# Patient Record
Sex: MALE | Race: WHITE | NOT HISPANIC OR LATINO | Employment: OTHER | URBAN - METROPOLITAN AREA
[De-identification: names, ages, dates, MRNs, and addresses within clinical notes are randomized per-mention and may not be internally consistent; named-entity substitution may affect disease eponyms.]

---

## 2017-01-06 ENCOUNTER — MEDICAL CORRESPONDENCE (OUTPATIENT)
Dept: HEALTH INFORMATION MANAGEMENT | Facility: CLINIC | Age: 62
End: 2017-01-06

## 2017-01-23 ENCOUNTER — INFUSION THERAPY VISIT (OUTPATIENT)
Dept: ONCOLOGY | Facility: CLINIC | Age: 62
End: 2017-01-23
Attending: INTERNAL MEDICINE
Payer: COMMERCIAL

## 2017-01-23 ENCOUNTER — ONCOLOGY VISIT (OUTPATIENT)
Dept: ONCOLOGY | Facility: CLINIC | Age: 62
End: 2017-01-23
Attending: PHYSICIAN ASSISTANT
Payer: COMMERCIAL

## 2017-01-23 ENCOUNTER — CARE COORDINATION (OUTPATIENT)
Dept: ONCOLOGY | Facility: CLINIC | Age: 62
End: 2017-01-23

## 2017-01-23 VITALS
TEMPERATURE: 98.8 F | SYSTOLIC BLOOD PRESSURE: 117 MMHG | WEIGHT: 182.3 LBS | HEART RATE: 74 BPM | BODY MASS INDEX: 24.72 KG/M2 | DIASTOLIC BLOOD PRESSURE: 70 MMHG | OXYGEN SATURATION: 100 % | RESPIRATION RATE: 18 BRPM

## 2017-01-23 DIAGNOSIS — D69.6 THROMBOCYTOPENIA (H): Primary | ICD-10-CM

## 2017-01-23 DIAGNOSIS — R11.2 NON-INTRACTABLE VOMITING WITH NAUSEA, UNSPECIFIED VOMITING TYPE: ICD-10-CM

## 2017-01-23 DIAGNOSIS — C96.9 T-CELL OR NK-CELL NEOPLASM (H): ICD-10-CM

## 2017-01-23 DIAGNOSIS — D63.0 ANEMIA IN NEOPLASTIC DISEASE: ICD-10-CM

## 2017-01-23 DIAGNOSIS — Z94.84 HISTORY OF PERIPHERAL STEM CELL TRANSPLANT (H): ICD-10-CM

## 2017-01-23 DIAGNOSIS — C86.00 EXTRANODAL NK/T-CELL LYMPHOMA, NASAL TYPE: ICD-10-CM

## 2017-01-23 LAB
ALBUMIN SERPL-MCNC: 2.9 G/DL (ref 3.4–5)
ALBUMIN UR-MCNC: 30 MG/DL
ALP SERPL-CCNC: 90 U/L (ref 40–150)
ALT SERPL W P-5'-P-CCNC: 24 U/L (ref 0–70)
ANION GAP SERPL CALCULATED.3IONS-SCNC: 9 MMOL/L (ref 3–14)
APPEARANCE UR: CLEAR
AST SERPL W P-5'-P-CCNC: 22 U/L (ref 0–45)
BASOPHILS # BLD AUTO: 0 10E9/L (ref 0–0.2)
BASOPHILS NFR BLD AUTO: 0.2 %
BILIRUB SERPL-MCNC: 0.8 MG/DL (ref 0.2–1.3)
BILIRUB UR QL STRIP: NEGATIVE
BUN SERPL-MCNC: 14 MG/DL (ref 7–30)
CALCIUM SERPL-MCNC: 8.4 MG/DL (ref 8.5–10.1)
CHLORIDE SERPL-SCNC: 105 MMOL/L (ref 94–109)
CO2 SERPL-SCNC: 23 MMOL/L (ref 20–32)
COLOR UR AUTO: YELLOW
CREAT SERPL-MCNC: 0.94 MG/DL (ref 0.66–1.25)
DIFFERENTIAL METHOD BLD: ABNORMAL
EOSINOPHIL # BLD AUTO: 0.1 10E9/L (ref 0–0.7)
EOSINOPHIL NFR BLD AUTO: 0.8 %
ERYTHROCYTE [DISTWIDTH] IN BLOOD BY AUTOMATED COUNT: 12.7 % (ref 10–15)
GFR SERPL CREATININE-BSD FRML MDRD: 81 ML/MIN/1.7M2
GLUCOSE SERPL-MCNC: 97 MG/DL (ref 70–99)
GLUCOSE UR STRIP-MCNC: 50 MG/DL
HCT VFR BLD AUTO: 39.8 % (ref 40–53)
HGB BLD-MCNC: 13.1 G/DL (ref 13.3–17.7)
HGB UR QL STRIP: NEGATIVE
IMM GRANULOCYTES # BLD: 0.1 10E9/L (ref 0–0.4)
IMM GRANULOCYTES NFR BLD: 0.4 %
KETONES UR STRIP-MCNC: NEGATIVE MG/DL
LDH SERPL L TO P-CCNC: 216 U/L (ref 85–227)
LEUKOCYTE ESTERASE UR QL STRIP: NEGATIVE
LYMPHOCYTES # BLD AUTO: 4.4 10E9/L (ref 0.8–5.3)
LYMPHOCYTES NFR BLD AUTO: 24.1 %
MCH RBC QN AUTO: 28.2 PG (ref 26.5–33)
MCHC RBC AUTO-ENTMCNC: 32.9 G/DL (ref 31.5–36.5)
MCV RBC AUTO: 86 FL (ref 78–100)
MONOCYTES # BLD AUTO: 2.1 10E9/L (ref 0–1.3)
MONOCYTES NFR BLD AUTO: 11.5 %
MUCOUS THREADS #/AREA URNS LPF: PRESENT /LPF
NEUTROPHILS # BLD AUTO: 11.5 10E9/L (ref 1.6–8.3)
NEUTROPHILS NFR BLD AUTO: 63 %
NITRATE UR QL: NEGATIVE
NRBC # BLD AUTO: 0 10*3/UL
NRBC BLD AUTO-RTO: 0 /100
PH UR STRIP: 6 PH (ref 5–7)
PLATELET # BLD AUTO: 332 10E9/L (ref 150–450)
POTASSIUM SERPL-SCNC: 3.4 MMOL/L (ref 3.4–5.3)
PROT SERPL-MCNC: 6.8 G/DL (ref 6.8–8.8)
RBC # BLD AUTO: 4.65 10E12/L (ref 4.4–5.9)
RBC #/AREA URNS AUTO: <1 /HPF (ref 0–2)
SODIUM SERPL-SCNC: 137 MMOL/L (ref 133–144)
SP GR UR STRIP: 1.02 (ref 1–1.03)
SQUAMOUS #/AREA URNS AUTO: <1 /HPF (ref 0–1)
URN SPEC COLLECT METH UR: ABNORMAL
UROBILINOGEN UR STRIP-MCNC: 4 MG/DL (ref 0–2)
WBC # BLD AUTO: 18.2 10E9/L (ref 4–11)
WBC #/AREA URNS AUTO: 1 /HPF (ref 0–2)

## 2017-01-23 PROCEDURE — 25000125 ZZHC RX 250: Mod: ZF | Performed by: PHYSICIAN ASSISTANT

## 2017-01-23 PROCEDURE — 96374 THER/PROPH/DIAG INJ IV PUSH: CPT

## 2017-01-23 PROCEDURE — 80053 COMPREHEN METABOLIC PANEL: CPT | Performed by: INTERNAL MEDICINE

## 2017-01-23 PROCEDURE — 96361 HYDRATE IV INFUSION ADD-ON: CPT

## 2017-01-23 PROCEDURE — 83615 LACTATE (LD) (LDH) ENZYME: CPT | Performed by: INTERNAL MEDICINE

## 2017-01-23 PROCEDURE — 87040 BLOOD CULTURE FOR BACTERIA: CPT | Performed by: PHYSICIAN ASSISTANT

## 2017-01-23 PROCEDURE — 81001 URINALYSIS AUTO W/SCOPE: CPT | Performed by: PHYSICIAN ASSISTANT

## 2017-01-23 PROCEDURE — 25000128 H RX IP 250 OP 636: Mod: ZF | Performed by: PHYSICIAN ASSISTANT

## 2017-01-23 PROCEDURE — 99214 OFFICE O/P EST MOD 30 MIN: CPT | Mod: ZP | Performed by: PHYSICIAN ASSISTANT

## 2017-01-23 PROCEDURE — 85025 COMPLETE CBC W/AUTO DIFF WBC: CPT | Performed by: INTERNAL MEDICINE

## 2017-01-23 RX ADMIN — SODIUM CHLORIDE 8 MG: 9 INJECTION, SOLUTION INTRAVENOUS at 16:43

## 2017-01-23 RX ADMIN — SODIUM CHLORIDE 1000 ML: 9 INJECTION, SOLUTION INTRAVENOUS at 16:42

## 2017-01-23 NOTE — MR AVS SNAPSHOT
After Visit Summary   1/23/2017    Hi Smith    MRN: [id number]           Patient Information     Date Of Birth          1955        Visit Information        Provider Department      1/23/2017 4:50 PM Brit Elias PA-C [facility], located within [facility]        Today's Diagnoses     History of peripheral stem cell transplant (H)        T-cell or NK-cell neoplasm (H)           Follow-ups after your visit        Your next 10 appointments already scheduled     Mar 23, 2017 11:45 AM CDT   [facility]  [facility]   [facility] Lab Draw ([facility])    [address] [place] [address] [id number] [phone]            Mar 23, 2017 12:30 PM CDT   (Arrive by 12:15 PM)   Return Visit with Yuri Hill MD   [facility] ([facility])    [address] [place] [address] [id number] [phone]              Who to contact     If you have questions or need follow up information about today's clinic visit or your schedule please contact [facility] directly at [phone].  Normal or non-critical lab and imaging results will be communicated to you by MyChart, letter or phone within 4 business days after the clinic has received the results. If you do not hear from us within 7 days, please contact the clinic through [employer] or phone. If you have a critical or abnormal lab result, we will notify you by phone as soon as possible.  Submit refill requests through [employer] or call your pharmacy and they will forward the refill request to us. Please allow 3 business days for your refill to be completed.          Additional Information About Your Visit        MyChart Information     [employer] gives you secure access to your electronic health record. If you see a primary care provider, you can also send messages to your care team and make appointments. If you have  questions, please call your primary care clinic.  If you do not have a primary care provider, please call [phone] and they will assist you.        Care EveryWhere ID     This is your Care EveryWhere ID. This could be used by other organizations to access your [place] medical records  [id number]        Your Vitals Were     Pulse Temperature Respirations Pulse Oximetry BMI (Body Mass Index)       74 98.8  F (37.1  C) (Oral) 18 100% 24.72 kg/m2        Blood Pressure from Last 3 Encounters:   01/30/17 112/73   01/25/17 125/77   01/23/17 117/70    Weight from Last 3 Encounters:   01/30/17 82.1 kg (181 lb 1.6 oz)   01/25/17 83.2 kg (183 lb 6.4 oz)   01/23/17 82.7 kg (182 lb 4.8 oz)              We Performed the Following     *CBC with platelets differential     Comprehensive metabolic panel     Lactate Dehydrogenase        Primary Care Provider Office Phone # Fax #    Glenroy Brian [phone]       [facility] [address] [address] [facility] [address]        Thank you!     Thank you for choosing [facility]  for your care. Our goal is always to provide you with excellent care. Hearing back from our patients is one way we can continue to improve our services. Please take a few minutes to complete the written survey that you may receive in the mail after your visit with us. Thank you!             Your Updated Medication List - Protect others around you: Learn how to safely use, store and throw away your medicines at www.disposemymeds.org.          This list is accurate as of: 1/23/17 11:59 PM.  Always use your most recent med list.                   Brand Name Dispense Instructions for use    amLODIPine 5 MG tablet    NORVASC    30 tablet    Take 1 tablet (5 mg) by mouth daily       ASPIRIN PO      Take 81 mg by mouth daily       LIPITOR PO      Take 10 mg by mouth       PHOSPHA 250 NEUTRAL 250 MG per tablet   Generic drug:  phosphorus tablet 250 mg     270 tablet     TAKE ONE TABLET BY MOUTH TWICE A DAY       venlafaxine 37.5 MG 24 hr capsule    EFFEXOR XR    90 capsule    Take 1 capsule (37.5 mg) by mouth daily

## 2017-01-23 NOTE — PROGRESS NOTES
"Patient's wife called this AM to see if her  could get into clinic this afternoon to see a provider and possible get some IV fluids.  Patient's wife stated that is all started about 1/12/17. He had been to the dentist and had a tooth pulled and was started on Amoxicillin. Things were going well and then he went to his granddaughters birthday party on 1/15. His granddaughter had been sick but was feeling better for 24 hrs plus so they didn't cancel the party. On the morning on 1/16 Mr. Smith was just not feeling well and his stomach hurt so bad. He stopped the Amoxicillin as he wasn't eating or feeling well. The night of the 16th he starting vomiting. He vomited pretty much all night and woke up feeling weak and worn out. He layed low all day on the 17th and didn't eat much or drink much. His wife did get Gatorade and Pedialyte but the patient didn't drink much of it. He started to feel better but then the feeling of \"blah\" and weakness waxed and waned for the next couple of days and then he got diarrhea which he had the 19th and 20th. He now has not had a BM since then. He states that his tank is low and he needs a refill meaning IV fluids. Patient has denied a fever through out this whole ordeal.  Patient was scheduled for labs, f/u w/ Elisabeth and possible IV fluids this afternoon. Wife and patient grateful and will be here this afternoon.    "

## 2017-01-24 ENCOUNTER — TELEPHONE (OUTPATIENT)
Dept: ONCOLOGY | Facility: CLINIC | Age: 62
End: 2017-01-24

## 2017-01-24 ASSESSMENT — ENCOUNTER SYMPTOMS
RECTAL BLEEDING: 0
FATIGUE: 0
INCREASED ENERGY: 0
WEIGHT GAIN: 0
BOWEL INCONTINENCE: 0
BLOATING: 0
POLYDIPSIA: 0
FEVER: 0
ABDOMINAL PAIN: 0
CONSTIPATION: 0
ALTERED TEMPERATURE REGULATION: 0
HEARTBURN: 0
NIGHT SWEATS: 0
DIARRHEA: 1
VOMITING: 1
RECTAL PAIN: 0
DECREASED APPETITE: 0
NAUSEA: 1
JAUNDICE: 0
WEIGHT LOSS: 1
HALLUCINATIONS: 0
CHILLS: 0
BLOOD IN STOOL: 0
POLYPHAGIA: 0

## 2017-01-24 NOTE — PROGRESS NOTES
[facility] CANCER CLINIC  FOLLOW-UP VISIT NOTE  Date of visit: 1-23-17          REASON FOR VISIT: lymphoma, coming in with N/V/D    HPI: Hi Smith, 61 years of age, has a history of extranodal NK/T-cell lymphoma (nasal type) that has been localized to multiple cutaneous/subcutaneous sites.  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  By 12/2015, he had evidence for recurrence on the right lower extremity (biopsy proven).  Recurrence appeared to be localized to the right ankle and possibly, although undocumented, on the right wrist.  He was given involved field radiation to these areas.  Subsequently, he has had multiple subcutaneous nodules that arise and often resolve spontaneously.  Others show inflammation and cutaneous crusting with erythema and subsequent regression.    At Colton's last visit with Dr Hill, they continue to follow his lesions with the help of Dr Eaton and he remains off active therapy.     INTERVAL HISTORY: Colton called into triage today requesting IVF.  States last week he was at his grandchilds birthday and subsequently 4-5 of the adults ended up with a likely viral gastroenteritis.  The other family members were better in about 2-3 days, but Colton had emesis and diarrhea for about 4 days.  Over the weekend he denied emesis/diarrhea, in fact he had a small formed stool this AM, but he felt weak and very tired.  He has been mostly sleeping, but able to perform all his ADLs.  Intake has been limited to gatorade and soups, nothing hearty.     He denies f/s/c.  No arthralgias/myalgias.  No chest pain/cough/dyspnea. No URI symptoms.  No  symptoms. No new rash or edema, in fact in the last month with the aid  of compression stockings his TABBY is resolved.  His lesions from his lymphoma are all stable-- two are open without s/s of infection.      EXAM:    Vital Signs 1/23/2017   Systolic 117   Diastolic 70   Pulse 74   Temperature 98.8   Respirations 18   Weight (LB) 182 lb 4.8 oz   O2 100   Vital signs were reviewed.   Patient alert and oriented x3.   PERRLA. EOMI. No scleral icterus noted. OP without thrush/sores.  Neck exam: No palpable cervical, supraclavicular or axillary nodes bilaterally.   Heart: RRR no murmurs noted.   Lungs: clear to auscultation bilaterally.  No crackles or wheezing.   Abd: positive bowel sounds in all four quadrants.  No tenderness to palpation.  No hepatomegaly.   Extremities: No lower extremity edema.   Neuro: grossly intact.   Mood and affect is stable  Skin was thoroughly assessed - open lesion on the left forearm and left lateral ankle - both c/d/i    LABS:      11/23/2016 10:56 1/23/2017 16:41   Sodium 144 137   Potassium 3.9 3.4   Chloride 108 105   Carbon Dioxide 26 23   Urea Nitrogen 19 14   Creatinine 0.85 0.94   GFR Estimate >90... 81   GFR Estimate If Black >90... >90...   Calcium 8.3 (L) 8.4 (L)   Anion Gap 10 9   Phosphorus 2.8    Albumin 3.5 2.9 (L)   Protein Total 6.1 (L) 6.8   Bilirubin Total 0.7 0.8   Alkaline Phosphatase 90 90   ALT 21 24   AST 24 22   Lactate Dehydrogenase 174 216   Uric Acid 2.2 (L)    Glucose 81 97   WBC 9.7 18.2 (H)   Hemoglobin 13.3 13.1 (L)   Hematocrit 42.6 39.8 (L)   Platelet Count 202 332   RBC Count 4.62 4.65   MCV 92 86   Nucleated RBCs 0 0   Absolute Neutrophil 4.7 11.5 (H)   Absolute Lymphocytes 3.6 4.4   Absolute Monocytes 1.2 2.1 (H)   Absolute Eosinophils 0.2 0.1   Absolute Basophils 0.0 0.0   Abs Immature Granulocytes 0.0 0.1   Absolute Nucleated RBC 0.0 0.0     ASSESSMENT/PLAN: 61 year old with T-cell lymphoma, with leukocytosis and resolved gastroenteritis    GI/ID: Sounds like Colton's whole family had viral gastroenteritis last week.  His  "symptoms took about two days longer to resolve, which given his treatment history,  May not be surprising.  He was given IVF today and will call in tomorrow if he feels he needs to come back again. His vitals and creat/lytes were stable, thus not profoundly dehydrated.  Clinically he looked good- non toxic with no recent fevers.  I don't have a good idea of what is causing his leukocytosis/neutrophilia as he doesn't appear to have any s/s of bacterial infection.  His two open lesions appear uninfected and both Colton and his wife confirm they look \"typical\".   Will obtain BC and UA/UC and he is returning to clinic on Wednesday to see Dr Hill and we'll repeat a CBC again.      Elisabeth Elias PA-C  "

## 2017-01-24 NOTE — PROGRESS NOTES
"Infusion Nursing Note:  Pt added on to infusion schedule for IVFs/Zofran.  Patient seen by LANDON Cummins in infusion.    WBC: 18.2   Hgb: 13.1   Plt: 332   ANC: 11.5  Creatinine: 0.94  Bili: 0.8  K: 3.4      Note: N/A.  Proceed with treatment. BC and Urine sent to lab    Intravenous Access:  Peripheral IV placed. Pt has fear of having PIV placed and requests to keep it in place for Wednesday. States he is \"more afraid of having PIV placed then a BMBX\".    TORB 1/23/2017 @ 1745 ALNDON Bai/Emanuel Garcia RN  --OK to leave PIV in place for possible IVFs Wednesday      Post Infusion Assessment:  Patient tolerated infusion without incident.  Blood return noted pre and post infusion.  PIV flushed and left intact at end of infusion.    Discharge Plan:   Patient declined prescription refills.  Discharge instructions reviewed with: Patient.  Patient and/or family verbalized understanding of discharge instructions and all questions answered.  Copy of AVS reviewed with patient and/or family.  Pt will return Wednesday ,1/25, for provider and possible IVFs.            "

## 2017-01-24 NOTE — TELEPHONE ENCOUNTER
Received a request for clarification from [facility] [employer], Disability Nurse  Kristina Velazquez at [phone] ext [phone], fax [phone], in regards to Hi's disability claim. Spoke to Hi's who reported he tried to return to work but could not due to extreme fatigue. He had to retire in January. Okayed by Dr. Hill to indicate again that Hi is unable to work, even in a part time role. Faxed letter to Kristina Velazquez, copy mailed to patient.

## 2017-01-24 NOTE — TELEPHONE ENCOUNTER
Follow up call to Colton this morning.  He states he is feeling much better after getting fluids and Zofran yesterday.  He slept well last night.  Pt plans to return to clinic to see Dr. Hill on 1/24/17.

## 2017-01-25 ENCOUNTER — ONCOLOGY VISIT (OUTPATIENT)
Dept: ONCOLOGY | Facility: CLINIC | Age: 62
End: 2017-01-25
Attending: INTERNAL MEDICINE
Payer: COMMERCIAL

## 2017-01-25 ENCOUNTER — APPOINTMENT (OUTPATIENT)
Dept: LAB | Facility: CLINIC | Age: 62
End: 2017-01-25
Attending: INTERNAL MEDICINE
Payer: COMMERCIAL

## 2017-01-25 VITALS
BODY MASS INDEX: 24.84 KG/M2 | TEMPERATURE: 98 F | DIASTOLIC BLOOD PRESSURE: 77 MMHG | WEIGHT: 183.4 LBS | HEIGHT: 72 IN | OXYGEN SATURATION: 96 % | HEART RATE: 78 BPM | SYSTOLIC BLOOD PRESSURE: 125 MMHG

## 2017-01-25 DIAGNOSIS — C96.9 T-CELL OR NK-CELL NEOPLASM (H): ICD-10-CM

## 2017-01-25 LAB
ALBUMIN SERPL-MCNC: 2.9 G/DL (ref 3.4–5)
ALP SERPL-CCNC: 79 U/L (ref 40–150)
ALT SERPL W P-5'-P-CCNC: 25 U/L (ref 0–70)
ANION GAP SERPL CALCULATED.3IONS-SCNC: 10 MMOL/L (ref 3–14)
AST SERPL W P-5'-P-CCNC: 15 U/L (ref 0–45)
BASOPHILS # BLD AUTO: 0 10E9/L (ref 0–0.2)
BASOPHILS NFR BLD AUTO: 0.2 %
BILIRUB SERPL-MCNC: 0.7 MG/DL (ref 0.2–1.3)
BUN SERPL-MCNC: 12 MG/DL (ref 7–30)
CALCIUM SERPL-MCNC: 8.6 MG/DL (ref 8.5–10.1)
CHLORIDE SERPL-SCNC: 105 MMOL/L (ref 94–109)
CO2 SERPL-SCNC: 23 MMOL/L (ref 20–32)
CREAT SERPL-MCNC: 1.01 MG/DL (ref 0.66–1.25)
DIFFERENTIAL METHOD BLD: ABNORMAL
EOSINOPHIL # BLD AUTO: 0.1 10E9/L (ref 0–0.7)
EOSINOPHIL NFR BLD AUTO: 0.8 %
ERYTHROCYTE [DISTWIDTH] IN BLOOD BY AUTOMATED COUNT: 12.8 % (ref 10–15)
GFR SERPL CREATININE-BSD FRML MDRD: 75 ML/MIN/1.7M2
GLUCOSE SERPL-MCNC: 98 MG/DL (ref 70–99)
HCT VFR BLD AUTO: 39.1 % (ref 40–53)
HGB BLD-MCNC: 12.8 G/DL (ref 13.3–17.7)
IMM GRANULOCYTES # BLD: 0.1 10E9/L (ref 0–0.4)
IMM GRANULOCYTES NFR BLD: 0.5 %
LDH SERPL L TO P-CCNC: 154 U/L (ref 85–227)
LYMPHOCYTES # BLD AUTO: 3.6 10E9/L (ref 0.8–5.3)
LYMPHOCYTES NFR BLD AUTO: 21 %
MCH RBC QN AUTO: 27.8 PG (ref 26.5–33)
MCHC RBC AUTO-ENTMCNC: 32.7 G/DL (ref 31.5–36.5)
MCV RBC AUTO: 85 FL (ref 78–100)
MONOCYTES # BLD AUTO: 1.9 10E9/L (ref 0–1.3)
MONOCYTES NFR BLD AUTO: 11.1 %
NEUTROPHILS # BLD AUTO: 11.3 10E9/L (ref 1.6–8.3)
NEUTROPHILS NFR BLD AUTO: 66.4 %
NRBC # BLD AUTO: 0 10*3/UL
NRBC BLD AUTO-RTO: 0 /100
PLATELET # BLD AUTO: 349 10E9/L (ref 150–450)
POTASSIUM SERPL-SCNC: 3.1 MMOL/L (ref 3.4–5.3)
PROT SERPL-MCNC: 6.5 G/DL (ref 6.8–8.8)
RBC # BLD AUTO: 4.6 10E12/L (ref 4.4–5.9)
SODIUM SERPL-SCNC: 138 MMOL/L (ref 133–144)
WBC # BLD AUTO: 17.1 10E9/L (ref 4–11)

## 2017-01-25 PROCEDURE — 83615 LACTATE (LD) (LDH) ENZYME: CPT | Performed by: PHYSICIAN ASSISTANT

## 2017-01-25 PROCEDURE — 99214 OFFICE O/P EST MOD 30 MIN: CPT | Mod: ZP | Performed by: INTERNAL MEDICINE

## 2017-01-25 PROCEDURE — 80053 COMPREHEN METABOLIC PANEL: CPT | Performed by: PHYSICIAN ASSISTANT

## 2017-01-25 PROCEDURE — 85025 COMPLETE CBC W/AUTO DIFF WBC: CPT | Performed by: PHYSICIAN ASSISTANT

## 2017-01-25 RX ORDER — HYDROCODONE BITARTRATE AND ACETAMINOPHEN 5; 325 MG/1; MG/1
TABLET ORAL
COMMUNITY
Start: 2017-01-04 | End: 2017-01-25

## 2017-01-25 RX ORDER — MUPIROCIN CALCIUM 20 MG/G
CREAM TOPICAL DAILY PRN
COMMUNITY
Start: 2016-08-15 | End: 2017-05-18

## 2017-01-25 RX ORDER — AMOXICILLIN 500 MG/1
CAPSULE ORAL
COMMUNITY
Start: 2017-01-04 | End: 2017-01-25

## 2017-01-25 RX ORDER — SULFAMETHOXAZOLE/TRIMETHOPRIM 800-160 MG
TABLET ORAL
COMMUNITY
End: 2017-01-25

## 2017-01-25 ASSESSMENT — PAIN SCALES - GENERAL: PAINLEVEL: NO PAIN (0)

## 2017-01-25 NOTE — NURSING NOTE
"Hi Smith is a 61 year old male who presents for:  Chief Complaint   Patient presents with     Blood Draw     vs/labs by [facility]     Oncology Clinic Visit     t cell lymphoma 2 month follow up        Initial Vitals:  /77 mmHg  Pulse 78  Temp(Src) 98  F (36.7  C) (Oral)  Ht 1.829 m (6' 0.01\")  Wt 83.19 kg (183 lb 6.4 oz)  BMI 24.87 kg/m2  SpO2 96% Estimated body mass index is 24.87 kg/(m^2) as calculated from the following:    Height as of this encounter: 1.829 m (6' 0.01\").    Weight as of this encounter: 83.19 kg (183 lb 6.4 oz).. Body surface area is 2.06 meters squared. BP completed using cuff size: NA (Not Taken)  No Pain (0) No LMP for male patient. Allergies and medications reviewed.     Medications: MEDICATION REFILLS NEEDED TODAY.  Pharmacy name entered into [employer]: [facility] [place], [place] - [address], [address]    Comments: requesting refill of effexor, would like to talk to provider about dosage. Vitals taken in lab today    7 minutes for nursing intake (face to face time)   Victorina Galdamez CMA          "

## 2017-01-25 NOTE — PROGRESS NOTES
HISTORY OF PRESENT ILLNESS:  Hi Smith, 61 years of age, has a history of extranodal NK/T-cell lymphoma (nasal type) that has been localized to multiple cutaneous/subcutaneous sites.  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  By 12/2015, he had evidence for recurrence on the right lower extremity (biopsy proven).  Recurrence appeared to be localized to the right ankle and possibly, although undocumented, on the right wrist.  He was given involved field radiation to these areas.  Subsequently, he has had multiple subcutaneous nodules that arise and often resolve spontaneously.  Others show inflammation and cutaneous crusting with erythema and subsequent regression.      As previously documented, he has continued to have new lesions, predominantly on his lower legs, that appear and resolve.  Those on his legs sometimes superficially ulcerate and crust over.  He has been helped immensely by the input of Dr. Eaton with respect to wound control. His last visit was in 11/2016.    INTERIM HISTORY:  has been struggling with fatigue and anorexia over the past 7-10 days. He reports that 2 weeks ago today he went to the dentist and had a tooth pulled. At that time he was given amoxicillin. He went to a child's birthday party the following weekend (apparently all of the children at that party came down with a GI illness with vomiting/diarrhea that resolved in about 3 days) and the following Monday he began to feel ill. He noticed feeling generally weak.  He developed nausea and has had intermittent vomiting. He states that toward the end of last week he had a single day of severe diarrhea (but only a couple of bowel movements) and he has since not had  "a bowel movement in about 4-5 days.  He continues to have nausea and has vomited several times. He is able to keep most of his PO intake down. He states that in the past 1-2 days he has noticed a bit of a fever with chills. The highest temp he has documented at home was 100.3.  He received IV fluids in clinic on Monday. He thinks this helped a little bit. He currently feels constipated. He does not endorse abdominal pain. Again, the nausea does not seem to be generally improving.    He denies all upper respiratory symptoms, night sweats or fevers outside of this illness.    He denies any new type of rash. He notes nodules that come and go as they typically do. With one active and ulcerated on his left distal forearm and one healing on his right distal forearm. Overall, the status of these nodules continues to improve.     REVIEW OF SYSTEMS:  A 10-point review of systems is otherwise normal.     Current Outpatient Prescriptions on File Prior to Visit:  PHOSPHA 250 NEUTRAL [phone] MG tablet TAKE ONE TABLET BY MOUTH TWICE A DAY   amLODIPine (NORVASC) 5 MG tablet Take 1 tablet (5 mg) by mouth daily   venlafaxine (EFFEXOR XR) 37.5 MG 24 hr capsule Take 1 capsule (37.5 mg) by mouth daily   ASPIRIN PO Take 81 mg by mouth daily   Atorvastatin Calcium (LIPITOR PO) Take 10 mg by mouth     No current facility-administered medications on file prior to visit.     ALLERGIES:  None reported.      PHYSICAL EXAMINATION:   /77 mmHg  Pulse 78  Temp(Src) 98  F (36.7  C) (Oral)  Ht 1.829 m (6' 0.01\")  Wt 83.19 kg (183 lb 6.4 oz)  BMI 24.87 kg/m2  SpO2 96%    HEENT:  Anicteric.  No conjunctival lesions.  Oropharynx - he has a couple small, white ulcerations on his left buccal mucosa.  Mucosa - moist.  No thrush.  NECK:  Supple without adenopathy.   CHEST:  Clear to auscultation.   ABDOMEN:  Soft, without organomegaly or mass.  No tenderness.  Bowel sounds present.   EXTREMITIES:  No edema.  SKIN:  No clearly identifiable " subcutaneous nodules above the thighs.  He has a small open sore on his left knee w/o associated nodule.  He has healing nodules on his distal legs. He has a healing subcutaneous nodule on his right distal forearm.  He has a persistent ulcerated lesion on the left distal forearm, but this also appears dry, crusted and less inflammed.       LABORATORY:     WBC     17.1   1/25/2017  RBC     4.60   1/25/2017  HGB     12.8   1/25/2017  HCT     39.1   1/25/2017  MCV       85   1/25/2017  MCH     27.8   1/25/2017  MCHC     32.7   1/25/2017  RDW     12.8   1/25/2017  PLT      349   1/25/2017    Last Basic Metabolic Panel:  NA      138   1/25/2017   POTASSIUM      3.1   1/25/2017  CHLORIDE      105   1/25/2017  ARIK      8.6   1/25/2017  CO2       23   1/25/2017  BUN       12   1/25/2017  CR     1.01   1/25/2017  GLC       98   1/25/2017      Liver Function Studies -   Recent Labs   Lab Test  01/25/17   1120   PROTTOTAL  6.5*   ALBUMIN  2.9*   BILITOTAL  0.7   ALKPHOS  79   AST  15   ALT  25         ASSESSMENT AND PLAN:  GI illness characterized by nausea/vomting, prior diarrhea, now constipation, and fatigue - By clinical history this seems most consistent with a viral gastroenteritis given the exposure history.  It is possible that he has a prolonged course given that he was on amoxicillin at the time this began and he has an impaired immune system as he is s/p allo-transplant.  I thinks it is unlikely that this represents the development of GVHD given the acute onset, history, and the neutrophilia on labs.  I encouraged him to use a more aggressive bowel regimen (miralax in addition to stool softeners) and to push the fluids.  We will have him come back to see an SARITA in clinic on Monday to see how he is doing.  I am hopeful that he will be improving by then, if he is not then further workup may be needed, and he and his wife will be able to go on their planned cruise.    NK/T-cell lymphoma (nasal type), multiply recurrent  and persistent. He is generally doing well.   The patient continues to have some evidence for new sites of disease, but less numerous than befoer.  However, as before and since his allogeneic transplant, most of the lesions appear and then slowly disappear.  Overall his skin is doing well.  We will continue to observe these.  We will arrange follow up once the acute issue has been clarified.  He continues to work w/ Dr. Eaton as needed.       Patient was seen with and plan of care developed with Dr. Hill.    Russ Stubbs MD  Heme/onc Fellow    Oncology Attending    Patient seen and examined with the Oncology Fellow, Dr. Stubbs. Agree with his findings, assessment and plan.    Yuri Hill MD  Professor of Medicine  Oncology  [facility]  Office: [phone]  Clinic Fax: [phone]      cc:      MD Ruth Prince MD Daniel Miller, MD Bailey Lee, MD Roger Winnick, MD Nathan Norquist, MD Marie-Claire Buckley, MD

## 2017-01-25 NOTE — NURSING NOTE
Chief Complaint   Patient presents with     Blood Draw     vs/labs by cma   Labs drawn off piv and sent for processing. See doc flowsheets for details.    JEANNINE Morales, CMA

## 2017-01-25 NOTE — Clinical Note
1/25/2017       RE: Hi Smith  [address] [facility] [address]     Dear Colleague,    Thank you for referring your patient, Hi Smith, to the [facility]. Please see a copy of my visit note below.    HISTORY OF PRESENT ILLNESS:    Hi Smith, 61 years of age, has a history of extranodal NK/T-cell lymphoma (nasal type) that has been localized to multiple cutaneous/subcutaneous sites.  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  By 12/2015, he had evidence for recurrence on the right lower extremity (biopsy proven).  Recurrence appeared to be localized to the right ankle and possibly, although undocumented, on the right wrist.  He was given involved field radiation to these areas.  Subsequently, he has had multiple subcutaneous nodules that arise and often resolve spontaneously.  Others show inflammation and cutaneous crusting with erythema and subsequent regression.      As previously documented, he has continued to have new lesions, predominantly on his lower legs, that come and resolve.  Those on his legs sometimes superficially ulcerate and crust over.  He has been helped immensely by the input of Dr. Eaton with respect to wound control.       His last visit with Dr. Hill was in 11/2016.    He has been struggling with illness over the past 7-10 days. He reports that 2 weeks ago today he went to the dentist and had a tooth pulled. At that time he was given amoxicillin. He went to a child's birthday party the following weekend (apparently all of the children at that party came down with a GI illness with vomiting/diarrhea that resolved in about 3 days) and the following Monday he began to feel ill. He noticed  "feeling generally weak.  He developed nausea and has had intermittent vomiting. He states that toward the end of last week he had a single day of severe diarrhea (but only a couple of bowel movements) and he has since not had a bowel movement in about 4-5 days.  He continues to have nausea and has vomited several times. He is able to keep most of his PO intake down. He states that in the past 1-2 days he has noticed a bit of a fever with chills. The highest temp he has documented at home was 100.3.  He received IV fluids in clinic on Monday. He thinks this helped a little bit. He currently feels constipated. He does not endorse abdominal pain. Again, the nausea does not seem to be generally improving.    He denies all upper respiratory symptoms.    He denies night sweats. No fevers outside of this illness.    He denies any new type of rash. He notes nodules that come and go as they typically do. With one active and ulcerated on his left distal forearm and one healing on his right distal forearm.     REVIEW OF SYSTEMS:  A 10-point review of systems is otherwise normal.       Current Outpatient Prescriptions on File Prior to Visit:  PHOSPHA 250 NEUTRAL [id number] MG tablet TAKE ONE TABLET BY MOUTH TWICE A DAY   amLODIPine (NORVASC) 5 MG tablet Take 1 tablet (5 mg) by mouth daily   venlafaxine (EFFEXOR XR) 37.5 MG 24 hr capsule Take 1 capsule (37.5 mg) by mouth daily   ASPIRIN PO Take 81 mg by mouth daily   Atorvastatin Calcium (LIPITOR PO) Take 10 mg by mouth     No current facility-administered medications on file prior to visit.          ALLERGIES:  None reported.      PHYSICAL EXAMINATION:   /77 mmHg  Pulse 78  Temp(Src) 98  F (36.7  C) (Oral)  Ht 1.829 m (6' 0.01\")  Wt 83.19 kg (183 lb 6.4 oz)  BMI 24.87 kg/m2  SpO2 96%    HEENT:  Anicteric.  No conjunctival lesions.  Oropharynx - he has a couple small, white ulcerations on his left buccal mucosa.  Mucosa - moist.    NECK:  Supple without adenopathy. "   CHEST:  Clear to auscultation.   ABDOMEN:  Soft, without organomegaly or mass.  No tenderness.  Bowel sounds present.   EXTREMITIES:  He has 1+ right lower extremity edema and 2+ left lower extremity edema.   SKIN:  No clearly identifiable subcutaneous nodules above the thighs.  He has a healing subcutaneous nodule on his right distal forearm.  He has an ulcerated lesion on the left distal forearm.       LABORATORY:     WBC     17.1   1/25/2017  RBC     4.60   1/25/2017  HGB     12.8   1/25/2017  HCT     39.1   1/25/2017  MCV       85   1/25/2017  MCH     27.8   1/25/2017  MCHC     32.7   1/25/2017  RDW     12.8   1/25/2017  PLT      349   1/25/2017    Last Basic Metabolic Panel:  NA      138   1/25/2017   POTASSIUM      3.1   1/25/2017  CHLORIDE      105   1/25/2017  ARIK      8.6   1/25/2017  CO2       23   1/25/2017  BUN       12   1/25/2017  CR     1.01   1/25/2017  GLC       98   1/25/2017      Liver Function Studies -   Recent Labs   Lab Test  01/25/17   1120   PROTTOTAL  6.5*   ALBUMIN  2.9*   BILITOTAL  0.7   ALKPHOS  79   AST  15   ALT  25         ASSESSMENT AND PLAN:    # GI illness characterized by nausea/vomting, constipation, fatigue - By clinical history this seems most consistent with a viral gastroenteritis     NK/T-cell lymphoma (nasal type), multiply recurrent and persistent.  The patient continues to have some evidence for new sites of disease.  However, as before and since his allogeneic transplant, most of the lesions appear and then slowly disappear.  Those on his left lower leg, in particular, show signs of ulceration and crusting.  The one on his left foot is particularly problematic at present.  He is addressing these appropriately as per recommendations of Dr. Eaton.       Mr. Smith is generally doing well but is limited by the recurrent and chronic ulcerations.     The patient will return to our clinic in approximately 2 months with laboratory studies.               Again, thank you for  allowing me to participate in the care of your patient.      Sincerely,    Yuri Hill MD

## 2017-01-25 NOTE — Clinical Note
1/25/2017      RE: Hi Smith  [address] [facility] [address]       HISTORY OF PRESENT ILLNESS:  Hi Smith, 61 years of age, has a history of extranodal NK/T-cell lymphoma (nasal type) that has been localized to multiple cutaneous/subcutaneous sites.  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  By 12/2015, he had evidence for recurrence on the right lower extremity (biopsy proven).  Recurrence appeared to be localized to the right ankle and possibly, although undocumented, on the right wrist.  He was given involved field radiation to these areas.  Subsequently, he has had multiple subcutaneous nodules that arise and often resolve spontaneously.  Others show inflammation and cutaneous crusting with erythema and subsequent regression.      As previously documented, he has continued to have new lesions, predominantly on his lower legs, that appear and resolve.  Those on his legs sometimes superficially ulcerate and crust over.  He has been helped immensely by the input of Dr. Eaton with respect to wound control. His last visit was in 11/2016.    INTERIM HISTORY:  has been struggling with fatigue and anorexia over the past 7-10 days. He reports that 2 weeks ago today he went to the dentist and had a tooth pulled. At that time he was given amoxicillin. He went to a child's birthday party the following weekend (apparently all of the children at that party came down with a GI illness with vomiting/diarrhea that resolved in about 3 days) and the following Monday he began to feel ill. He noticed feeling generally weak.  He developed nausea and has had intermittent vomiting. He states that toward the end of last week he had a single day  "of severe diarrhea (but only a couple of bowel movements) and he has since not had a bowel movement in about 4-5 days.  He continues to have nausea and has vomited several times. He is able to keep most of his PO intake down. He states that in the past 1-2 days he has noticed a bit of a fever with chills. The highest temp he has documented at home was 100.3.  He received IV fluids in clinic on Monday. He thinks this helped a little bit. He currently feels constipated. He does not endorse abdominal pain. Again, the nausea does not seem to be generally improving.    He denies all upper respiratory symptoms, night sweats or fevers outside of this illness.    He denies any new type of rash. He notes nodules that come and go as they typically do. With one active and ulcerated on his left distal forearm and one healing on his right distal forearm. Overall, the status of these nodules continues to improve.     REVIEW OF SYSTEMS:  A 10-point review of systems is otherwise normal.     Current Outpatient Prescriptions on File Prior to Visit:  PHOSPHA 250 NEUTRAL [id number] MG tablet TAKE ONE TABLET BY MOUTH TWICE A DAY   amLODIPine (NORVASC) 5 MG tablet Take 1 tablet (5 mg) by mouth daily   venlafaxine (EFFEXOR XR) 37.5 MG 24 hr capsule Take 1 capsule (37.5 mg) by mouth daily   ASPIRIN PO Take 81 mg by mouth daily   Atorvastatin Calcium (LIPITOR PO) Take 10 mg by mouth     No current facility-administered medications on file prior to visit.     ALLERGIES:  None reported.      PHYSICAL EXAMINATION:   /77 mmHg  Pulse 78  Temp(Src) 98  F (36.7  C) (Oral)  Ht 1.829 m (6' 0.01\")  Wt 83.19 kg (183 lb 6.4 oz)  BMI 24.87 kg/m2  SpO2 96%    HEENT:  Anicteric.  No conjunctival lesions.  Oropharynx - he has a couple small, white ulcerations on his left buccal mucosa.  Mucosa - moist.  No thrush.  NECK:  Supple without adenopathy.   CHEST:  Clear to auscultation.   ABDOMEN:  Soft, without organomegaly or mass.  No " tenderness.  Bowel sounds present.   EXTREMITIES:  No edema.  SKIN:  No clearly identifiable subcutaneous nodules above the thighs.  He has a small open sore on his left knee w/o associated nodule.  He has healing nodules on his distal legs. He has a healing subcutaneous nodule on his right distal forearm.  He has a persistent ulcerated lesion on the left distal forearm, but this also appears dry, crusted and less inflammed.       LABORATORY:     WBC     17.1   1/25/2017  RBC     4.60   1/25/2017  HGB     12.8   1/25/2017  HCT     39.1   1/25/2017  MCV       85   1/25/2017  MCH     27.8   1/25/2017  MCHC     32.7   1/25/2017  RDW     12.8   1/25/2017  PLT      349   1/25/2017    Last Basic Metabolic Panel:  NA      138   1/25/2017   POTASSIUM      3.1   1/25/2017  CHLORIDE      105   1/25/2017  ARIK      8.6   1/25/2017  CO2       23   1/25/2017  BUN       12   1/25/2017  CR     1.01   1/25/2017  GLC       98   1/25/2017      Liver Function Studies -   Recent Labs   Lab Test  01/25/17   1120   PROTTOTAL  6.5*   ALBUMIN  2.9*   BILITOTAL  0.7   ALKPHOS  79   AST  15   ALT  25         ASSESSMENT AND PLAN:  GI illness characterized by nausea/vomting, prior diarrhea, now constipation, and fatigue - By clinical history this seems most consistent with a viral gastroenteritis given the exposure history.  It is possible that he has a prolonged course given that he was on amoxicillin at the time this began and he has an impaired immune system as he is s/p allo-transplant.  I thinks it is unlikely that this represents the development of GVHD given the acute onset, history, and the neutrophilia on labs.  I encouraged him to use a more aggressive bowel regimen (miralax in addition to stool softeners) and to push the fluids.  We will have him come back to see an SARITA in clinic on Monday to see how he is doing.  I am hopeful that he will be improving by then, if he is not then further workup may be needed, and he and his wife will  be able to go on their planned cruise.    NK/T-cell lymphoma (nasal type), multiply recurrent and persistent. He is generally doing well.   The patient continues to have some evidence for new sites of disease, but less numerous than befoer.  However, as before and since his allogeneic transplant, most of the lesions appear and then slowly disappear.  Overall his skin is doing well.  We will continue to observe these.  We will arrange follow up once the acute issue has been clarified.  He continues to work w/ Dr. Eaton as needed.       Patient was seen with and plan of care developed with Dr. Hill.    Russ Stubbs MD  Heme/onc Fellow    Oncology Attending    Patient seen and examined with the Oncology Fellow, Dr. Stubbs. Agree with his findings, assessment and plan.    Yuri Hill MD  Professor of Medicine  Oncology  [facility]  Office: [phone]  Clinic Fax: [phone]      cc:      MD Ruth Prince MD Daniel Miller, MD Bailey Lee, MD Roger Winnick, MD Nathan Norquist, MD Marie-Claire Buckley, MD Bruce A. Peterson, MD

## 2017-01-26 ASSESSMENT — ENCOUNTER SYMPTOMS
BOWEL INCONTINENCE: 0
ALTERED TEMPERATURE REGULATION: 0
JAUNDICE: 0
WEIGHT GAIN: 0
BLOATING: 0
INCREASED ENERGY: 0
RECTAL PAIN: 0
CONSTIPATION: 1
WEIGHT LOSS: 0
POLYPHAGIA: 0
RECTAL BLEEDING: 0
NAUSEA: 1
DECREASED APPETITE: 0
FEVER: 0
HALLUCINATIONS: 0
CHILLS: 1
POLYDIPSIA: 0
ABDOMINAL PAIN: 0
NIGHT SWEATS: 1
VOMITING: 1
DIARRHEA: 1
HEARTBURN: 0

## 2017-01-29 LAB
BACTERIA SPEC CULT: NO GROWTH
MICRO REPORT STATUS: NORMAL
SPECIMEN SOURCE: NORMAL

## 2017-01-30 ENCOUNTER — ONCOLOGY VISIT (OUTPATIENT)
Dept: ONCOLOGY | Facility: CLINIC | Age: 62
End: 2017-01-30
Attending: PHYSICIAN ASSISTANT
Payer: COMMERCIAL

## 2017-01-30 VITALS
OXYGEN SATURATION: 94 % | HEART RATE: 74 BPM | DIASTOLIC BLOOD PRESSURE: 73 MMHG | RESPIRATION RATE: 20 BRPM | TEMPERATURE: 97.2 F | SYSTOLIC BLOOD PRESSURE: 112 MMHG | WEIGHT: 181.1 LBS | BODY MASS INDEX: 24.56 KG/M2

## 2017-01-30 DIAGNOSIS — C96.9 T-CELL OR NK-CELL NEOPLASM (H): ICD-10-CM

## 2017-01-30 DIAGNOSIS — B37.0 ORAL THRUSH: ICD-10-CM

## 2017-01-30 DIAGNOSIS — C83.50 NATURAL KILLER (NK) CELL LYMPHOBLASTIC LYMPHOMA (H): Primary | ICD-10-CM

## 2017-01-30 LAB
ALBUMIN SERPL-MCNC: 2.8 G/DL (ref 3.4–5)
ALP SERPL-CCNC: 94 U/L (ref 40–150)
ALT SERPL W P-5'-P-CCNC: 34 U/L (ref 0–70)
ANION GAP SERPL CALCULATED.3IONS-SCNC: 9 MMOL/L (ref 3–14)
AST SERPL W P-5'-P-CCNC: 18 U/L (ref 0–45)
BASOPHILS # BLD AUTO: 0.1 10E9/L (ref 0–0.2)
BASOPHILS NFR BLD AUTO: 0.4 %
BILIRUB SERPL-MCNC: 0.6 MG/DL (ref 0.2–1.3)
BUN SERPL-MCNC: 14 MG/DL (ref 7–30)
CALCIUM SERPL-MCNC: 8.4 MG/DL (ref 8.5–10.1)
CHLORIDE SERPL-SCNC: 104 MMOL/L (ref 94–109)
CO2 SERPL-SCNC: 25 MMOL/L (ref 20–32)
CREAT SERPL-MCNC: 1.05 MG/DL (ref 0.66–1.25)
DIFFERENTIAL METHOD BLD: ABNORMAL
EOSINOPHIL # BLD AUTO: 0.2 10E9/L (ref 0–0.7)
EOSINOPHIL NFR BLD AUTO: 1 %
ERYTHROCYTE [DISTWIDTH] IN BLOOD BY AUTOMATED COUNT: 12.9 % (ref 10–15)
GFR SERPL CREATININE-BSD FRML MDRD: 71 ML/MIN/1.7M2
GLUCOSE SERPL-MCNC: 99 MG/DL (ref 70–99)
HCT VFR BLD AUTO: 40.1 % (ref 40–53)
HGB BLD-MCNC: 12.9 G/DL (ref 13.3–17.7)
IMM GRANULOCYTES # BLD: 0.1 10E9/L (ref 0–0.4)
IMM GRANULOCYTES NFR BLD: 0.5 %
LYMPHOCYTES # BLD AUTO: 4.3 10E9/L (ref 0.8–5.3)
LYMPHOCYTES NFR BLD AUTO: 26 %
MCH RBC QN AUTO: 27.7 PG (ref 26.5–33)
MCHC RBC AUTO-ENTMCNC: 32.2 G/DL (ref 31.5–36.5)
MCV RBC AUTO: 86 FL (ref 78–100)
MONOCYTES # BLD AUTO: 2 10E9/L (ref 0–1.3)
MONOCYTES NFR BLD AUTO: 12 %
NEUTROPHILS # BLD AUTO: 10 10E9/L (ref 1.6–8.3)
NEUTROPHILS NFR BLD AUTO: 60.1 %
NRBC # BLD AUTO: 0 10*3/UL
NRBC BLD AUTO-RTO: 0 /100
PLATELET # BLD AUTO: 374 10E9/L (ref 150–450)
POTASSIUM SERPL-SCNC: 3.4 MMOL/L (ref 3.4–5.3)
PROT SERPL-MCNC: 6.9 G/DL (ref 6.8–8.8)
RBC # BLD AUTO: 4.66 10E12/L (ref 4.4–5.9)
SODIUM SERPL-SCNC: 138 MMOL/L (ref 133–144)
WBC # BLD AUTO: 16.6 10E9/L (ref 4–11)

## 2017-01-30 PROCEDURE — 80053 COMPREHEN METABOLIC PANEL: CPT | Performed by: INTERNAL MEDICINE

## 2017-01-30 PROCEDURE — 99212 OFFICE O/P EST SF 10 MIN: CPT

## 2017-01-30 PROCEDURE — 99214 OFFICE O/P EST MOD 30 MIN: CPT | Mod: ZP | Performed by: PHYSICIAN ASSISTANT

## 2017-01-30 PROCEDURE — 85025 COMPLETE CBC W/AUTO DIFF WBC: CPT | Performed by: INTERNAL MEDICINE

## 2017-01-30 PROCEDURE — 36415 COLL VENOUS BLD VENIPUNCTURE: CPT

## 2017-01-30 RX ORDER — NYSTATIN 100000/ML
500000 SUSPENSION, ORAL (FINAL DOSE FORM) ORAL 4 TIMES DAILY
Qty: 280 ML | Refills: 0 | Status: SHIPPED | OUTPATIENT
Start: 2017-01-30 | End: 2017-03-02

## 2017-01-30 NOTE — Clinical Note
1/30/2017      RE: Hi Smith  [address] [facility] [address]       [facility]  FOLLOW-UP VISIT NOTE  Date of visit: 1-31-17          REASON FOR VISIT: lymphoma, coming in with N/V/D    HPI: Hi Smith, 61 years of age, has a history of extranodal NK/T-cell lymphoma (nasal type) that has been localized to multiple cutaneous/subcutaneous sites.  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  By 12/2015, he had evidence for recurrence on the right lower extremity (biopsy proven).  Recurrence appeared to be localized to the right ankle and possibly, although undocumented, on the right wrist.  He was given involved field radiation to these areas.  Subsequently, he has had multiple subcutaneous nodules that arise and often resolve spontaneously.  Others show inflammation and cutaneous crusting with erythema and subsequent regression.    At Colton's last visit with Dr Hill, they continue to follow his lesions with the help of Dr Eaton and he remains off active therapy.     INTERVAL HISTORY: I saw Colton last week for IVF after he ended up with gastroenteritis.  States last week he was at his grandchilds birthday and subsequently 4-5 of the adults ended up with a likely viral gastroenteritis.  The other family members were better in about 2-3 days, but Colton had emesis and diarrhea for about 4 days. When I saw him on the 1/23 visit- he was having formed stool and no emesis, just fatigue.  IVF helped his dehydration and this whole last week he has slowly been recovering. States today he actually feels the best- most stamina and energy.     He denies f/s/c.  No arthralgias/myalgias.  No chest pain/cough/dyspnea.  "Today feels some mild \"head cold\" symptoms, but no ST, ear pain or cough. No  symptoms. No new rash or edema, in fact in the last month with the aid of compression stockings his TABBY is resolved.  His lesions from his lymphoma are all stable-- two are open without s/s of infection.      EXAM: /73 mmHg  Pulse 74  Temp(Src) 97.2  F (36.2  C) (Oral)  Resp 20  Wt 82.146 kg (181 lb 1.6 oz)  SpO2 94%  Wt Readings from Last 4 Encounters:   01/30/17 82.146 kg (181 lb 1.6 oz)   01/25/17 83.19 kg (183 lb 6.4 oz)   01/23/17 82.691 kg (182 lb 4.8 oz)   11/23/16 88.6 kg (195 lb 5.2 oz)       Vital signs were reviewed.   Patient alert and oriented x3.   PERRLA. EOMI. No scleral icterus noted. OP without thrush/sores.  Neck exam: No palpable cervical, supraclavicular or axillary nodes bilaterally.   Heart: RRR no murmurs noted.   Lungs: clear to auscultation bilaterally.  No crackles or wheezing.   Abd: positive bowel sounds in all four quadrants.  No tenderness to palpation.  No hepatomegaly.   Extremities: No lower extremity edema.   Neuro: grossly intact.   Mood and affect is stable  Skin was thoroughly assessed - open lesion on the left forearm and left lateral ankle - both c/d/i    LABS:      1/23/2017 16:41 1/25/2017 11:20 1/30/2017 15:28   WBC 18.2 (H) 17.1 (H) 16.6 (H)   Hemoglobin 13.1 (L) 12.8 (L) 12.9 (L)   Hematocrit 39.8 (L) 39.1 (L) 40.1   Platelet Count 332 349 374   RBC Count 4.65 4.60 4.66   MCV 86 85 86   MCH 28.2 27.8 27.7   MCHC 32.9 32.7 32.2   RDW 12.7 12.8 12.9   Diff Method Automated Method Automated Method Automated Method   % Neutrophils 63.0 66.4 60.1   % Lymphocytes 24.1 21.0 26.0   % Monocytes 11.5 11.1 12.0   % Eosinophils 0.8 0.8 1.0   % Basophils 0.2 0.2 0.4   % Immature Granulocytes 0.4 0.5 0.5   Nucleated RBCs 0 0 0   Absolute Neutrophil 11.5 (H) 11.3 (H) 10.0 (H)   Absolute Lymphocytes 4.4 3.6 4.3   Absolute Monocytes 2.1 (H) 1.9 (H) 2.0 (H)     ASSESSMENT/PLAN: 61 year old with " T-cell lymphoma, with leukocytosis and resolved gastroenteritis    GI/ID: Sounds like Colton's whole family had viral gastroenteritis last week.  Colton has gone a whole week now with no nausea/vomiting or diarrhea, in fact he found himself actually constipated a few days needing a stool softener.  He has been afebrile with fatigue being his only symptom.  This last week he feels that daily he has been feeling better, with today feeling the best.  His WBC continues to decline, although marginally.  We'll check him again later this week prior to vacation but assuming he continues to feel well, no additional labs will be added.    Elisabeth Elias PA-C

## 2017-01-30 NOTE — Clinical Note
1/30/2017       RE: Hi Smith  [address] [address] [facility] [address]     Dear Colleague,    Thank you for referring your patient, Hi Smith, to the [facility]. Please see a copy of my visit note below.    No notes on file    Again, thank you for allowing me to participate in the care of your patient.      Sincerely,    Brit Elias PA-C

## 2017-01-30 NOTE — NURSING NOTE
Labs drawn today, VIA VENIPUNCTURE see flow sheet.  Mckenzie Willoughby, CMA - 1/30/2017 3:21 PM

## 2017-01-30 NOTE — NURSING NOTE
"Hi Smith is a 61 year old male who presents for:  Chief Complaint   Patient presents with     Blood Draw     CENTRAL LAB DRAW VENIPUNCTURE     Oncology Clinic Visit     Return patient visit- T cell lymphoma         Initial Vitals:  /73 mmHg  Pulse 74  Temp(Src) 97.2  F (36.2  C) (Oral)  Resp 20  Wt 82.146 kg (181 lb 1.6 oz)  SpO2 94% Estimated body mass index is 24.56 kg/(m^2) as calculated from the following:    Height as of 1/25/17: 1.829 m (6' 0.01\").    Weight as of this encounter: 82.146 kg (181 lb 1.6 oz).. There is no height on file to calculate BSA. BP completed using cuff size: NA (Not Taken)  Data Unavailable No LMP for male patient. Allergies and medications reviewed.     Medications: Medication refills not needed today.  Pharmacy name entered into [facility]: [facility] [address] [address] [facility] [address], [address]    Comments: vitals taken in lab    5 minutes for nursing intake (face to face time)   Gavin Wells CMA          "

## 2017-02-01 NOTE — PROGRESS NOTES
"[facility]  FOLLOW-UP VISIT NOTE  Date of visit: 1-31-17          REASON FOR VISIT: lymphoma, coming in with N/V/D    HPI: Hi Smith, 61 years of age, has a history of extranodal NK/T-cell lymphoma (nasal type) that has been localized to multiple cutaneous/subcutaneous sites.  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  By 12/2015, he had evidence for recurrence on the right lower extremity (biopsy proven).  Recurrence appeared to be localized to the right ankle and possibly, although undocumented, on the right wrist.  He was given involved field radiation to these areas.  Subsequently, he has had multiple subcutaneous nodules that arise and often resolve spontaneously.  Others show inflammation and cutaneous crusting with erythema and subsequent regression.    At Colton's last visit with Dr Hill, they continue to follow his lesions with the help of Dr Eaton and he remains off active therapy.     INTERVAL HISTORY: I saw Colton last week for IVF after he ended up with gastroenteritis.  States last week he was at his grandchilds birthday and subsequently 4-5 of the adults ended up with a likely viral gastroenteritis.  The other family members were better in about 2-3 days, but Colton had emesis and diarrhea for about 4 days. When I saw him on the 1/23 visit- he was having formed stool and no emesis, just fatigue.  IVF helped his dehydration and this whole last week he has slowly been recovering. States today he actually feels the best- most stamina and energy.     He denies f/s/c.  No arthralgias/myalgias.  No chest pain/cough/dyspnea. Today feels some mild \"head cold\" symptoms, but no ST, ear pain or cough. No  " symptoms. No new rash or edema, in fact in the last month with the aid of compression stockings his TABBY is resolved.  His lesions from his lymphoma are all stable-- two are open without s/s of infection.      EXAM: /73 mmHg  Pulse 74  Temp(Src) 97.2  F (36.2  C) (Oral)  Resp 20  Wt 82.146 kg (181 lb 1.6 oz)  SpO2 94%  Wt Readings from Last 4 Encounters:   01/30/17 82.146 kg (181 lb 1.6 oz)   01/25/17 83.19 kg (183 lb 6.4 oz)   01/23/17 82.691 kg (182 lb 4.8 oz)   11/23/16 88.6 kg (195 lb 5.2 oz)       Vital signs were reviewed.   Patient alert and oriented x3.   PERRLA. EOMI. No scleral icterus noted. OP without thrush/sores.  Neck exam: No palpable cervical, supraclavicular or axillary nodes bilaterally.   Heart: RRR no murmurs noted.   Lungs: clear to auscultation bilaterally.  No crackles or wheezing.   Abd: positive bowel sounds in all four quadrants.  No tenderness to palpation.  No hepatomegaly.   Extremities: No lower extremity edema.   Neuro: grossly intact.   Mood and affect is stable  Skin was thoroughly assessed - open lesion on the left forearm and left lateral ankle - both c/d/i    LABS:      1/23/2017 16:41 1/25/2017 11:20 1/30/2017 15:28   WBC 18.2 (H) 17.1 (H) 16.6 (H)   Hemoglobin 13.1 (L) 12.8 (L) 12.9 (L)   Hematocrit 39.8 (L) 39.1 (L) 40.1   Platelet Count 332 349 374   RBC Count 4.65 4.60 4.66   MCV 86 85 86   MCH 28.2 27.8 27.7   MCHC 32.9 32.7 32.2   RDW 12.7 12.8 12.9   Diff Method Automated Method Automated Method Automated Method   % Neutrophils 63.0 66.4 60.1   % Lymphocytes 24.1 21.0 26.0   % Monocytes 11.5 11.1 12.0   % Eosinophils 0.8 0.8 1.0   % Basophils 0.2 0.2 0.4   % Immature Granulocytes 0.4 0.5 0.5   Nucleated RBCs 0 0 0   Absolute Neutrophil 11.5 (H) 11.3 (H) 10.0 (H)   Absolute Lymphocytes 4.4 3.6 4.3   Absolute Monocytes 2.1 (H) 1.9 (H) 2.0 (H)     ASSESSMENT/PLAN: 61 year old with T-cell lymphoma, with leukocytosis and resolved gastroenteritis    GI/ID: Sounds like  Colton's whole family had viral gastroenteritis last week.  Colton has gone a whole week now with no nausea/vomiting or diarrhea, in fact he found himself actually constipated a few days needing a stool softener.  He has been afebrile with fatigue being his only symptom.  This last week he feels that daily he has been feeling better, with today feeling the best.  His WBC continues to decline, although marginally.  We'll check him again later this week prior to vacation but assuming he continues to feel well, no additional labs will be added.    Elisabeth Elias PA-C

## 2017-02-03 ENCOUNTER — CARE COORDINATION (OUTPATIENT)
Dept: ONCOLOGY | Facility: CLINIC | Age: 62
End: 2017-02-03

## 2017-02-03 DIAGNOSIS — C83.50 NATURAL KILLER (NK) CELL LYMPHOBLASTIC LYMPHOMA (H): ICD-10-CM

## 2017-02-03 LAB
ALBUMIN SERPL-MCNC: 3 G/DL (ref 3.4–5)
ALP SERPL-CCNC: 112 U/L (ref 40–150)
ALT SERPL W P-5'-P-CCNC: 30 U/L (ref 0–70)
ANION GAP SERPL CALCULATED.3IONS-SCNC: 8 MMOL/L (ref 3–14)
AST SERPL W P-5'-P-CCNC: 18 U/L (ref 0–45)
BASOPHILS # BLD AUTO: 0.2 10E9/L (ref 0–0.2)
BASOPHILS NFR BLD AUTO: 1 %
BILIRUB SERPL-MCNC: 0.4 MG/DL (ref 0.2–1.3)
BUN SERPL-MCNC: 13 MG/DL (ref 7–30)
CALCIUM SERPL-MCNC: 9.3 MG/DL (ref 8.5–10.1)
CHLORIDE SERPL-SCNC: 105 MMOL/L (ref 94–109)
CO2 SERPL-SCNC: 25 MMOL/L (ref 20–32)
CREAT SERPL-MCNC: 1.06 MG/DL (ref 0.66–1.25)
DIFFERENTIAL METHOD BLD: ABNORMAL
EOSINOPHIL # BLD AUTO: 0.2 10E9/L (ref 0–0.7)
EOSINOPHIL NFR BLD AUTO: 1 %
ERYTHROCYTE [DISTWIDTH] IN BLOOD BY AUTOMATED COUNT: 13.5 % (ref 10–15)
GFR SERPL CREATININE-BSD FRML MDRD: 71 ML/MIN/1.7M2
GLUCOSE SERPL-MCNC: 98 MG/DL (ref 70–99)
HCT VFR BLD AUTO: 40.4 % (ref 40–53)
HGB BLD-MCNC: 12.8 G/DL (ref 13.3–17.7)
LDH SERPL L TO P-CCNC: 160 U/L (ref 85–227)
LYMPHOCYTES # BLD AUTO: 3.8 10E9/L (ref 0.8–5.3)
LYMPHOCYTES NFR BLD AUTO: 23 %
MCH RBC QN AUTO: 27.8 PG (ref 26.5–33)
MCHC RBC AUTO-ENTMCNC: 31.7 G/DL (ref 31.5–36.5)
MCV RBC AUTO: 88 FL (ref 78–100)
MONOCYTES # BLD AUTO: 2 10E9/L (ref 0–1.3)
MONOCYTES NFR BLD AUTO: 12 %
NEUTROPHILS # BLD AUTO: 10.2 10E9/L (ref 1.6–8.3)
NEUTROPHILS NFR BLD AUTO: 63 %
PLATELET # BLD AUTO: 348 10E9/L (ref 150–450)
PLATELET # BLD EST: NORMAL 10*3/UL
POTASSIUM SERPL-SCNC: 3.4 MMOL/L (ref 3.4–5.3)
PROT SERPL-MCNC: 7.4 G/DL (ref 6.8–8.8)
RBC # BLD AUTO: 4.61 10E12/L (ref 4.4–5.9)
SODIUM SERPL-SCNC: 138 MMOL/L (ref 133–144)
WBC # BLD AUTO: 16.4 10E9/L (ref 4–11)

## 2017-02-03 PROCEDURE — 83615 LACTATE (LD) (LDH) ENZYME: CPT | Performed by: PHYSICIAN ASSISTANT

## 2017-02-03 PROCEDURE — 80053 COMPREHEN METABOLIC PANEL: CPT | Performed by: PHYSICIAN ASSISTANT

## 2017-02-03 PROCEDURE — 85025 COMPLETE CBC W/AUTO DIFF WBC: CPT | Performed by: PHYSICIAN ASSISTANT

## 2017-02-03 PROCEDURE — 36415 COLL VENOUS BLD VENIPUNCTURE: CPT | Performed by: PHYSICIAN ASSISTANT

## 2017-02-03 NOTE — PROGRESS NOTES
Called patient to review labs from this AM. Patient stable feeling better everyday. Patient still with some fatigue but getingbetter.   Reviewed that labs are stable and that will check when patient gets back from vacation.   Patient verbalized understanding and was grateful for the follow up call.    Results for THOM LARSON (MRN [id number]) as of 2/3/2017 13:58   Ref. Range 2/3/2017 10:03   WBC Latest Ref Range: 4.0-11.0 10e9/L 16.4 (H)   Hemoglobin Latest Ref Range: 13.3-17.7 g/dL 12.8 (L)   Hematocrit Latest Ref Range: 40.0-53.0 % 40.4   Platelet Count Latest Ref Range: 150-450 10e9/L 348   RBC Count Latest Ref Range: 4.4-5.9 10e12/L 4.61   MCV Latest Ref Range:  fl 88   MCH Latest Ref Range: 26.5-33.0 pg 27.8   MCHC Latest Ref Range: 31.5-36.5 g/dL 31.7   RDW Latest Ref Range: 10.0-15.0 % 13.5   Diff Method Unknown Automated Method   % Neutrophils Latest Units: % 63.0   % Lymphocytes Latest Units: % 23.0   % Monocytes Latest Units: % 12.0   % Eosinophils Latest Units: % 1.0   % Basophils Latest Units: % 1.0   Absolute Neutrophil Latest Ref Range: 1.6-8.3 10e9/L 10.2 (H)   Absolute Lymphocytes Latest Ref Range: 0.8-5.3 10e9/L 3.8   Absolute Monocytes Latest Ref Range: 0.0-1.3 10e9/L 2.0 (H)   Absolute Eosinophils Latest Ref Range: 0.0-0.7 10e9/L 0.2   Absolute Basophils Latest Ref Range: 0.0-0.2 10e9/L 0.2   Platelet Estimate Unknown Normal

## 2017-02-15 DIAGNOSIS — C85.90 LYMPHOMA, NON-HODGKIN'S (H): ICD-10-CM

## 2017-02-15 DIAGNOSIS — C85.90 LYMPHOMA, NON-HODGKIN'S (H): Primary | ICD-10-CM

## 2017-02-15 LAB
ANISOCYTOSIS BLD QL SMEAR: SLIGHT
DIFFERENTIAL METHOD BLD: ABNORMAL
EOSINOPHIL # BLD AUTO: 0.5 10E9/L (ref 0–0.7)
EOSINOPHIL NFR BLD AUTO: 3 %
ERYTHROCYTE [DISTWIDTH] IN BLOOD BY AUTOMATED COUNT: 14.7 % (ref 10–15)
HCT VFR BLD AUTO: 39.2 % (ref 40–53)
HGB BLD-MCNC: 12 G/DL (ref 13.3–17.7)
LYMPHOCYTES # BLD AUTO: 5.6 10E9/L (ref 0.8–5.3)
LYMPHOCYTES NFR BLD AUTO: 34 %
MCH RBC QN AUTO: 27.5 PG (ref 26.5–33)
MCHC RBC AUTO-ENTMCNC: 30.6 G/DL (ref 31.5–36.5)
MCV RBC AUTO: 90 FL (ref 78–100)
MONOCYTES # BLD AUTO: 2.5 10E9/L (ref 0–1.3)
MONOCYTES NFR BLD AUTO: 15 %
NEUTROPHILS # BLD AUTO: 7.8 10E9/L (ref 1.6–8.3)
NEUTROPHILS NFR BLD AUTO: 48 %
PLATELET # BLD AUTO: 279 10E9/L (ref 150–450)
PLATELET # BLD EST: NORMAL 10*3/UL
RBC # BLD AUTO: 4.37 10E12/L (ref 4.4–5.9)
VARIANT LYMPHS BLD QL SMEAR: PRESENT
WBC # BLD AUTO: 16.4 10E9/L (ref 4–11)

## 2017-02-15 PROCEDURE — 85025 COMPLETE CBC W/AUTO DIFF WBC: CPT | Performed by: PHYSICIAN ASSISTANT

## 2017-02-15 PROCEDURE — 36415 COLL VENOUS BLD VENIPUNCTURE: CPT | Performed by: PHYSICIAN ASSISTANT

## 2017-02-16 ENCOUNTER — CARE COORDINATION (OUTPATIENT)
Dept: ONCOLOGY | Facility: CLINIC | Age: 62
End: 2017-02-16

## 2017-02-28 DIAGNOSIS — C86.00 EXTRANODAL NK/T-CELL LYMPHOMA, NASAL TYPE: Primary | ICD-10-CM

## 2017-02-28 ASSESSMENT — ENCOUNTER SYMPTOMS
DECREASED APPETITE: 0
ALTERED TEMPERATURE REGULATION: 0
HALLUCINATIONS: 0
FATIGUE: 1
POLYDIPSIA: 0
INCREASED ENERGY: 0
FEVER: 0
NIGHT SWEATS: 0
WEIGHT LOSS: 0
WEIGHT GAIN: 0
POLYPHAGIA: 0
CHILLS: 0

## 2017-03-02 ENCOUNTER — ONCOLOGY VISIT (OUTPATIENT)
Dept: ONCOLOGY | Facility: CLINIC | Age: 62
End: 2017-03-02
Attending: PEDIATRICS
Payer: MEDICARE

## 2017-03-02 ENCOUNTER — APPOINTMENT (OUTPATIENT)
Dept: LAB | Facility: CLINIC | Age: 62
End: 2017-03-02
Attending: PEDIATRICS
Payer: MEDICARE

## 2017-03-02 VITALS
RESPIRATION RATE: 16 BRPM | SYSTOLIC BLOOD PRESSURE: 124 MMHG | OXYGEN SATURATION: 97 % | HEART RATE: 70 BPM | HEIGHT: 72 IN | DIASTOLIC BLOOD PRESSURE: 71 MMHG | BODY MASS INDEX: 25.63 KG/M2 | TEMPERATURE: 99 F | WEIGHT: 189.2 LBS

## 2017-03-02 DIAGNOSIS — C86.00 EXTRANODAL NK/T-CELL LYMPHOMA, NASAL TYPE: ICD-10-CM

## 2017-03-02 LAB
ALBUMIN SERPL-MCNC: 3.6 G/DL (ref 3.4–5)
ALP SERPL-CCNC: 109 U/L (ref 40–150)
ALT SERPL W P-5'-P-CCNC: 26 U/L (ref 0–70)
ANION GAP SERPL CALCULATED.3IONS-SCNC: 10 MMOL/L (ref 3–14)
AST SERPL W P-5'-P-CCNC: 23 U/L (ref 0–45)
BASOPHILS # BLD AUTO: 0 10E9/L (ref 0–0.2)
BASOPHILS NFR BLD AUTO: 0.3 %
BILIRUB SERPL-MCNC: 0.4 MG/DL (ref 0.2–1.3)
BUN SERPL-MCNC: 13 MG/DL (ref 7–30)
CALCIUM SERPL-MCNC: 8.6 MG/DL (ref 8.5–10.1)
CHLORIDE SERPL-SCNC: 106 MMOL/L (ref 94–109)
CO2 SERPL-SCNC: 26 MMOL/L (ref 20–32)
CREAT SERPL-MCNC: 0.97 MG/DL (ref 0.66–1.25)
DIFFERENTIAL METHOD BLD: NORMAL
EOSINOPHIL # BLD AUTO: 0.1 10E9/L (ref 0–0.7)
EOSINOPHIL NFR BLD AUTO: 1.1 %
ERYTHROCYTE [DISTWIDTH] IN BLOOD BY AUTOMATED COUNT: 15 % (ref 10–15)
GFR SERPL CREATININE-BSD FRML MDRD: 78 ML/MIN/1.7M2
GLUCOSE SERPL-MCNC: 89 MG/DL (ref 70–99)
HCT VFR BLD AUTO: 41.9 % (ref 40–53)
HGB BLD-MCNC: 13.3 G/DL (ref 13.3–17.7)
IMM GRANULOCYTES # BLD: 0 10E9/L (ref 0–0.4)
IMM GRANULOCYTES NFR BLD: 0.2 %
LDH SERPL L TO P-CCNC: 177 U/L (ref 85–227)
LYMPHOCYTES # BLD AUTO: 3.9 10E9/L (ref 0.8–5.3)
LYMPHOCYTES NFR BLD AUTO: 37.5 %
MCH RBC QN AUTO: 27.8 PG (ref 26.5–33)
MCHC RBC AUTO-ENTMCNC: 31.7 G/DL (ref 31.5–36.5)
MCV RBC AUTO: 88 FL (ref 78–100)
MONOCYTES # BLD AUTO: 1.2 10E9/L (ref 0–1.3)
MONOCYTES NFR BLD AUTO: 11 %
NEUTROPHILS # BLD AUTO: 5.2 10E9/L (ref 1.6–8.3)
NEUTROPHILS NFR BLD AUTO: 49.9 %
NRBC # BLD AUTO: 0 10*3/UL
NRBC BLD AUTO-RTO: 0 /100
PLATELET # BLD AUTO: 243 10E9/L (ref 150–450)
POTASSIUM SERPL-SCNC: 3.8 MMOL/L (ref 3.4–5.3)
PROT SERPL-MCNC: 7.2 G/DL (ref 6.8–8.8)
RBC # BLD AUTO: 4.79 10E12/L (ref 4.4–5.9)
SODIUM SERPL-SCNC: 143 MMOL/L (ref 133–144)
WBC # BLD AUTO: 10.4 10E9/L (ref 4–11)

## 2017-03-02 PROCEDURE — 36415 COLL VENOUS BLD VENIPUNCTURE: CPT

## 2017-03-02 PROCEDURE — 99213 OFFICE O/P EST LOW 20 MIN: CPT | Mod: GC | Performed by: INTERNAL MEDICINE

## 2017-03-02 PROCEDURE — 99212 OFFICE O/P EST SF 10 MIN: CPT | Mod: ZF

## 2017-03-02 PROCEDURE — 83615 LACTATE (LD) (LDH) ENZYME: CPT | Performed by: INTERNAL MEDICINE

## 2017-03-02 PROCEDURE — 80053 COMPREHEN METABOLIC PANEL: CPT | Performed by: INTERNAL MEDICINE

## 2017-03-02 PROCEDURE — 85025 COMPLETE CBC W/AUTO DIFF WBC: CPT | Performed by: INTERNAL MEDICINE

## 2017-03-02 RX ORDER — VENLAFAXINE HYDROCHLORIDE 37.5 MG/1
37.5 CAPSULE, EXTENDED RELEASE ORAL DAILY
Qty: 90 CAPSULE | Refills: 3 | Status: CANCELLED | OUTPATIENT
Start: 2017-03-02

## 2017-03-02 ASSESSMENT — PAIN SCALES - GENERAL: PAINLEVEL: NO PAIN (0)

## 2017-03-02 NOTE — NURSING NOTE
"Hi Smith is a 62 year old male who presents for:  Chief Complaint   Patient presents with     Blood Draw     venipuncture        Initial Vitals:  /71  Pulse 70  Temp 99  F (37.2  C) (Oral)  Resp 16  Ht 1.829 m (6' 0.01\")  Wt 85.8 kg (189 lb 3.2 oz)  SpO2 97%  BMI 25.65 kg/m2 Estimated body mass index is 25.65 kg/(m^2) as calculated from the following:    Height as of this encounter: 1.829 m (6' 0.01\").    Weight as of this encounter: 85.8 kg (189 lb 3.2 oz).. Body surface area is 2.09 meters squared. BP completed using cuff size: NA (Not Taken)  No Pain (0) No LMP for male patient. Allergies and medications reviewed.     Medications: Medication refills not needed today.  Pharmacy name entered into [facility]:    [facility] [place], [place] - [address] [address], [address]  [facility] [place] [place] [facility] [address]    Comments: Vitals completed in lab.    7 minutes for nursing intake (face to face time)   Kaitlin Sethi LPN        "

## 2017-03-02 NOTE — MR AVS SNAPSHOT
After Visit Summary   3/2/2017    Hi Smith    MRN: [id number]           Patient Information     Date Of Birth          1955        Visit Information        Provider Department      3/2/2017 3:00 PM Yuri Hill MD [facility]        Today's Diagnoses     Extranodal NK/T-cell lymphoma, nasal type (H)           Follow-ups after your visit        Follow-up notes from your care team     Return Pt has appointment for 03/23. Will alter as necessary..      Your next 10 appointments already scheduled     Mar 23, 2017 11:45 AM CDT   [facility] Draw with  [facility] DRAW   [facility] Draw ([facility])    [address] [place] [id number] [phone]            Mar 23, 2017 12:30 PM CDT   (Arrive by 12:15 PM)   Return Visit with Yuri Hill MD   [facility] ([facility])    [address] [place] [address] [phone]              Who to contact     If you have questions or need follow up information about today's clinic visit or your schedule please contact [facility] directly at [phone].  Normal or non-critical lab and imaging results will be communicated to you by [employer], letter or phone within 4 business days after the clinic has received the results. If you do not hear from us within 7 days, please contact the clinic through [employer] or phone. If you have a critical or abnormal lab result, we will notify you by phone as soon as possible.  Submit refill requests through [employer] or call your pharmacy and they will forward the refill request to us. Please allow 3 business days for your refill to be completed.          Additional Information About Your Visit        [employer] Information     [employer] gives you secure access to your electronic health record. If you see a primary care provider, you can also send  "messages to your care team and make appointments. If you have questions, please call your primary care clinic.  If you do not have a primary care provider, please call [phone] and they will assist you.        Care EveryWhere ID     This is your Care EveryWhere ID. This could be used by other organizations to access your [facility] [place] medical records  [id number]        Your Vitals Were     Pulse Temperature Respirations Height Pulse Oximetry BMI (Body Mass Index)    70 99  F (37.2  C) (Oral) 16 1.829 m (6' 0.01\") 97% 25.65 kg/m2       Blood Pressure from Last 3 Encounters:   03/02/17 124/71   01/30/17 112/73   01/25/17 125/77    Weight from Last 3 Encounters:   03/02/17 85.8 kg (189 lb 3.2 oz)   01/30/17 82.1 kg (181 lb 1.6 oz)   01/25/17 83.2 kg (183 lb 6.4 oz)              We Performed the Following     CBC with platelets differential     Comprehensive metabolic panel     Lactate Dehydrogenase          Today's Medication Changes          These changes are accurate as of: 3/2/17 11:59 PM.  If you have any questions, ask your nurse or doctor.               Stop taking these medicines if you haven't already. Please contact your care team if you have questions.     nystatin [id number] UNIT/ML suspension   Commonly known as:  MYCOSTATIN   Stopped by:  Yuri Hill MD                    Primary Care Provider Office Phone # Fax #    Glenroy Torres [phone]       [facility] [address]  [facility] [address]        Thank you!     Thank you for choosing [facility]  for your care. Our goal is always to provide you with excellent care. Hearing back from our patients is one way we can continue to improve our services. Please take a few minutes to complete the written survey that you may receive in the mail after your visit with us. Thank you!             Your Updated Medication List - Protect others around you: Learn how to safely use, store and throw away your " medicines at www.disposemymeds.org.          This list is accurate as of: 3/2/17 11:59 PM.  Always use your most recent med list.                   Brand Name Dispense Instructions for use    amLODIPine 5 MG tablet    NORVASC    30 tablet    Take 1 tablet (5 mg) by mouth daily       ASPIRIN PO      Take 81 mg by mouth daily       LIPITOR PO      Take 10 mg by mouth daily       mupirocin 2 % cream    BACTROBAN     Apply topically daily as needed Reported on 3/2/2017       PHOSPHA 250 NEUTRAL 250 MG per tablet   Generic drug:  phosphorus tablet 250 mg     270 tablet    TAKE ONE TABLET BY MOUTH TWICE A DAY       venlafaxine 37.5 MG 24 hr capsule    EFFEXOR XR    90 capsule    Take 1 capsule (37.5 mg) by mouth daily

## 2017-03-02 NOTE — PROGRESS NOTES
Date of Service: 3/2/17  Reason for visit: New skin lesion different from usual skin lesions.    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  By 12/2015, he had evidence for recurrence on the right lower extremity (biopsy proven).  Recurrence appeared to be localized to the right ankle and possibly, although undocumented, on the right wrist.  He was given involved field radiation to these areas.  Subsequently, he has had multiple subcutaneous nodules that arise and often resolve spontaneously.  Others show inflammation and cutaneous crusting with erythema and subsequent regression.      As previously documented, he has continued to have new lesions, predominantly on his lower legs, that appear and resolve.  Those on his legs sometimes superficially ulcerate and crust over. These have largely resolved with the care provide by Dr. Eaton with respect to wound control.     INTERIM HISTORY:He has just arrived back from cruise trip to [facility]. He noted a thickening subcutaneously since Nov 16 which has slowly grown bigger and now mildly tender in the right forearm dorsal aspect. He has no ulceration. Its softer than prior lymphoma lesions. But this one tends not to resolve. There is no ulceration, no other nodules in his body.All skin lesions have healed.     Denies chest pain, SOB, cough, phlegmn, nausea, vomiting, abdominal pain, diarrhoea, constipation, fatigue, loss of weight, loss of appetite, urinary problems, headache, dizziness, blurry vision, fevers, chills, night  "sweats.      REVIEW OF SYSTEMS:  A 10-point review of systems is otherwise normal.     Current Outpatient Prescriptions on File Prior to Visit:  mupirocin (BACTROBAN) 2 % cream Apply topically daily as needed Reported on 3/2/2017   PHOSPHA 250 NEUTRAL 155-852-130 MG tablet TAKE ONE TABLET BY MOUTH TWICE A DAY   amLODIPine (NORVASC) 5 MG tablet Take 1 tablet (5 mg) by mouth daily   venlafaxine (EFFEXOR XR) 37.5 MG 24 hr capsule Take 1 capsule (37.5 mg) by mouth daily   ASPIRIN PO Take 81 mg by mouth daily   Atorvastatin Calcium (LIPITOR PO) Take 10 mg by mouth daily      No current facility-administered medications on file prior to visit.      ALLERGIES:  None reported.      PHYSICAL EXAMINATION:   /71  Pulse 70  Temp 99  F (37.2  C) (Oral)  Resp 16  Ht 1.829 m (6' 0.01\")  Wt 85.8 kg (189 lb 3.2 oz)  SpO2 97%  BMI 25.65 kg/m2    HEENT:  Anicteric.  No conjunctival lesions. Mucosa - moist.  No thrush.  NECK:  Supple without adenopathy.   CHEST:  Clear to auscultation.   ABDOMEN:  Soft, without organomegaly or mass.  No tenderness.  Bowel sounds present.   EXTREMITIES:  No edema.  SKIN:  No clearly identifiable subcutaneous nodules above the thighs. 4 x 3 subcutaneous firmness in dorsal aspect of distal forearm. Mild tenderness. Edges are indeterminant. No ulceration/erythema.      LABORATORY:     None - today    ASSESSMENT AND PLAN:    New subcutaneous lump: Has consistency and size very different from his lymphoma lesions. Recommend heat to the lesion and call back on Monday with updates. If changes do not resolve or progress - can consider ultrasound.    NK/T-cell lymphoma (nasal type), multiply recurrent and persistent. He is generally doing well.   The patient reports marked improvement in most skin lesions from prior.  However, as before and since his allogeneic transplant, most of the lesions appear and then slowly disappear.  Overall his skin is doing well.  We will continue to observe these.  He " continues to work w/ Dr. Eaton as needed.       Patient was seen with and plan of care developed with Dr. Hill.    ALAN Charles, MS.  Hematology/Oncology Fellow  [facility]  Pager [phone]    Oncology Attending    Patient seen and examined with the Oncology Fellow, Dr. Fox. Agree with her findings, assessment and plan.    Yuri Hill MD  Professor of Medicine  Oncology  [facility]  Office: [phone]  Clinic Fax: [phone]        cc:      MD Ruth Prince MD Daniel Miller, MD Bailey Lee, MD Roger Winnick, MD Nathan Norquist, MD Marie-Claire Buckley, MD

## 2017-03-02 NOTE — LETTER
3/2/2017      RE: Hi Smith  [address] [address] [address]       Date of Service: 3/2/17  Reason for visit: New skin lesion different from usual skin lesions.    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  By 12/2015, he had evidence for recurrence on the right lower extremity (biopsy proven).  Recurrence appeared to be localized to the right ankle and possibly, although undocumented, on the right wrist.  He was given involved field radiation to these areas.  Subsequently, he has had multiple subcutaneous nodules that arise and often resolve spontaneously.  Others show inflammation and cutaneous crusting with erythema and subsequent regression.      As previously documented, he has continued to have new lesions, predominantly on his lower legs, that appear and resolve.  Those on his legs sometimes superficially ulcerate and crust over. These have largely resolved with the care provide by Dr. Eaton with respect to wound control.     INTERIM HISTORY:He has just arrived back from cruise trip to [facility]. He noted a thickening subcutaneously since Nov 16 which has slowly grown bigger and now mildly tender in the right forearm dorsal aspect. He has no ulceration. Its softer than prior lymphoma lesions. But this one tends not to resolve. There is no ulceration, no other nodules in his body.All skin lesions have healed.     Denies chest pain, SOB, cough, phlegmn, nausea, vomiting, abdominal pain, diarrhoea, constipation, fatigue, loss of weight, loss of  "appetite, urinary problems, headache, dizziness, blurry vision, fevers, chills, night sweats.      REVIEW OF SYSTEMS:  A 10-point review of systems is otherwise normal.     Current Outpatient Prescriptions on File Prior to Visit:  mupirocin (BACTROBAN) 2 % cream Apply topically daily as needed Reported on 3/2/2017   PHOSPHA 250 NEUTRAL 155-852-130 MG tablet TAKE ONE TABLET BY MOUTH TWICE A DAY   amLODIPine (NORVASC) 5 MG tablet Take 1 tablet (5 mg) by mouth daily   venlafaxine (EFFEXOR XR) 37.5 MG 24 hr capsule Take 1 capsule (37.5 mg) by mouth daily   ASPIRIN PO Take 81 mg by mouth daily   Atorvastatin Calcium (LIPITOR PO) Take 10 mg by mouth daily      No current facility-administered medications on file prior to visit.      ALLERGIES:  None reported.      PHYSICAL EXAMINATION:   /71  Pulse 70  Temp 99  F (37.2  C) (Oral)  Resp 16  Ht 1.829 m (6' 0.01\")  Wt 85.8 kg (189 lb 3.2 oz)  SpO2 97%  BMI 25.65 kg/m2    HEENT:  Anicteric.  No conjunctival lesions. Mucosa - moist.  No thrush.  NECK:  Supple without adenopathy.   CHEST:  Clear to auscultation.   ABDOMEN:  Soft, without organomegaly or mass.  No tenderness.  Bowel sounds present.   EXTREMITIES:  No edema.  SKIN:  No clearly identifiable subcutaneous nodules above the thighs. 4 x 3 subcutaneous firmness in dorsal aspect of distal forearm. Mild tenderness. Edges are indeterminant. No ulceration/erythema.      LABORATORY:     None - today    ASSESSMENT AND PLAN:    New subcutaneous lump: Has consistency and size very different from his lymphoma lesions. Recommend heat to the lesion and call back on Monday with updates. If changes do not resolve or progress - can consider ultrasound.    NK/T-cell lymphoma (nasal type), multiply recurrent and persistent. He is generally doing well.   The patient reports marked improvement in most skin lesions from prior.  However, as before and since his allogeneic transplant, most of the lesions appear and then slowly " disappear.  Overall his skin is doing well.  We will continue to observe these.  He continues to work w/ Dr. Eaton as needed.       Patient was seen with and plan of care developed with Dr. Hill.    ALAN Charles, MS.  Hematology/Oncology Fellow  [facility]  Pager [phone]    Oncology Attending    Patient seen and examined with the Oncology Fellow, Dr. Fox. Agree with her findings, assessment and plan.    Yuri Hill MD  Professor of Medicine  Oncology  [facility]  Office: [phone]  Clinic Fax: [phone]        cc:      MD Ruth Prince MD Daniel Miller, MD Bailey Lee, MD Roger Winnick, MD Nathan Norquist, MD Marie-Claire Buckley, MD Bruce A. Peterson, MD

## 2017-03-02 NOTE — NURSING NOTE
Chief Complaint   Patient presents with     Blood Draw     venipuncture     Vitals done and labs drawn, see flow sheets.  NELLA PAYTON, CMA

## 2017-03-06 ENCOUNTER — CARE COORDINATION (OUTPATIENT)
Dept: ONCOLOGY | Facility: CLINIC | Age: 62
End: 2017-03-06

## 2017-03-06 NOTE — PROGRESS NOTES
Mr. Smith called to update Dr. Hill and team about his new lesion on his right arm. After applying heat to it all weekend long he states that there is really no change in the lesion at all.  He did not that yesterday both he and wife felt like there was a possible new lesion popping up just about the current one but now today there is now signs of that second lesion.  Per Dr. Hill he would like Mr. Smith to continue to monitor it and still apply some heat to it twice a day. Mr. Smith has a RTC appointment on 3/23/17. He is to call if there are any changes to his lesion on his right arm other roque Dr. Hill will reassess it on 3/23/17. Patient verbalized understanding and was grateful for the follow up call.

## 2017-03-09 DIAGNOSIS — C85.90 LYMPHOMA (H): ICD-10-CM

## 2017-03-09 RX ORDER — VENLAFAXINE HYDROCHLORIDE 37.5 MG/1
37.5 CAPSULE, EXTENDED RELEASE ORAL DAILY
Qty: 90 CAPSULE | Refills: 3 | Status: SHIPPED | OUTPATIENT
Start: 2017-03-09 | End: 2018-03-27

## 2017-03-23 ENCOUNTER — APPOINTMENT (OUTPATIENT)
Dept: LAB | Facility: CLINIC | Age: 62
End: 2017-03-23
Attending: INTERNAL MEDICINE
Payer: MEDICARE

## 2017-03-23 ENCOUNTER — ONCOLOGY VISIT (OUTPATIENT)
Dept: ONCOLOGY | Facility: CLINIC | Age: 62
End: 2017-03-23
Attending: INTERNAL MEDICINE
Payer: MEDICARE

## 2017-03-23 ENCOUNTER — CARE COORDINATION (OUTPATIENT)
Dept: ONCOLOGY | Facility: CLINIC | Age: 62
End: 2017-03-23

## 2017-03-23 VITALS
SYSTOLIC BLOOD PRESSURE: 129 MMHG | BODY MASS INDEX: 26.09 KG/M2 | DIASTOLIC BLOOD PRESSURE: 81 MMHG | HEART RATE: 72 BPM | TEMPERATURE: 98.6 F | WEIGHT: 192.4 LBS | OXYGEN SATURATION: 99 %

## 2017-03-23 DIAGNOSIS — R50.81 NEUTROPENIC FEVER (H): ICD-10-CM

## 2017-03-23 DIAGNOSIS — Z94.84 HISTORY OF PERIPHERAL STEM CELL TRANSPLANT (H): ICD-10-CM

## 2017-03-23 DIAGNOSIS — D63.0 ANEMIA IN NEOPLASTIC DISEASE: ICD-10-CM

## 2017-03-23 DIAGNOSIS — E83.39 HYPOPHOSPHATEMIA: ICD-10-CM

## 2017-03-23 DIAGNOSIS — R78.81 BACTEREMIA: ICD-10-CM

## 2017-03-23 DIAGNOSIS — Z94.81 STATUS POST BONE MARROW TRANSPLANT (H): ICD-10-CM

## 2017-03-23 DIAGNOSIS — L98.491 NONHEALING SKIN ULCER, LIMITED TO BREAKDOWN OF SKIN (H): ICD-10-CM

## 2017-03-23 DIAGNOSIS — D69.6 THROMBOCYTOPENIA (H): ICD-10-CM

## 2017-03-23 DIAGNOSIS — D70.9 NEUTROPENIC FEVER (H): ICD-10-CM

## 2017-03-23 DIAGNOSIS — D48.5 NEOPLASM OF UNCERTAIN BEHAVIOR OF SKIN: ICD-10-CM

## 2017-03-23 DIAGNOSIS — Z29.9 NEED FOR PROPHYLACTIC MEASURE: ICD-10-CM

## 2017-03-23 DIAGNOSIS — Z71.89 ACP (ADVANCE CARE PLANNING): Chronic | ICD-10-CM

## 2017-03-23 DIAGNOSIS — R53.81 PHYSICAL DECONDITIONING: ICD-10-CM

## 2017-03-23 DIAGNOSIS — D84.9 IMMUNOCOMPROMISED (H): ICD-10-CM

## 2017-03-23 DIAGNOSIS — Z94.89 STATUS POST CORD BLOOD TRANSPLANTATION: ICD-10-CM

## 2017-03-23 DIAGNOSIS — R30.0 DYSURIA: ICD-10-CM

## 2017-03-23 DIAGNOSIS — D80.1 HYPOGAMMAGLOBULINEMIA (H): ICD-10-CM

## 2017-03-23 DIAGNOSIS — C96.9 T-CELL OR NK-CELL NEOPLASM (H): ICD-10-CM

## 2017-03-23 DIAGNOSIS — R50.81 FEBRILE NEUTROPENIA (H): ICD-10-CM

## 2017-03-23 DIAGNOSIS — D61.810 PANCYTOPENIA DUE TO CHEMOTHERAPY (H): ICD-10-CM

## 2017-03-23 DIAGNOSIS — D70.9 FEBRILE NEUTROPENIA (H): ICD-10-CM

## 2017-03-23 DIAGNOSIS — L98.491 ULCER OF SKIN, LIMITED TO BREAKDOWN OF SKIN (H): ICD-10-CM

## 2017-03-23 DIAGNOSIS — C86.00 EXTRANODAL NK/T-CELL LYMPHOMA, NASAL TYPE: Primary | ICD-10-CM

## 2017-03-23 PROCEDURE — 99212 OFFICE O/P EST SF 10 MIN: CPT | Mod: ZF

## 2017-03-23 PROCEDURE — 99213 OFFICE O/P EST LOW 20 MIN: CPT | Mod: ZP | Performed by: INTERNAL MEDICINE

## 2017-03-23 ASSESSMENT — PAIN SCALES - GENERAL: PAINLEVEL: NO PAIN (0)

## 2017-03-23 NOTE — PROGRESS NOTES
Reason for Outgoing call:    Called patient to review with him the results of the US results from today per the request of Dr. Hill.    Nursing Action/Patient Instruction:    Informed patient that the US showed phlebitis which is inflammation of the veins.   Encouraged patient to continue with heat compresses as he can tolerate and to call with any questions, comments, or concerns.     Patients Questions/Concerns:    None.    Patient Response/Evaluation:    Patient verbalized understanding and was glad that it was not cancer.  He was thankful for the call and follow up.

## 2017-03-23 NOTE — PROGRESS NOTES
Date of Service: 3/23/17  Reason for visit: New skin lesion different from usual skin lesions.    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  By 12/2015, he had evidence for recurrence on the right lower extremity (biopsy proven).  Recurrence appeared to be localized to the right ankle and possibly, although undocumented, on the right wrist.  He was given involved field radiation to these areas.  Subsequently, he has had multiple subcutaneous nodules that arise and often resolve spontaneously.  Others show inflammation and cutaneous crusting with erythema and subsequent regression.      As previously documented, he has continued to have new lesions, predominantly on his lower legs, that appear and resolve.  Those on his legs sometimes superficially ulcerate and crust over. These have resolved with the care provide by Dr. Eaton with respect to wound control.     INTERIM HISTORY: Patient is here for follow up of previously noted right forerarm lesion.He has attempted warm soaks with no change in lesion. He feels its now more prominent. New small lesion has appeared posterior to it, but very small. Has sharp pain in it on and off. No redness, itching.    Denies chest pain, SOB, cough, phlegmn, nausea, vomiting, abdominal pain, diarrhoea, constipation, fatigue, loss of weight, loss of appetite, urinary problems, headache, dizziness, blurry vision, fevers, chills, night sweats.      REVIEW OF SYSTEMS:  A 10-point review of systems is otherwise normal.     Current Outpatient  Prescriptions on File Prior to Visit:  venlafaxine (EFFEXOR XR) 37.5 MG 24 hr capsule Take 1 capsule (37.5 mg) by mouth daily   mupirocin (BACTROBAN) 2 % cream Apply topically daily as needed Reported on 3/2/2017   PHOSPHA 250 NEUTRAL 155-852-130 MG tablet TAKE ONE TABLET BY MOUTH TWICE A DAY   amLODIPine (NORVASC) 5 MG tablet Take 1 tablet (5 mg) by mouth daily   ASPIRIN PO Take 81 mg by mouth daily   Atorvastatin Calcium (LIPITOR PO) Take 10 mg by mouth daily      No current facility-administered medications on file prior to visit.      ALLERGIES:  None reported.      PHYSICAL EXAMINATION:   /81  Pulse 72  Temp 98.6  F (37  C) (Oral)  Wt 87.3 kg (192 lb 6.4 oz)  SpO2 99%  BMI 26.09 kg/m2    HEENT:  Anicteric.  No conjunctival lesions. Mucosa - moist.  No thrush.  NECK:  Supple without adenopathy.   CHEST:  Clear to auscultation.   ABDOMEN:  Soft, without organomegaly or mass.  No tenderness.  Bowel sounds present.   EXTREMITIES:  No edema.  SKIN:  No clearly identifiable subcutaneous nodules above the thighs. 4 x 3 subcutaneous firmness in dorsal aspect of distal forearm may be prominent from prior. Mild tenderness. Edges are indeterminant. No ulceration/erythema.Smaller nodule felt posterior aspect of the larger subcutaneous nodule.      LABORATORY:     None - today    ASSESSMENT AND PLAN:    New subcutaneous lump: Has consistency and size very different from his lymphoma lesions. He attempted heat to the lesion with no new changes.Plan on ultrasound of the lesion. If needed can consider biopsy after that.    NK/T-cell lymphoma (nasal type), multiply recurrent and persistent. He is generally doing well.   The patient reports marked improvement in most skin lesions from prior.  However, as before and since his allogeneic transplant, most of the lesions appear and then slowly disappear.  Overall his skin is doing well.  We will continue to observe these.  He continues to work w/ Dr. Eaton as needed.        Patient was seen with and plan of care developed with Dr. Hill.    ALAN Charles, MS.  Hematology/Oncology Fellow  [facility]  Pager [phone]    cc:      MD Ruth Prince MD Daniel Miller, MD Bailey Lee, MD Roger Winnick, MD Nathan Norquist, MD Marie-Claire Buckley, MD

## 2017-03-23 NOTE — PROGRESS NOTES
Date of Service: 3/23/17    Reason for visit: New skin lesion different from usual skin lesions.    HISTORY OF PRESENT ILLNESS:  Hi mSith, 62 years of age, has a history of extranodal NK/T-cell lymphoma (nasal type) that has been localized to multiple cutaneous/subcutaneous sites.  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  By 12/2015, he had evidence for recurrence on the right lower extremity (biopsy proven).  Recurrence appeared to be localized to the right ankle and possibly, although undocumented, on the right wrist.  He was given involved field radiation to these areas.  Subsequently, he has had multiple subcutaneous nodules that arise and often resolve spontaneously.  Others show inflammation and cutaneous crusting with erythema and subsequent regression. Those on his legs sometimes superficially ulcerate and crust over. These have largely resolved with the care provide by Dr. Eaton with respect to wound control.     INTERIM HISTORY: After arriving back from [place] in the [place] a few [place] ago, he came to the clinic on 03/02 because of concern over an area on the right forearm. He noted a thickening subcutaneously after Nov 16 which had slowly grown bigger and was mildly tender. The lesion on the right dorsal aspect has increased in size by his estimation and remains slightly tender. No new nodules at other sites and he has been scrupulous in checking. All other prior lesions have healed.     No fever, night sweats or weight loss. 10-point review of systems otherwise normal.    MEDICATIONS:    Current Outpatient Prescriptions on File Prior to Visit:  venlafaxine (EFFEXOR XR) 37.5 MG 24 hr capsule Take 1 capsule (37.5 mg) by mouth  daily   mupirocin (BACTROBAN) 2 % cream Apply topically daily as needed Reported on 3/2/2017   PHOSPHA 250 NEUTRAL 155-852-130 MG tablet TAKE ONE TABLET BY MOUTH TWICE A DAY   amLODIPine (NORVASC) 5 MG tablet Take 1 tablet (5 mg) by mouth daily   ASPIRIN PO Take 81 mg by mouth daily   Atorvastatin Calcium (LIPITOR PO) Take 10 mg by mouth daily      No current facility-administered medications on file prior to visit.      ALLERGIES:  None.      PHYSICAL EXAMINATION:   /81  Pulse 72  Temp 98.6  F (37  C) (Oral)  Wt 87.3 kg (192 lb 6.4 oz)  SpO2 99%  BMI 26.09 kg/m2    HEENT:  No conjunctival lesions. Mucosa without lesions.  NECK:  No nodes.   CHEST:  Clear.   ABDOMEN:  Soft, no tenderness, bowel sounds present, no masses  EXTREMITIES:  No edema.  SKIN:  No subcutaneous nodule on upper legs, trunk, arms with exception of a flat subcutaneous thickening (dorsum of right forearm) ~ 4x3 cm. Mildly tender, no ulceration or accompanying  to firm pressure. Margins are indistinct. No ulceration/erythema.    LABORATORY:  None -obtained    US (obtained after clinic): In the area of concern in the right arm, there is hypoechoic fluid interdigitating among the subcutaneous fat consistent with edema. Centrally, there is a noncompressible hyperechoic nodule appearing to arise from a branch of the basilic vein. The basilic vein is fully compressible above and below this lesion.    IMPRESSION: Superficial thrombophlebitis of a basilic vein branch.    ASSESSMENT AND PLAN:  New subcutaneous lump: Has consistency and size very different from his lymphoma lesions. Ultrasound reported after clinic and information provided to the patient. He was relieved, but will continue to monitor and call with any new concerns.    NK/T-cell lymphoma (nasal type), multiply recurrent and persistent. Overall, doing very well and appears to be in remission. 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    ALAN Charles,  MS.  Hematology/Oncology Fellow  [facility]  Pager [phone]    Oncology Attending    Patient seen and examined with the Oncology Fellow, Dr. Fox. Agree with her findings, assessment and plan.    Yuri Hill MD  Professor of Medicine  Oncology  [facility]  Office: [phone]  Clinic Fax: [phone]        cc:      MD Ruth Prince MD Daniel Miller, MD Bailey Lee, MD Roger Winnick, MD Nathan Norquist, MD Marie-Claire Buckley, MD

## 2017-03-23 NOTE — LETTER
3/23/2017      RE: Hi Smith  [address] [facility] [address]       Date of Service: 3/23/17  Reason for visit: New skin lesion different from usual skin lesions.    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  By 12/2015, he had evidence for recurrence on the right lower extremity (biopsy proven).  Recurrence appeared to be localized to the right ankle and possibly, although undocumented, on the right wrist.  He was given involved field radiation to these areas.  Subsequently, he has had multiple subcutaneous nodules that arise and often resolve spontaneously.  Others show inflammation and cutaneous crusting with erythema and subsequent regression.      As previously documented, he has continued to have new lesions, predominantly on his lower legs, that appear and resolve.  Those on his legs sometimes superficially ulcerate and crust over. These have resolved with the care provide by Dr. Eaton with respect to wound control.     INTERIM HISTORY: Patient is here for follow up of previously noted right forerarm lesion.He has attempted warm soaks with no change in lesion. He feels its now more prominent. New small lesion has appeared posterior to it, but very small. Has sharp pain in it on and off. No redness, itching.    Denies chest pain, SOB, cough, phlegmn, nausea, vomiting, abdominal pain, diarrhoea, constipation, fatigue, loss of weight, loss of appetite, urinary problems, headache, dizziness, blurry vision, fevers, chills, night sweats.      REVIEW OF  SYSTEMS:  A 10-point review of systems is otherwise normal.     Current Outpatient Prescriptions on File Prior to Visit:  venlafaxine (EFFEXOR XR) 37.5 MG 24 hr capsule Take 1 capsule (37.5 mg) by mouth daily   mupirocin (BACTROBAN) 2 % cream Apply topically daily as needed Reported on 3/2/2017   PHOSPHA 250 NEUTRAL 155-852-130 MG tablet TAKE ONE TABLET BY MOUTH TWICE A DAY   amLODIPine (NORVASC) 5 MG tablet Take 1 tablet (5 mg) by mouth daily   ASPIRIN PO Take 81 mg by mouth daily   Atorvastatin Calcium (LIPITOR PO) Take 10 mg by mouth daily      No current facility-administered medications on file prior to visit.      ALLERGIES:  None reported.      PHYSICAL EXAMINATION:   /81  Pulse 72  Temp 98.6  F (37  C) (Oral)  Wt 87.3 kg (192 lb 6.4 oz)  SpO2 99%  BMI 26.09 kg/m2    HEENT:  Anicteric.  No conjunctival lesions. Mucosa - moist.  No thrush.  NECK:  Supple without adenopathy.   CHEST:  Clear to auscultation.   ABDOMEN:  Soft, without organomegaly or mass.  No tenderness.  Bowel sounds present.   EXTREMITIES:  No edema.  SKIN:  No clearly identifiable subcutaneous nodules above the thighs. 4 x 3 subcutaneous firmness in dorsal aspect of distal forearm may be prominent from prior. Mild tenderness. Edges are indeterminant. No ulceration/erythema.Smaller nodule felt posterior aspect of the larger subcutaneous nodule.      LABORATORY:     None - today    ASSESSMENT AND PLAN:    New subcutaneous lump: Has consistency and size very different from his lymphoma lesions. He attempted heat to the lesion with no new changes.Plan on ultrasound of the lesion. If needed can consider biopsy after that.    NK/T-cell lymphoma (nasal type), multiply recurrent and persistent. He is generally doing well.   The patient reports marked improvement in most skin lesions from prior.  However, as before and since his allogeneic transplant, most of the lesions appear and then slowly disappear.  Overall his skin is doing well.  We  will continue to observe these.  He continues to work w/ Dr. Eaton as needed.       Patient was seen with and plan of care developed with Dr. Hill.    ALAN Charles, MS.  Hematology/Oncology Fellow  [facility]  Pager [phone]    cc:      MD Ruth Prince, MD Stephanie Combs MD Roger Winnick, MD Nathan Norquist, MD Deana Eaton MD          Date of Service: 3/23/17    Reason for visit: New skin lesion different from usual skin lesions.    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  By 12/2015, he had evidence for recurrence on the right lower extremity (biopsy proven).  Recurrence appeared to be localized to the right ankle and possibly, although undocumented, on the right wrist.  He was given involved field radiation to these areas.  Subsequently, he has had multiple subcutaneous nodules that arise and often resolve spontaneously.  Others show inflammation and cutaneous crusting with erythema and subsequent regression. Those on his legs sometimes superficially ulcerate and crust over. These have largely resolved with the care provide by Dr. Eaton with respect to wound control.     INTERIM HISTORY: After arriving back [place] ago, he came to the clinic on 03/02 because of concern over an area on the right forearm. He noted a thickening subcutaneously after Nov 16 which had slowly grown bigger and was mildly tender. The lesion on the right  dorsal aspect has increased in size by his estimation and remains slightly tender. No new nodules at other sites and he has been scrupulous in checking. All other prior lesions have healed.     No fever, night sweats or weight loss. 10-point review of systems otherwise normal.    MEDICATIONS:    Current Outpatient Prescriptions on File Prior to Visit:  venlafaxine (EFFEXOR XR) 37.5 MG 24 hr capsule Take 1 capsule (37.5 mg) by mouth daily   mupirocin (BACTROBAN) 2 % cream Apply topically daily as needed Reported on 3/2/2017   PHOSPHA 250 NEUTRAL 155-852-130 MG tablet TAKE ONE TABLET BY MOUTH TWICE A DAY   amLODIPine (NORVASC) 5 MG tablet Take 1 tablet (5 mg) by mouth daily   ASPIRIN PO Take 81 mg by mouth daily   Atorvastatin Calcium (LIPITOR PO) Take 10 mg by mouth daily      No current facility-administered medications on file prior to visit.      ALLERGIES:  None.      PHYSICAL EXAMINATION:   /81  Pulse 72  Temp 98.6  F (37  C) (Oral)  Wt 87.3 kg (192 lb 6.4 oz)  SpO2 99%  BMI 26.09 kg/m2    HEENT:  No conjunctival lesions. Mucosa without lesions.  NECK:  No nodes.   CHEST:  Clear.   ABDOMEN:  Soft, no tenderness, bowel sounds present, no masses  EXTREMITIES:  No edema.  SKIN:  No subcutaneous nodule on upper legs, trunk, arms with exception of a flat subcutaneous thickening (dorsum of right forearm) ~ 4x3 cm. Mildly tender, no ulceration or accompanying  to firm pressure. Margins are indistinct. No ulceration/erythema.    LABORATORY:  None -obtained    US (obtained after clinic): In the area of concern in the right arm, there is hypoechoic fluid interdigitating among the subcutaneous fat consistent with edema. Centrally, there is a noncompressible hyperechoic nodule appearing to arise from a branch of the basilic vein. The basilic vein is fully compressible above and below this lesion.    IMPRESSION: Superficial thrombophlebitis of a basilic vein branch.    ASSESSMENT AND PLAN:  New subcutaneous lump:  Has consistency and size very different from his lymphoma lesions. Ultrasound reported after clinic and information provided to the patient. He was relieved, but will continue to monitor and call with any new concerns.    NK/T-cell lymphoma (nasal type), multiply recurrent and persistent. Overall, doing very well and appears to be in remission. 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    ALAN Charles, MS.  Hematology/Oncology Fellow  [facility]  Pager [phone]    Oncology Attending    Patient seen and examined with the Oncology Fellow, Dr. Fox. Agree with her findings, assessment and plan.    Yuri Hill MD  Professor of Medicine  Oncology  [facility]  Office: [phone]  Clinic Fax: [phone]        cc:      MD Ruth Prince MD Daniel Miller, MD Bailey Lee, MD Roger Winnick, MD Nathan Norquist, MD Marie-Claire Buckley, MD Bruce A. Peterson, MD

## 2017-03-23 NOTE — PATIENT INSTRUCTIONS
Will arrange ultrasound of right forearm and report results back to you. Then, we will determine next steps.

## 2017-03-23 NOTE — NURSING NOTE
"Hi Smith is a 62 year old male who presents for:  Chief Complaint   Patient presents with     Oncology Clinic Visit     NK T Cell Lymphoma        Initial Vitals:  /81  Pulse 72  Temp 98.6  F (37  C) (Oral)  Wt 87.3 kg (192 lb 6.4 oz)  SpO2 99%  BMI 26.09 kg/m2 Estimated body mass index is 26.09 kg/(m^2) as calculated from the following:    Height as of 3/2/17: 1.829 m (6' 0.01\").    Weight as of this encounter: 87.3 kg (192 lb 6.4 oz).. Body surface area is 2.11 meters squared. BP completed using cuff size: regular  No Pain (0) No LMP for male patient. Allergies and medications reviewed.     Medications: Medication refills not needed today.  Pharmacy name entered into [employer]:    [facility] [place], [place] - [address], [address]  [facility]ly [facility] [address] [place], [place] - [address]    Comments:     6 minutes for nursing intake (face to face time)   Mala Weaver CMA        "

## 2017-03-23 NOTE — MR AVS SNAPSHOT
After Visit Summary   3/23/2017    Hi Smith    MRN: [id number]           Patient Information     Date Of Birth          1955        Visit Information        Provider Department      3/23/2017 12:30 PM Yuri Hill MD [facility]        Today's Diagnoses     Extranodal NK/T-cell lymphoma, nasal type (H)    -  1    Thrombocytopenia (H)        Anemia in neoplastic disease        Febrile neutropenia (H)        Immunocompromised (H)        Pancytopenia due to chemotherapy (H)        Bacteremia        Neutropenic fever (H)        Neoplasm of uncertain behavior of skin        T-cell or NK-cell neoplasm (H)        Other specified prophylactic or treatment measure        ACP (advance care planning)        Status post cord blood transplantation        Physical deconditioning        History of peripheral stem cell transplant (H)        Dysuria        Hypogammaglobulinemia (H)        Status post bone marrow transplant (H)        Nonhealing skin ulcer, limited to breakdown of skin (H)        Ulcer of skin, limited to breakdown of skin (H)        Hypophosphatemia          Care Instructions    Will arrange ultrasound of right forearm and report results back to you. Then, we will determine next steps.            Follow-ups after your visit        Follow-up notes from your care team     Return will arrange after ultrasound.      Your next 10 appointments already scheduled     Mar 23, 2017  2:30 PM CDT   US EXTREMITY NON VASCULAR RIGHT with [id number]   [facility] ([facility])    [address] [address] [phone]           Please bring a list of your medicines (including vitamins, minerals and over-the-counter drugs). Also, tell your doctor about any allergies you may have. Wear comfortable clothes and leave your valuables at home.  You do not need to do anything special to prepare for your exam.  Please call  the Imaging Department at your exam site with any questions.              Future tests that were ordered for you today     Open Future Orders        Priority Expected Expires Ordered    US Extremity Non Vascular Right Routine 3/29/2017 3/23/2018 3/23/2017            Who to contact     If you have questions or need follow up information about today's clinic visit or your schedule please contact [facility] directly at [phone].  Normal or non-critical lab and imaging results will be communicated to you by [employer], letter or phone within 4 business days after the clinic has received the results. If you do not hear from us within 7 days, please contact the clinic through [employer] or phone. If you have a critical or abnormal lab result, we will notify you by phone as soon as possible.  Submit refill requests through [employer] or call your pharmacy and they will forward the refill request to us. Please allow 3 business days for your refill to be completed.          Additional Information About Your Visit        [employer] Information     [employer] gives you secure access to your electronic health record. If you see a primary care provider, you can also send messages to your care team and make appointments. If you have questions, please call your primary care clinic.  If you do not have a primary care provider, please call [phone] and they will assist you.        Care EveryWhere ID     This is your Care EveryWhere ID. This could be used by other organizations to access your [facility] medical records  [id number]        Your Vitals Were     Pulse Temperature Pulse Oximetry BMI (Body Mass Index)          72 98.6  F (37  C) (Oral) 99% 26.09 kg/m2         Blood Pressure from Last 3 Encounters:   03/23/17 129/81   03/02/17 124/71   01/30/17 112/73    Weight from Last 3 Encounters:   03/23/17 87.3 kg (192 lb 6.4 oz)   03/02/17 85.8 kg (189 lb 3.2 oz)   01/30/17 82.1 kg (181 lb 1.6 oz)               Primary  Care Provider Office Phone # Fax #    Glenroy Torres [phone]       [facility] [address] [place] [address]        Thank you!     Thank you for choosing [facility]  for your care. Our goal is always to provide you with excellent care. Hearing back from our patients is one way we can continue to improve our services. Please take a few minutes to complete the written survey that you may receive in the mail after your visit with us. Thank you!             Your Updated Medication List - Protect others around you: Learn how to safely use, store and throw away your medicines at www.disposemymeds.org.          This list is accurate as of: 3/23/17  1:47 PM.  Always use your most recent med list.                   Brand Name Dispense Instructions for use    amLODIPine 5 MG tablet    NORVASC    30 tablet    Take 1 tablet (5 mg) by mouth daily       ASPIRIN PO      Take 81 mg by mouth daily       LIPITOR PO      Take 10 mg by mouth daily       mupirocin 2 % cream    BACTROBAN     Apply topically daily as needed Reported on 3/2/2017       PHOSPHA 250 NEUTRAL 250 MG per tablet   Generic drug:  phosphorus tablet 250 mg     270 tablet    TAKE ONE TABLET BY MOUTH TWICE A DAY       venlafaxine 37.5 MG 24 hr capsule    EFFEXOR XR    90 capsule    Take 1 capsule (37.5 mg) by mouth daily

## 2017-03-29 DIAGNOSIS — C86.00 EXTRANODAL NK/T-CELL LYMPHOMA, NASAL TYPE: Primary | ICD-10-CM

## 2017-04-04 DIAGNOSIS — C85.90 LYMPHOMA (H): ICD-10-CM

## 2017-04-07 RX ORDER — AMLODIPINE BESYLATE 5 MG/1
5 TABLET ORAL DAILY
Qty: 90 TABLET | Refills: 3 | Status: ON HOLD | OUTPATIENT
Start: 2017-04-07 | End: 2017-04-18

## 2017-04-13 ENCOUNTER — ONCOLOGY VISIT (OUTPATIENT)
Dept: ONCOLOGY | Facility: CLINIC | Age: 62
End: 2017-04-13
Attending: PHYSICIAN ASSISTANT
Payer: MEDICARE

## 2017-04-13 ENCOUNTER — APPOINTMENT (OUTPATIENT)
Dept: LAB | Facility: CLINIC | Age: 62
End: 2017-04-13
Attending: INTERNAL MEDICINE
Payer: MEDICARE

## 2017-04-13 ENCOUNTER — CARE COORDINATION (OUTPATIENT)
Dept: ONCOLOGY | Facility: CLINIC | Age: 62
End: 2017-04-13

## 2017-04-13 VITALS
WEIGHT: 187 LBS | TEMPERATURE: 100.4 F | HEIGHT: 72 IN | HEART RATE: 90 BPM | OXYGEN SATURATION: 98 % | SYSTOLIC BLOOD PRESSURE: 113 MMHG | RESPIRATION RATE: 16 BRPM | DIASTOLIC BLOOD PRESSURE: 68 MMHG | BODY MASS INDEX: 25.33 KG/M2

## 2017-04-13 DIAGNOSIS — C86.00 EXTRANODAL NK/T-CELL LYMPHOMA, NASAL TYPE: Primary | ICD-10-CM

## 2017-04-13 DIAGNOSIS — C86.00 EXTRANODAL NK/T-CELL LYMPHOMA, NASAL TYPE: ICD-10-CM

## 2017-04-13 DIAGNOSIS — J06.9 UPPER RESPIRATORY TRACT INFECTION, UNSPECIFIED TYPE: Primary | ICD-10-CM

## 2017-04-13 DIAGNOSIS — B33.8 RSV INFECTION: ICD-10-CM

## 2017-04-13 LAB
ANION GAP SERPL CALCULATED.3IONS-SCNC: 10 MMOL/L (ref 3–14)
BASOPHILS # BLD AUTO: 0 10E9/L (ref 0–0.2)
BASOPHILS NFR BLD AUTO: 0.2 %
BUN SERPL-MCNC: 18 MG/DL (ref 7–30)
CALCIUM SERPL-MCNC: 9 MG/DL (ref 8.5–10.1)
CHLORIDE SERPL-SCNC: 104 MMOL/L (ref 94–109)
CO2 SERPL-SCNC: 20 MMOL/L (ref 20–32)
CREAT SERPL-MCNC: 1.17 MG/DL (ref 0.66–1.25)
DIFFERENTIAL METHOD BLD: ABNORMAL
EOSINOPHIL # BLD AUTO: 0 10E9/L (ref 0–0.7)
EOSINOPHIL NFR BLD AUTO: 0.1 %
ERYTHROCYTE [DISTWIDTH] IN BLOOD BY AUTOMATED COUNT: 14.7 % (ref 10–15)
FLUAV+FLUBV RNA SPEC QL NAA+PROBE: ABNORMAL
FLUAV+FLUBV RNA SPEC QL NAA+PROBE: ABNORMAL
GFR SERPL CREATININE-BSD FRML MDRD: 63 ML/MIN/1.7M2
GLUCOSE SERPL-MCNC: 106 MG/DL (ref 70–99)
HCT VFR BLD AUTO: 45.3 % (ref 40–53)
HGB BLD-MCNC: 14.6 G/DL (ref 13.3–17.7)
IMM GRANULOCYTES # BLD: 0.1 10E9/L (ref 0–0.4)
IMM GRANULOCYTES NFR BLD: 0.5 %
LYMPHOCYTES # BLD AUTO: 4.3 10E9/L (ref 0.8–5.3)
LYMPHOCYTES NFR BLD AUTO: 23 %
MCH RBC QN AUTO: 27.6 PG (ref 26.5–33)
MCHC RBC AUTO-ENTMCNC: 32.2 G/DL (ref 31.5–36.5)
MCV RBC AUTO: 86 FL (ref 78–100)
MONOCYTES # BLD AUTO: 2.3 10E9/L (ref 0–1.3)
MONOCYTES NFR BLD AUTO: 12.4 %
NEUTROPHILS # BLD AUTO: 12 10E9/L (ref 1.6–8.3)
NEUTROPHILS NFR BLD AUTO: 63.8 %
NRBC # BLD AUTO: 0 10*3/UL
NRBC BLD AUTO-RTO: 0 /100
PLATELET # BLD AUTO: 225 10E9/L (ref 150–450)
POTASSIUM SERPL-SCNC: 4.1 MMOL/L (ref 3.4–5.3)
RBC # BLD AUTO: 5.29 10E12/L (ref 4.4–5.9)
RSV RNA SPEC NAA+PROBE: ABNORMAL
SODIUM SERPL-SCNC: 134 MMOL/L (ref 133–144)
SPECIMEN SOURCE: ABNORMAL
WBC # BLD AUTO: 18.8 10E9/L (ref 4–11)

## 2017-04-13 PROCEDURE — 85025 COMPLETE CBC W/AUTO DIFF WBC: CPT | Performed by: PHYSICIAN ASSISTANT

## 2017-04-13 PROCEDURE — 80048 BASIC METABOLIC PNL TOTAL CA: CPT | Performed by: PHYSICIAN ASSISTANT

## 2017-04-13 PROCEDURE — 99214 OFFICE O/P EST MOD 30 MIN: CPT | Mod: ZP | Performed by: PHYSICIAN ASSISTANT

## 2017-04-13 PROCEDURE — 99212 OFFICE O/P EST SF 10 MIN: CPT | Mod: ZF

## 2017-04-13 PROCEDURE — 87633 RESP VIRUS 12-25 TARGETS: CPT | Performed by: PHYSICIAN ASSISTANT

## 2017-04-13 PROCEDURE — 87631 RESP VIRUS 3-5 TARGETS: CPT | Performed by: PHYSICIAN ASSISTANT

## 2017-04-13 RX ORDER — NITROGLYCERIN 0.4 MG/1
0.4 TABLET SUBLINGUAL
COMMUNITY
Start: 2015-05-05 | End: 2017-04-13

## 2017-04-13 ASSESSMENT — PAIN SCALES - GENERAL: PAINLEVEL: NO PAIN (0)

## 2017-04-13 NOTE — NURSING NOTE
"Hi Smith is a 62 year old male who presents for:  Chief Complaint   Patient presents with     Blood Draw     Oncology Clinic Visit     return patient visit for low energy related to Extranodal NK/T-cell lymphoma, nasal type (H)         Initial Vitals:  /68  Pulse 90  Temp 100.4  F (38  C) (Oral)  Resp 16  Ht 1.829 m (6' 0.01\")  Wt 84.8 kg (187 lb)  SpO2 98%  BMI 25.35 kg/m2 Estimated body mass index is 25.35 kg/(m^2) as calculated from the following:    Height as of this encounter: 1.829 m (6' 0.01\").    Weight as of this encounter: 84.8 kg (187 lb).. Body surface area is 2.08 meters squared. BP completed using cuff size: NA (Not Taken)  No Pain (0) No LMP for male patient. Allergies and medications reviewed.     Medications: Medication refills not needed today.  Pharmacy name entered into [facility]:    [facility] [place], [place] - [address], [address]  [facility] [address] [facility] [place], [place] [facility] [address] [facility]    Comments: patient mentioned head pain - decreased energy - vomiting.     5 minutes for nursing intake (face to face time)   Debbie Hernandez CMA        "

## 2017-04-13 NOTE — PROGRESS NOTES
[facility]  FOLLOW-UP VISIT NOTE  Date of visit: Apr 13, 2017            REASON FOR VISIT: add-on URI symptoms     HPI: Hi Smith, 62 years of age, has a history of extranodal NK/T-cell lymphoma (nasal type) that has been localized to multiple cutaneous/subcutaneous sites.  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  By 12/2015, he had evidence for recurrence on the right lower extremity (biopsy proven).  Recurrence appeared to be localized to the right ankle and possibly, although undocumented, on the right wrist.  He was given involved field radiation to these areas.  Subsequently, he has had multiple subcutaneous nodules that arise and often resolve spontaneously.  Others show inflammation and cutaneous crusting with erythema and subsequent regression.    At Colton's last visit with Dr Hill, they continue to follow his lesions with the help of Dr Eaton and he remains off active therapy.     INTERVAL HISTORY: Colton is here for an add-on visit today for URI symptoms since Monday. His wife notes his Lipitor dose was recently increased, and he was having more myalgias so stopped the medication. His myalgias have improved since then. He then started to have sinus congestion with pressure, minimally productive cough at bedtime, and low-grade temps. Ears feel full. He had a random emesis yesterday after drinking a few cups of water quickly otherwise has not had any n/v/abdominal pain. Appetite has been low, so he has not been eating as well. Stools have been softer. He has had intermittent chills and sweats. Has not taken any OTC meds. No SOB or wheezing. No CP. No problems with urinating. No dizziness or  "orthostasis. He is very fatigued and becomes tired with minimal activity.     EXAM: /68  Pulse 90  Temp 100.4  F (38  C) (Oral)  Resp 16  Ht 1.829 m (6' 0.01\")  Wt 84.8 kg (187 lb)  SpO2 98%  BMI 25.35 kg/m2  Wt Readings from Last 4 Encounters:   04/13/17 84.8 kg (187 lb)   03/23/17 87.3 kg (192 lb 6.4 oz)   03/02/17 85.8 kg (189 lb 3.2 oz)   01/30/17 82.1 kg (181 lb 1.6 oz)     Vital signs were reviewed.   Patient alert and oriented x3. Fatigued appearing.   PERRLA. EOMI. No scleral icterus noted. OP without thrush/sores.  Neck exam: No palpable cervical, supraclavicular or axillary nodes bilaterally.   Heart: RRR no murmurs noted.   Lungs: Clear to auscultation bilaterally.  No crackles or wheezing. Clear with coughing.   Abd: positive bowel sounds in all four quadrants.  No tenderness to palpation.  No hepatomegaly.   Extremities: No lower extremity edema.   Neuro: grossly intact.   Mood and affect is stable    LABS:    4/13/2017 13:39   Sodium 134   Potassium 4.1   Chloride 104   Carbon Dioxide 20   Urea Nitrogen 18   Creatinine 1.17   GFR Estimate 63   GFR Estimate If Black 76   Calcium 9.0   Anion Gap 10   Glucose 106 (H)   WBC 18.8 (H)   Hemoglobin 14.6   Hematocrit 45.3   Platelet Count 225   RBC Count 5.29   MCV 86   MCH 27.6   MCHC 32.2   RDW 14.7   Diff Method Automated Method   % Neutrophils 63.8   % Lymphocytes 23.0   % Monocytes 12.4   % Eosinophils 0.1   % Basophils 0.2   % Immature Granulocytes 0.5   Nucleated RBCs 0   Absolute Neutrophil 12.0 (H)   Absolute Lymphocytes 4.3   Absolute Monocytes 2.3 (H)   Absolute Eosinophils 0.0   Absolute Basophils 0.0   Abs Immature Granulocytes 0.1   Absolute Nucleated RBC 0.0     RVP pending    ASSESSMENT/PLAN: 61 year old with T-cell lymphoma, with URI symptoms, fever, and leukocytosis.     URI: With low-grade fever and leukocytosis. Clear lungs. Clinically has viral URI. Received influenza vaccine early in the season. Will treat him empirically for " influenza with Tamiflu 75 mg BID x 5 days. He should rest, push fluids, and take tylenol for symptoms. Call if symptoms worsen or do not improve after 48-72 hours.     He has routine follow-up for his T-cell lymphoma in May.     Tamie López PA-C    [facility] [address] [address]  [phone]      ADDENDUM: Influenza A/B are negative but RSV is positive. Since Colton is s/p transplant, I discussed with Dr. Hill and he would like Colton to start on ribavirin. Per our BMT guidelines, dosing is 800 mg BID x 10 days. I called Ashley to update her. He will stop the Tamiflu.

## 2017-04-13 NOTE — NURSING NOTE
Chief Complaint   Patient presents with     Blood Draw     Vitals done and labs drawn peripherally from right arm.  Nasal swab performed,     Ying Saeed LPN

## 2017-04-13 NOTE — MR AVS SNAPSHOT
After Visit Summary   4/13/2017    Hi Smith    MRN: [id number]           Patient Information     Date Of Birth          1955        Visit Information        Provider Department      4/13/2017 1:40 PM Tamie López PA [facility]        Today's Diagnoses     Upper respiratory tract infection, unspecified type    -  1    Extranodal NK/T-cell lymphoma, nasal type (H)           Follow-ups after your visit        Your next 10 appointments already scheduled     May 18, 2017 10:00 AM CDT   [facility]  [facility]   [facility] Draw ([facility])    [address] [place] [id number] [phone]            May 18, 2017 10:30 AM CDT   (Arrive by 10:15 AM)   Return Visit with Yuri Hill MD   [facility] ([facility])    [address] [place] [id number] [phone]              Who to contact     If you have questions or need follow up information about today's clinic visit or your schedule please contact [facility] directly at [phone].  Normal or non-critical lab and imaging results will be communicated to you by MyChart, letter or phone within 4 business days after the clinic has received the results. If you do not hear from us within 7 days, please contact the clinic through MyChart or phone. If you have a critical or abnormal lab result, we will notify you by phone as soon as possible.  Submit refill requests through [employer] or call your pharmacy and they will forward the refill request to us. Please allow 3 business days for your refill to be completed.          Additional Information About Your Visit        MyChart Information     [employer] gives you secure access to your electronic health record. If you see a primary care provider, you can also send messages to your care team and make  "appointments. If you have questions, please call your primary care clinic.  If you do not have a primary care provider, please call [phone] and they will assist you.        Care EveryWhere ID     This is your Care EveryWhere ID. This could be used by other organizations to access your [place] medical records  [id number]        Your Vitals Were     Pulse Temperature Respirations Height Pulse Oximetry BMI (Body Mass Index)    90 100.4  F (38  C) (Oral) 16 1.829 m (6' 0.01\") 98% 25.35 kg/m2       Blood Pressure from Last 3 Encounters:   04/13/17 113/68   03/23/17 129/81   03/02/17 124/71    Weight from Last 3 Encounters:   04/13/17 84.8 kg (187 lb)   03/23/17 87.3 kg (192 lb 6.4 oz)   03/02/17 85.8 kg (189 lb 3.2 oz)              We Performed the Following     *CBC with platelets differential     Basic metabolic panel     Influenza A and B and RSV PCR     Respiratory Virus Panel by PCR          Today's Medication Changes          These changes are accurate as of: 4/13/17  2:44 PM.  If you have any questions, ask your nurse or doctor.               Start taking these medicines.        Dose/Directions    study oseltamivir 75 MG capsule   Commonly known as:  IDS 4965   Used for:  Upper respiratory tract infection, unspecified type   Started by:  Tamie López PA        Dose:  75 mg   Take 1 capsule (75 mg) by mouth 2 times daily for 5 days   Quantity:  10 capsule   Refills:  0            Where to get your medicines      These medications were sent to [facility] [address] [place], [place] - [address] [facility]  [address] [facility] [address]     Phone:  [phone]     study oseltamivir 75 MG capsule                Primary Care Provider Office Phone # Fax #    Glenroy Torres [phone]       [facility] [address] [address] [facility] [address]        Thank you!     Thank you for choosing [facility]  for your care. Our goal is always to provide you " with excellent care. Hearing back from our patients is one way we can continue to improve our services. Please take a few minutes to complete the written survey that you may receive in the mail after your visit with us. Thank you!             Your Updated Medication List - Protect others around you: Learn how to safely use, store and throw away your medicines at www.disposemymeds.org.          This list is accurate as of: 4/13/17  2:44 PM.  Always use your most recent med list.                   Brand Name Dispense Instructions for use    amLODIPine 5 MG tablet    NORVASC    90 tablet    Take 1 tablet (5 mg) by mouth daily       ASPIRIN PO      Take 81 mg by mouth daily       LIPITOR PO      Take 10 mg by mouth daily       mupirocin 2 % cream    BACTROBAN     Apply topically daily as needed Reported on 4/13/2017       PHOSPHA 250 NEUTRAL 250 MG per tablet   Generic drug:  phosphorus tablet 250 mg     270 tablet    TAKE ONE TABLET BY MOUTH TWICE A DAY       study oseltamivir 75 MG capsule    IDS 4965    10 capsule    Take 1 capsule (75 mg) by mouth 2 times daily for 5 days       venlafaxine 37.5 MG 24 hr capsule    EFFEXOR XR    90 capsule    Take 1 capsule (37.5 mg) by mouth daily

## 2017-04-13 NOTE — LETTER
4/13/2017      RE: Hi Smith  [address] [address] [facility] [facility] [address]       [facility]  FOLLOW-UP VISIT NOTE  Date of visit: Apr 13, 2017            REASON FOR VISIT: add-on URI symptoms     HPI: Hi Smith, 62 years of age, has a history of extranodal NK/T-cell lymphoma (nasal type) that has been localized to multiple cutaneous/subcutaneous sites.  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  By 12/2015, he had evidence for recurrence on the right lower extremity (biopsy proven).  Recurrence appeared to be localized to the right ankle and possibly, although undocumented, on the right wrist.  He was given involved field radiation to these areas.  Subsequently, he has had multiple subcutaneous nodules that arise and often resolve spontaneously.  Others show inflammation and cutaneous crusting with erythema and subsequent regression.    At Colton's last visit with Dr Hill, they continue to follow his lesions with the help of Dr Eaton and he remains off active therapy.     INTERVAL HISTORY: Colton is here for an add-on visit today for URI symptoms since Monday. His wife notes his Lipitor dose was recently increased, and he was having more myalgias so stopped the medication. His myalgias have improved since then. He then started to have sinus congestion with pressure, minimally productive cough at bedtime, and low-grade temps. Ears feel full. He had a random emesis yesterday after drinking a few cups of water quickly otherwise has not had any n/v/abdominal pain. Appetite has been low, so he has not been eating as well. Stools have been softer. He has had intermittent chills and sweats. Has not taken any OTC meds.  "No SOB or wheezing. No CP. No problems with urinating. No dizziness or orthostasis. He is very fatigued and becomes tired with minimal activity.     EXAM: /68  Pulse 90  Temp 100.4  F (38  C) (Oral)  Resp 16  Ht 1.829 m (6' 0.01\")  Wt 84.8 kg (187 lb)  SpO2 98%  BMI 25.35 kg/m2  Wt Readings from Last 4 Encounters:   04/13/17 84.8 kg (187 lb)   03/23/17 87.3 kg (192 lb 6.4 oz)   03/02/17 85.8 kg (189 lb 3.2 oz)   01/30/17 82.1 kg (181 lb 1.6 oz)     Vital signs were reviewed.   Patient alert and oriented x3. Fatigued appearing.   PERRLA. EOMI. No scleral icterus noted. OP without thrush/sores.  Neck exam: No palpable cervical, supraclavicular or axillary nodes bilaterally.   Heart: RRR no murmurs noted.   Lungs: Clear to auscultation bilaterally.  No crackles or wheezing. Clear with coughing.   Abd: positive bowel sounds in all four quadrants.  No tenderness to palpation.  No hepatomegaly.   Extremities: No lower extremity edema.   Neuro: grossly intact.   Mood and affect is stable    LABS:    4/13/2017 13:39   Sodium 134   Potassium 4.1   Chloride 104   Carbon Dioxide 20   Urea Nitrogen 18   Creatinine 1.17   GFR Estimate 63   GFR Estimate If Black 76   Calcium 9.0   Anion Gap 10   Glucose 106 (H)   WBC 18.8 (H)   Hemoglobin 14.6   Hematocrit 45.3   Platelet Count 225   RBC Count 5.29   MCV 86   MCH 27.6   MCHC 32.2   RDW 14.7   Diff Method Automated Method   % Neutrophils 63.8   % Lymphocytes 23.0   % Monocytes 12.4   % Eosinophils 0.1   % Basophils 0.2   % Immature Granulocytes 0.5   Nucleated RBCs 0   Absolute Neutrophil 12.0 (H)   Absolute Lymphocytes 4.3   Absolute Monocytes 2.3 (H)   Absolute Eosinophils 0.0   Absolute Basophils 0.0   Abs Immature Granulocytes 0.1   Absolute Nucleated RBC 0.0     RVP pending    ASSESSMENT/PLAN: 61 year old with T-cell lymphoma, with URI symptoms, fever, and leukocytosis.     URI: With low-grade fever and leukocytosis. Clear lungs. Clinically has viral URI. " Received influenza vaccine early in the season. Will treat him empirically for influenza with Tamiflu 75 mg BID x 5 days. He should rest, push fluids, and take tylenol for symptoms. Call if symptoms worsen or do not improve after 48-72 hours.     He has routine follow-up for his T-cell lymphoma in May.     Tamie López PA-C    [facility]  [address] [place] [id number] [phone]

## 2017-04-15 LAB
FLUAV H1 2009 PAND RNA SPEC QL NAA+PROBE: NEGATIVE
FLUAV H1 RNA SPEC QL NAA+PROBE: NEGATIVE
FLUAV H3 RNA SPEC QL NAA+PROBE: NEGATIVE
FLUAV RNA SPEC QL NAA+PROBE: NEGATIVE
FLUBV RNA SPEC QL NAA+PROBE: NEGATIVE
HADV DNA SPEC QL NAA+PROBE: NEGATIVE
HADV DNA SPEC QL NAA+PROBE: NEGATIVE
HMPV RNA SPEC QL NAA+PROBE: NEGATIVE
HPIV1 RNA SPEC QL NAA+PROBE: NEGATIVE
HPIV2 RNA SPEC QL NAA+PROBE: NEGATIVE
HPIV3 RNA SPEC QL NAA+PROBE: NEGATIVE
MICROBIOLOGIST REVIEW: ABNORMAL
RHINOVIRUS RNA SPEC QL NAA+PROBE: ABNORMAL
RSV RNA SPEC QL NAA+PROBE: NEGATIVE
RSV RNA SPEC QL NAA+PROBE: POSITIVE
SPECIMEN SOURCE: ABNORMAL

## 2017-04-17 ENCOUNTER — APPOINTMENT (OUTPATIENT)
Dept: GENERAL RADIOLOGY | Facility: CLINIC | Age: 62
DRG: 202 | End: 2017-04-17
Attending: EMERGENCY MEDICINE
Payer: MEDICARE

## 2017-04-17 ENCOUNTER — HOSPITAL ENCOUNTER (INPATIENT)
Facility: CLINIC | Age: 62
LOS: 1 days | Discharge: HOME | DRG: 202 | End: 2017-04-18
Attending: EMERGENCY MEDICINE | Admitting: INTERNAL MEDICINE
Payer: MEDICARE

## 2017-04-17 ENCOUNTER — CARE COORDINATION (OUTPATIENT)
Dept: ONCOLOGY | Facility: CLINIC | Age: 62
End: 2017-04-17

## 2017-04-17 DIAGNOSIS — J18.9 PNEUMONIA OF LEFT LOWER LOBE DUE TO INFECTIOUS ORGANISM: ICD-10-CM

## 2017-04-17 DIAGNOSIS — B33.8 RSV INFECTION: ICD-10-CM

## 2017-04-17 LAB
ALBUMIN SERPL-MCNC: 2.6 G/DL (ref 3.4–5)
ALBUMIN SERPL-MCNC: 3.2 G/DL (ref 3.4–5)
ALBUMIN UR-MCNC: 100 MG/DL
ALP SERPL-CCNC: 82 U/L (ref 40–150)
ALP SERPL-CCNC: 99 U/L (ref 40–150)
ALT SERPL W P-5'-P-CCNC: 28 U/L (ref 0–70)
ALT SERPL W P-5'-P-CCNC: 31 U/L (ref 0–70)
ANION GAP SERPL CALCULATED.3IONS-SCNC: 10 MMOL/L (ref 3–14)
ANION GAP SERPL CALCULATED.3IONS-SCNC: 12 MMOL/L (ref 3–14)
APPEARANCE UR: CLEAR
AST SERPL W P-5'-P-CCNC: 24 U/L (ref 0–45)
AST SERPL W P-5'-P-CCNC: 36 U/L (ref 0–45)
BASOPHILS # BLD AUTO: 0 10E9/L (ref 0–0.2)
BASOPHILS # BLD AUTO: 0 10E9/L (ref 0–0.2)
BASOPHILS NFR BLD AUTO: 0.1 %
BASOPHILS NFR BLD AUTO: 0.2 %
BILIRUB SERPL-MCNC: 0.6 MG/DL (ref 0.2–1.3)
BILIRUB SERPL-MCNC: 1 MG/DL (ref 0.2–1.3)
BILIRUB UR QL STRIP: NEGATIVE
BUN SERPL-MCNC: 17 MG/DL (ref 7–30)
BUN SERPL-MCNC: 18 MG/DL (ref 7–30)
CALCIUM SERPL-MCNC: 8.2 MG/DL (ref 8.5–10.1)
CALCIUM SERPL-MCNC: 8.7 MG/DL (ref 8.5–10.1)
CHLORIDE SERPL-SCNC: 101 MMOL/L (ref 94–109)
CHLORIDE SERPL-SCNC: 105 MMOL/L (ref 94–109)
CO2 BLDCOV-SCNC: 23 MMOL/L (ref 21–28)
CO2 SERPL-SCNC: 22 MMOL/L (ref 20–32)
CO2 SERPL-SCNC: 24 MMOL/L (ref 20–32)
COLOR UR AUTO: YELLOW
CREAT SERPL-MCNC: 1.06 MG/DL (ref 0.66–1.25)
CREAT SERPL-MCNC: 1.24 MG/DL (ref 0.66–1.25)
DIFFERENTIAL METHOD BLD: ABNORMAL
DIFFERENTIAL METHOD BLD: ABNORMAL
EOSINOPHIL # BLD AUTO: 0 10E9/L (ref 0–0.7)
EOSINOPHIL # BLD AUTO: 0 10E9/L (ref 0–0.7)
EOSINOPHIL NFR BLD AUTO: 0 %
EOSINOPHIL NFR BLD AUTO: 0 %
ERYTHROCYTE [DISTWIDTH] IN BLOOD BY AUTOMATED COUNT: 14.8 % (ref 10–15)
ERYTHROCYTE [DISTWIDTH] IN BLOOD BY AUTOMATED COUNT: 14.9 % (ref 10–15)
GFR SERPL CREATININE-BSD FRML MDRD: 59 ML/MIN/1.7M2
GFR SERPL CREATININE-BSD FRML MDRD: 71 ML/MIN/1.7M2
GLUCOSE SERPL-MCNC: 138 MG/DL (ref 70–99)
GLUCOSE SERPL-MCNC: 98 MG/DL (ref 70–99)
GLUCOSE UR STRIP-MCNC: 70 MG/DL
GRAM STN SPEC: NORMAL
HCT VFR BLD AUTO: 36.1 % (ref 40–53)
HCT VFR BLD AUTO: 41.7 % (ref 40–53)
HGB BLD-MCNC: 11.7 G/DL (ref 13.3–17.7)
HGB BLD-MCNC: 13.6 G/DL (ref 13.3–17.7)
HGB UR QL STRIP: ABNORMAL
IMM GRANULOCYTES # BLD: 0.1 10E9/L (ref 0–0.4)
IMM GRANULOCYTES # BLD: 0.1 10E9/L (ref 0–0.4)
IMM GRANULOCYTES NFR BLD: 0.3 %
IMM GRANULOCYTES NFR BLD: 0.4 %
KETONES UR STRIP-MCNC: NEGATIVE MG/DL
LACTATE BLD-SCNC: 3 MMOL/L (ref 0.7–2.1)
LEUKOCYTE ESTERASE UR QL STRIP: NEGATIVE
LYMPHOCYTES # BLD AUTO: 2.9 10E9/L (ref 0.8–5.3)
LYMPHOCYTES # BLD AUTO: 6.5 10E9/L (ref 0.8–5.3)
LYMPHOCYTES NFR BLD AUTO: 15.8 %
LYMPHOCYTES NFR BLD AUTO: 29.1 %
Lab: NORMAL
MAGNESIUM SERPL-MCNC: 1.8 MG/DL (ref 1.6–2.3)
MCH RBC QN AUTO: 27.7 PG (ref 26.5–33)
MCH RBC QN AUTO: 28 PG (ref 26.5–33)
MCHC RBC AUTO-ENTMCNC: 32.4 G/DL (ref 31.5–36.5)
MCHC RBC AUTO-ENTMCNC: 32.6 G/DL (ref 31.5–36.5)
MCV RBC AUTO: 86 FL (ref 78–100)
MCV RBC AUTO: 86 FL (ref 78–100)
MICRO REPORT STATUS: NORMAL
MONOCYTES # BLD AUTO: 2.2 10E9/L (ref 0–1.3)
MONOCYTES # BLD AUTO: 3 10E9/L (ref 0–1.3)
MONOCYTES NFR BLD AUTO: 12 %
MONOCYTES NFR BLD AUTO: 13.7 %
NEUTROPHILS # BLD AUTO: 12.6 10E9/L (ref 1.6–8.3)
NEUTROPHILS # BLD AUTO: 13 10E9/L (ref 1.6–8.3)
NEUTROPHILS NFR BLD AUTO: 56.8 %
NEUTROPHILS NFR BLD AUTO: 71.6 %
NITRATE UR QL: NEGATIVE
NRBC # BLD AUTO: 0 10*3/UL
NRBC # BLD AUTO: 0 10*3/UL
NRBC BLD AUTO-RTO: 0 /100
NRBC BLD AUTO-RTO: 0 /100
NT-PROBNP SERPL-MCNC: 759 PG/ML (ref 0–900)
PCO2 BLDV: 34 MM HG (ref 40–50)
PH BLDV: 7.43 PH (ref 7.32–7.43)
PH UR STRIP: 6 PH (ref 5–7)
PHOSPHATE SERPL-MCNC: 1.4 MG/DL (ref 2.5–4.5)
PLATELET # BLD AUTO: 246 10E9/L (ref 150–450)
PLATELET # BLD AUTO: 263 10E9/L (ref 150–450)
PLATELET # BLD EST: ABNORMAL 10*3/UL
PLATELET # BLD EST: ABNORMAL 10*3/UL
PO2 BLDV: 19 MM HG (ref 25–47)
POTASSIUM SERPL-SCNC: 3 MMOL/L (ref 3.4–5.3)
POTASSIUM SERPL-SCNC: 3 MMOL/L (ref 3.4–5.3)
PROT SERPL-MCNC: 6.3 G/DL (ref 6.8–8.8)
PROT SERPL-MCNC: 7.8 G/DL (ref 6.8–8.8)
RBC # BLD AUTO: 4.22 10E12/L (ref 4.4–5.9)
RBC # BLD AUTO: 4.85 10E12/L (ref 4.4–5.9)
RBC #/AREA URNS AUTO: 1 /HPF (ref 0–2)
SAO2 % BLDV FROM PO2: 31 %
SODIUM SERPL-SCNC: 137 MMOL/L (ref 133–144)
SODIUM SERPL-SCNC: 137 MMOL/L (ref 133–144)
SP GR UR STRIP: 1.02 (ref 1–1.03)
SPECIMEN SOURCE: NORMAL
TROPONIN I SERPL-MCNC: NORMAL UG/L (ref 0–0.04)
URN SPEC COLLECT METH UR: ABNORMAL
UROBILINOGEN UR STRIP-MCNC: 2 MG/DL (ref 0–2)
WBC # BLD AUTO: 18.2 10E9/L (ref 4–11)
WBC # BLD AUTO: 22.2 10E9/L (ref 4–11)
WBC #/AREA URNS AUTO: 2 /HPF (ref 0–2)

## 2017-04-17 PROCEDURE — 25000125 ZZHC RX 250: Performed by: EMERGENCY MEDICINE

## 2017-04-17 PROCEDURE — 87077 CULTURE AEROBIC IDENTIFY: CPT | Performed by: EMERGENCY MEDICINE

## 2017-04-17 PROCEDURE — 25000128 H RX IP 250 OP 636: Performed by: EMERGENCY MEDICINE

## 2017-04-17 PROCEDURE — 27210995 ZZH RX 272

## 2017-04-17 PROCEDURE — 93005 ELECTROCARDIOGRAM TRACING: CPT | Performed by: EMERGENCY MEDICINE

## 2017-04-17 PROCEDURE — 12000008 ZZH R&B INTERMEDIATE UMMC

## 2017-04-17 PROCEDURE — A9270 NON-COVERED ITEM OR SERVICE: HCPCS | Mod: GY

## 2017-04-17 PROCEDURE — 84484 ASSAY OF TROPONIN QUANT: CPT | Performed by: EMERGENCY MEDICINE

## 2017-04-17 PROCEDURE — 25000128 H RX IP 250 OP 636

## 2017-04-17 PROCEDURE — 71020 XR CHEST 2 VW: CPT

## 2017-04-17 PROCEDURE — 83735 ASSAY OF MAGNESIUM: CPT | Performed by: INTERNAL MEDICINE

## 2017-04-17 PROCEDURE — 87186 SC STD MICRODIL/AGAR DIL: CPT | Performed by: EMERGENCY MEDICINE

## 2017-04-17 PROCEDURE — A9270 NON-COVERED ITEM OR SERVICE: HCPCS | Mod: GY | Performed by: EMERGENCY MEDICINE

## 2017-04-17 PROCEDURE — 96367 TX/PROPH/DG ADDL SEQ IV INF: CPT | Performed by: EMERGENCY MEDICINE

## 2017-04-17 PROCEDURE — 25000132 ZZH RX MED GY IP 250 OP 250 PS 637: Mod: GY

## 2017-04-17 PROCEDURE — 87040 BLOOD CULTURE FOR BACTERIA: CPT | Performed by: INTERNAL MEDICINE

## 2017-04-17 PROCEDURE — 36415 COLL VENOUS BLD VENIPUNCTURE: CPT | Performed by: EMERGENCY MEDICINE

## 2017-04-17 PROCEDURE — 82803 BLOOD GASES ANY COMBINATION: CPT

## 2017-04-17 PROCEDURE — 85025 COMPLETE CBC W/AUTO DIFF WBC: CPT | Performed by: EMERGENCY MEDICINE

## 2017-04-17 PROCEDURE — 85025 COMPLETE CBC W/AUTO DIFF WBC: CPT | Performed by: INTERNAL MEDICINE

## 2017-04-17 PROCEDURE — 94640 AIRWAY INHALATION TREATMENT: CPT | Performed by: EMERGENCY MEDICINE

## 2017-04-17 PROCEDURE — 80053 COMPREHEN METABOLIC PANEL: CPT | Performed by: EMERGENCY MEDICINE

## 2017-04-17 PROCEDURE — 84100 ASSAY OF PHOSPHORUS: CPT | Performed by: INTERNAL MEDICINE

## 2017-04-17 PROCEDURE — 81001 URINALYSIS AUTO W/SCOPE: CPT | Performed by: EMERGENCY MEDICINE

## 2017-04-17 PROCEDURE — 36415 COLL VENOUS BLD VENIPUNCTURE: CPT | Performed by: INTERNAL MEDICINE

## 2017-04-17 PROCEDURE — 87040 BLOOD CULTURE FOR BACTERIA: CPT | Performed by: EMERGENCY MEDICINE

## 2017-04-17 PROCEDURE — 40000556 ZZH STATISTIC PERIPHERAL IV START W US GUIDANCE

## 2017-04-17 PROCEDURE — 96365 THER/PROPH/DIAG IV INF INIT: CPT | Performed by: EMERGENCY MEDICINE

## 2017-04-17 PROCEDURE — 96361 HYDRATE IV INFUSION ADD-ON: CPT | Performed by: EMERGENCY MEDICINE

## 2017-04-17 PROCEDURE — 83880 ASSAY OF NATRIURETIC PEPTIDE: CPT | Performed by: EMERGENCY MEDICINE

## 2017-04-17 PROCEDURE — 87070 CULTURE OTHR SPECIMN AEROBIC: CPT | Performed by: EMERGENCY MEDICINE

## 2017-04-17 PROCEDURE — 83605 ASSAY OF LACTIC ACID: CPT

## 2017-04-17 PROCEDURE — 93010 ELECTROCARDIOGRAM REPORT: CPT | Mod: Z6 | Performed by: EMERGENCY MEDICINE

## 2017-04-17 PROCEDURE — 25000132 ZZH RX MED GY IP 250 OP 250 PS 637: Mod: GY | Performed by: EMERGENCY MEDICINE

## 2017-04-17 PROCEDURE — 99285 EMERGENCY DEPT VISIT HI MDM: CPT | Mod: 25 | Performed by: EMERGENCY MEDICINE

## 2017-04-17 PROCEDURE — 99284 EMERGENCY DEPT VISIT MOD MDM: CPT | Mod: Z6 | Performed by: EMERGENCY MEDICINE

## 2017-04-17 PROCEDURE — 87205 SMEAR GRAM STAIN: CPT | Performed by: EMERGENCY MEDICINE

## 2017-04-17 PROCEDURE — 80053 COMPREHEN METABOLIC PANEL: CPT | Performed by: INTERNAL MEDICINE

## 2017-04-17 RX ORDER — VENLAFAXINE HYDROCHLORIDE 37.5 MG/1
37.5 TABLET, EXTENDED RELEASE ORAL DAILY
Status: DISCONTINUED | OUTPATIENT
Start: 2017-04-18 | End: 2017-04-18 | Stop reason: HOSPADM

## 2017-04-17 RX ORDER — ACETAMINOPHEN 325 MG/1
TABLET ORAL
Status: COMPLETED
Start: 2017-04-17 | End: 2017-04-17

## 2017-04-17 RX ORDER — LEVOFLOXACIN 5 MG/ML
750 INJECTION, SOLUTION INTRAVENOUS EVERY 24 HOURS
Status: DISCONTINUED | OUTPATIENT
Start: 2017-04-18 | End: 2017-04-18

## 2017-04-17 RX ORDER — ACETAMINOPHEN 325 MG/1
650 TABLET ORAL ONCE
Status: COMPLETED | OUTPATIENT
Start: 2017-04-17 | End: 2017-04-17

## 2017-04-17 RX ORDER — POTASSIUM CHLORIDE 750 MG/1
20 TABLET, EXTENDED RELEASE ORAL ONCE
Status: COMPLETED | OUTPATIENT
Start: 2017-04-17 | End: 2017-04-17

## 2017-04-17 RX ORDER — ALBUTEROL SULFATE 0.83 MG/ML
2.5 SOLUTION RESPIRATORY (INHALATION) ONCE
Status: COMPLETED | OUTPATIENT
Start: 2017-04-17 | End: 2017-04-17

## 2017-04-17 RX ORDER — CEFTRIAXONE 2 G/1
INJECTION, POWDER, FOR SOLUTION INTRAMUSCULAR; INTRAVENOUS
Status: DISCONTINUED
Start: 2017-04-17 | End: 2017-04-17 | Stop reason: HOSPADM

## 2017-04-17 RX ORDER — SODIUM CHLORIDE 9 MG/ML
1000 INJECTION, SOLUTION INTRAVENOUS CONTINUOUS
Status: DISCONTINUED | OUTPATIENT
Start: 2017-04-17 | End: 2017-04-18 | Stop reason: HOSPADM

## 2017-04-17 RX ORDER — AMLODIPINE BESYLATE 5 MG/1
5 TABLET ORAL DAILY
Status: DISCONTINUED | OUTPATIENT
Start: 2017-04-18 | End: 2017-04-18

## 2017-04-17 RX ORDER — CEFTRIAXONE 2 G/1
2 INJECTION, POWDER, FOR SOLUTION INTRAMUSCULAR; INTRAVENOUS ONCE
Status: COMPLETED | OUTPATIENT
Start: 2017-04-17 | End: 2017-04-17

## 2017-04-17 RX ORDER — ASPIRIN 81 MG/1
81 TABLET, CHEWABLE ORAL DAILY
Status: DISCONTINUED | OUTPATIENT
Start: 2017-04-18 | End: 2017-04-18 | Stop reason: HOSPADM

## 2017-04-17 RX ORDER — LEVOFLOXACIN 5 MG/ML
500 INJECTION, SOLUTION INTRAVENOUS ONCE
Status: COMPLETED | OUTPATIENT
Start: 2017-04-17 | End: 2017-04-17

## 2017-04-17 RX ORDER — ONDANSETRON 2 MG/ML
4 INJECTION INTRAMUSCULAR; INTRAVENOUS EVERY 6 HOURS PRN
Status: DISCONTINUED | OUTPATIENT
Start: 2017-04-17 | End: 2017-04-18 | Stop reason: HOSPADM

## 2017-04-17 RX ORDER — PROCHLORPERAZINE MALEATE 5 MG
5-10 TABLET ORAL EVERY 6 HOURS PRN
Status: DISCONTINUED | OUTPATIENT
Start: 2017-04-17 | End: 2017-04-18 | Stop reason: HOSPADM

## 2017-04-17 RX ORDER — DOCUSATE SODIUM 100 MG/1
100 CAPSULE, LIQUID FILLED ORAL 2 TIMES DAILY
Status: DISCONTINUED | OUTPATIENT
Start: 2017-04-17 | End: 2017-04-18 | Stop reason: HOSPADM

## 2017-04-17 RX ORDER — MUPIROCIN CALCIUM 20 MG/G
CREAM TOPICAL 2 TIMES DAILY
Status: DISCONTINUED | OUTPATIENT
Start: 2017-04-17 | End: 2017-04-18 | Stop reason: HOSPADM

## 2017-04-17 RX ORDER — ATORVASTATIN CALCIUM 10 MG/1
10 TABLET, FILM COATED ORAL DAILY
Status: DISCONTINUED | OUTPATIENT
Start: 2017-04-18 | End: 2017-04-18 | Stop reason: HOSPADM

## 2017-04-17 RX ORDER — ACETAMINOPHEN 325 MG/1
650 TABLET ORAL EVERY 4 HOURS PRN
Status: DISCONTINUED | OUTPATIENT
Start: 2017-04-17 | End: 2017-04-18 | Stop reason: HOSPADM

## 2017-04-17 RX ORDER — IPRATROPIUM BROMIDE AND ALBUTEROL SULFATE 2.5; .5 MG/3ML; MG/3ML
3 SOLUTION RESPIRATORY (INHALATION)
Status: DISCONTINUED | OUTPATIENT
Start: 2017-04-17 | End: 2017-04-18 | Stop reason: HOSPADM

## 2017-04-17 RX ORDER — RIBAVIRIN 200 MG/1
800 CAPSULE ORAL 2 TIMES DAILY
Status: DISCONTINUED | OUTPATIENT
Start: 2017-04-17 | End: 2017-04-18 | Stop reason: HOSPADM

## 2017-04-17 RX ORDER — POTASSIUM CHLORIDE 750 MG/1
TABLET, EXTENDED RELEASE ORAL
Status: COMPLETED
Start: 2017-04-17 | End: 2017-04-17

## 2017-04-17 RX ORDER — CEFTRIAXONE SODIUM 1 G
VIAL (EA) INJECTION
Status: DISCONTINUED
Start: 2017-04-17 | End: 2017-04-17 | Stop reason: HOSPADM

## 2017-04-17 RX ORDER — ACETAMINOPHEN 325 MG/1
TABLET ORAL
Status: DISCONTINUED
Start: 2017-04-17 | End: 2017-04-17 | Stop reason: HOSPADM

## 2017-04-17 RX ORDER — LEVOFLOXACIN 5 MG/ML
INJECTION, SOLUTION INTRAVENOUS
Status: COMPLETED
Start: 2017-04-17 | End: 2017-04-17

## 2017-04-17 RX ADMIN — ALBUTEROL SULFATE 2.5 MG: 2.5 SOLUTION RESPIRATORY (INHALATION) at 12:50

## 2017-04-17 RX ADMIN — POTASSIUM CHLORIDE 20 MEQ: 750 TABLET, EXTENDED RELEASE ORAL at 16:12

## 2017-04-17 RX ADMIN — SODIUM CHLORIDE 500 ML: 9 INJECTION, SOLUTION INTRAVENOUS at 14:04

## 2017-04-17 RX ADMIN — LEVOFLOXACIN 500 MG: 5 INJECTION, SOLUTION INTRAVENOUS at 16:13

## 2017-04-17 RX ADMIN — CEFTRIAXONE 2 G: 2 INJECTION, POWDER, FOR SOLUTION INTRAMUSCULAR; INTRAVENOUS at 15:37

## 2017-04-17 RX ADMIN — ACETAMINOPHEN 650 MG: 325 TABLET, FILM COATED ORAL at 14:26

## 2017-04-17 RX ADMIN — ACETAMINOPHEN 650 MG: 325 TABLET, FILM COATED ORAL at 19:53

## 2017-04-17 NOTE — PROGRESS NOTES
Reason for Outgoing call:    Returning patient's wife's call.      Patients Questions/Concerns:    Wife is very concerned about how Mr. Smith is doing.  Stated that he has gone down hill over the weekend. Very week and deconditioned.  Getting hard for her to handle his care at home. Has started on his Ribavirin.  Not eating and drinking okay. Wife stated that she almost took him into [facility] this weekend.    Nursing Action/Patient Instruction:    Called Dr. Hill to update him on Mr. Smith's status.  Per Dr. Hill, Mr. Smith should go to the ED to be further evaluated.       Patient Response/Evaluation:    Mrs. Smith verbalized understanding and [facility]

## 2017-04-17 NOTE — IP AVS SNAPSHOT
[address] [address]    Phone:  [phone]                                       After Visit Summary   4/17/2017    Hi Smith    MRN: [id number]           After Visit Summary Signature Page     I have received my discharge instructions, and my questions have been answered. I have discussed any challenges I see with this plan with the nurse or doctor.    ..........................................................................................................................................  Patient/Patient Representative Signature      ..........................................................................................................................................  Patient Representative Print Name and Relationship to Patient    ..................................................               ................................................  Date                                            Time    ..........................................................................................................................................  Reviewed by Signature/Title    ...................................................              ..............................................  Date                                                            Time

## 2017-04-17 NOTE — LETTER
Transition Communication Hand-off for Care Transitions to Next Level of Care Provider    Name: Hi Smith  MRN #: [id number]  Primary Care Provider: BEN IRVIN   Hematologist: Dr. Hill     Primary Clinic: [address] [place] [address]     Reason for Hospitalization:  RSV infection [B97.4]  Pneumonia of left lower lobe due to infectious organism [J18.9]  Admit Date/Time: 4/17/2017 12:15 PM  Discharge Date: 4/18/2017  Payor Source: Payor: MEDICARE / Plan: MEDICARE / Product Type: Medicare /     Discharge Plan:  Patient discharged on oral levaquin. [facility]     Concern for non-adherence with plan of care:   No  Follow-up plan:  Future Appointments  Date Time Provider Department Center   5/18/2017 10:00 AM [facility] LAB DRAW [facility]   5/18/2017 10:30 AM Yuri Hill MD [facility]       Nat Seals, RNCC  Phone [phone]  Pager [phone]    AVS/Discharge Summary is the source of truth; this is a helpful guide for improved communication of patient story

## 2017-04-17 NOTE — H&P
HEMATOLOGY/ONCOLOGY HISTORY & PHYSICAL    HISTORY OF PRESENT ILLNESS:     Hi Smith, 62 years of age, has a history of extranodal NK/T-cell lymphoma (nasal type) that has been localized to multiple cutaneous/subcutaneous sites. (history is summarized below).   He developed URI symptoms such as chest congestion, dry cough, SOB on exertion about 6 days ago. He was seen in clinic and empirically started on Tamiflu, his respiratory panel came back positive for RSV. He was started on ribavirin 800 mg BID on 4/13/17, Tamiflu was stopped.He reports progressive fatigue and SOB when with minimal exertion. He has mild chest soreness with cough, denies drenching night sweats, fever, abdominal pain sore throat or runny nose, denies N/V, bleeding, skin rash.   He was not eating well for the last couple days due but was able to drink fluid. Denies problems with urination or bowel movements. His lactate was elevated at 3 today. He received breathing treatments and IV fluids in the ER with no significant relief.    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  By 12/2015, he had evidence for recurrence on the right lower extremity (biopsy proven).  Recurrence appeared to be localized to the right ankle and possibly, although undocumented, on the right wrist.  He was given involved field radiation to these areas.  Subsequently, he has had multiple subcutaneous nodules that arise and often resolve spontaneously.  Others show inflammation and cutaneous crusting with erythema and subsequent regression. Those on his legs sometimes superficially ulcerate and crust over. These have largely resolved with the care provide  by Dr. Eaton with respect to wound control.        PAST MEDICAL HISTORY:  Past Medical History:   Diagnosis Date     Alcohol abuse     in remission x 20 yrs     CAD (coronary artery disease) of artery bypass graft     CABG 2005, hx of stents     Depression      DVT (deep venous thrombosis) (H) November, 2014    upper left arm after PICC line     GERD (gastroesophageal reflux disease)      Hearing loss      HTN (hypertension)      Lymphoma (H)      S/P autologous bone marrow transplantation (H) 3/4/2015     Skin cancer     15 y ago.? basal cell     Tinnitus      VRE (vancomycin resistant enterococcus) culture positive      PAST SURGICAL HISTORY:  Past Surgical History:   Procedure Laterality Date     BYPASS GRAFT ARTERY CORONARY      2005     CYSTOSCOPY, TRANSURETHRAL RESECTION (TUR) TUMOR BLADDER, COMBINED N/A 12/29/2015    Procedure: COMBINED CYSTOSCOPY, TRANSURETHRAL RESECTION (TUR) TUMOR BLADDER;  Surgeon: Orville Boyle MD;  Location: [facility] OR     MOHS MICROGRAPHIC PROCEDURE  2000    nose     PICC INSERTION Left 9/26/2014    5fr DL Power PICC, 43cm (1cm external) in the L basilic vein w/ tip in the low SVC.     PICC INSERTION Left 10/28/2014    5fr DL Power PICC, 41cm (1cm external) in the L basilic vein w/ tip in the SVC RA junction.     PICC INSERTION Right 12/31/2014    5fr DL Power PICC, 39cm (1cm external) in the R medial brachial vein w/ tip in the low SVC.       SOCIAL HISTORY:   Marital Status:        Spouse Name: N/A     Number of Children: N/A     Years of Education: N/A          Occupational History     Not on file.            Social History Main Topics     Smoking status: Former Smoker -- 1.00 packs/day for 20 years       Types: Cigarettes       Quit date: 02/16/1994     Smokeless tobacco: Never Used     Alcohol Use: No     Drug Use: No     Sexual Activity: Not on file        FAMILY HISTORY:  Family History   Problem Relation Age of Onset     CANCER Mother      ovarian, esophageal      C.A.D. Sister      CANCER Father      prostate cancer, lung fibrosis     HEART DISEASE Father      Heart Disease     Hypertension Father      Melanoma No family hx of      Skin Cancer No family hx of      ALLERGIES:  Allergies   Allergen Reactions     Nka [No Known Allergies]        MEDICATIONS:  Current Facility-Administered Medications   Medication     sodium chloride (PF) 0.9% PF flush 3 mL     sodium chloride (PF) 0.9% PF flush 3 mL     0.9% sodium chloride infusion     levofloxacin (LEVAQUIN) infusion 500 mg     acetaminophen (TYLENOL) 325 MG tablet     cefTRIAXone (ROCEPHIN) 0.35 g/mL injection     cefTRIAXone (ROCEPHIN) 2 GM vial     Current Outpatient Prescriptions   Medication     study oseltamivir (IDS 4965) 75 MG capsule     ribavirin 400 MG TABS     amLODIPine (NORVASC) 5 MG tablet     venlafaxine (EFFEXOR XR) 37.5 MG 24 hr capsule     mupirocin (BACTROBAN) 2 % cream     PHOSPHA 250 NEUTRAL 155-852-130 MG tablet     ASPIRIN PO     Atorvastatin Calcium (LIPITOR PO)     REVIEW OF SYSTEMS: Negative unless otherwise noted in above HPI.    PHYSICAL EXAM:  /63  Pulse 74  Temp 97.9  F (36.6  C) (Oral)  Resp 18  Ht 1.829 m (6')  Wt 78.9 kg (174 lb)  SpO2 95%  BMI 23.6 kg/m2  General Appearance: appears fatigued, constantly coughing, NAD.  EYES: sclera anicteric, PERRL, EOMI.   ENT: moist mucus membranes, no ulcerations or thrush  NECK: supple, no masses.   CV: RRR, no murmur or rub.   RESP: coarse bs bilaterally, occasional rhonchi anteriorly; no rales or wheezes.  GI: +BS, soft, nontender, no masses or hepatosplenomegaly.  LYMPH: No lymphadenopathy neck, axilla, clavicular areas.  MS: no edema juan LE's; normal gait; Strength 5/5 in major muscle groups of bilateral UE and LE.   NEURO: A&O x3; CN II-XII grossly intact   PSYCH: Appropriate affect      ROUTINE LABS (Last four results)  CBC  Recent Labs  Lab 04/17/17  1310 04/13/17  1339   WBC 22.2* 18.8*   RBC 4.85 5.29   HGB 13.6 14.6   HCT 41.7  45.3   MCV 86 86   MCH 28.0 27.6   MCHC 32.6 32.2   RDW 14.9 14.7    225     CMP  Recent Labs  Lab 04/17/17  1310 04/13/17  1339    134   POTASSIUM 3.0* 4.1   CHLORIDE 101 104   CO2 24 20   ANIONGAP 12 10   GLC 98 106*   BUN 18 18   CR 1.24 1.17   GFRESTIMATED 59* 63   GFRESTBLACK 71 76   ARIK 8.7 9.0   PROTTOTAL 7.8  --    ALBUMIN 3.2*  --    BILITOTAL 1.0  --    ALKPHOS 99  --    AST 36  --    ALT 31  --      INRNo lab results found in last 7 days.    CXR:     4/17/17  IMPRESSION: Minimal streaky opacities in the lung bases, favor  subsegmental atelectasis although consolidation/infection cannot be  excluded.    ASSESSMENT:  Hi Smith, 62 years of age, has a history of extranodal NK/T-cell lymphoma (nasal type) that has been localized to multiple cutaneous/subcutaneous sites, s/p NMA DUCBT on 3/2015 here with worsening symptoms due to RSV bronchiolitis and possibly developing PNA.      RSV bronchiolitis with possible superimposed bacterial pneumonia.  Patient is hemodynamically stable, no wheezing during my exam, although he just received breathing treatment. Patient is s/p NMA DUCBT in 2015.  He is unable to eat and is SOB on exertion with CXR suspicious for possibly underlying PNA, most likely viral, although bacterial superinfection is not excluded given no improvement on ribavirin and clinically worsening status with leukocytosis.  -LA 3  -blood cx, sputum cx pending   -On ribavirin 800mg BID, started on 4/13/17, will continue for total of 10 days.  -Will empirically treat with levaquin 750 mg daily.       NK/T-cell lymphoma (nasal type), multiply recurrent and persistent.  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   12/2015- recurrence on the right lower extremity (biopsy  proven), treated with involved field radiation to these areas.  Currently in remission. Following with Dr. Hill.    HTN  Continue home meds.    CAD  Asymptomatic, continue home meds.       IVF/nutrition  NS@125ml/h  Regular diet.    Prophy:  lovenox 40 mg sq daily      CODE STATUS:  FULL CODE      DISPOSITION:  2-3 days pending clinical improvement.    This plan of care has been discussed with Dr. Alfredo Gayle  Hem-Onc Fellow

## 2017-04-17 NOTE — ED PROVIDER NOTES
History     Chief Complaint   Patient presents with     URI     dx with rsv last week     Fatigue     Generalized Weakness     HPI  Hi fraser is a 62-year-old male who presents to the ER with his wife for evaluation of persistent weakness after he was diagnosed with RSV infection last week. Patient was thought to have influenza at the time and was started on Tamiflu but only took one dose and since that time has been on Ribavirin. The patient states that his cough has worsened and that he has developed increasing shortness of breath and progressive weakness. Patient states that he has not been eating well but states that he has been taking fluids fairly well. He denies any melena or bright red blood per rectum and denies any chest pain. Patient states that his cough continues however.      PAST MEDICAL HISTORY  Past Medical History:   Diagnosis Date     Alcohol abuse     in remission x 20 yrs     CAD (coronary artery disease) of artery bypass graft     CABG 2005, hx of stents     Depression      DVT (deep venous thrombosis) (H) November, 2014    upper left arm after PICC line     GERD (gastroesophageal reflux disease)      Hearing loss      HTN (hypertension)      Lymphoma (H)      S/P autologous bone marrow transplantation (H) 3/4/2015     Skin cancer     15 y ago.? basal cell     Tinnitus      VRE (vancomycin resistant enterococcus) culture positive      PAST SURGICAL HISTORY  Past Surgical History:   Procedure Laterality Date     BYPASS GRAFT ARTERY CORONARY      2005     CYSTOSCOPY, TRANSURETHRAL RESECTION (TUR) TUMOR BLADDER, COMBINED N/A 12/29/2015    Procedure: COMBINED CYSTOSCOPY, TRANSURETHRAL RESECTION (TUR) TUMOR BLADDER;  Surgeon: Orville Boyle MD;  Location: [facility] OR     MOHS MICROGRAPHIC PROCEDURE  2000    nose     PICC INSERTION Left 9/26/2014    5fr DL Power PICC, 43cm (1cm external) in the L basilic vein w/ tip in the low SVC.     PICC INSERTION Left 10/28/2014    5fr DL Power PICC,  41cm (1cm external) in the L basilic vein w/ tip in the SVC RA junction.     PICC INSERTION Right 12/31/2014    5fr DL Power PICC, 39cm (1cm external) in the R medial brachial vein w/ tip in the low SVC.     FAMILY HISTORY  Family History   Problem Relation Age of Onset     CANCER Mother      ovarian, esophageal     C.A.D. Sister      CANCER Father      prostate cancer, lung fibrosis     HEART DISEASE Father      Heart Disease     Hypertension Father      Melanoma No family hx of      Skin Cancer No family hx of      SOCIAL HISTORY  Social History   Substance Use Topics     Smoking status: Former Smoker     Packs/day: 1.00     Years: 20.00     Types: Cigarettes     Quit date: 2/16/1994     Smokeless tobacco: Never Used     Alcohol use No     MEDICATIONS  Previous Medications    AMLODIPINE (NORVASC) 5 MG TABLET    Take 1 tablet (5 mg) by mouth daily    ASPIRIN PO    Take 81 mg by mouth daily    ATORVASTATIN CALCIUM (LIPITOR PO)    Take 10 mg by mouth daily     MUPIROCIN (BACTROBAN) 2 % CREAM    Apply topically daily as needed Reported on 4/13/2017    PHOSPHA 250 NEUTRAL 155-852-130 MG TABLET    TAKE ONE TABLET BY MOUTH TWICE A DAY    RIBAVIRIN 400 MG TABS    Take 800 mg by mouth 2 times daily for 10 days    STUDY OSELTAMIVIR (IDS 4965) 75 MG CAPSULE    Take 1 capsule (75 mg) by mouth 2 times daily for 5 days    VENLAFAXINE (EFFEXOR XR) 37.5 MG 24 HR CAPSULE    Take 1 capsule (37.5 mg) by mouth daily     ALLERGIES  Allergies   Allergen Reactions     Nka [No Known Allergies]        I have reviewed the Medications, Allergies, Past Medical and Surgical History, and Social History in the Epic system.    Review of Systems   ROS: 10 point ROS neg other than the symptoms noted above in the HPI.    Physical Exam   BP: 109/67  Pulse: 74  Temp: 97.9  F (36.6  C)  Resp: 18  Height: 182.9 cm (6')  Weight: 78.9 kg (174 lb)  SpO2: 94 %  Physical Exam   Constitutional: He is oriented to person, place, and time.   Hard of hearing but  appears somewhat pale and weak   HENT:   Head: Atraumatic.   Eyes: EOM are normal. Pupils are equal, round, and reactive to light.   Neck: Neck supple. No JVD present.   Cardiovascular: Normal heart sounds.  Exam reveals no friction rub.    No murmur heard.  Pulmonary/Chest: He has wheezes.   Scattered wheezes with decreased aeration   Abdominal: Soft. There is no tenderness. There is no rebound.   Musculoskeletal: He exhibits no edema or tenderness.   Neurological: He is alert and oriented to person, place, and time.   Grossly intact and symmetric   Skin: Skin is warm.   Psychiatric: He has a normal mood and affect.       ED Course     ED Course     Procedures          IV was established for blood draw and fluid administration.    Patient received neb treatment and was able to produce sputum was sent for culture.    EKG revealed a normal sinus rhythm at a rate of 79 with a NJ of 0.146 and QRS duration 0.088.  The patient had a normal axis with no acute ST or T-wave changes for ischemia.  This is read by me personally.    Results for orders placed or performed during the hospital encounter of 04/17/17   XR Chest 2 Views    Narrative    EXAMINATION:  XR CHEST 2 VW 4/17/2017 12:46 PM.    COMPARISON: 8/25/2015 chest CT and chest radiograph dated 8/4/2015.    HISTORY:  uri    FINDINGS: The cardiac silhouette and pulmonary vasculature are within  normal limits. Sternotomy. No pneumothorax or pleural effusion.  Minimal streaky opacities in the lung bases as seen on the lateral  view.      Impression    IMPRESSION: Minimal streaky opacities in the lung bases, favor  subsegmental atelectasis although consolidation/infection cannot be  excluded.    CHRISTIAN BRAVO MD   CBC with platelets differential   Result Value Ref Range    WBC 22.2 (H) 4.0 - 11.0 10e9/L    RBC Count 4.85 4.4 - 5.9 10e12/L    Hemoglobin 13.6 13.3 - 17.7 g/dL    Hematocrit 41.7 40.0 - 53.0 %    MCV 86 78 - 100 fl    MCH 28.0 26.5 - 33.0 pg    MCHC 32.6 31.5  - 36.5 g/dL    RDW 14.9 10.0 - 15.0 %    Platelet Count 263 150 - 450 10e9/L    Diff Method Automated Method     % Neutrophils 56.8 %    % Lymphocytes 29.1 %    % Monocytes 13.7 %    % Eosinophils 0.0 %    % Basophils 0.1 %    % Immature Granulocytes 0.3 %    Nucleated RBCs 0 0 /100    Absolute Neutrophil 12.6 (H) 1.6 - 8.3 10e9/L    Absolute Lymphocytes 6.5 (H) 0.8 - 5.3 10e9/L    Absolute Monocytes 3.0 (H) 0.0 - 1.3 10e9/L    Absolute Eosinophils 0.0 0.0 - 0.7 10e9/L    Absolute Basophils 0.0 0.0 - 0.2 10e9/L    Abs Immature Granulocytes 0.1 0 - 0.4 10e9/L    Absolute Nucleated RBC 0.0     Platelet Estimate Confirming automated cell count    Comprehensive metabolic panel   Result Value Ref Range    Sodium 137 133 - 144 mmol/L    Potassium 3.0 (L) 3.4 - 5.3 mmol/L    Chloride 101 94 - 109 mmol/L    Carbon Dioxide 24 20 - 32 mmol/L    Anion Gap 12 3 - 14 mmol/L    Glucose 98 70 - 99 mg/dL    Urea Nitrogen 18 7 - 30 mg/dL    Creatinine 1.24 0.66 - 1.25 mg/dL    GFR Estimate 59 (L) >60 mL/min/1.7m2    GFR Estimate If Black 71 >60 mL/min/1.7m2    Calcium 8.7 8.5 - 10.1 mg/dL    Bilirubin Total 1.0 0.2 - 1.3 mg/dL    Albumin 3.2 (L) 3.4 - 5.0 g/dL    Protein Total 7.8 6.8 - 8.8 g/dL    Alkaline Phosphatase 99 40 - 150 U/L    ALT 31 0 - 70 U/L    AST 36 0 - 45 U/L   Troponin I   Result Value Ref Range    Troponin I ES  0.000 - 0.045 ug/L     <0.015  The 99th percentile for upper reference range is 0.045 ug/L.  Troponin values in   the range of 0.045 - 0.120 ug/L may be associated with risks of adverse   clinical events.     Nt probnp inpatient (BNP)   Result Value Ref Range    N-Terminal Pro BNP Inpatient 759 0 - 900 pg/mL   UA reflex to Microscopic and Culture   Result Value Ref Range    Color Urine Yellow     Appearance Urine Clear     Glucose Urine 70 (A) NEG mg/dL    Bilirubin Urine Negative NEG    Ketones Urine Negative NEG mg/dL    Specific Gravity Urine 1.017 1.003 - 1.035    Blood Urine Small (A) NEG    pH Urine  6.0 5.0 - 7.0 pH    Protein Albumin Urine 100 (A) NEG mg/dL    Urobilinogen mg/dL 2.0 0.0 - 2.0 mg/dL    Nitrite Urine Negative NEG    Leukocyte Esterase Urine Negative NEG    Source Midstream Urine     RBC Urine 1 0 - 2 /HPF    WBC Urine 2 0 - 2 /HPF   EKG 12-lead, tracing only   Result Value Ref Range    Interpretation ECG Click View Image link to view waveform and result    ISTAT gases lactate shelley POCT   Result Value Ref Range    Ph Venous 7.43 7.32 - 7.43 pH    PCO2 Venous 34 (L) 40 - 50 mm Hg    PO2 Venous 19 (L) 25 - 47 mm Hg    Bicarbonate Venous 23 21 - 28 mmol/L    O2 Sat Venous 31 %    Lactic Acid 3.0 (H) 0.7 - 2.1 mmol/L       Labs Ordered and Resulted from Time of ED Arrival Up to the Time of Departure from the ED   CBC WITH PLATELETS DIFFERENTIAL - Abnormal; Notable for the following:        Result Value    WBC 22.2 (*)     Absolute Neutrophil 12.6 (*)     Absolute Lymphocytes 6.5 (*)     Absolute Monocytes 3.0 (*)     All other components within normal limits   COMPREHENSIVE METABOLIC PANEL - Abnormal; Notable for the following:     Potassium 3.0 (*)     GFR Estimate 59 (*)     Albumin 3.2 (*)     All other components within normal limits   UA MACROSCOPIC WITH REFLEX TO MICRO AND CULTURE - Abnormal; Notable for the following:     Glucose Urine 70 (*)     Blood Urine Small (*)     Protein Albumin Urine 100 (*)     All other components within normal limits   ISTAT  GASES LACTATE SHELLEY POCT - Abnormal; Notable for the following:     PCO2 Venous 34 (*)     PO2 Venous 19 (*)     Lactic Acid 3.0 (*)     All other components within normal limits   TROPONIN I   NT PROBNP INPATIENT   PERIPHERAL IV CATHETER   ISTAT CG4 GASES LACTATE SHELLEY NURSING POCT   SPUTUM CULTURE AEROBIC BACTERIAL   GRAM STAIN   BLOOD CULTURE   BLOOD CULTURE       Assessments & Plan (with Medical Decision Making)     I have reviewed the nursing notes.    Medications   sodium chloride (PF) 0.9% PF flush 3 mL (not administered)   sodium chloride  (PF) 0.9% PF flush 3 mL (3 mLs Intracatheter Not Given 4/17/17 1426)   0.9% sodium chloride BOLUS (500 mLs Intravenous New Bag 4/17/17 1404)     Followed by   0.9% sodium chloride infusion (not administered)   cefTRIAXone (ROCEPHIN) 2 g vial to attach to  ml bag for ADULTS or NS 50 ml bag for PEDS (not administered)   azithromycin (ZITHROMAX) 500 mg in NaCl 0.9 % 250 mL intermittent infusion (not administered)   potassium chloride SA (K-DUR/KLOR-CON M) CR tablet 20 mEq (not administered)   albuterol neb solution 2.5 mg (2.5 mg Nebulization Given 4/17/17 1250)   acetaminophen (TYLENOL) tablet 650 mg (650 mg Oral Given 4/17/17 1426)     At this time the patient's white count is increasing and it appears he has a left lower lobe infiltrate on chest x-ray.  The patient's lactate is 3.1 and he does have a SIRS-type syndrome.  Patient was cultured and will be admitted for IV antibiotics.  Patient's infiltrate may be viral in etiology but a bacterial superinfection cannot be ruled out.  Meanwhile the patient has been worsening over the last week despite treatment for his RSV.  Patient was to be placed on community acquired pneumonia antibiotics and admitted to the oncology service; however, oncology refused to admit the patient therefore they recommended Levaquin, blood cultures and early follow-up on an outpatient basis.  Because of the elevated lactate and the clinical presentation of the patient with worsening white count, my discomfort for sending the patient home was vocalized and the patient will be admitted to oncology.    I have reviewed the findings, diagnosis, and plan with the patient.    Final diagnoses:   Pneumonia of left lower lobe due to infectious organism   RSV infection - recent     Akbar Bean MD    4/17/2017   [facility], EMERGENCY DEPARTMENT     Akbar Bean MD  04/17/17 [id number]       Akbar Bean MD  04/17/17 [id number]

## 2017-04-17 NOTE — IP AVS SNAPSHOT
MRN:[id number]                      After Visit Summary   4/17/2017    Hi Smith    MRN: [id number]           Thank you!     Thank you for choosing [facility] for your care. Our goal is always to provide you with excellent care. Hearing back from our patients is one way we can continue to improve our services. Please take a few minutes to complete the written survey that you may receive in the mail after you visit with us. Thank you!        Patient Information     Date Of Birth          1955        About your hospital stay     You were admitted on:  April 17, 2017 You last received care in the:  [address] [facility]    You were discharged on:  April 18, 2017        Reason for your hospital stay       Pneumonia                  Who to Call     For medical emergencies, please call 911.  For non-urgent questions about your medical care, please call your primary care provider or clinic, [phone]          Attending Provider     Provider Specialty    Akbar Bean MD Emergency Medicine    Pao Fuentes MD Hematology       Primary Care Provider Office Phone # Fax #    Glenroy Torres [phone]       [facility] [address] [address] [facility] [address]        After Care Instructions     Activity       Your activity upon discharge: activity as tolerated            Diet       Follow this diet upon discharge: Regular            Discharge Instructions       New medications: levaquin (levofloxacin) 750 mg daily x 5 days (antibiotic for pneumonia), albuterol inhaler every 4 hours as needed for shortness of breath or chest tightness or wheezing, cough suppressant every 4 hours as needed for cough (5-10 mL)- this medication will likely make you tired so do not drive after taking it. Stop taking amlodipine due to low blood pressures, will be rechecked and restarted in clinic.     [facility] ([phone])  for temperature > 101,  shortness of breath, chest pain, headaches, vision changes, bleeding, uncontrolled nausea, vomiting, diarrhea, or pain.                  Follow-up Appointments     Follow Up and recommended labs and tests       Follow up as scheduled in the hematology/oncology clinic on 4/20                  Your next 10 appointments already scheduled     Apr 20, 2017  9:45 AM CDT   [facility] Draw with  [facility] DRAW   [facility] Draw ([facility])    [address] [place] [address]   [phone]            Apr 20, 2017 10:20 AM CDT   (Arrive by 10:05 AM)   Return Visit with MALENA Hawkins CNP   [facility] ([facility])    [address] [place] [id number]   [phone]            May 18, 2017 10:00 AM CDT   [facility] Lab Draw with  [facility] LAB DRAW   [facility] Draw ([facility])    [address] [place] [address]   [phone]            May 18, 2017 10:30 AM CDT   (Arrive by 10:15 AM)   Return Visit with Yuri Hill MD   [facility] ([facility])    [address] [place] [address]   [phone]              Future tests that were ordered for you     Basic metabolic panel  (Ca, Cl, CO2, Creat, Gluc, K, Na, BUN)           CBC with platelets differential       Last Lab Result: Hemoglobin (g/dL)       Date                     Value                 04/18/2017               11.8 (L)         ----------                  Pending Results     Date and Time Order Name Status Description    4/17/2017 2003 Blood culture Preliminary     4/17/2017 2003 Blood culture Preliminary     4/17/2017 1428 Blood culture Preliminary     4/17/2017 1348 Blood culture Preliminary     4/17/2017 1232 Sputum Culture Aerobic Bacterial Preliminary     4/17/2017 1230 EKG 12-lead,  tracing only Preliminary             Statement of Approval     Ordered          04/18/17 1324  I have reviewed and agree with all the recommendations and orders detailed in this document.  EFFECTIVE NOW     Approved and electronically signed by:  Fatmata Joyce PA-C             Admission Information     Date & Time Provider Department Dept. Phone    4/17/2017 Pao Fuentes MD Unit [facility]D [facility] [phone]      Your Vitals Were     Blood Pressure Pulse Temperature Respirations Height Weight    99/58 (BP Location: Left arm) 76 98  F (36.7  C) (Oral) 20 1.829 m (6') 80.7 kg (177 lb 14.4 oz)    Pulse Oximetry BMI (Body Mass Index)                94% 24.13 kg/m2          [employer] Information     [employer] gives you secure access to your electronic health record. If you see a primary care provider, you can also send messages to your care team and make appointments. If you have questions, please call your primary care clinic.  If you do not have a primary care provider, please call [phone] and they will assist you.        Care EveryWhere ID     This is your Care EveryWhere ID. This could be used by other organizations to access your [facility] medical records  [id number]           Review of your medicines      START taking        Dose / Directions    albuterol 108 (90 BASE) MCG/ACT Inhaler   Commonly known as:  PROAIR HFA/PROVENTIL HFA/VENTOLIN HFA   Used for:  Pneumonia of left lower lobe due to infectious organism, RSV infection        Dose:  2 puff   Inhale 2 puffs into the lungs every 4 hours as needed for shortness of breath / dyspnea or wheezing   Quantity:  1 Inhaler   Refills:  0       guaiFENesin-codeine 100-10 MG/5ML Soln solution   Commonly known as:  ROBITUSSIN AC   Used for:  Pneumonia of left lower lobe due to infectious organism, RSV infection        Dose:  5-10 mL   Take 5-10 mLs by mouth every 4 hours as needed for cough   Quantity:  420 mL   Refills:  0       levofloxacin 750 MG  tablet   Commonly known as:  LEVAQUIN   Indication:  Community Acquired Pneumonia   Used for:  Pneumonia of left lower lobe due to infectious organism        Dose:  750 mg   Take 1 tablet (750 mg) by mouth daily   Quantity:  5 tablet   Refills:  0         CONTINUE these medicines which have NOT CHANGED        Dose / Directions    ASPIRIN PO   Used for:  T-cell or NK-cell neoplasm (H)        Dose:  81 mg   Take 81 mg by mouth daily   Refills:  0       LIPITOR PO   Used for:  T-cell or NK-cell neoplasm (H)        Dose:  10 mg   Take 10 mg by mouth daily   Refills:  0       mupirocin 2 % cream   Commonly known as:  BACTROBAN        Apply topically daily as needed Reported on 4/13/2017   Refills:  0       PHOSPHA 250 NEUTRAL 250 MG per tablet   Used for:  Hypophosphatemia   Generic drug:  phosphorus tablet 250 mg        TAKE ONE TABLET BY MOUTH TWICE A DAY   Quantity:  270 tablet   Refills:  3       ribavirin 400 MG Tabs   Used for:  RSV infection        Dose:  800 mg   Take 800 mg by mouth 2 times daily for 10 days   Quantity:  40 tablet   Refills:  0       venlafaxine 37.5 MG 24 hr capsule   Commonly known as:  EFFEXOR XR   Used for:  Lymphoma (H)        Dose:  37.5 mg   Take 1 capsule (37.5 mg) by mouth daily   Quantity:  90 capsule   Refills:  3         STOP taking     amLODIPine 5 MG tablet   Commonly known as:  NORVASC           study oseltamivir 75 MG capsule   Commonly known as:  IDS 4965                Where to get your medicines      These medications were sent to [facility] - [place], [place] - [address]  [address] [address]     Phone:  [phone]     albuterol 108 (90 BASE) MCG/ACT Inhaler    levofloxacin 750 MG tablet         Some of these will need a paper prescription and others can be bought over the counter. Ask your nurse if you have questions.     Bring a paper prescription for each of these medications     guaiFENesin-codeine 100-10 MG/5ML Soln solution                 Protect others around you: Learn how to safely use, store and throw away your medicines at www.disposemymeds.org.             Medication List: This is a list of all your medications and when to take them. Check marks below indicate your daily home schedule. Keep this list as a reference.      Medications           Morning Afternoon Evening Bedtime As Needed    albuterol 108 (90 BASE) MCG/ACT Inhaler   Commonly known as:  PROAIR HFA/PROVENTIL HFA/VENTOLIN HFA   Inhale 2 puffs into the lungs every 4 hours as needed for shortness of breath / dyspnea or wheezing                                ASPIRIN PO   Take 81 mg by mouth daily                                guaiFENesin-codeine 100-10 MG/5ML Soln solution   Commonly known as:  ROBITUSSIN AC   Take 5-10 mLs by mouth every 4 hours as needed for cough   Last time this was given:  5 mLs on 4/18/2017  9:33 AM                                levofloxacin 750 MG tablet   Commonly known as:  LEVAQUIN   Take 1 tablet (750 mg) by mouth daily   Last time this was given:  750 mg on 4/18/2017  1:33 PM                                LIPITOR PO   Take 10 mg by mouth daily                                mupirocin 2 % cream   Commonly known as:  BACTROBAN   Apply topically daily as needed Reported on 4/13/2017                                PHOSPHA 250 NEUTRAL 250 MG per tablet   TAKE ONE TABLET BY MOUTH TWICE A DAY   Last time this was given:  250 mg on 4/18/2017  9:01 AM   Generic drug:  phosphorus tablet 250 mg                                ribavirin 400 MG Tabs   Take 800 mg by mouth 2 times daily for 10 days                                venlafaxine 37.5 MG 24 hr capsule   Commonly known as:  EFFEXOR XR   Take 1 capsule (37.5 mg) by mouth daily

## 2017-04-17 NOTE — ED NOTES
Triage Assessment & Note:    /67  Pulse 74  Temp 97.9  F (36.6  C) (Oral)  Resp 18  Ht 1.829 m (6')  Wt 78.9 kg (174 lb)  SpO2 94%  BMI 23.6 kg/m2      Patient presents with: BMT pt come to triage with wife with c/o of URI, fatigue, and increase in generalized weakness.  Pt was recently dx with RSV last week and has been on rx for that. Pt reports cough with no fever.     Home Treatments/Remedies: antibiotics for rsv    Febrile / Afebrile: afebrile    Duration of C/o: 5 days    Ryann Daniel RN  April 17, 2017

## 2017-04-18 VITALS
HEART RATE: 76 BPM | DIASTOLIC BLOOD PRESSURE: 58 MMHG | OXYGEN SATURATION: 94 % | SYSTOLIC BLOOD PRESSURE: 99 MMHG | RESPIRATION RATE: 20 BRPM | TEMPERATURE: 98 F | BODY MASS INDEX: 24.09 KG/M2 | WEIGHT: 177.9 LBS | HEIGHT: 72 IN

## 2017-04-18 LAB
ALBUMIN SERPL-MCNC: 2.4 G/DL (ref 3.4–5)
ALP SERPL-CCNC: 80 U/L (ref 40–150)
ALT SERPL W P-5'-P-CCNC: 33 U/L (ref 0–70)
ANION GAP SERPL CALCULATED.3IONS-SCNC: 7 MMOL/L (ref 3–14)
AST SERPL W P-5'-P-CCNC: 39 U/L (ref 0–45)
BASOPHILS # BLD AUTO: 0 10E9/L (ref 0–0.2)
BASOPHILS NFR BLD AUTO: 0.2 %
BILIRUB SERPL-MCNC: 0.6 MG/DL (ref 0.2–1.3)
BUN SERPL-MCNC: 15 MG/DL (ref 7–30)
CALCIUM SERPL-MCNC: 8.1 MG/DL (ref 8.5–10.1)
CHLORIDE SERPL-SCNC: 110 MMOL/L (ref 94–109)
CO2 SERPL-SCNC: 26 MMOL/L (ref 20–32)
CREAT SERPL-MCNC: 1.02 MG/DL (ref 0.66–1.25)
DIFFERENTIAL METHOD BLD: ABNORMAL
EOSINOPHIL # BLD AUTO: 0 10E9/L (ref 0–0.7)
EOSINOPHIL NFR BLD AUTO: 0.1 %
ERYTHROCYTE [DISTWIDTH] IN BLOOD BY AUTOMATED COUNT: 14.7 % (ref 10–15)
GFR SERPL CREATININE-BSD FRML MDRD: 74 ML/MIN/1.7M2
GLUCOSE SERPL-MCNC: 95 MG/DL (ref 70–99)
HCT VFR BLD AUTO: 37.5 % (ref 40–53)
HGB BLD-MCNC: 11.8 G/DL (ref 13.3–17.7)
IMM GRANULOCYTES # BLD: 0 10E9/L (ref 0–0.4)
IMM GRANULOCYTES NFR BLD: 0.2 %
LYMPHOCYTES # BLD AUTO: 4.8 10E9/L (ref 0.8–5.3)
LYMPHOCYTES NFR BLD AUTO: 29.7 %
MAGNESIUM SERPL-MCNC: 2.3 MG/DL (ref 1.6–2.3)
MCH RBC QN AUTO: 27.3 PG (ref 26.5–33)
MCHC RBC AUTO-ENTMCNC: 31.5 G/DL (ref 31.5–36.5)
MCV RBC AUTO: 87 FL (ref 78–100)
MONOCYTES # BLD AUTO: 2.1 10E9/L (ref 0–1.3)
MONOCYTES NFR BLD AUTO: 12.9 %
NEUTROPHILS # BLD AUTO: 9.1 10E9/L (ref 1.6–8.3)
NEUTROPHILS NFR BLD AUTO: 56.9 %
NRBC # BLD AUTO: 0 10*3/UL
NRBC BLD AUTO-RTO: 0 /100
PHOSPHATE SERPL-MCNC: 2.2 MG/DL (ref 2.5–4.5)
PLATELET # BLD AUTO: 258 10E9/L (ref 150–450)
POTASSIUM SERPL-SCNC: 3.5 MMOL/L (ref 3.4–5.3)
PROT SERPL-MCNC: 6.4 G/DL (ref 6.8–8.8)
RBC # BLD AUTO: 4.32 10E12/L (ref 4.4–5.9)
SODIUM SERPL-SCNC: 142 MMOL/L (ref 133–144)
WBC # BLD AUTO: 16.1 10E9/L (ref 4–11)

## 2017-04-18 PROCEDURE — 83735 ASSAY OF MAGNESIUM: CPT | Performed by: INTERNAL MEDICINE

## 2017-04-18 PROCEDURE — A9270 NON-COVERED ITEM OR SERVICE: HCPCS | Mod: GY | Performed by: PHYSICIAN ASSISTANT

## 2017-04-18 PROCEDURE — 25000132 ZZH RX MED GY IP 250 OP 250 PS 637: Mod: GY | Performed by: STUDENT IN AN ORGANIZED HEALTH CARE EDUCATION/TRAINING PROGRAM

## 2017-04-18 PROCEDURE — 25000128 H RX IP 250 OP 636: Performed by: INTERNAL MEDICINE

## 2017-04-18 PROCEDURE — 85025 COMPLETE CBC W/AUTO DIFF WBC: CPT | Performed by: INTERNAL MEDICINE

## 2017-04-18 PROCEDURE — 40000274 ZZH STATISTIC RCP CONSULT EA 30 MIN

## 2017-04-18 PROCEDURE — 25000125 ZZHC RX 250: Performed by: PHYSICIAN ASSISTANT

## 2017-04-18 PROCEDURE — 25000125 ZZHC RX 250: Performed by: INTERNAL MEDICINE

## 2017-04-18 PROCEDURE — 94640 AIRWAY INHALATION TREATMENT: CPT | Mod: 76

## 2017-04-18 PROCEDURE — 25000132 ZZH RX MED GY IP 250 OP 250 PS 637: Mod: GY | Performed by: INTERNAL MEDICINE

## 2017-04-18 PROCEDURE — A9270 NON-COVERED ITEM OR SERVICE: HCPCS | Mod: GY | Performed by: STUDENT IN AN ORGANIZED HEALTH CARE EDUCATION/TRAINING PROGRAM

## 2017-04-18 PROCEDURE — 25000128 H RX IP 250 OP 636: Performed by: EMERGENCY MEDICINE

## 2017-04-18 PROCEDURE — 94640 AIRWAY INHALATION TREATMENT: CPT

## 2017-04-18 PROCEDURE — 25000132 ZZH RX MED GY IP 250 OP 250 PS 637: Mod: GY | Performed by: PHYSICIAN ASSISTANT

## 2017-04-18 PROCEDURE — A9270 NON-COVERED ITEM OR SERVICE: HCPCS | Mod: GY | Performed by: INTERNAL MEDICINE

## 2017-04-18 PROCEDURE — 80053 COMPREHEN METABOLIC PANEL: CPT | Performed by: INTERNAL MEDICINE

## 2017-04-18 PROCEDURE — 36415 COLL VENOUS BLD VENIPUNCTURE: CPT | Performed by: INTERNAL MEDICINE

## 2017-04-18 PROCEDURE — 84100 ASSAY OF PHOSPHORUS: CPT | Performed by: INTERNAL MEDICINE

## 2017-04-18 RX ORDER — LEVOFLOXACIN 750 MG/1
750 TABLET, FILM COATED ORAL DAILY
Status: DISCONTINUED | OUTPATIENT
Start: 2017-04-18 | End: 2017-04-18 | Stop reason: HOSPADM

## 2017-04-18 RX ORDER — LEVOFLOXACIN 750 MG/1
750 TABLET, FILM COATED ORAL DAILY
Qty: 5 TABLET | Refills: 0 | Status: SHIPPED | OUTPATIENT
Start: 2017-04-18 | End: 2017-05-18

## 2017-04-18 RX ORDER — AMLODIPINE BESYLATE 5 MG/1
5 TABLET ORAL
Status: DISCONTINUED | OUTPATIENT
Start: 2017-04-18 | End: 2017-04-18 | Stop reason: HOSPADM

## 2017-04-18 RX ORDER — ALBUTEROL SULFATE 90 UG/1
2 AEROSOL, METERED RESPIRATORY (INHALATION) EVERY 4 HOURS PRN
Qty: 1 INHALER | Refills: 0 | Status: SHIPPED | OUTPATIENT
Start: 2017-04-18 | End: 2017-05-18

## 2017-04-18 RX ORDER — BENZONATATE 100 MG/1
200 CAPSULE ORAL 3 TIMES DAILY PRN
Status: DISCONTINUED | OUTPATIENT
Start: 2017-04-18 | End: 2017-04-18 | Stop reason: HOSPADM

## 2017-04-18 RX ORDER — CODEINE PHOSPHATE AND GUAIFENESIN 10; 100 MG/5ML; MG/5ML
5-10 SOLUTION ORAL EVERY 4 HOURS PRN
Qty: 420 ML | Refills: 0 | Status: SHIPPED | OUTPATIENT
Start: 2017-04-18 | End: 2017-05-18

## 2017-04-18 RX ORDER — CODEINE PHOSPHATE AND GUAIFENESIN 10; 100 MG/5ML; MG/5ML
5-10 SOLUTION ORAL EVERY 4 HOURS
Status: DISCONTINUED | OUTPATIENT
Start: 2017-04-18 | End: 2017-04-18 | Stop reason: HOSPADM

## 2017-04-18 RX ORDER — POTASSIUM CHLORIDE 750 MG/1
40 TABLET, EXTENDED RELEASE ORAL ONCE
Status: COMPLETED | OUTPATIENT
Start: 2017-04-18 | End: 2017-04-18

## 2017-04-18 RX ADMIN — RIBAVIRIN 800 MG: 200 CAPSULE ORAL at 09:10

## 2017-04-18 RX ADMIN — IPRATROPIUM BROMIDE AND ALBUTEROL SULFATE 3 ML: .5; 3 SOLUTION RESPIRATORY (INHALATION) at 08:19

## 2017-04-18 RX ADMIN — SODIUM CHLORIDE 1000 ML: 9 INJECTION, SOLUTION INTRAVENOUS at 01:00

## 2017-04-18 RX ADMIN — DIBASIC SODIUM PHOSPHATE, MONOBASIC POTASSIUM PHOSPHATE AND MONOBASIC SODIUM PHOSPHATE 250 MG: 852; 155; 130 TABLET ORAL at 01:00

## 2017-04-18 RX ADMIN — GUAIFENESIN AND CODEINE PHOSPHATE 5 ML: 100; 10 SOLUTION ORAL at 09:33

## 2017-04-18 RX ADMIN — VENLAFAXINE HYDROCHLORIDE 37.5 MG: 37.5 TABLET, EXTENDED RELEASE ORAL at 09:01

## 2017-04-18 RX ADMIN — BENZONATATE 200 MG: 100 CAPSULE, LIQUID FILLED ORAL at 09:11

## 2017-04-18 RX ADMIN — LEVOFLOXACIN 750 MG: 750 TABLET, FILM COATED ORAL at 13:33

## 2017-04-18 RX ADMIN — ENOXAPARIN SODIUM 40 MG: 40 INJECTION SUBCUTANEOUS at 01:00

## 2017-04-18 RX ADMIN — POTASSIUM CHLORIDE 40 MEQ: 750 TABLET, EXTENDED RELEASE ORAL at 08:56

## 2017-04-18 RX ADMIN — RIBAVIRIN 800 MG: 200 CAPSULE ORAL at 01:00

## 2017-04-18 RX ADMIN — POTASSIUM PHOSPHATE, MONOBASIC AND POTASSIUM PHOSPHATE, DIBASIC 15 MMOL: 224; 236 INJECTION, SOLUTION INTRAVENOUS at 10:27

## 2017-04-18 RX ADMIN — DIBASIC SODIUM PHOSPHATE, MONOBASIC POTASSIUM PHOSPHATE AND MONOBASIC SODIUM PHOSPHATE 250 MG: 852; 155; 130 TABLET ORAL at 09:01

## 2017-04-18 RX ADMIN — IPRATROPIUM BROMIDE AND ALBUTEROL SULFATE 3 ML: .5; 3 SOLUTION RESPIRATORY (INHALATION) at 12:25

## 2017-04-18 NOTE — DISCHARGE SUMMARY
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    Hospital Course   Hi Smith was admitted on 4/17/2017.  The following problems were addressed during his hospitalization:    #RSV bronchiolitis with possible superimposed bacterial pneumonia: Patient is hemodynamically stable, wheezing improved with duonebs. Patient is s/p NMA DUCBT in 2015.He is weak and SOB on exertion with CXR suspicious for possibly underlying PNA, most likely viral, although bacterial superinfection is not excluded given no improvement on ribavirin and clinically worsening status with leukocytosis. LA 3 on admission. BC NGTD. Sputum cx pending. Clinically much improved on day of discharge, improved strength and not hypoxic.   -Continue ribavirin 800mg BID, started on 4/13/17, will continue for total of 10 days.  -Will empirically treat with levaquin 750 mg daily x 7 days total (started on 4/17).   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   12/2015- recurrence on the right lower extremity (biopsy proven), treated with involved  field radiation to these areas.  Currently in remission. Following with Dr. Hill.  - F/u in clinic     #HTN: BP soft in the hospital; will hold amlodipine and can restart in the clinic if BP is normal/high.      #CAD  Asymptomatic, continue home meds.     Fatmata Joyce PA-C  Hematology/Oncology    Significant Results and Procedures     Pending Results   Unresulted Labs Ordered in the Past 30 Days of this Admission     Date and Time Order Name Status Description    4/17/2017 2003 Blood culture Preliminary     4/17/2017 2003 Blood culture Preliminary     4/17/2017 1428 Blood culture Preliminary     4/17/2017 1348 Blood culture Preliminary     4/17/2017 1232 Sputum Culture Aerobic Bacterial Preliminary           Code Status   Full Code       Primary Care Physician   BEN IRVIN    Physical Exam   Temp: 98  F (36.7  C) Temp src: Oral BP: 99/58 Pulse: 76   Resp: 20 SpO2: 94 % O2 Device: None (Room air) Oxygen Delivery: 1 LPM  Vitals:    04/17/17 1210 04/17/17 2009 04/18/17 1245   Weight: 78.9 kg (174 lb) 80.6 kg (177 lb 9.6 oz) 80.7 kg (177 lb 14.4 oz)     Vital Signs with Ranges  Temp:  [97.5  F (36.4  C)-100.5  F (38.1  C)] 98  F (36.7  C)  Pulse:  [64-91] 76  Resp:  [16-20] 20  BP: ()/(55-74) 99/58  SpO2:  [90 %-98 %] 94 %  I/O last 3 completed shifts:  In: 250 [I.V.:250]  Out: 1000 [Urine:1000]    Constitutional: Awake, alert, cooperative, no apparent distress, and appears stated age.  Eyes: sclera clear, conjunctiva normal.  ENT: Normocephalic, without obvious abnormality, atraumatic, oral pharynx with moist mucus membranes  Respiratory: No increased work of breathing, good air exchange, clear to auscultation bilaterally, no crackles or wheezing.  Cardiovascular: Normal apical impulse, regular rate and rhythm, normal S1 and S2, no S3 or S4, and no murmur noted.  GI: No scars, normal bowel sounds, soft, non-distended, non-tender, no masses palpated, no hepatosplenomegaly.  Skin: No bruising  or bleeding  Musculoskeletal: There is no redness, warmth, or swelling of the joints.    Neurologic: Awake, alert, oriented to name, place and time.  Cranial nerves II-XII are grossly intact  Neuropsychiatric: Calm, normal eye contact, alert, normal affect, oriented to self, place, time and situation, memory for past and recent events intact and thought process normal.    Discharge Disposition   Discharged to home  Condition at discharge: Satisfactory    Consultations This Hospital Stay   VASCULAR ACCESS CARE ADULT IP CONSULT    Time Spent on this Encounter     Discharge Orders     CBC with platelets differential   Last Lab Result: Hemoglobin (g/dL)      Date                     Value                04/18/2017               11.8 (L)         ----------     Basic metabolic panel  (Ca, Cl, CO2, Creat, Gluc, K, Na, BUN)     Reason for your hospital stay   Pneumonia     Follow Up and recommended labs and tests   Follow up as scheduled in the hematology/oncology clinic on 4/20     Activity   Your activity upon discharge: activity as tolerated     Discharge Instructions   New medications: levaquin (levofloxacin) 750 mg daily x 5 days (antibiotic for pneumonia), albuterol inhaler every 4 hours as needed for shortness of breath or chest tightness or wheezing, cough suppressant every 4 hours as needed for cough (5-10 mL)- this medication will likely make you tired so do not drive after taking it. Stop taking amlodipine due to low blood pressures, will be rechecked and restarted in clinic.     [facility] ([phone])  for temperature > 101, shortness of breath, chest pain, headaches, vision changes, bleeding, uncontrolled nausea, vomiting, diarrhea, or pain.     Full Code     Diet   Follow this diet upon discharge: Regular       Discharge Medications   Discharge Medication List as of 4/18/2017  2:04 PM      START taking these medications    Details   guaiFENesin-codeine (ROBITUSSIN AC) 100-10  MG/5ML SOLN solution Take 5-10 mLs by mouth every 4 hours as needed for cough, Disp-420 mL, R-0, Local Print      levofloxacin (LEVAQUIN) 750 MG tablet Take 1 tablet (750 mg) by mouth daily, Disp-5 tablet, R-0, E-Prescribe      albuterol (PROAIR HFA/PROVENTIL HFA/VENTOLIN HFA) 108 (90 BASE) MCG/ACT Inhaler Inhale 2 puffs into the lungs every 4 hours as needed for shortness of breath / dyspnea or wheezing, Disp-1 Inhaler, R-0, E-Prescribe         CONTINUE these medications which have NOT CHANGED    Details   ribavirin 400 MG TABS Take 800 mg by mouth 2 times daily for 10 days, Disp-40 tablet, R-0, E-Prescribe      venlafaxine (EFFEXOR XR) 37.5 MG 24 hr capsule Take 1 capsule (37.5 mg) by mouth daily, Disp-90 capsule, R-3, E-Prescribe      mupirocin (BACTROBAN) 2 % cream Apply topically daily as needed Reported on 4/13/2017Historical      PHOSPHA 250 NEUTRAL 155-852-130 MG tablet TAKE ONE TABLET BY MOUTH TWICE A DAY, Disp-270 tablet, R-3, E-Prescribe      ASPIRIN PO Take 81 mg by mouth daily, Historical      Atorvastatin Calcium (LIPITOR PO) Take 10 mg by mouth daily , Historical         STOP taking these medications       study oseltamivir (IDS 4965) 75 MG capsule Comments:   Reason for Stopping:         amLODIPine (NORVASC) 5 MG tablet Comments:   Reason for Stopping:             Allergies   Allergies   Allergen Reactions     Nka [No Known Allergies]      Data   Most Recent 3 CBC's:  Recent Labs   Lab Test  04/18/17   0557  04/17/17 2145 04/17/17   1310   WBC  16.1*  18.2*  22.2*   HGB  11.8*  11.7*  13.6   MCV  87  86  86   PLT  258  246  263      Most Recent 3 BMP's:  Recent Labs   Lab Test  04/18/17   0557  04/17/17 2145 04/17/17   1310   NA  142  137  137   POTASSIUM  3.5  3.0*  3.0*   CHLORIDE  110*  105  101   CO2  26  22  24   BUN  15  17  18   CR  1.02  1.06  1.24   ANIONGAP  7  10  12   ARIK  8.1*  8.2*  8.7   GLC  95  138*  98     Most Recent 2 LFT's:  Recent Labs   Lab Test  04/18/17   0557   04/17/17   2145   AST  39  24   ALT  33  28   ALKPHOS  80  82   BILITOTAL  0.6  0.6     Most Recent INR's and Anticoagulation Dosing History:  Anticoagulation Dose History     Recent Dosing and Labs Latest Ref Rng & Units 8/12/2015 8/17/2015 8/24/2015 8/31/2015 9/7/2015 9/28/2015 8/18/2016    INR 0.86 - 1.14 1.01 0.94 0.95 1.04 0.96 0.96 1.05        Most Recent 3 Troponin's:  Recent Labs   Lab Test  04/17/17   1310   TROPI  <0.015  The 99th percentile for upper reference range is 0.045 ug/L.  Troponin values in   the range of 0.045 - 0.120 ug/L may be associated with risks of adverse   clinical events.       Most Recent Cholesterol Panel:  Recent Labs   Lab Test  08/31/15   2242  08/24/15   0300   CHOL   --   213*   LDL   --   122   HDL   --   42   TRIG  143  245*     Most Recent 6 Bacteria Isolates From Any Culture (See EPIC Reports for Culture Details):  Recent Labs   Lab Test  04/17/17   2144  04/17/17   2143  04/17/17   1428  04/17/17   1401  04/17/17   1316  01/23/17   1838   CULT  No growth after 16 hours  No growth after 16 hours  No growth after 15 hours  No growth after 15 hours  Moderate growth Normal giuliana  Culture in progress    No growth     Most Recent TSH, T4 and A1c Labs:  Recent Labs   Lab Test  07/13/15   1338   TSH  2.41   T4  1.00     Results for orders placed or performed during the hospital encounter of 04/17/17   XR Chest 2 Views    Narrative    EXAMINATION:  XR CHEST 2 VW 4/17/2017 12:46 PM.    COMPARISON: 8/25/2015 chest CT and chest radiograph dated 8/4/2015.    HISTORY:  uri    FINDINGS: The cardiac silhouette and pulmonary vasculature are within  normal limits. Sternotomy. No pneumothorax or pleural effusion.  Minimal streaky opacities in the lung bases as seen on the lateral  view.      Impression    IMPRESSION: Minimal streaky opacities in the lung bases, favor  subsegmental atelectasis although consolidation/infection cannot be  excluded.    CHRISTIAN BRAVO MD

## 2017-04-18 NOTE — PROGRESS NOTES
DI: Reviewed discharge medications and orders with Colton and his wife and they verbalized understanding.  AP: Colton has a folow up clinic appointment and phone numbers to call with questions.

## 2017-04-18 NOTE — DOWNTIME EVENT NOTE
The EMR was down for 11 hours on 4/18/2017.    Erin Ng RN was responsible for completing the paper charting during this time period.     The following information was re-entered into the system by Nereyda Keith: Flowsheet data, Intake and output and MAR    The following information will remain in the paper chart: Notes, assessment    Nereyda Keith  4/18/2017

## 2017-04-19 ENCOUNTER — CARE COORDINATION (OUTPATIENT)
Dept: ONCOLOGY | Facility: CLINIC | Age: 62
End: 2017-04-19

## 2017-04-19 LAB — INTERPRETATION ECG - MUSE: NORMAL

## 2017-04-19 ASSESSMENT — ENCOUNTER SYMPTOMS
ALTERED TEMPERATURE REGULATION: 1
CHILLS: 1
RESPIRATORY PAIN: 0
NIGHT SWEATS: 1
INCREASED ENERGY: 0
DECREASED APPETITE: 1
FEVER: 0
HEMOPTYSIS: 0
WHEEZING: 0
SPUTUM PRODUCTION: 1
COUGH: 1
SHORTNESS OF BREATH: 1
POLYDIPSIA: 0
DYSPNEA ON EXERTION: 1
FATIGUE: 1
POSTURAL DYSPNEA: 0
COUGH DISTURBING SLEEP: 0
POLYPHAGIA: 0
HALLUCINATIONS: 0
SNORES LOUDLY: 0

## 2017-04-19 NOTE — PROGRESS NOTES
Called patient and his wife this afternoon to see how he is doing after coming to the ED and being admitted for 1 night. He states that he is feeling better after receiving the hydration and antibiotic in addition to the Ribavirin he was taking. He states his appetite is a little bit better and he had soup for dinner last night and oatmeal for breakfast this AM. Patient states that he is still fatigued but it is getting less and less. They had no questions about medications and will be here in clinic tomorrow for follow up. Patient and wife grateful for the call and have no further questions at this time.

## 2017-04-20 ENCOUNTER — CARE COORDINATION (OUTPATIENT)
Dept: CARDIOLOGY | Facility: CLINIC | Age: 62
End: 2017-04-20

## 2017-04-20 ENCOUNTER — ONCOLOGY VISIT (OUTPATIENT)
Dept: ONCOLOGY | Facility: CLINIC | Age: 62
End: 2017-04-20
Attending: NURSE PRACTITIONER
Payer: MEDICARE

## 2017-04-20 ENCOUNTER — APPOINTMENT (OUTPATIENT)
Dept: LAB | Facility: CLINIC | Age: 62
End: 2017-04-20
Attending: INTERNAL MEDICINE
Payer: MEDICARE

## 2017-04-20 VITALS
TEMPERATURE: 97.5 F | OXYGEN SATURATION: 96 % | BODY MASS INDEX: 25.04 KG/M2 | DIASTOLIC BLOOD PRESSURE: 77 MMHG | WEIGHT: 184.6 LBS | HEART RATE: 74 BPM | SYSTOLIC BLOOD PRESSURE: 115 MMHG | RESPIRATION RATE: 18 BRPM

## 2017-04-20 DIAGNOSIS — J18.9 PNEUMONIA OF LEFT LOWER LOBE DUE TO INFECTIOUS ORGANISM: ICD-10-CM

## 2017-04-20 DIAGNOSIS — E83.39 HYPOPHOSPHATEMIA: Primary | ICD-10-CM

## 2017-04-20 LAB
ANION GAP SERPL CALCULATED.3IONS-SCNC: 10 MMOL/L (ref 3–14)
BASOPHILS # BLD AUTO: 0 10E9/L (ref 0–0.2)
BASOPHILS NFR BLD AUTO: 0.2 %
BUN SERPL-MCNC: 12 MG/DL (ref 7–30)
CALCIUM SERPL-MCNC: 8.8 MG/DL (ref 8.5–10.1)
CHLORIDE SERPL-SCNC: 107 MMOL/L (ref 94–109)
CO2 SERPL-SCNC: 24 MMOL/L (ref 20–32)
CREAT SERPL-MCNC: 1.1 MG/DL (ref 0.66–1.25)
DIFFERENTIAL METHOD BLD: ABNORMAL
EOSINOPHIL # BLD AUTO: 0.2 10E9/L (ref 0–0.7)
EOSINOPHIL NFR BLD AUTO: 1.5 %
ERYTHROCYTE [DISTWIDTH] IN BLOOD BY AUTOMATED COUNT: 14.4 % (ref 10–15)
GFR SERPL CREATININE-BSD FRML MDRD: 68 ML/MIN/1.7M2
GLUCOSE SERPL-MCNC: 81 MG/DL (ref 70–99)
HCT VFR BLD AUTO: 41.1 % (ref 40–53)
HGB BLD-MCNC: 13.2 G/DL (ref 13.3–17.7)
IMM GRANULOCYTES # BLD: 0.1 10E9/L (ref 0–0.4)
IMM GRANULOCYTES NFR BLD: 0.9 %
LYMPHOCYTES # BLD AUTO: 3 10E9/L (ref 0.8–5.3)
LYMPHOCYTES NFR BLD AUTO: 27.4 %
MCH RBC QN AUTO: 27.6 PG (ref 26.5–33)
MCHC RBC AUTO-ENTMCNC: 32.1 G/DL (ref 31.5–36.5)
MCV RBC AUTO: 86 FL (ref 78–100)
MONOCYTES # BLD AUTO: 1.4 10E9/L (ref 0–1.3)
MONOCYTES NFR BLD AUTO: 13 %
NEUTROPHILS # BLD AUTO: 6.3 10E9/L (ref 1.6–8.3)
NEUTROPHILS NFR BLD AUTO: 57 %
NRBC # BLD AUTO: 0 10*3/UL
NRBC BLD AUTO-RTO: 0 /100
PHOSPHATE SERPL-MCNC: 2.8 MG/DL (ref 2.5–4.5)
PLATELET # BLD AUTO: 353 10E9/L (ref 150–450)
POTASSIUM SERPL-SCNC: 3.5 MMOL/L (ref 3.4–5.3)
RBC # BLD AUTO: 4.78 10E12/L (ref 4.4–5.9)
SODIUM SERPL-SCNC: 141 MMOL/L (ref 133–144)
WBC # BLD AUTO: 11 10E9/L (ref 4–11)

## 2017-04-20 PROCEDURE — 84100 ASSAY OF PHOSPHORUS: CPT | Performed by: PHYSICIAN ASSISTANT

## 2017-04-20 PROCEDURE — 36415 COLL VENOUS BLD VENIPUNCTURE: CPT

## 2017-04-20 PROCEDURE — 99214 OFFICE O/P EST MOD 30 MIN: CPT | Mod: ZP | Performed by: NURSE PRACTITIONER

## 2017-04-20 PROCEDURE — 85025 COMPLETE CBC W/AUTO DIFF WBC: CPT | Performed by: PHYSICIAN ASSISTANT

## 2017-04-20 PROCEDURE — 99212 OFFICE O/P EST SF 10 MIN: CPT | Mod: ZF

## 2017-04-20 PROCEDURE — 80048 BASIC METABOLIC PNL TOTAL CA: CPT | Performed by: PHYSICIAN ASSISTANT

## 2017-04-20 ASSESSMENT — PAIN SCALES - GENERAL: PAINLEVEL: NO PAIN (0)

## 2017-04-20 NOTE — PROGRESS NOTES
Patient has clinic visit within 24-48 hours of Discharge so no post DC follow up call is needed              Apr 20, 2017 9:45 AM CDT   [facility] Lab Draw with  [facility] LAB DRAW   [facility] Lab Draw ([facility])     [address] [address]   [phone]                 Apr 20, 2017 10:20 AM CDT   (Arrive by 10:05 AM)   Return Visit with MALENA Hawkins CNP   [facility] ([facility])

## 2017-04-20 NOTE — MR AVS SNAPSHOT
After Visit Summary   4/20/2017    Hi Smith    MRN: [id number]           Patient Information     Date Of Birth          1955        Visit Information        Provider Department      4/20/2017 10:20 AM Rebecca Henry APRN CNP [facility]        Today's Diagnoses     Hypophosphatemia    -  1    Pneumonia of left lower lobe due to infectious organism           Follow-ups after your visit        Your next 10 appointments already scheduled     May 18, 2017 10:00 AM CDT   [facility] Draw with [facility] LAB DRAW   [facility] Lab Draw ([facility])    [address] [place] [id number] [phone]            May 18, 2017 10:30 AM CDT   (Arrive by 10:15 AM)   Return Visit with Yuri Hill MD   [facility] ([facility])    [address] [place] [id number] [phone]              Who to contact     If you have questions or need follow up information about today's clinic visit or your schedule please contact [facility] directly at [phone].  Normal or non-critical lab and imaging results will be communicated to you by MyChart, letter or phone within 4 business days after the clinic has received the results. If you do not hear from us within 7 days, please contact the clinic through [employer] or phone. If you have a critical or abnormal lab result, we will notify you by phone as soon as possible.  Submit refill requests through [employer] or call your pharmacy and they will forward the refill request to us. Please allow 3 business days for your refill to be completed.          Additional Information About Your Visit        MyChart Information     [facility] gives you secure access to your electronic health record. If you see a primary care provider, you can also send messages to your care team and make appointments. If you have  questions, please call your primary care clinic.  If you do not have a primary care provider, please call [phone] and they will assist you.        Care EveryWhere ID     This is your Care EveryWhere ID. This could be used by other organizations to access your [place] medical records  [id number]        Your Vitals Were     Pulse Temperature Respirations Pulse Oximetry BMI (Body Mass Index)       74 97.5  F (36.4  C) (Tympanic) 18 96% 25.04 kg/m2        Blood Pressure from Last 3 Encounters:   04/20/17 115/77   04/18/17 99/58   04/13/17 113/68    Weight from Last 3 Encounters:   04/20/17 83.7 kg (184 lb 9.6 oz)   04/18/17 80.7 kg (177 lb 14.4 oz)   04/13/17 84.8 kg (187 lb)              We Performed the Following     Basic metabolic panel  (Ca, Cl, CO2, Creat, Gluc, K, Na, BUN)     CBC with platelets differential     Phosphorus        Primary Care Provider Office Phone # Fax #    Glenroy Kamaljitantonia [phone]       [facility] [address] [facility] [address]        Thank you!     Thank you for choosing [facility] CANCER [facility]  for your care. Our goal is always to provide you with excellent care. Hearing back from our patients is one way we can continue to improve our services. Please take a few minutes to complete the written survey that you may receive in the mail after your visit with us. Thank you!             Your Updated Medication List - Protect others around you: Learn how to safely use, store and throw away your medicines at www.disposemymeds.org.          This list is accurate as of: 4/20/17 11:27 AM.  Always use your most recent med list.                   Brand Name Dispense Instructions for use    albuterol 108 (90 BASE) MCG/ACT Inhaler    PROAIR HFA/PROVENTIL HFA/VENTOLIN HFA    1 Inhaler    Inhale 2 puffs into the lungs every 4 hours as needed for shortness of breath / dyspnea or wheezing       ASPIRIN PO      Take 81 mg by mouth daily        guaiFENesin-codeine 100-10 MG/5ML Soln solution    ROBITUSSIN AC    420 mL    Take 5-10 mLs by mouth every 4 hours as needed for cough       levofloxacin 750 MG tablet    LEVAQUIN    5 tablet    Take 1 tablet (750 mg) by mouth daily       LIPITOR PO      Take 10 mg by mouth daily       mupirocin 2 % cream    BACTROBAN     Apply topically daily as needed Reported on 4/13/2017       PHOSPHA 250 NEUTRAL 250 MG per tablet   Generic drug:  phosphorus tablet 250 mg     270 tablet    TAKE ONE TABLET BY MOUTH TWICE A DAY       ribavirin 400 MG Tabs     40 tablet    Take 800 mg by mouth 2 times daily for 10 days       venlafaxine 37.5 MG 24 hr capsule    EFFEXOR XR    90 capsule    Take 1 capsule (37.5 mg) by mouth daily

## 2017-04-20 NOTE — NURSING NOTE
Hi Smith is a 62 year old male who presents for:  Chief Complaint   Patient presents with     Blood Draw     labs collected from right hand by RN and sent, pt tolerated well, vitals taken and pt checked in for next appt.     Oncology Clinic Visit     return patient visit for follow up related to Nurse Practitioner  Pneumonia of left lower lobe due to infectious organism         Initial Vitals:  /77  Pulse 74  Temp 97.5  F (36.4  C) (Tympanic)  Resp 18  Wt 83.7 kg (184 lb 9.6 oz)  SpO2 96%  BMI 25.04 kg/m2 Estimated body mass index is 25.04 kg/(m^2) as calculated from the following:    Height as of 4/17/17: 1.829 m (6').    Weight as of this encounter: 83.7 kg (184 lb 9.6 oz).. Body surface area is 2.06 meters squared. BP completed using cuff size: NA (Not Taken)  No Pain (0) No LMP for male patient. Allergies and medications reviewed.     Medications: Medication refills not needed today.  Pharmacy name entered into [facility]:    [facility] [place], [place] - [address] [address]  [address] - [address]    Comments: patient denied pain/discomfort.     5 minutes for nursing intake (face to face time)   Debbie Hernandez CMA

## 2017-04-20 NOTE — Clinical Note
4/20/2017       RE: Hi Smith  [address] [address] [facility] [address]     Dear Colleague,    Thank you for referring your patient, Hi Smith, to the [facility]. Please see a copy of my visit note below.    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  By 12/2015, he had evidence for recurrence on the right lower extremity (biopsy proven).  Recurrence appeared to be localized to the right ankle and possibly, although undocumented, on the right wrist.  He was given involved field radiation to these areas.  Subsequently, he has had multiple subcutaneous nodules that arise and often resolve spontaneously.  Others show inflammation and cutaneous crusting with erythema and subsequent regression.    He was hospitalized on 4/17/17 with RSV. Prior to that hospitalization, he was started on Tamiflu empirically during a clinic visit. His respiratory panel came back positive for RSV. He was started on ribavirin 800 mg bid on 4/13/17.  He developed progressive fatigue and shortness of breath and was hospitalized. He received IV levaquin and improved after 24 hours. He was discharged home on po levaquin and ribavirin.    Interval history:  Hi is feeling much better than a few days ago. His cough and congestion are improving. Still feels winded with some activity, but recovers with rest. No fevers/chills. Cough is less frequent, still productive of clear phlegm. Has some nasal congestion and ear fullness. Notes  decreased hearing in the left ear. No ear pain. Has one loose stool per day. No abdominal pain. No nausea/vomiting. Eating/drinking well. Has less dizziness now. No headaches/vision changes. No mouth sores. No new skin lesions/lumps.    Current Outpatient Prescriptions   Medication Sig Dispense Refill     guaiFENesin-codeine (ROBITUSSIN AC) 100-10 MG/5ML SOLN solution Take 5-10 mLs by mouth every 4 hours as needed for cough 420 mL 0     levofloxacin (LEVAQUIN) 750 MG tablet Take 1 tablet (750 mg) by mouth daily 5 tablet 0     albuterol (PROAIR HFA/PROVENTIL HFA/VENTOLIN HFA) 108 (90 BASE) MCG/ACT Inhaler Inhale 2 puffs into the lungs every 4 hours as needed for shortness of breath / dyspnea or wheezing 1 Inhaler 0     ribavirin 400 MG TABS Take 800 mg by mouth 2 times daily for 10 days 40 tablet 0     venlafaxine (EFFEXOR XR) 37.5 MG 24 hr capsule Take 1 capsule (37.5 mg) by mouth daily 90 capsule 3     mupirocin (BACTROBAN) 2 % cream Apply topically daily as needed Reported on 4/13/2017       PHOSPHA 250 NEUTRAL 155-852-130 MG tablet TAKE ONE TABLET BY MOUTH TWICE A  tablet 3     ASPIRIN PO Take 81 mg by mouth daily       Atorvastatin Calcium (LIPITOR PO) Take 10 mg by mouth daily        Exam:  Alert, appears in NAD. Blood pressure 115/77, pulse 74, temperature 97.5  F (36.4  C), temperature source Tympanic, resp. rate 18, weight 83.7 kg (184 lb 9.6 oz), SpO2 96 %.  Oropharynx is moist, no focal lesion. No icterus. Neck supple and without adenopathy. Lungs:bilateral basilar crackles.  Heart:RRR, no murmur or rub. Abdomen: soft, non-tender, BS active. No masses or organomegaly. Extremities: warm, no edema. Speech clear. Gait/station wnl.    Labs: Results for THOM LARSON (MRN [id number]) as of 4/20/2017 10:50   Ref. Range 4/20/2017 10:18   Sodium Latest Ref Range: 133 - 144 mmol/L 141   Potassium Latest Ref Range: 3.4 - 5.3 mmol/L 3.5   Chloride Latest Ref Range: 94 - 109 mmol/L 107   Carbon Dioxide Latest  Ref Range: 20 - 32 mmol/L 24   Urea Nitrogen Latest Ref Range: 7 - 30 mg/dL 12   Creatinine Latest Ref Range: 0.66 - 1.25 mg/dL 1.10   GFR Estimate Latest Ref Range: >60 mL/min/1.7m2 68   GFR Estimate If Black Latest Ref Range: >60 mL/min/1.7m2 82   Calcium Latest Ref Range: 8.5 - 10.1 mg/dL 8.8   Anion Gap Latest Ref Range: 3 - 14 mmol/L 10   Glucose Latest Ref Range: 70 - 99 mg/dL 81   WBC Latest Ref Range: 4.0 - 11.0 10e9/L 11.0   Hemoglobin Latest Ref Range: 13.3 - 17.7 g/dL 13.2 (L)   Hematocrit Latest Ref Range: 40.0 - 53.0 % 41.1   Platelet Count Latest Ref Range: 150 - 450 10e9/L 353   RBC Count Latest Ref Range: 4.4 - 5.9 10e12/L 4.78   MCV Latest Ref Range: 78 - 100 fl 86   MCH Latest Ref Range: 26.5 - 33.0 pg 27.6   MCHC Latest Ref Range: 31.5 - 36.5 g/dL 32.1   RDW Latest Ref Range: 10.0 - 15.0 % 14.4   Diff Method Unknown Automated Method   % Neutrophils Latest Units: % 57.0   % Lymphocytes Latest Units: % 27.4   % Monocytes Latest Units: % 13.0   % Eosinophils Latest Units: % 1.5   % Basophils Latest Units: % 0.2   % Immature Granulocytes Latest Units: % 0.9   Nucleated RBCs Latest Ref Range: 0 /100 0   Absolute Neutrophil Latest Ref Range: 1.6 - 8.3 10e9/L 6.3   Absolute Lymphocytes Latest Ref Range: 0.8 - 5.3 10e9/L 3.0   Absolute Monocytes Latest Ref Range: 0.0 - 1.3 10e9/L 1.4 (H)   Absolute Eosinophils Latest Ref Range: 0.0 - 0.7 10e9/L 0.2   Absolute Basophils Latest Ref Range: 0.0 - 0.2 10e9/L 0.0   Abs Immature Granulocytes Latest Ref Range: 0 - 0.4 10e9/L 0.1   Absolute Nucleated RBC Unknown 0.0       Impression/plan:    1. RSV bronchiolitis with superimposed bacterial pneumonia  -Clinically improving. WBC improving and symptoms of cough and congestion are better.  -Continue ribavirin 800 mg bid through 4/23/17  -Continue levaquin 750 mg  Through 4/24/17  -Continue supportive measures including albuterol inhaler and Robitussin AC prn  -call if cough/shortness of breath worsen    2. NK/Tcell  lymphoma, followed by Dr. Hill. Next on 5/18/17    3. HTN/hx of chest pain-amlodipine was held in the hospital  BP today improved. OK to resume amlodipine    4. Hypophosphatemia:  On oral phos replacement, one bid  Phos stable today. Continue bid dosing.    5. Cerumen impaction bilaterally: contributing to decreased hearing.  OK to try OTC wax drops and irrigation. If no improvement, should see PCP for irrigation.        Answers for HPI/ROS submitted by the patient on 4/19/2017   General Symptoms: Yes  Skin Symptoms: No  HENT Symptoms: No  EYE SYMPTOMS: No  HEART SYMPTOMS: No  LUNG SYMPTOMS: Yes  INTESTINAL SYMPTOMS: No  URINARY SYMPTOMS: No  REPRODUCTIVE SYMPTOMS: No  SKELETAL SYMPTOMS: No  BLOOD SYMPTOMS: No  NERVOUS SYSTEM SYMPTOMS: No  MENTAL HEALTH SYMPTOMS: No  Fever: No  Loss of appetite: Yes  Fatigue: Yes  Night sweats: Yes  Chills: Yes  Increased stress: No  Excessive hunger: No  Excessive thirst: No  Feeling hot or cold when others believe the temperature is normal: Yes  Loss of height: No  Post-operative complications: No  Surgical site pain: No  Hallucinations: No  Change in or Loss of Energy: No  Hyperactivity: No  Confusion: No  Cough: Yes  Sputum or phlegm: Yes  Coughing up blood: No  Difficulty breating or shortness of breath: Yes  Snoring: No  Wheezing: No  Difficulty breathing on exertion: Yes  Respiratory pain: No  Nighttime Cough: No  Difficulty breathing when lying flat: No      Again, thank you for allowing me to participate in the care of your patient.      Sincerely,    MALENA Greenwood CNP

## 2017-04-20 NOTE — NURSING NOTE
Chief Complaint   Patient presents with     Blood Draw     labs collected from right hand by RN and sent, pt tolerated well, vitals taken and pt checked in for next appt.     Taryn Carter

## 2017-04-20 NOTE — PROGRESS NOTES
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  By 12/2015, he had evidence for recurrence on the right lower extremity (biopsy proven).  Recurrence appeared to be localized to the right ankle and possibly, although undocumented, on the right wrist.  He was given involved field radiation to these areas.  Subsequently, he has had multiple subcutaneous nodules that arise and often resolve spontaneously.  Others show inflammation and cutaneous crusting with erythema and subsequent regression.    He was hospitalized on 4/17/17 with RSV. Prior to that hospitalization, he was started on Tamiflu empirically during a clinic visit. His respiratory panel came back positive for RSV. He was started on ribavirin 800 mg bid on 4/13/17.  He developed progressive fatigue and shortness of breath and was hospitalized. He received IV levaquin and improved after 24 hours. He was discharged home on po levaquin and ribavirin.    Interval history:  Hi is feeling much better than a few days ago. His cough and congestion are improving. Still feels winded with some activity, but recovers with rest. No fevers/chills. Cough is less frequent, still productive of clear phlegm. Has some nasal congestion and ear fullness. Notes decreased hearing in the left ear. No ear pain. Has one loose stool per day. No abdominal pain. No nausea/vomiting. Eating/drinking well. Has less dizziness now. No headaches/vision changes. No mouth sores. No new skin  lesions/lumps.    Current Outpatient Prescriptions   Medication Sig Dispense Refill     guaiFENesin-codeine (ROBITUSSIN AC) 100-10 MG/5ML SOLN solution Take 5-10 mLs by mouth every 4 hours as needed for cough 420 mL 0     levofloxacin (LEVAQUIN) 750 MG tablet Take 1 tablet (750 mg) by mouth daily 5 tablet 0     albuterol (PROAIR HFA/PROVENTIL HFA/VENTOLIN HFA) 108 (90 BASE) MCG/ACT Inhaler Inhale 2 puffs into the lungs every 4 hours as needed for shortness of breath / dyspnea or wheezing 1 Inhaler 0     ribavirin 400 MG TABS Take 800 mg by mouth 2 times daily for 10 days 40 tablet 0     venlafaxine (EFFEXOR XR) 37.5 MG 24 hr capsule Take 1 capsule (37.5 mg) by mouth daily 90 capsule 3     mupirocin (BACTROBAN) 2 % cream Apply topically daily as needed Reported on 4/13/2017       PHOSPHA 250 NEUTRAL 155-852-130 MG tablet TAKE ONE TABLET BY MOUTH TWICE A  tablet 3     ASPIRIN PO Take 81 mg by mouth daily       Atorvastatin Calcium (LIPITOR PO) Take 10 mg by mouth daily        Exam:  Alert, appears in NAD. Blood pressure 115/77, pulse 74, temperature 97.5  F (36.4  C), temperature source Tympanic, resp. rate 18, weight 83.7 kg (184 lb 9.6 oz), SpO2 96 %.  Oropharynx is moist, no focal lesion. No icterus. Neck supple and without adenopathy. Lungs:bilateral basilar crackles.  Heart:RRR, no murmur or rub. Abdomen: soft, non-tender, BS active. No masses or organomegaly. Extremities: warm, no edema. Speech clear. Gait/station wnl.    Labs: Results for THOM LARSON (MRN [id number]) as of 4/20/2017 10:50   Ref. Range 4/20/2017 10:18   Sodium Latest Ref Range: 133 - 144 mmol/L 141   Potassium Latest Ref Range: 3.4 - 5.3 mmol/L 3.5   Chloride Latest Ref Range: 94 - 109 mmol/L 107   Carbon Dioxide Latest Ref Range: 20 - 32 mmol/L 24   Urea Nitrogen Latest Ref Range: 7 - 30 mg/dL 12   Creatinine Latest Ref Range: 0.66 - 1.25 mg/dL 1.10   GFR Estimate Latest Ref Range: >60 mL/min/1.7m2 68   GFR Estimate If Black Latest  Ref Range: >60 mL/min/1.7m2 82   Calcium Latest Ref Range: 8.5 - 10.1 mg/dL 8.8   Anion Gap Latest Ref Range: 3 - 14 mmol/L 10   Glucose Latest Ref Range: 70 - 99 mg/dL 81   WBC Latest Ref Range: 4.0 - 11.0 10e9/L 11.0   Hemoglobin Latest Ref Range: 13.3 - 17.7 g/dL 13.2 (L)   Hematocrit Latest Ref Range: 40.0 - 53.0 % 41.1   Platelet Count Latest Ref Range: 150 - 450 10e9/L 353   RBC Count Latest Ref Range: 4.4 - 5.9 10e12/L 4.78   MCV Latest Ref Range: 78 - 100 fl 86   MCH Latest Ref Range: 26.5 - 33.0 pg 27.6   MCHC Latest Ref Range: 31.5 - 36.5 g/dL 32.1   RDW Latest Ref Range: 10.0 - 15.0 % 14.4   Diff Method Unknown Automated Method   % Neutrophils Latest Units: % 57.0   % Lymphocytes Latest Units: % 27.4   % Monocytes Latest Units: % 13.0   % Eosinophils Latest Units: % 1.5   % Basophils Latest Units: % 0.2   % Immature Granulocytes Latest Units: % 0.9   Nucleated RBCs Latest Ref Range: 0 /100 0   Absolute Neutrophil Latest Ref Range: 1.6 - 8.3 10e9/L 6.3   Absolute Lymphocytes Latest Ref Range: 0.8 - 5.3 10e9/L 3.0   Absolute Monocytes Latest Ref Range: 0.0 - 1.3 10e9/L 1.4 (H)   Absolute Eosinophils Latest Ref Range: 0.0 - 0.7 10e9/L 0.2   Absolute Basophils Latest Ref Range: 0.0 - 0.2 10e9/L 0.0   Abs Immature Granulocytes Latest Ref Range: 0 - 0.4 10e9/L 0.1   Absolute Nucleated RBC Unknown 0.0       Impression/plan:    1. RSV bronchiolitis with superimposed bacterial pneumonia  -Clinically improving. WBC improving and symptoms of cough and congestion are better.  -Continue ribavirin 800 mg bid through 4/23/17  -Continue levaquin 750 mg  Through 4/24/17  -Continue supportive measures including albuterol inhaler and Robitussin AC prn  -call if cough/shortness of breath worsen    2. NK/Tcell lymphoma, followed by Dr. Hill. Next on 5/18/17    3. HTN/hx of chest pain-amlodipine was held in the hospital  BP today improved. OK to resume amlodipine    4. Hypophosphatemia:  On oral phos replacement, one  bid  Phos stable today. Continue bid dosing.    5. Cerumen impaction bilaterally: contributing to decreased hearing.  OK to try OTC wax drops and irrigation. If no improvement, should see PCP for irrigation.        Answers for HPI/ROS submitted by the patient on 4/19/2017   General Symptoms: Yes  Skin Symptoms: No  HENT Symptoms: No  EYE SYMPTOMS: No  HEART SYMPTOMS: No  LUNG SYMPTOMS: Yes  INTESTINAL SYMPTOMS: No  URINARY SYMPTOMS: No  REPRODUCTIVE SYMPTOMS: No  SKELETAL SYMPTOMS: No  BLOOD SYMPTOMS: No  NERVOUS SYSTEM SYMPTOMS: No  MENTAL HEALTH SYMPTOMS: No  Fever: No  Loss of appetite: Yes  Fatigue: Yes  Night sweats: Yes  Chills: Yes  Increased stress: No  Excessive hunger: No  Excessive thirst: No  Feeling hot or cold when others believe the temperature is normal: Yes  Loss of height: No  Post-operative complications: No  Surgical site pain: No  Hallucinations: No  Change in or Loss of Energy: No  Hyperactivity: No  Confusion: No  Cough: Yes  Sputum or phlegm: Yes  Coughing up blood: No  Difficulty breating or shortness of breath: Yes  Snoring: No  Wheezing: No  Difficulty breathing on exertion: Yes  Respiratory pain: No  Nighttime Cough: No  Difficulty breathing when lying flat: No

## 2017-04-21 ENCOUNTER — ALLIED HEALTH/NURSE VISIT (OUTPATIENT)
Dept: NURSING | Facility: CLINIC | Age: 62
End: 2017-04-21
Payer: MEDICARE

## 2017-04-21 DIAGNOSIS — H61.23 BILATERAL IMPACTED CERUMEN: Primary | ICD-10-CM

## 2017-04-21 LAB
BACTERIA SPEC CULT: ABNORMAL
MICRO REPORT STATUS: ABNORMAL
MICROORGANISM SPEC CULT: ABNORMAL
SPECIMEN SOURCE: ABNORMAL

## 2017-04-21 PROCEDURE — 99207 ZZC NO CHARGE NURSE ONLY: CPT

## 2017-04-21 PROCEDURE — 69209 REMOVE IMPACTED EAR WAX UNI: CPT | Mod: 50

## 2017-04-21 NOTE — PROGRESS NOTES
Hi Smith is a 62 year old male who presents in clinic for possible impacted cerumen.    SYMPTOMS:  Onset of symptoms: 1 day ago with sudden onset and gradual onset    Hx of cerumen impaction?   Yes  Hx of surgery or rupture?  No (if yes, huddle with provider)  OBJECTIVE:  Patient appears in no distress  HEENT: both sides ear canal occluded with cerumen.      RN check prior to irrigation:Renata West RN  RN check post irrigation: Renata West RN    PLAN: Cerumenosis is noted Wax is removed by syringing with Ear syringe and manual debridement with 1/2 strength water and 1/2 strength H202       Instructions for home care to prevent wax buildup are given, including OTC agents  Tilt head sideways and instill 5-10 drops twice daily up to 4 days, tip of applicator should not enter ear canal; keep drops in ear for several minutes by keeping head tilted and placing cotton in ear.    NURSING PLAN: patient asked what he can do to reduce the amount of ear wax: reiterated to patient, he can use over the counter agents such as debrox for ear wax accumulation per label instructions. Or we will be happy to see him in the future for additional  ear washes in clinic.   No redness, pain and skin is intact.   Patient did report having mild common cold symptoms.   NATHAN Delgado, LOUISN RN

## 2017-04-21 NOTE — MR AVS SNAPSHOT
After Visit Summary   4/21/2017    Hi Smith    MRN: [id number]           Patient Information     Date Of Birth          1955        Visit Information        Provider Department      4/21/2017 1:00 PM [facility]        Today's Diagnoses     Bilateral impacted cerumen    -  1       Follow-ups after your visit        Your next 10 appointments already scheduled     May 18, 2017 10:00 AM CDT   [facility] Lab Draw with  [facility] DRAW   [facility] ([facility])    [address] [place] [id number] [phone]            May 18, 2017 10:30 AM CDT   (Arrive by 10:15 AM)   Return Visit with Yuri Hill MD   [facility] ([facility])    [address] [place] [id number] [phone]              Who to contact     If you have questions or need follow up information about today's clinic visit or your schedule please contact [facility] directly at [phone].  Normal or non-critical lab and imaging results will be communicated to you by [employer], letter or phone within 4 business days after the clinic has received the results. If you do not hear from us within 7 days, please contact the clinic through [employer] or phone. If you have a critical or abnormal lab result, we will notify you by phone as soon as possible.  Submit refill requests through [employer] or call your pharmacy and they will forward the refill request to us. Please allow 3 business days for your refill to be completed.          Additional Information About Your Visit        MyChart Information     [employer] gives you secure access to your electronic health record. If you see a primary care provider, you can also send messages to your care team and make appointments. If you have questions, please call your primary care clinic.  If you do not have a primary care  provider, please call [phone] and they will assist you.        Care EveryWhere ID     This is your Care EveryWhere ID. This could be used by other organizations to access your [place] medical records  [id number]         Blood Pressure from Last 3 Encounters:   04/20/17 115/77   04/18/17 99/58   04/13/17 113/68    Weight from Last 3 Encounters:   04/20/17 184 lb 9.6 oz (83.7 kg)   04/18/17 177 lb 14.4 oz (80.7 kg)   04/13/17 187 lb (84.8 kg)              We Performed the Following     HC REMOVAL IMPACTED CERUMEN IRRIGATION/LVG UNILAT        Primary Care Provider Office Phone # Fax #    Glenroy Torres [phone]       [facility] [address] [facility] [address]        Thank you!     [facility] you for [facility]  [facility]. Our goal is always to provide you with excellent care. Hearing back from our patients is one way we can continue to improve our services. Please take a few minutes to complete the written survey that you may receive in the mail after your visit with us. Thank you!             Your Updated Medication List - Protect others around you: Learn how to safely use, store and throw away your medicines at www.disposemymeds.org.          This list is accurate as of: 4/21/17  1:47 PM.  Always use your most recent med list.                   Brand Name Dispense Instructions for use    albuterol 108 (90 BASE) MCG/ACT Inhaler    PROAIR HFA/PROVENTIL HFA/VENTOLIN HFA    1 Inhaler    Inhale 2 puffs into the lungs every 4 hours as needed for shortness of breath / dyspnea or wheezing       ASPIRIN PO      Take 81 mg by mouth daily       guaiFENesin-codeine 100-10 MG/5ML Soln solution    ROBITUSSIN AC    420 mL    Take 5-10 mLs by mouth every 4 hours as needed for cough       levofloxacin 750 MG tablet    LEVAQUIN    5 tablet    Take 1 tablet (750 mg) by mouth daily       LIPITOR PO      Take 10 mg by mouth daily       mupirocin 2 % cream    BACTROBAN      Apply topically daily as needed Reported on 4/13/2017       PHOSPHA 250 NEUTRAL 250 MG per tablet   Generic drug:  phosphorus tablet 250 mg     270 tablet    TAKE ONE TABLET BY MOUTH TWICE A DAY       ribavirin 400 MG Tabs     40 tablet    Take 800 mg by mouth 2 times daily for 10 days       venlafaxine 37.5 MG 24 hr capsule    EFFEXOR XR    90 capsule    Take 1 capsule (37.5 mg) by mouth daily

## 2017-04-23 LAB
BACTERIA SPEC CULT: NO GROWTH
MICRO REPORT STATUS: NORMAL
SPECIMEN SOURCE: NORMAL

## 2017-04-24 ENCOUNTER — CARE COORDINATION (OUTPATIENT)
Dept: ONCOLOGY | Facility: CLINIC | Age: 62
End: 2017-04-24

## 2017-04-24 NOTE — PROGRESS NOTES
Reason for Outgoing call:    Returning patient/wife's call.       Patients Questions/Concerns:    Patient is calling into clinic today with symptoms of Beaver in both ears progressing even after having his ears flushed at his local clinic on Friday.  After seeing Rebecca Henry last Thrusday 4/20/17 patient made an appointment at his local clinic to have his ears cleaned out. Patient stated that both ears had lots of wax in them but the left was worse then the right. Patient also mentioned that now since having that done he can barely hear out of his right ear if at all and marginally hear out his left ear. He has some hearing aides from his mother in law that are like brand new that he is going to try to see if they will help with his hearing. He has cleaned them out and put new batteries in them. He also has an ENT/Audiology appointment on May 2, 2017. He was just calling to make sure there wasn't anything else he should be doing and that it was okay to pursue this way verus through Dr. Hill or oncology.     Nursing Action/Patient Instruction:    Nurse called Dr. Hill to updated him and see if he had any other recommendations about the situation.   Dr. Hill thinks his current plan is good and that if he were to have issues or problems before May 2 he should contact his PCP.     Patient Response/Evaluation:    Patient and Wife verbalized understanding and were grateful for the update and feed back. They will call their PCP if he has anymore issues with his ears.

## 2017-05-02 ENCOUNTER — OFFICE VISIT (OUTPATIENT)
Dept: AUDIOLOGY | Facility: CLINIC | Age: 62
End: 2017-05-02
Payer: MEDICARE

## 2017-05-02 DIAGNOSIS — H69.93 DYSFUNCTION OF EUSTACHIAN TUBE, BILATERAL: ICD-10-CM

## 2017-05-02 DIAGNOSIS — H93.8X3 PLUGGED FEELING IN EAR, BILATERAL: Primary | ICD-10-CM

## 2017-05-02 DIAGNOSIS — H90.3 SENSORINEURAL HEARING LOSS, BILATERAL: ICD-10-CM

## 2017-05-02 PROCEDURE — 92567 TYMPANOMETRY: CPT | Performed by: AUDIOLOGIST

## 2017-05-02 PROCEDURE — 92557 COMPREHENSIVE HEARING TEST: CPT | Performed by: AUDIOLOGIST

## 2017-05-02 NOTE — PROGRESS NOTES
Audiology Note     SUBJECTIVE:  Hi Smith is a 62 year old male, accompanied by his wife, was seen at [facility] for audiologic evaluation. The patient reports a recent bout RSV with a fairly sudden drop in hearing worse right while hospitalized 2 weeks ago.  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 He had cerumen removed recently which did not help. The patient denies chronic middle ear problems.  The patient notes difficulty with communication in a variety of listening situations and when background noise is present.    OBJECTIVE:    Otoscopic exam indicates ears are clear of cerumen bilaterally    Pure Tone Thresholds assessed using conventional audiometry with good reliability from 250-8000 Hz bilaterally using high frequency circumaural headphones revealed;    RIGHT:  Mild to moderate sensori-neural hearing loss     LEFT:    Moderate to severe sensori-neural hearing loss   Please refer to scanned audiogram(s) for further details.     Speech Reception Threshold:    RIGHT: 50 dB HL    LEFT:   35 dB HL    Word Recognition Score:    RIGHT: 100% at 85 dB HL using NU-6  recorded word list.    LEFT:   100% at 10 dB HL using NU-6 recorded word list.    Tympanogram:     RIGHT: Type B - Flat     LEFT:   Type As - shallow    ASSESSMENT:   Eustachian Tube Dysfunction bilateral   Sensori-neural hearing loss bilateral   Plugged feeling in ears bilateral     Today s results were discussed with the patient in detail and a copy of the audiogram was provided upon request.    PLAN: Patient was counseled regarding hearing loss and impact on communication.  Patient is a good candidate for amplification at this time, with Articulation Index scores of 10% right and 55% left. Handouts on good communication strategies and the process of obtaining amplification was given to  patient. Referred to ENT for consult of sudden hearing loss change and possible Eustachian Tube Dysfunction right prior to Hearing Aid Evaluation.Please call this clinic with questions regarding these results or recommendations.    Laurel Rivera M.S., F-[facility]  Licensed Audiologist, [place] #4458

## 2017-05-02 NOTE — MR AVS SNAPSHOT
After Visit Summary   5/2/2017    Hi Smith    MRN: [id number]           Patient Information     Date Of Birth          1955        Visit Information        Provider Department      5/2/2017 3:00 PM Laurel Rivera [facility]        Today's Diagnoses     Plugged feeling in ear, bilateral    -  1    Sensorineural hearing loss, bilateral        Dysfunction of eustachian tube, bilateral           Follow-ups after your visit        Your next 10 appointments already scheduled     May 18, 2017 10:00 AM CDT   [employer] [facility] Lab Draw with  [employer] LAB DRAW   [facility]    [address] [place] [id number] [phone]            May 18, 2017 10:30 AM CDT   (Arrive by 10:15 AM)   Return Visit with Yuri Hill MD   [facility] ([facility])    [address] [place] [id number] [phone]              Who to contact     If you have questions or need follow up information about today's clinic visit or your schedule please contact [facility] directly at [phone].  Normal or non-critical lab and imaging results will be communicated to you by MyChart, letter or phone within 4 business days after the clinic has received the results. If you do not hear from us within 7 days, please contact the clinic through MyChart or phone. If you have a critical or abnormal lab result, we will notify you by phone as soon as possible.  Submit refill requests through [employer] or call your pharmacy and they will forward the refill request to us. Please allow 3 business days for your refill to be completed.          Additional Information About Your Visit        [employer] Information     [employer] gives you secure access to your electronic health record. If you see a primary care provider, you can also send messages to your care  team and make appointments. If you have questions, please call your primary care clinic.  If you do not have a primary care provider, please call [phone] and they will assist you.        Care EveryWhere ID     This is your Care EveryWhere ID. This could be used by other organizations to access your [place] medical records  [id number]         Blood Pressure from Last 3 Encounters:   04/20/17 115/77   04/18/17 99/58   04/13/17 113/68    Weight from Last 3 Encounters:   04/20/17 184 lb 9.6 oz (83.7 kg)   04/18/17 177 lb 14.4 oz (80.7 kg)   04/13/17 187 lb (84.8 kg)              We Performed the Following     AUDIOGRAM/TYMPANOGRAM - INTERFACE     COMPREHENSIVE HEARING TEST     TYMPANOMETRY        Primary Care Provider Office Phone # Fax #    Glenroy Torres [phone]       [facility] [address] [facility] [address]        Thank you!     Thank you for choosing [facility]  for your care. Our goal is always to provide you with excellent care. Hearing back from our patients is one way we can continue to improve our services. Please take a few minutes to complete the written survey that you may receive in the mail after your visit with us. Thank you!             Your Updated Medication List - Protect others around you: Learn how to safely use, store and throw away your medicines at www.disposemymeds.org.          This list is accurate as of: 5/2/17  6:21 PM.  Always use your most recent med list.                   Brand Name Dispense Instructions for use    albuterol 108 (90 BASE) MCG/ACT Inhaler    PROAIR HFA/PROVENTIL HFA/VENTOLIN HFA    1 Inhaler    Inhale 2 puffs into the lungs every 4 hours as needed for shortness of breath / dyspnea or wheezing       ASPIRIN PO      Take 81 mg by mouth daily       guaiFENesin-codeine 100-10 MG/5ML Soln solution    ROBITUSSIN AC    420 mL    Take 5-10 mLs by mouth every 4 hours as needed for cough       levofloxacin 750  MG tablet    LEVAQUIN    5 tablet    Take 1 tablet (750 mg) by mouth daily       LIPITOR PO      Take 10 mg by mouth daily       mupirocin 2 % cream    BACTROBAN     Apply topically daily as needed Reported on 4/13/2017       PHOSPHA 250 NEUTRAL 250 MG per tablet   Generic drug:  phosphorus tablet 250 mg     270 tablet    TAKE ONE TABLET BY MOUTH TWICE A DAY       ribavirin 400 MG Tabs     40 tablet    Take 800 mg by mouth 2 times daily for 10 days       venlafaxine 37.5 MG 24 hr capsule    EFFEXOR XR    90 capsule    Take 1 capsule (37.5 mg) by mouth daily

## 2017-05-12 ENCOUNTER — OFFICE VISIT (OUTPATIENT)
Dept: OTOLARYNGOLOGY | Facility: CLINIC | Age: 62
End: 2017-05-12
Payer: MEDICARE

## 2017-05-12 ENCOUNTER — TELEPHONE (OUTPATIENT)
Dept: ONCOLOGY | Facility: CLINIC | Age: 62
End: 2017-05-12

## 2017-05-12 VITALS — BODY MASS INDEX: 25.33 KG/M2 | WEIGHT: 187 LBS | HEIGHT: 72 IN | RESPIRATION RATE: 16 BRPM

## 2017-05-12 DIAGNOSIS — H90.3 SNHL (SENSORY-NEURAL HEARING LOSS), ASYMMETRICAL: Primary | ICD-10-CM

## 2017-05-12 DIAGNOSIS — H69.93 DYSFUNCTION OF EUSTACHIAN TUBE, BILATERAL: ICD-10-CM

## 2017-05-12 PROCEDURE — 99203 OFFICE O/P NEW LOW 30 MIN: CPT | Performed by: OTOLARYNGOLOGY

## 2017-05-12 ASSESSMENT — PAIN SCALES - GENERAL: PAINLEVEL: NO PAIN (0)

## 2017-05-12 NOTE — NURSING NOTE
Chief Complaint   Patient presents with     Ear Problem     Fluid     Hearing Problem     Hearing aid clearance       Initial Resp 16  Ht 1.829 m (6')  Wt 84.8 kg (187 lb)  BMI 25.36 kg/m2 Estimated body mass index is 25.36 kg/(m^2) as calculated from the following:    Height as of this encounter: 1.829 m (6').    Weight as of this encounter: 84.8 kg (187 lb).  Medication Reconciliation: complete     Julieta Keane MA

## 2017-05-12 NOTE — TELEPHONE ENCOUNTER
Prior authorization initiated via covermymeds  Hi Smith (Nguyen: [id number])  Submitted: May 12th, 2017    Drug: [employer] 250 Neutral [id number] tablets  Form: MedicareBlue Rx Non-Formulary Form  Prior Authorization for Coverage of Non-Formulary Drugs  [phone] phone  [phone] fax

## 2017-05-12 NOTE — PROGRESS NOTES
Chief Complaint - Hearing loss    History of Present Illness - Hi Smith is a 62 year old male who presents to me today with hearing loss in both ears.  It has been present and noticeable for approximately month. He feels it is worse in one ear, right.  It was sudden in onset.  Plugged ears, loud voice in ears, crackling. He was sick when this happened. There is no history of chronic ear disease or ear surgery.  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 The patient notes some otalgia. 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 Whole body radiation. Also had chemo.     Past Medical History -   Patient Active Problem List   Diagnosis     Extranodal NK/T-cell lymphoma, nasal type (H)     Nausea with vomiting     Natural killer (NK) cell lymphoblastic lymphoma (H)     Thrombocytopenia (H)     Anemia in neoplastic disease     Febrile neutropenia (H)     Immunocompromised (H)     Pancytopenia due to chemotherapy (H)     Bacteremia     NHL (non-Hodgkin's lymphoma) (H)     Lymphoma (H)     DVT of upper extremity (deep vein thrombosis) (H)     Fever     Lymphoma, non-Hodgkin's (H)     Neutropenic fever (H)     Neoplasm of uncertain behavior of skin     T-cell or NK-cell neoplasm (H)     Other specified prophylactic or treatment measure     ACP (advance care planning)     Status post cord blood transplantation     Physical deconditioning     History of peripheral stem cell transplant (H)     Dysuria     Hypogammaglobulinemia (H)     Status post bone marrow transplant (H)     Complications of bone marrow transplant (H)     Nonhealing skin ulcer, limited to breakdown of skin (H)     Ulcer of skin, limited to breakdown of skin (H)     Cellulitis     Hypophosphatemia     CAP (community acquired pneumonia)       Current Medications -   Current Outpatient Prescriptions:      guaiFENesin-codeine (ROBITUSSIN AC) 100-10 MG/5ML  SOLN solution, Take 5-10 mLs by mouth every 4 hours as needed for cough, Disp: 420 mL, Rfl: 0     levofloxacin (LEVAQUIN) 750 MG tablet, Take 1 tablet (750 mg) by mouth daily, Disp: 5 tablet, Rfl: 0     albuterol (PROAIR HFA/PROVENTIL HFA/VENTOLIN HFA) 108 (90 BASE) MCG/ACT Inhaler, Inhale 2 puffs into the lungs every 4 hours as needed for shortness of breath / dyspnea or wheezing, Disp: 1 Inhaler, Rfl: 0     venlafaxine (EFFEXOR XR) 37.5 MG 24 hr capsule, Take 1 capsule (37.5 mg) by mouth daily, Disp: 90 capsule, Rfl: 3     mupirocin (BACTROBAN) 2 % cream, Apply topically daily as needed Reported on 4/13/2017, Disp: , Rfl:      PHOSPHA 250 NEUTRAL 155-852-130 MG tablet, TAKE ONE TABLET BY MOUTH TWICE A DAY, Disp: 270 tablet, Rfl: 3     ASPIRIN PO, Take 81 mg by mouth daily, Disp: , Rfl:      Atorvastatin Calcium (LIPITOR PO), Take 10 mg by mouth daily , Disp: , Rfl:      ribavirin 400 MG TABS, Take 800 mg by mouth 2 times daily for 10 days, Disp: 40 tablet, Rfl: 0    Allergies -   Allergies   Allergen Reactions     Nka [No Known Allergies]        Social History -   Social History     Social History     Marital status:      Spouse name: N/A     Number of children: N/A     Years of education: N/A     Social History Main Topics     Smoking status: Former Smoker     Packs/day: 1.00     Years: 20.00     Types: Cigarettes     Quit date: 2/16/1994     Smokeless tobacco: Never Used     Alcohol use No     Drug use: No     Sexual activity: Not Asked     Other Topics Concern     None     Social History Narrative       Family History -   Family History   Problem Relation Age of Onset     CANCER Mother      ovarian, esophageal     C.A.D. Sister      CANCER Father      prostate cancer, lung fibrosis     HEART DISEASE Father      Heart Disease     Hypertension Father      Melanoma No family hx of      Skin Cancer No family hx of        Review of Systems - As per HPI and PMHx, otherwise 7 system review of the head and neck  negative.    Physical Exam  Resp 16  Ht 1.829 m (6')  Wt 84.8 kg (187 lb)  BMI 25.36 kg/m2d  General - The patient is in no distress.  Alert and oriented to person and place, answers questions and cooperates with examination appropriately.   Voice and Breathing - The patient was breathing comfortably without the use of accessory muscles. There was no wheezing, stridor, or stertor.  The patients voice was clear and strong.  Ears - The auricles are normal. The tympanic membranes are normal in appearance, bony landmarks are intact. He can valsalva and move the tympanic membranes.  Maybe some retraction, but mild and no perforation, or masses.  No fluid or purulence was seen in the external canal or the middle ear. No evidence of infection of the middle ear or external canal, cerumen was normal in appearance.  Eyes - Extraocular movements intact, and the pupils were reactive to light.  Sclera were not icteric or injected.  Mouth - Examination of the oral cavity showed pink, healthy mucosa. No lesions or ulcerations noted.  The tongue was mobile and midline.  Throat - The walls of the oropharynx were smooth, symmetric, and had no lesions or ulcerations. The uvula was midline on elevation.    Neck - Palpation of the occipital, submental, submandibular, internal jugular chain, and supraclavicular nodes did not demonstrate any abnormal lymph nodes or masses. Parotid glands had no masses. Palpation of the thyroid was soft and smooth, with no nodules or goiter appreciated.  The trachea was mobile and midline.  Neurological - Cranial nerves 2 through 12 were grossly intact. House-Brackmann grade 1 out of 6 bilaterally.   Nose - no lesions, septum midline.    Audiologic Studies - An audiogram and tympanogram were performed 5/2/2017 as part of the evaluation and personally reviewed. The tympanogram shows a Type As right and left. Mild negative pressure left. The audiogram showed a significant down-sloping high frequency  sensorineural hearing loss.  There was no evidence of conductive hearing loss or significant asymmetry.      Assessment and Plan - Hi Smith is a 62 year old male who presents to me today with hearing loss.  This is most consistent with sensorineural hearing loss. He has some eustachian tube dysfunction following RSV and pneumonia, but it is resolving. He can try decongestants and valsalva. He has lots of noise exposure. MRI from 2014 didn't show any retrocochlear pathology. We consider this, or audiogram in 6-12 months. He can consider hearing aids as well.     Maximiliano Bowers MD  Otolaryngology  [facility]

## 2017-05-12 NOTE — PATIENT INSTRUCTIONS
General Scheduling Information  To schedule your CT/MRI scan, please contact [place] [facility] at [phone]   [address] [place] [address]    To schedule your Surgery, please contact our Specialty Schedulers at [phone]    ENT Clinic Locations Clinic Hours Telephone Number     [facility]  [address] [place] [address]   Tuesday:       8:00am -- 4:00pm    Wednesday:  8:00am - 4:00pm   To schedule an appointment with   Dr. Bowers,   please contact our   Specialty Scheduling Department at:     [phone]       [facility]as  [address] [place] [address]   Friday:          8:00am - 4:00pm         Urgent Care Locations Clinic Hours Telephone Numbers     [facility]sorio  [address] [place] [address]     Monday-Friday:     11:00pm - 9:00pm    Saturday-Sunday:  9:00am - 5:00pm   [phone]     [facility]ertas  [address] [place] [address]     Monday-Friday:      5:00pm - 9:00pm     Saturday-Sunday:  9:00am - 5:00pm   [phone]

## 2017-05-12 NOTE — MR AVS SNAPSHOT
After Visit Summary   5/12/2017    Hi Smith    MRN: [id number]           Patient Information     Date Of Birth          1955        Visit Information        Provider Department      5/12/2017 11:30 AM Maximiliano Bowers MD [facility]        Today's Diagnoses     SNHL (sensory-neural hearing loss), asymmetrical    -  1    Dysfunction of eustachian tube, bilateral          Care Instructions    General Scheduling Information  To schedule your CT/MRI scan, please contact Donnie Zhong at [phone] [address]  [place] [address]    To schedule your Surgery, please contact our Specialty Schedulers at [phone]    ENT Clinic Locations Clinic Hours Telephone Number     [facility]  [address]  [place] [address]   Tuesday:       8:00am -- 4:00pm    Wednesday:  8:00am - 4:00pm   To schedule an appointment with   Dr. Bowers,   please contact our   Specialty Scheduling Department at:     [phone]       [facility]  [address] [place] [address]   Friday:          8:00am - 4:00pm         Urgent Care Locations Clinic Hours Telephone Numbers     [facility]  [address] [address]     Monday-Friday:     11:00pm - 9:00pm    Saturday-Sunday:  9:00am - 5:00pm   [phone]     [facility]  [address] [place] [address]     Monday-Friday:      5:00pm - 9:00pm     Saturday-Sunday:  9:00am - 5:00pm   [phone]             Follow-ups after your visit        Your next 10 appointments already scheduled     May 18, 2017 10:00 AM CDT   [facility] Lab Draw with  [facility] LAB DRAW   [facility] Lab Draw ([facility])    [address] [place] [address] [phone]            May 18, 2017 10:30 AM CDT   (Arrive by 10:15 AM)   Return Visit with Yuri Hill MD   [facility] ([facility] [place])    [facility]  [address] [facility] [address] [phone]              Who to contact     If you have questions or need follow up information about today's clinic visit or your schedule please contact [facility] directly at [phone].  Normal or non-critical lab and imaging results will be communicated to you by MyChart, letter or phone within 4 business days after the clinic has received the results. If you do not hear from us within 7 days, please contact the clinic through MyChart or phone. If you have a critical or abnormal lab result, we will notify you by phone as soon as possible.  Submit refill requests through [employer] or call your pharmacy and they will forward the refill request to us. Please allow 3 business days for your refill to be completed.          Additional Information About Your Visit        MyChart Information     [employer] gives you secure access to your electronic health record. If you see a primary care provider, you can also send messages to your care team and make appointments. If you have questions, please call your primary care clinic.  If you do not have a primary care provider, please call [phone] and they will assist you.        Care EveryWhere ID     This is your Care EveryWhere ID. This could be used by other organizations to access your [facility] medical records  [id number]        Your Vitals Were     Respirations Height BMI (Body Mass Index)             16 1.829 m (6') 25.36 kg/m2          Blood Pressure from Last 3 Encounters:   04/20/17 115/77   04/18/17 99/58   04/13/17 113/68    Weight from Last 3 Encounters:   05/12/17 84.8 kg (187 lb)   04/20/17 83.7 kg (184 lb 9.6 oz)   04/18/17 80.7 kg (177 lb 14.4 oz)              Today, you had the following     No orders found for display       Primary Care Provider Office Phone # Fax #    Glenroy Torres [phone]       [facility] [address] [facility] [address]         Thank you!     Thank you for choosing [facility]  for your care. Our goal is always to provide you with excellent care. Hearing back from our patients is one way we can continue to improve our services. Please take a few minutes to complete the written survey that you may receive in the mail after your visit with us. Thank you!             Your Updated Medication List - Protect others around you: Learn how to safely use, store and throw away your medicines at www.disposemymeds.org.          This list is accurate as of: 5/12/17  4:58 PM.  Always use your most recent med list.                   Brand Name Dispense Instructions for use    albuterol 108 (90 BASE) MCG/ACT Inhaler    PROAIR HFA/PROVENTIL HFA/VENTOLIN HFA    1 Inhaler    Inhale 2 puffs into the lungs every 4 hours as needed for shortness of breath / dyspnea or wheezing       ASPIRIN PO      Take 81 mg by mouth daily       guaiFENesin-codeine 100-10 MG/5ML Soln solution    ROBITUSSIN AC    420 mL    Take 5-10 mLs by mouth every 4 hours as needed for cough       levofloxacin 750 MG tablet    LEVAQUIN    5 tablet    Take 1 tablet (750 mg) by mouth daily       LIPITOR PO      Take 10 mg by mouth daily       mupirocin 2 % cream    BACTROBAN     Apply topically daily as needed Reported on 4/13/2017       PHOSPHA 250 NEUTRAL 250 MG per tablet   Generic drug:  phosphorus tablet 250 mg     270 tablet    TAKE ONE TABLET BY MOUTH TWICE A DAY       ribavirin 400 MG Tabs     40 tablet    Take 800 mg by mouth 2 times daily for 10 days       venlafaxine 37.5 MG 24 hr capsule    EFFEXOR XR    90 capsule    Take 1 capsule (37.5 mg) by mouth daily

## 2017-05-17 ASSESSMENT — ENCOUNTER SYMPTOMS
NECK MASS: 0
SMELL DISTURBANCE: 0
HOARSE VOICE: 0
TASTE DISTURBANCE: 0
SINUS PAIN: 0
SINUS CONGESTION: 0
SORE THROAT: 0
TROUBLE SWALLOWING: 0

## 2017-05-18 ENCOUNTER — ONCOLOGY VISIT (OUTPATIENT)
Dept: ONCOLOGY | Facility: CLINIC | Age: 62
End: 2017-05-18
Attending: INTERNAL MEDICINE
Payer: MEDICARE

## 2017-05-18 ENCOUNTER — APPOINTMENT (OUTPATIENT)
Dept: LAB | Facility: CLINIC | Age: 62
End: 2017-05-18
Attending: INTERNAL MEDICINE
Payer: MEDICARE

## 2017-05-18 DIAGNOSIS — C86.00 EXTRANODAL NK/T-CELL LYMPHOMA, NASAL TYPE: ICD-10-CM

## 2017-05-18 LAB
ALBUMIN SERPL-MCNC: 3.5 G/DL (ref 3.4–5)
ALP SERPL-CCNC: 107 U/L (ref 40–150)
ALT SERPL W P-5'-P-CCNC: 18 U/L (ref 0–70)
ANION GAP SERPL CALCULATED.3IONS-SCNC: 11 MMOL/L (ref 3–14)
ANISOCYTOSIS BLD QL SMEAR: SLIGHT
AST SERPL W P-5'-P-CCNC: 20 U/L (ref 0–45)
BASOPHILS # BLD AUTO: 0 10E9/L (ref 0–0.2)
BASOPHILS NFR BLD AUTO: 0 %
BILIRUB SERPL-MCNC: 0.4 MG/DL (ref 0.2–1.3)
BUN SERPL-MCNC: 16 MG/DL (ref 7–30)
CALCIUM SERPL-MCNC: 8.5 MG/DL (ref 8.5–10.1)
CHLORIDE SERPL-SCNC: 108 MMOL/L (ref 94–109)
CO2 SERPL-SCNC: 23 MMOL/L (ref 20–32)
CREAT SERPL-MCNC: 0.85 MG/DL (ref 0.66–1.25)
DIFFERENTIAL METHOD BLD: NORMAL
EOSINOPHIL # BLD AUTO: 0.2 10E9/L (ref 0–0.7)
EOSINOPHIL NFR BLD AUTO: 1.8 %
ERYTHROCYTE [DISTWIDTH] IN BLOOD BY AUTOMATED COUNT: 14.5 % (ref 10–15)
GFR SERPL CREATININE-BSD FRML MDRD: ABNORMAL ML/MIN/1.7M2
GLUCOSE SERPL-MCNC: 108 MG/DL (ref 70–99)
HCT VFR BLD AUTO: 42.2 % (ref 40–53)
HGB BLD-MCNC: 13.3 G/DL (ref 13.3–17.7)
LYMPHOCYTES # BLD AUTO: 3.9 10E9/L (ref 0.8–5.3)
LYMPHOCYTES NFR BLD AUTO: 38.4 %
MCH RBC QN AUTO: 27.9 PG (ref 26.5–33)
MCHC RBC AUTO-ENTMCNC: 31.5 G/DL (ref 31.5–36.5)
MCV RBC AUTO: 89 FL (ref 78–100)
MICROCYTES BLD QL SMEAR: PRESENT
MONOCYTES # BLD AUTO: 1.1 10E9/L (ref 0–1.3)
MONOCYTES NFR BLD AUTO: 10.7 %
NEUTROPHILS # BLD AUTO: 5 10E9/L (ref 1.6–8.3)
NEUTROPHILS NFR BLD AUTO: 49.1 %
PLATELET # BLD AUTO: 230 10E9/L (ref 150–450)
POIKILOCYTOSIS BLD QL SMEAR: SLIGHT
POTASSIUM SERPL-SCNC: 3.4 MMOL/L (ref 3.4–5.3)
PROT SERPL-MCNC: 6.4 G/DL (ref 6.8–8.8)
RBC # BLD AUTO: 4.76 10E12/L (ref 4.4–5.9)
SODIUM SERPL-SCNC: 142 MMOL/L (ref 133–144)
WBC # BLD AUTO: 10.2 10E9/L (ref 4–11)

## 2017-05-18 PROCEDURE — 80053 COMPREHEN METABOLIC PANEL: CPT | Performed by: INTERNAL MEDICINE

## 2017-05-18 PROCEDURE — 99212 OFFICE O/P EST SF 10 MIN: CPT | Mod: ZF

## 2017-05-18 PROCEDURE — 99213 OFFICE O/P EST LOW 20 MIN: CPT | Mod: ZP | Performed by: INTERNAL MEDICINE

## 2017-05-18 PROCEDURE — 85025 COMPLETE CBC W/AUTO DIFF WBC: CPT | Performed by: INTERNAL MEDICINE

## 2017-05-18 NOTE — LETTER
5/18/2017      RE: Hi Smith  [address] [address] [facility] [address]       ONCOLOGY OUTPATIENT NOTE      HISTORY OF PRESENT ILLNESS: Hi Smith, 62 years of age, has a history of extranodal NK/T-cell lymphoma (nasal type) that has been localized to multiple cutaneous/subcutaneous sites.  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  By 12/2015, he had evidence for recurrence on the right lower extremity (biopsy proven).  Recurrence appeared to be localized to the right ankle and possibly, although undocumented, on the right wrist.  He was given involved field radiation to these areas.  Subsequently, he has had multiple subcutaneous nodules that arose and often resolved spontaneously.  Others show inflammation and cutaneous crusting with erythema and subsequent regression. It was thought he was having a graft vs lymphoma effect.      He continued to have new lesions, predominantly on his lower legs, that appear and resolve. Those on his legs sometimes superficially ulcerate and crust over. These have resolved with the care provide by Dr. Eaton with respect to wound control. Over time, the frequency of appearance of new lesions lessened.     INTERIM HISTORY: Mr. Smith was last in my clinic on 03/23/2017.  At present, he is feeling quite well, but has had an interval problem with a pneumonia requiring brief hospitalization on 04/17/2017.  He was felt to have an RSV bronchiolitis and suspected superimposed bacterial pneumonia.  The patient was initially treated with IV Levaquin, and discharged home on a 10-day course of p.o. Levaquin, albuterol for wheezing, and ribavirin for the RSV.  He made a good recovery, and was seen back in our clinic by Rebecca  Carl on 04/20/2017, at which time his progress was documented.      Currently, Mr. Smith is feeling and doing quite well.  He notes that it has now been 4 months since he has had any new lesions, and has no persistent lesions.  At the time of his last visit, there was an area on the dorsum of his right forearm that he was concerned about.  It was flat area of subcutaneous thickening measuring approximately 4 x 3 cm.  An ultrasound of the area suggested old clot.  He used warm packs and the area has continued to resolve.      In general, no current fevers, night sweats or weight loss.  His strength and energy are improving.  No new concerns, other than that of a pigmented lesion on the occiput behind the right ear.  This appeared at some point after the transplant.  The patient feels that it is becoming more prominent.  It does not locally cause symptoms.      REVIEW OF SYSTEMS:  A 10-point review of systems is otherwise negative.      MEDICATIONS:  Amlodipine, venlafaxine, Neutra-Phos, aspirin and atorvastatin.      ALLERGIES:  None reported.      PHYSICAL EXAMINATION:   VITAL SIGNS:  Weight 84.8 kg (stable), blood pressure not recorded.   HEENT:  Anicteric.  No conjunctival injection or lesions.  Pupils are equal and reactive.  EOMs intact.  Oropharynx with no masses.  Mucosa moist, no plaque or ulceration.   NECK:  No cervical or supraclavicular adenopathy.   CHEST:  Clear to auscultation.  A few crackles at the left base that improve with deep inspiration.  No dullness to percussion.   AXILLAE:  No nodes.   HEART:  Regular rhythm.  No murmur or gallop.   ABDOMEN:  Soft and nontender.  Bowel sounds present.  The liver is at the right costal margin.  The spleen is not palpable or percussible.  No inguinal nodes.   EXTREMITIES:  Trace lower extremity edema.   SKIN:  No subcutaneous nodules.  The flattened area on the dorsum of the right forearm has become thinner and regressed in overall size.  No tenderness.   Areas of hypopigmentation on the lower extremities where he had ulcerated lesions.  No lesions palpable or otherwise identified over the trunk, face, upper or lower extremities. An approximately 7 mm circular pigmented lesion behind the right ear that is slightly elevated with slightly irregular margins.     LABORATORY DATA:      Hemoglobin 13.3 g % with 10,200 WBCs (49% neutrophils, 38% lymphocytes) and 230,000 platelets.    Electrolytes, calcium, creatinine (0.85) normal.   Liver enzymes are normal.      ASSESSMENT AND PLAN:  NK/T-cell lymphoma (nasal type).  The patient has had no newsubcutaneous lesions identified over the course of the past 4 months that are related to his lymphoma.  All the old lesions have resolved.  Those that were also identified on his lower extremities have left flat areas of hyperpigmentation.  No evidence of disease by clinical examination.  Laboratory studies are also normal.  The patient is considered to be in remission, although he understands that this does not necessarily mean a cure. Will consider appropriate re-imaging in future after next visit.    Pigmented lesion - plans to see his dermatologist, Dr. Vick.     I am pleased with how Mr. Smith is doing.  He will return in approximately 3 months with laboratory studies.  Among the things that we will reassess are his HHV-6 status, as well as his immunoglobulins.  He may now be a candidate for repeat immunizations.     Yuri Hill MD  Professor of Medicine  Oncology  [facility]  Office: [phone]  Clinic Fax: [phone]       cc:      MD Ruth Prince MD Daniel Mueller, MD Bailey Lee, MD Roger Weenig, MD Nathan Norquist, MD Marie Claire Buckley, MD Bruce A. Peterson, MD

## 2017-05-18 NOTE — PROGRESS NOTES
ONCOLOGY OUTPATIENT NOTE      HISTORY OF PRESENT ILLNESS: Hi Smith, 62 years of age, has a history of extranodal NK/T-cell lymphoma (nasal type) that has been localized to multiple cutaneous/subcutaneous sites.  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  By 12/2015, he had evidence for recurrence on the right lower extremity (biopsy proven).  Recurrence appeared to be localized to the right ankle and possibly, although undocumented, on the right wrist.  He was given involved field radiation to these areas.  Subsequently, he has had multiple subcutaneous nodules that arose and often resolved spontaneously.  Others show inflammation and cutaneous crusting with erythema and subsequent regression. It was thought he was having a graft vs lymphoma effect.      He continued to have new lesions, predominantly on his lower legs, that appear and resolve. Those on his legs sometimes superficially ulcerate and crust over. These have resolved with the care provide by Dr. Eaton with respect to wound control. Over time, the frequency of appearance of new lesions lessened.     INTERIM HISTORY: Mr. Smith was last in my clinic on 03/23/2017.  At present, he is feeling quite well, but has had an interval problem with a pneumonia requiring brief hospitalization on 04/17/2017.  He was felt to have an RSV bronchiolitis and suspected superimposed bacterial pneumonia.  The patient was initially treated with IV Levaquin, and discharged home on a 10-day course of p.o. Levaquin, albuterol for wheezing, and ribavirin for the RSV.  He made a good recovery, and was seen back in our clinic by Rebecca Henry on 04/20/2017, at which time his progress was documented.      Currently,  Mr. Smith is feeling and doing quite well.  He notes that it has now been 4 months since he has had any new lesions, and has no persistent lesions.  At the time of his last visit, there was an area on the dorsum of his right forearm that he was concerned about.  It was flat area of subcutaneous thickening measuring approximately 4 x 3 cm.  An ultrasound of the area suggested old clot.  He used warm packs and the area has continued to resolve.      In general, no current fevers, night sweats or weight loss.  His strength and energy are improving.  No new concerns, other than that of a pigmented lesion on the occiput behind the right ear.  This appeared at some point after the transplant.  The patient feels that it is becoming more prominent.  It does not locally cause symptoms.      REVIEW OF SYSTEMS:  A 10-point review of systems is otherwise negative.      MEDICATIONS:  Amlodipine, venlafaxine, Neutra-Phos, aspirin and atorvastatin.      ALLERGIES:  None reported.      PHYSICAL EXAMINATION:   VITAL SIGNS:  Weight 84.8 kg (stable), blood pressure not recorded.   HEENT:  Anicteric.  No conjunctival injection or lesions.  Pupils are equal and reactive.  EOMs intact.  Oropharynx with no masses.  Mucosa moist, no plaque or ulceration.   NECK:  No cervical or supraclavicular adenopathy.   CHEST:  Clear to auscultation.  A few crackles at the left base that improve with deep inspiration.  No dullness to percussion.   AXILLAE:  No nodes.   HEART:  Regular rhythm.  No murmur or gallop.   ABDOMEN:  Soft and nontender.  Bowel sounds present.  The liver is at the right costal margin.  The spleen is not palpable or percussible.  No inguinal nodes.   EXTREMITIES:  Trace lower extremity edema.   SKIN:  No subcutaneous nodules.  The flattened area on the dorsum of the right forearm has become thinner and regressed in overall size.  No tenderness.  Areas of hypopigmentation on the lower extremities where he had ulcerated lesions.   No lesions palpable or otherwise identified over the trunk, face, upper or lower extremities. An approximately 7 mm circular pigmented lesion behind the right ear that is slightly elevated with slightly irregular margins.     LABORATORY DATA:      Hemoglobin 13.3 g % with 10,200 WBCs (49% neutrophils, 38% lymphocytes) and 230,000 platelets.    Electrolytes, calcium, creatinine (0.85) normal.   Liver enzymes are normal.      ASSESSMENT AND PLAN:  NK/T-cell lymphoma (nasal type).  The patient has had no newsubcutaneous lesions identified over the course of the past 4 months that are related to his lymphoma.  All the old lesions have resolved.  Those that were also identified on his lower extremities have left flat areas of hyperpigmentation.  No evidence of disease by clinical examination.  Laboratory studies are also normal.  The patient is considered to be in remission, although he understands that this does not necessarily mean a cure. Will consider appropriate re-imaging in future after next visit.    Pigmented lesion - plans to see his dermatologist, Dr. Vick.     I am pleased with how Mr. Smith is doing.  He will return in approximately 3 months with laboratory studies.  Among the things that we will reassess are his HHV-6 status, as well as his immunoglobulins.  He may now be a candidate for repeat immunizations.     Yuri Hill MD  Professor of Medicine  Oncology  [facility]  Office: [phone]  Clinic Fax: [phone]       cc:      MD Ruth Prince MD Daniel Mueller, MD Bailey Lee, MD Roger Weenig, MD Nathan Norquist, MD Marie Claire Buckley, MD

## 2017-05-18 NOTE — MR AVS SNAPSHOT
After Visit Summary   5/18/2017    Hi Smith    MRN: [id number]           Patient Information     Date Of Birth          1955        Visit Information        Provider Department      5/18/2017 10:30 AM Yuri Hill MD [facility]        Today's Diagnoses     Extranodal NK/T-cell lymphoma, nasal type (H)           Follow-ups after your visit        Follow-up notes from your care team     Return in about 3 months (around 8/18/2017) for Physical Exam, Lab Work.      Your next 10 appointments already scheduled     Aug 16, 2017 10:00 AM CDT   [facility] Lab Draw with  [facility] LAB DRAW   [facility] Lab Draw ([facility])    [address] [place] [address] [phone]            Aug 16, 2017 10:30 AM CDT   (Arrive by 10:15 AM)   Return Visit with Yuri Hill MD   [facility] ([facility])    [address] [place] [address] [phone]              Who to contact     If you have questions or need follow up information about today's clinic visit or your schedule please contact [facility] directly at [phone].  Normal or non-critical lab and imaging results will be communicated to you by MyChart, letter or phone within 4 business days after the clinic has received the results. If you do not hear from us within 7 days, please contact the clinic through [employer] or phone. If you have a critical or abnormal lab result, we will notify you by phone as soon as possible.  Submit refill requests through [employer] or call your pharmacy and they will forward the refill request to us. Please allow 3 business days for your refill to be completed.          Additional Information About Your Visit        MyChart Information     [employer] gives you secure access to your electronic health record. If you see a primary care provider, you can  also send messages to your care team and make appointments. If you have questions, please call your primary care clinic.  If you do not have a primary care provider, please call [phone] and they will assist you.        Care EveryWhere ID     This is your Care EveryWhere ID. This could be used by other organizations to access your [facility] medical records  [id number]         Blood Pressure from Last 3 Encounters:   04/20/17 115/77   04/18/17 99/58   04/13/17 113/68    Weight from Last 3 Encounters:   05/12/17 84.8 kg (187 lb)   04/20/17 83.7 kg (184 lb 9.6 oz)   04/18/17 80.7 kg (177 lb 14.4 oz)              We Performed the Following     *CBC with platelets differential     Comprehensive metabolic panel          Today's Medication Changes          These changes are accurate as of: 5/18/17 11:59 PM.  If you have any questions, ask your nurse or doctor.               Stop taking these medicines if you haven't already. Please contact your care team if you have questions.     albuterol 108 (90 BASE) MCG/ACT Inhaler   Commonly known as:  PROAIR HFA/PROVENTIL HFA/VENTOLIN HFA   Stopped by:  Yuri Hill MD           guaiFENesin-codeine 100-10 MG/5ML Soln solution   Commonly known as:  ROBITUSSIN AC   Stopped by:  Yuri Hill MD           levofloxacin 750 MG tablet   Commonly known as:  LEVAQUIN   Stopped by:  Yuri Hill MD           mupirocin 2 % cream   Commonly known as:  BACTROBAN   Stopped by:  Yuri Hill MD           ribavirin 400 MG Tabs   Stopped by:  Yuri Hill MD                    Primary Care Provider Office Phone # Fax #    Glenroy Torres [phone]       [facility] [address] [place] [address]        Thank you!     Thank you for choosing [facility]  for your care. Our goal is always to provide you with excellent care. Hearing back from our patients is one way we can continue to improve our services. Please  take a few minutes to complete the written survey that you may receive in the mail after your visit with us. Thank you!             Your Updated Medication List - Protect others around you: Learn how to safely use, store and throw away your medicines at www.disposemymeds.org.          This list is accurate as of: 5/18/17 11:59 PM.  Always use your most recent med list.                   Brand Name Dispense Instructions for use    AMLODIPINE BESYLATE PO      Take by mouth daily       ASPIRIN PO      Take 81 mg by mouth daily       LIPITOR PO      Take 10 mg by mouth daily       PHOSPHA 250 NEUTRAL 250 MG per tablet   Generic drug:  phosphorus tablet 250 mg     270 tablet    TAKE ONE TABLET BY MOUTH TWICE A DAY       venlafaxine 37.5 MG 24 hr capsule    EFFEXOR XR    90 capsule    Take 1 capsule (37.5 mg) by mouth daily

## 2017-05-19 NOTE — TELEPHONE ENCOUNTER
Spoke with Pharmacist at [facility], whom stated patient ended up using a coupon/discount card to obtain medication.

## 2017-06-22 ENCOUNTER — CARE COORDINATION (OUTPATIENT)
Dept: ONCOLOGY | Facility: CLINIC | Age: 62
End: 2017-06-22

## 2017-06-22 NOTE — PROGRESS NOTES
Mrs. Smith called in this AM to report that this past weekend Colton had a deer tick but that they were able to remove it correctly and has not had any signs of the target rash or any other symptoms.   She also noted that he has had a rash in the antecubital area of both arms and behind both of his knees which started this past weekend. She states that it has looked dry and mildly red. Itching has been very minimal it is not warm to touch. They have used some hydrocortisone cream to the area which has left. The worst spot is behind the right knee. However overall the rashes are improving and getting better. Per Dr. Hill they should continue to monitor this and make sure they are keeping the area's dry. In regards to the tick bite, they should monitor this as well and call immediately if a rash of any kind develops. Dr. Hill would like to have them follow up in a couple weeks with some labs. Patient and wife verbalized understanding of plan and will call if there are any questions.

## 2017-08-16 ENCOUNTER — APPOINTMENT (OUTPATIENT)
Dept: LAB | Facility: CLINIC | Age: 62
End: 2017-08-16
Attending: INTERNAL MEDICINE
Payer: MEDICARE

## 2017-08-16 ENCOUNTER — ONCOLOGY VISIT (OUTPATIENT)
Dept: ONCOLOGY | Facility: CLINIC | Age: 62
End: 2017-08-16
Attending: INTERNAL MEDICINE
Payer: MEDICARE

## 2017-08-16 VITALS
TEMPERATURE: 98 F | OXYGEN SATURATION: 97 % | WEIGHT: 194.2 LBS | BODY MASS INDEX: 26.3 KG/M2 | HEIGHT: 72 IN | HEART RATE: 74 BPM | DIASTOLIC BLOOD PRESSURE: 76 MMHG | RESPIRATION RATE: 16 BRPM | SYSTOLIC BLOOD PRESSURE: 130 MMHG

## 2017-08-16 DIAGNOSIS — D80.1 HYPOGAMMAGLOBULINEMIA (H): Primary | ICD-10-CM

## 2017-08-16 DIAGNOSIS — C86.00 EXTRANODAL NK/T-CELL LYMPHOMA, NASAL TYPE: ICD-10-CM

## 2017-08-16 LAB
ALBUMIN SERPL-MCNC: 3.6 G/DL (ref 3.4–5)
ALP SERPL-CCNC: 97 U/L (ref 40–150)
ALT SERPL W P-5'-P-CCNC: 26 U/L (ref 0–70)
ANION GAP SERPL CALCULATED.3IONS-SCNC: 9 MMOL/L (ref 3–14)
AST SERPL W P-5'-P-CCNC: 21 U/L (ref 0–45)
BASOPHILS # BLD AUTO: 0 10E9/L (ref 0–0.2)
BASOPHILS NFR BLD AUTO: 0.3 %
BILIRUB SERPL-MCNC: 0.4 MG/DL (ref 0.2–1.3)
BUN SERPL-MCNC: 17 MG/DL (ref 7–30)
CALCIUM SERPL-MCNC: 8.6 MG/DL (ref 8.5–10.1)
CHLORIDE SERPL-SCNC: 107 MMOL/L (ref 94–109)
CO2 SERPL-SCNC: 23 MMOL/L (ref 20–32)
CREAT SERPL-MCNC: 1.06 MG/DL (ref 0.66–1.25)
DIFFERENTIAL METHOD BLD: NORMAL
EOSINOPHIL # BLD AUTO: 0.1 10E9/L (ref 0–0.7)
EOSINOPHIL NFR BLD AUTO: 1.4 %
ERYTHROCYTE [DISTWIDTH] IN BLOOD BY AUTOMATED COUNT: 13.4 % (ref 10–15)
GFR SERPL CREATININE-BSD FRML MDRD: 70 ML/MIN/1.7M2
GLUCOSE SERPL-MCNC: 88 MG/DL (ref 70–99)
HCT VFR BLD AUTO: 43.6 % (ref 40–53)
HGB BLD-MCNC: 14 G/DL (ref 13.3–17.7)
IGA SERPL-MCNC: 41 MG/DL (ref 70–380)
IGG SERPL-MCNC: 631 MG/DL (ref 695–1620)
IGM SERPL-MCNC: 38 MG/DL (ref 60–265)
IMM GRANULOCYTES # BLD: 0 10E9/L (ref 0–0.4)
IMM GRANULOCYTES NFR BLD: 0.1 %
LDH SERPL L TO P-CCNC: 163 U/L (ref 85–227)
LYMPHOCYTES # BLD AUTO: 4.1 10E9/L (ref 0.8–5.3)
LYMPHOCYTES NFR BLD AUTO: 41.6 %
MCH RBC QN AUTO: 28.1 PG (ref 26.5–33)
MCHC RBC AUTO-ENTMCNC: 32.1 G/DL (ref 31.5–36.5)
MCV RBC AUTO: 87 FL (ref 78–100)
MONOCYTES # BLD AUTO: 1.2 10E9/L (ref 0–1.3)
MONOCYTES NFR BLD AUTO: 11.7 %
NEUTROPHILS # BLD AUTO: 4.4 10E9/L (ref 1.6–8.3)
NEUTROPHILS NFR BLD AUTO: 44.9 %
NRBC # BLD AUTO: 0 10*3/UL
NRBC BLD AUTO-RTO: 0 /100
PLATELET # BLD AUTO: 205 10E9/L (ref 150–450)
POTASSIUM SERPL-SCNC: 3.7 MMOL/L (ref 3.4–5.3)
PROT SERPL-MCNC: 6.5 G/DL (ref 6.8–8.8)
RBC # BLD AUTO: 4.99 10E12/L (ref 4.4–5.9)
SODIUM SERPL-SCNC: 139 MMOL/L (ref 133–144)
WBC # BLD AUTO: 9.8 10E9/L (ref 4–11)

## 2017-08-16 PROCEDURE — 00000402 ZZHCL STATISTIC TOTAL PROTEIN: Performed by: INTERNAL MEDICINE

## 2017-08-16 PROCEDURE — 82784 ASSAY IGA/IGD/IGG/IGM EACH: CPT | Performed by: INTERNAL MEDICINE

## 2017-08-16 PROCEDURE — 85025 COMPLETE CBC W/AUTO DIFF WBC: CPT | Performed by: INTERNAL MEDICINE

## 2017-08-16 PROCEDURE — 80053 COMPREHEN METABOLIC PANEL: CPT | Performed by: INTERNAL MEDICINE

## 2017-08-16 PROCEDURE — 99212 OFFICE O/P EST SF 10 MIN: CPT | Mod: ZF

## 2017-08-16 PROCEDURE — 36415 COLL VENOUS BLD VENIPUNCTURE: CPT

## 2017-08-16 PROCEDURE — 83615 LACTATE (LD) (LDH) ENZYME: CPT | Performed by: INTERNAL MEDICINE

## 2017-08-16 PROCEDURE — 99213 OFFICE O/P EST LOW 20 MIN: CPT

## 2017-08-16 PROCEDURE — 99213 OFFICE O/P EST LOW 20 MIN: CPT | Mod: ZP | Performed by: INTERNAL MEDICINE

## 2017-08-16 PROCEDURE — 84165 PROTEIN E-PHORESIS SERUM: CPT | Performed by: INTERNAL MEDICINE

## 2017-08-16 RX ORDER — NITROGLYCERIN 0.4 MG/1
0.4 TABLET SUBLINGUAL
COMMUNITY
Start: 2015-05-05 | End: 2019-06-26

## 2017-08-16 ASSESSMENT — PAIN SCALES - GENERAL: PAINLEVEL: NO PAIN (0)

## 2017-08-16 NOTE — PROGRESS NOTES
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  He received chemotherapy, initially with SMILE (3 cycles) with inability to control the disease, followed by ICE for 2 cycles in 01/2015 and then autologous stem cell transplant (03/04/2015) with BEAM conditioning. 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  By 12/2015, he again had evidence for recurrence on the right lower extremity (biopsy proven).  Recurrence appeared to be localized to the right ankle and possibly, although undocumented, on the right wrist.  He was given involved field radiation to these areas.  Subsequently, he had the appearance of multiple subcutaneous nodules on his legs, especially, but also on his arms and trunk, that arose and often resolved spontaneously.  Some showed inflammation and cutaneous crusting with erythema and subsequent regression. It was thought he may be experiencing a graft vs lymphoma effect. He continued to have new lesions, predominantly on his lower legs, that appeared and resolved. Those on his legs that ulcerated resolved with the care provide by Dr. Eaton. Over time, the frequency of new lesions and their ulceration lessened.      A pneumonia required brief hospitalization in 04/2017 thought to be RSV bronchiolitis with suspected superimposed bacterial pneumonia. Related symptoms cleared with antibiotics.     INTERVAL HISTORY:  The patient was last in my clinic on 05/18/2017.  At that time, he was doing quite well and since then, has continued to improve.  He has no residual pulmonary symptoms from the infection in 04/2017.      He also has had no new cutaneous lesions and all of the old lesions have healed.  He has some scarring and pigmentary changes at the sites of  old lesions on his legs as well as a few on his arms.       Overall, he feels he is doing quite well.  Of special note, Mr. Smith and his wife are celebrating their wedding anniversary today     REVIEW OF SYSTEMS:  A 10-point review of systems is otherwise negative.      MEDICATIONS:     1.  Amlodipine.    2.  Venlafaxine.     3.  Phospho neutral  4.  Aspirin 81 mg.    5.  Atorvastatin.    6.  Nitroglycerin p.r.n.      ALLERGIES:  None reported.      PHYSICAL EXAMINATION:   VITAL SIGNS:  Weight 88.1 kg, blood pressure 130/76, pulse 74 and regular, respirations 16, temperature 98.0, O2 saturation 97% on room air.    HEENT:  Anicteric.  No conjunctival injection.  No oropharyngeal masses.  Mucosa - moist.  No plaque or ulceration.   NECK:  No cervical or supraclavicular adenopathy.  Thyroid appears normal.   CHEST:  Clear to auscultation.  No dullness.     AXILLAE:  Negative.   HEART:  Regular rhythm.  Questionable soft systolic murmur over the upper left sternal border.  No gallop or rub.    ABDOMEN:  Soft.  No tenderness.  Bowel sounds active.  The liver is at the right costal margin and spans 10-11 cm total.  The spleen is not palpable or percussible.  No masses.  No inguinal/femoral nodes.   EXTREMITIES:  Trace lower extremity edema.   SKIN:  No subcutaneous lesions.  Areas on lower legs and arms where he previously had had ulcerative lesions continue to show hyperpigmentation and some scarring.  No evident new lesions.      LABORATORY:     Hemoglobin 14.0 grams percent with 9,800 WBCs (45% neutrophils, 42% lymphocytes) and 205,000 platelets.   Electrolytes, calcium, creatinine (1.06) -normal.     Liver enzymes, including serum LDH - normal.   SPEP - albumin 4.1, Ig 0.6, no monoclonal peak. IgG 631, IgA 41, IgM 38.      ASSESSMENT AND PLAN:  NK/T-cell lymphoma, nasal type - Mr. Smith continues to do very well, now just over 2 years from the time of his non-myeloablative cord blood transplant.  The disease has not been apparent for several months.  He is feeling well.  We  will continue to see him at approximately 3-month intervals.      Additional laboratory studies, including quantitative immunoglobulins, were obtained today and he remains hypogammaglobulinemic.  Will review his vaccination status.       The patient and his wife are planning a trip to [place], returning through the [place], [place]., next February.  He will be checking on the need for any pertinent immunizations.       Yuri Hill MD  Professor of Medicine  Oncology  [facility]  Office: [phone]  Clinic Fax: [phone]        cc:    MD Ruth Prince MD Daniel Miller, MD Bailey Lee, MD Roger Weenig, MD Nathan Norquist, MD Marie-Claire Buckley, MD

## 2017-08-16 NOTE — NURSING NOTE
Chief Complaint   Patient presents with     Blood Draw     venipuncture in right hand, vitals obtained      Dawn Perez CMA

## 2017-08-16 NOTE — LETTER
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  He received chemotherapy, initially with SMILE (3 cycles) with inability to control the disease, followed by ICE for 2 cycles in 01/2015 and then autologous stem cell transplant (03/04/2015) with BEAM conditioning. 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  By 12/2015, he again had evidence for recurrence on the right lower extremity (biopsy proven).  Recurrence appeared to be localized to the right ankle and possibly, although undocumented, on the right wrist.  He was given involved field radiation to these areas.  Subsequently, he had the appearance of multiple subcutaneous nodules on his legs, especially, but also on his arms and trunk, that arose and often resolved spontaneously.  Some showed inflammation and cutaneous crusting with erythema and subsequent regression. It was thought he may be experiencing a graft vs lymphoma effect. He continued to have new lesions, predominantly on his lower legs, that appeared and resolved. Those on his legs that ulcerated resolved with the care provide by Dr. Eaton. Over time, the frequency of new lesions and their ulceration lessened.      A pneumonia required brief hospitalization  in 04/2017 thought to be RSV bronchiolitis with suspected superimposed bacterial pneumonia. Related symptoms cleared with antibiotics.     INTERVAL HISTORY:  The patient was last in my clinic on 05/18/2017.  At that time, he was doing quite well and since then, has continued to improve.  He has no residual pulmonary symptoms from the infection in 04/2017.      He also has had no new cutaneous lesions and all of the old lesions have healed.  He has some scarring and pigmentary  changes at the sites of  old lesions on his legs as well as a few on his arms.      Overall, he feels he is doing quite well.  Of special note, Mr. Smith and his wife are celebrating their wedding anniversary today     REVIEW OF SYSTEMS:  A 10-point review of systems is otherwise negative.      MEDICATIONS:     1.  Amlodipine.    2.  Venlafaxine.     3.  Phospho neutral  4.  Aspirin 81 mg.    5.  Atorvastatin.    6.  Nitroglycerin p.r.n.      ALLERGIES:  None reported.      PHYSICAL EXAMINATION:   VITAL SIGNS:  Weight 88.1 kg, blood pressure 130/76, pulse 74 and regular, respirations 16, temperature 98.0, O2 saturation 97% on room air.    HEENT:  Anicteric.  No conjunctival injection.  No oropharyngeal masses.  Mucosa - moist.  No plaque or ulceration.   NECK:  No cervical or supraclavicular adenopathy.  Thyroid appears normal.   CHEST:  Clear to auscultation.  No dullness.     AXILLAE:  Negative.   HEART:  Regular rhythm.  Questionable soft systolic murmur over the upper left sternal border.  No gallop or rub.    ABDOMEN:  Soft.  No tenderness.  Bowel sounds active.  The liver is at the right costal margin and spans 10-11 cm total.  The spleen is not palpable or percussible.  No masses.  No inguinal/femoral nodes.   EXTREMITIES:  Trace lower extremity edema.   SKIN:  No subcutaneous lesions.  Areas on lower legs and arms where he previously had had ulcerative lesions continue to show hyperpigmentation and some scarring.  No evident new lesions.      LABORATORY:     Hemoglobin 14.0 grams percent with 9,800 WBCs (45% neutrophils, 42% lymphocytes) and 205,000 platelets.   Electrolytes, calcium, creatinine (1.06) -normal.     Liver enzymes, including serum LDH - normal.   SPEP - albumin 4.1, Ig 0.6, no monoclonal peak. IgG 631, IgA 41, IgM 38.      ASSESSMENT AND PLAN:  NK/T-cell lymphoma, nasal type - Mr. Smith continues to do very well, now just over 2 years from the time of his non-myeloablative cord blood  transplant.  The disease has not been apparent for several months.  He is feeling well.  We will continue to see him at approximately 3-month intervals.      Additional laboratory studies, including quantitative immunoglobulins, were obtained today and he remains hypogammaglobulinemic.  Will review his vaccination status.       The patient and his wife are planning a trip to [facility], returning through [place], [place] next February.  He will be checking on the need for any pertinent immunizations.       Yuri Hill MD  Professor of Medicine  Oncology  [facility]  Office: [phone]  Clinic Fax: [phone]        cc:    MD Ruth Prince MD Daniel Miller, MD Bailey Lee, MD Roger Weenig, MD Nathan Norquist, MD Marie-Claire Buckley, MD Bruce A. Peterson, MD

## 2017-08-16 NOTE — MR AVS SNAPSHOT
After Visit Summary   8/16/2017    Hi Smith    MRN: [id number]           Patient Information     Date Of Birth          1955        Visit Information        Provider Department      8/16/2017 10:30 AM Yuri Hill MD [facility]        Today's Diagnoses     Hypogammaglobulinemia (H)    -  1    Extranodal NK/T-cell lymphoma, nasal type (H)           Follow-ups after your visit        Follow-up notes from your care team     Return in about 3 months (around 11/16/2017) for Physical Exam, Lab Work.      Who to contact     If you have questions or need follow up information about today's clinic visit or your schedule please contact [facility] directly at [phone].  Normal or non-critical lab and imaging results will be communicated to you by [employer], letter or phone within 4 business days after the clinic has received the results. If you do not hear from us within 7 days, please contact the clinic through [employer] or phone. If you have a critical or abnormal lab result, we will notify you by phone as soon as possible.  Submit refill requests through [employer] or call your pharmacy and they will forward the refill request to us. Please allow 3 business days for your refill to be completed.          Additional Information About Your Visit        MyChart Information     [employer] gives you secure access to your electronic health record. If you see a primary care provider, you can also send messages to your care team and make appointments. If you have questions, please call your primary care clinic.  If you do not have a primary care provider, please call [phone] and they will assist you.        Care EveryWhere ID     This is your Care EveryWhere ID. This could be used by other organizations to access your [place] medical records  [id number]        Your Vitals Were     Pulse Temperature Respirations Height Pulse Oximetry BMI (Body Mass Index)    74 98   F (36.7  C) (Oral) 16 1.829 m (6') 97% 26.34 kg/m2       Blood Pressure from Last 3 Encounters:   08/16/17 130/76   04/20/17 115/77   04/18/17 99/58    Weight from Last 3 Encounters:   08/16/17 88.1 kg (194 lb 3.2 oz)   05/12/17 84.8 kg (187 lb)   04/20/17 83.7 kg (184 lb 9.6 oz)              We Performed the Following     *CBC with platelets differential     Comprehensive metabolic panel     Immunoglobulins A G and M     Lactate Dehydrogenase     Protein electrophoresis        Primary Care Provider Office Phone # Fax #    Glenroy Torres [phone]       [place] [address] [address] [facility] [address]        Equal Access to Services     SANDRA LOPEZ : Hadii mery reynolds hadasho Soomaali, waaxda luqadaha, qaybta kaalmada adeegyada, waxeric nowakin haystephanie butt . So [facility] [phone].    ATENCIÓN: Si habla español, tiene a singh disposición servicios gratuitos de asistencia lingüística. Llame al [phone].    We comply with applicable federal civil rights laws and [place] laws. We do not discriminate on the basis of race, color, national origin, age, disability sex, sexual orientation or gender identity.            Thank you!     Thank you for choosing [facility]  for your care. Our goal is always to provide you with excellent care. Hearing back from our patients is one way we can continue to improve our services. Please take a few minutes to complete the written survey that you may receive in the mail after your visit with us. Thank you!             Your Updated Medication List - Protect others around you: Learn how to safely use, store and throw away your medicines at www.disposemymeds.org.          This list is accurate as of: 8/16/17 11:59 PM.  Always use your most recent med list.                   Brand Name Dispense Instructions for use Diagnosis    AMLODIPINE BESYLATE PO      Take by mouth daily    Extranodal NK/T-cell lymphoma, nasal type (H)        ASPIRIN PO      Take 81 mg by mouth daily    T-cell or NK-cell neoplasm (H)       LIPITOR PO      Take 10 mg by mouth daily    T-cell or NK-cell neoplasm (H)       nitroGLYcerin 0.4 MG sublingual tablet    NITROSTAT     Place 0.4 mg under the tongue    Extranodal NK/T-cell lymphoma, nasal type (H), Hypogammaglobulinemia (H)       PHOSPHA 250 NEUTRAL 250 MG per tablet   Generic drug:  phosphorus tablet 250 mg     270 tablet    TAKE ONE TABLET BY MOUTH TWICE A DAY    Hypophosphatemia       venlafaxine 37.5 MG 24 hr capsule    EFFEXOR XR    90 capsule    Take 1 capsule (37.5 mg) by mouth daily    Lymphoma (H)

## 2017-08-16 NOTE — NURSING NOTE
Oncology Rooming Note    August 16, 2017 10:31 AM   Hi Smith is a 62 year old male who presents for:    Chief Complaint   Patient presents with     Blood Draw     venipuncture in right hand, vitals obtained      Oncology Clinic Visit     Follow up-T-Cell Lymphoma     Initial Vitals: /76  Pulse 74  Temp 98  F (36.7  C) (Oral)  Resp 16  Ht 1.829 m (6')  Wt 88.1 kg (194 lb 3.2 oz)  SpO2 97%  BMI 26.34 kg/m2 Estimated body mass index is 26.34 kg/(m^2) as calculated from the following:    Height as of this encounter: 1.829 m (6').    Weight as of this encounter: 88.1 kg (194 lb 3.2 oz). Body surface area is 2.12 meters squared.  No Pain (0) Comment: Data Unavailable   No LMP for male patient.  Allergies reviewed: Yes  Medications reviewed: Yes    Medications: Medication refills not needed today.  Pharmacy name entered into [facility]:    [facility] [place], [place] - [address] [facility], [address]  [address] [address] [facility]    Clinical concerns: No concerns Dr. Hill was notified.    10 minutes for nursing intake (face to face time)     Tanisha Sánchez LPN

## 2017-08-17 LAB
ALBUMIN SERPL ELPH-MCNC: 4.1 G/DL (ref 3.7–5.1)
ALPHA1 GLOB SERPL ELPH-MCNC: 0.3 G/DL (ref 0.2–0.4)
ALPHA2 GLOB SERPL ELPH-MCNC: 0.7 G/DL (ref 0.5–0.9)
B-GLOBULIN SERPL ELPH-MCNC: 0.6 G/DL (ref 0.6–1)
GAMMA GLOB SERPL ELPH-MCNC: 0.6 G/DL (ref 0.7–1.6)
M PROTEIN SERPL ELPH-MCNC: 0 G/DL
PROT PATTERN SERPL ELPH-IMP: ABNORMAL

## 2017-08-24 DIAGNOSIS — C96.9 T-CELL OR NK-CELL NEOPLASM (H): Primary | ICD-10-CM

## 2017-09-13 ENCOUNTER — CARE COORDINATION (OUTPATIENT)
Dept: ONCOLOGY | Facility: CLINIC | Age: 62
End: 2017-09-13

## 2017-09-13 NOTE — PROGRESS NOTES
Patient had called in to report a possible new lump behind his right knee.   Patient states that it is moveable, not painful, semi-hard, no discoloration to skin, is not harm, and does not itch.  He also stated that he had 3 bug bites on his left ankle, calf, and arm. He states that these are red and itchy but are healing.  Patient denies any fevers or night sweats. He stated that this lump on that is behind the right knee is small and showed up on Monday of this week.   Per Dr. Hill patient is to continue to monitor at this time and to call into the nurse triage line if anything changes.   Patient verbalized understanding and was grateful for the follow up call.

## 2017-09-24 ASSESSMENT — ENCOUNTER SYMPTOMS
INSOMNIA: 0
NECK MASS: 0
SORE THROAT: 0
DEPRESSION: 1
POOR WOUND HEALING: 0
DECREASED CONCENTRATION: 0
SWOLLEN GLANDS: 0
SMELL DISTURBANCE: 0
NAIL CHANGES: 0
NERVOUS/ANXIOUS: 0
PANIC: 0
TROUBLE SWALLOWING: 0
TASTE DISTURBANCE: 0
SINUS PAIN: 0
HOARSE VOICE: 0
SKIN CHANGES: 1
BRUISES/BLEEDS EASILY: 1
SINUS CONGESTION: 0

## 2017-09-26 ENCOUNTER — APPOINTMENT (OUTPATIENT)
Dept: LAB | Facility: CLINIC | Age: 62
End: 2017-09-26
Attending: INTERNAL MEDICINE
Payer: MEDICARE

## 2017-09-26 ENCOUNTER — ONCOLOGY VISIT (OUTPATIENT)
Dept: ONCOLOGY | Facility: CLINIC | Age: 62
End: 2017-09-26
Attending: INTERNAL MEDICINE
Payer: MEDICARE

## 2017-09-26 VITALS
DIASTOLIC BLOOD PRESSURE: 81 MMHG | HEART RATE: 76 BPM | SYSTOLIC BLOOD PRESSURE: 128 MMHG | TEMPERATURE: 97.8 F | WEIGHT: 194.1 LBS | RESPIRATION RATE: 16 BRPM | OXYGEN SATURATION: 96 % | BODY MASS INDEX: 26.29 KG/M2 | HEIGHT: 72 IN

## 2017-09-26 DIAGNOSIS — R21 RASH: ICD-10-CM

## 2017-09-26 DIAGNOSIS — C86.00 EXTRANODAL NK/T-CELL LYMPHOMA, NASAL TYPE: Primary | ICD-10-CM

## 2017-09-26 LAB
ALBUMIN SERPL-MCNC: 3.5 G/DL (ref 3.4–5)
ALP SERPL-CCNC: 110 U/L (ref 40–150)
ALT SERPL W P-5'-P-CCNC: 27 U/L (ref 0–70)
ANION GAP SERPL CALCULATED.3IONS-SCNC: 8 MMOL/L (ref 3–14)
AST SERPL W P-5'-P-CCNC: 25 U/L (ref 0–45)
BASOPHILS # BLD AUTO: 0 10E9/L (ref 0–0.2)
BASOPHILS NFR BLD AUTO: 0.4 %
BILIRUB SERPL-MCNC: 0.3 MG/DL (ref 0.2–1.3)
BUN SERPL-MCNC: 17 MG/DL (ref 7–30)
CALCIUM SERPL-MCNC: 8.4 MG/DL (ref 8.5–10.1)
CHLORIDE SERPL-SCNC: 108 MMOL/L (ref 94–109)
CO2 SERPL-SCNC: 26 MMOL/L (ref 20–32)
CREAT SERPL-MCNC: 1.17 MG/DL (ref 0.66–1.25)
DIFFERENTIAL METHOD BLD: NORMAL
EOSINOPHIL # BLD AUTO: 0.2 10E9/L (ref 0–0.7)
EOSINOPHIL NFR BLD AUTO: 2.2 %
ERYTHROCYTE [DISTWIDTH] IN BLOOD BY AUTOMATED COUNT: 12.9 % (ref 10–15)
GFR SERPL CREATININE-BSD FRML MDRD: 63 ML/MIN/1.7M2
GLUCOSE SERPL-MCNC: 90 MG/DL (ref 70–99)
HCT VFR BLD AUTO: 42.9 % (ref 40–53)
HGB BLD-MCNC: 14.1 G/DL (ref 13.3–17.7)
IMM GRANULOCYTES # BLD: 0 10E9/L (ref 0–0.4)
IMM GRANULOCYTES NFR BLD: 0.3 %
LDH SERPL L TO P-CCNC: 188 U/L (ref 85–227)
LYMPHOCYTES # BLD AUTO: 4.2 10E9/L (ref 0.8–5.3)
LYMPHOCYTES NFR BLD AUTO: 40.7 %
MCH RBC QN AUTO: 28.3 PG (ref 26.5–33)
MCHC RBC AUTO-ENTMCNC: 32.9 G/DL (ref 31.5–36.5)
MCV RBC AUTO: 86 FL (ref 78–100)
MONOCYTES # BLD AUTO: 1 10E9/L (ref 0–1.3)
MONOCYTES NFR BLD AUTO: 10 %
NEUTROPHILS # BLD AUTO: 4.8 10E9/L (ref 1.6–8.3)
NEUTROPHILS NFR BLD AUTO: 46.4 %
NRBC # BLD AUTO: 0 10*3/UL
NRBC BLD AUTO-RTO: 0 /100
PLATELET # BLD AUTO: 221 10E9/L (ref 150–450)
POTASSIUM SERPL-SCNC: 3.6 MMOL/L (ref 3.4–5.3)
PROT SERPL-MCNC: 6.3 G/DL (ref 6.8–8.8)
RBC # BLD AUTO: 4.98 10E12/L (ref 4.4–5.9)
SODIUM SERPL-SCNC: 141 MMOL/L (ref 133–144)
URATE SERPL-MCNC: 2.9 MG/DL (ref 3.5–7.2)
WBC # BLD AUTO: 10.2 10E9/L (ref 4–11)

## 2017-09-26 PROCEDURE — 85025 COMPLETE CBC W/AUTO DIFF WBC: CPT | Performed by: NURSE PRACTITIONER

## 2017-09-26 PROCEDURE — 36415 COLL VENOUS BLD VENIPUNCTURE: CPT

## 2017-09-26 PROCEDURE — 80053 COMPREHEN METABOLIC PANEL: CPT | Performed by: NURSE PRACTITIONER

## 2017-09-26 PROCEDURE — 99212 OFFICE O/P EST SF 10 MIN: CPT | Mod: ZF

## 2017-09-26 PROCEDURE — 84550 ASSAY OF BLOOD/URIC ACID: CPT | Performed by: NURSE PRACTITIONER

## 2017-09-26 PROCEDURE — 99214 OFFICE O/P EST MOD 30 MIN: CPT | Mod: ZP | Performed by: NURSE PRACTITIONER

## 2017-09-26 PROCEDURE — 83615 LACTATE (LD) (LDH) ENZYME: CPT | Performed by: NURSE PRACTITIONER

## 2017-09-26 PROCEDURE — 99213 OFFICE O/P EST LOW 20 MIN: CPT

## 2017-09-26 ASSESSMENT — PAIN SCALES - GENERAL: PAINLEVEL: NO PAIN (0)

## 2017-09-26 NOTE — MR AVS SNAPSHOT
After Visit Summary   9/26/2017    Hi Smith    MRN: [id number]           Patient Information     Date Of Birth          1955        Visit Information        Provider Department      9/26/2017 4:30 PM Rebecca Henry APRN CNP [facility]        Today's Diagnoses     Extranodal NK/T-cell lymphoma, nasal type (H)    -  1    Rash           Follow-ups after your visit        Your next 10 appointments already scheduled     Nov 16, 2017  9:30 AM CST   [facility] Draw with [facility] DRAW   [facility] Lab Draw ([facility])    [address] [place] [address] [phone]            Nov 16, 2017 10:00 AM CST   (Arrive by 9:45 AM)   Return Visit with Yuri Hill MD   [facility] ([facility])    [address] [place] [address] [phone]              Who to contact     If you have questions or need follow up information about today's clinic visit or your schedule please contact [facility] directly at [phone].  Normal or non-critical lab and imaging results will be communicated to you by MyChart, letter or phone within 4 business days after the clinic has received the results. If you do not hear from us within 7 days, please contact the clinic through MyChart or phone. If you have a critical or abnormal lab result, we will notify you by phone as soon as possible.  Submit refill requests through [employer] or call your pharmacy and they will forward the refill request to us. Please allow 3 business days for your refill to be completed.          Additional Information About Your Visit        MyChart Information     [employer] gives you secure access to your electronic health record. If you see a primary care provider, you can also send messages to your care team and make appointments. If you have questions, please call your  "primary care clinic.  If you do not have a primary care provider, please call [phone] and they will assist you.        Care EveryWhere ID     This is your Care EveryWhere ID. This could be used by other organizations to access your [place] medical records  [id number]        Your Vitals Were     Pulse Temperature Respirations Height Pulse Oximetry BMI (Body Mass Index)    76 97.8  F (36.6  C) (Oral) 16 1.829 m (6' 0.01\") 96% 26.32 kg/m2       Blood Pressure from Last 3 Encounters:   09/26/17 128/81   08/16/17 130/76   04/20/17 115/77    Weight from Last 3 Encounters:   09/26/17 88 kg (194 lb 1.6 oz)   08/16/17 88.1 kg (194 lb 3.2 oz)   05/12/17 84.8 kg (187 lb)              We Performed the Following     CBC with platelets differential     Comprehensive metabolic panel     Lactate Dehydrogenase     Uric acid        Primary Care Provider Office Phone # Fax #    Glenroy Torres [phone]       [facility] [address]  [facility] [address]        Equal Access to Services     [facility]: Hadii aad ku hadasho Soomaali, waaxda luqadaha, qaybta kaalmada adeegyada, phuc cordova haybeban choco butt . So [facility] [phone].    ATENCIÓN: Si habla español, tiene a singh disposición servicios gratuitos de asistencia lingüística. Llame al [phone].    We comply with applicable federal civil rights laws and [place] laws. We do not discriminate on the basis of race, color, national origin, age, disability sex, sexual orientation or gender identity.            Thank you!     Thank you for choosing [facility]  for your care. Our goal is always to provide you with excellent care. Hearing back from our patients is one way we can continue to improve our services. Please take a few minutes to complete the written survey that you may receive in the mail after your visit with us. Thank you!             Your Updated Medication List - Protect others around you: Learn " how to safely use, store and throw away your medicines at www.disposemymeds.org.          This list is accurate as of: 9/26/17  6:58 PM.  Always use your most recent med list.                   Brand Name Dispense Instructions for use Diagnosis    AMLODIPINE BESYLATE PO      Take by mouth daily    Extranodal NK/T-cell lymphoma, nasal type (H)       ASPIRIN PO      Take 81 mg by mouth daily    T-cell or NK-cell neoplasm (H)       LIPITOR PO      Take 10 mg by mouth daily    T-cell or NK-cell neoplasm (H)       nitroGLYcerin 0.4 MG sublingual tablet    NITROSTAT     Place 0.4 mg under the tongue    Extranodal NK/T-cell lymphoma, nasal type (H), Hypogammaglobulinemia (H)       PHOSPHA 250 NEUTRAL 250 MG per tablet   Generic drug:  phosphorus tablet 250 mg     270 tablet    TAKE ONE TABLET BY MOUTH TWICE A DAY    Hypophosphatemia       venlafaxine 37.5 MG 24 hr capsule    EFFEXOR XR    90 capsule    Take 1 capsule (37.5 mg) by mouth daily    Lymphoma (H)

## 2017-09-26 NOTE — PROGRESS NOTES
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  He received chemotherapy, initially with SMILE (3 cycles) with inability to control the disease, followed by ICE for 2 cycles in 01/2015 and then autologous stem cell transplant (03/04/2015) with BEAM conditioning. 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  By 12/2015, he again had evidence for recurrence on the right lower extremity (biopsy proven).  Recurrence appeared to be localized to the right ankle and possibly, although undocumented, on the right wrist.  He was given involved field radiation to these areas.  Subsequently, he had the appearance of multiple subcutaneous nodules on his legs, especially, but also on his arms and trunk, that arose and often resolved spontaneously.  Some showed inflammation and cutaneous crusting with erythema and subsequent regression. It was thought he may be experiencing a graft vs lymphoma effect. He continued to have new lesions, predominantly on his lower legs, that appeared and resolved. Those on his legs that ulcerated resolved with the care provide by Dr. Eaton. Over time, the frequency of new lesions and their ulceration lessened.      A pneumonia required brief hospitalization in 04/2017 thought to be RSV bronchiolitis with suspected superimposed bacterial pneumonia. Related symptoms cleared with antibiotics.    Interval history:   About 2 weeks ago, Colton called in with concerns about a new lump behind the knee (right). He also had what he thought were 3 bug bites on the left ankle, calf and arm. He monitored this. He followed with his community  dermatologist last week and was found to have some erythema behind the knees. This was thought to be eczema and he was started on corticosteroid cream. He started using this and the rash behind the knees has resolved. He has noted multiple skin lumps on the extremities that are improving. The nodularities are similar to how he presented with the lymphoma initially. However, these start out itchy and slightly red. He used the steroid cream to a nodule on the right anteriolateral knee surface last night. Today, he notes that all the nodules (even those not exposed to the corticosteroid) are better. No itching currently. Skin nodules that he has noted include the left upper arm and shoulder, ankle and calf. Left thigh, left forehead and right knee. Most of these are resolving. The initial lump behind the right knee remains.     Was diagnosed with basal cell carcinoma of the left scapula and will have this removed next week.    Has otherwise felt well. No fevers/chills/sweats. Energy and appetite are good. Weight is stable. No neck or axillae lumps. No mouth sores. No nasal drainage. No vision/hearing changes. No headaches or focal neurologic complaints. Bowel/bladder function wnl. No cough/wheezing, SOB, CP, palpitation.    His close contacts do not have this rash. No new medications. No oral steroids.    Current Outpatient Prescriptions   Medication Sig Dispense Refill     nitroGLYcerin (NITROSTAT) 0.4 MG sublingual tablet Place 0.4 mg under the tongue       AMLODIPINE BESYLATE PO Take by mouth daily       venlafaxine (EFFEXOR XR) 37.5 MG 24 hr capsule Take 1 capsule (37.5 mg) by mouth daily 90 capsule 3     PHOSPHA 250 NEUTRAL 155-852-130 MG tablet TAKE ONE TABLET BY MOUTH TWICE A  tablet 3     ASPIRIN PO Take 81 mg by mouth daily       Atorvastatin Calcium (LIPITOR PO) Take 10 mg by mouth daily             Allergies   Allergen Reactions     Nka [No Known Allergies]          Exam: alert, appears well. Blood  "pressure 128/81, pulse 76, temperature 97.8  F (36.6  C), temperature source Oral, resp. rate 16, height 1.829 m (6' 0.01\"), weight 88 kg (194 lb 1.6 oz), SpO2 96 %.  Wt Readings from Last 4 Encounters:   09/26/17 88 kg (194 lb 1.6 oz)   08/16/17 88.1 kg (194 lb 3.2 oz)   05/12/17 84.8 kg (187 lb)   04/20/17 83.7 kg (184 lb 9.6 oz)     Oropharynx is moist, no focal lesion. No icterus. Neck supple and without adenopathy. Lungs:CTA. Heart: RRR, no murmur or rub. Abdomen: soft, nontender, BS active. No organomegaly or mass. Extremities: warm, no edema. Skin: Small, firm nodular lesions measuring 2-3 mm in size are palpable in the subcutaneous tissues of the right anterolateral knee and right posterior knee. There are a few foci of erythematous nodules on the right scapula and back, left upper arm and left thigh. Multiple scars noted from prior ulcerations on the BLE.    Labs: Results for THOM LARSON (MRN [id number]) as of 9/26/2017 17:16   Ref. Range 9/26/2017 16:18   Sodium Latest Ref Range: 133 - 144 mmol/L 141   Potassium Latest Ref Range: 3.4 - 5.3 mmol/L 3.6   Chloride Latest Ref Range: 94 - 109 mmol/L 108   Carbon Dioxide Latest Ref Range: 20 - 32 mmol/L 26   Urea Nitrogen Latest Ref Range: 7 - 30 mg/dL 17   Creatinine Latest Ref Range: 0.66 - 1.25 mg/dL 1.17   GFR Estimate Latest Ref Range: >60 mL/min/1.7m2 63   GFR Estimate If Black Latest Ref Range: >60 mL/min/1.7m2 76   Calcium Latest Ref Range: 8.5 - 10.1 mg/dL 8.4 (L)   Anion Gap Latest Ref Range: 3 - 14 mmol/L 8   Albumin Latest Ref Range: 3.4 - 5.0 g/dL 3.5   Protein Total Latest Ref Range: 6.8 - 8.8 g/dL 6.3 (L)   Bilirubin Total Latest Ref Range: 0.2 - 1.3 mg/dL 0.3   Alkaline Phosphatase Latest Ref Range: 40 - 150 U/L 110   ALT Latest Ref Range: 0 - 70 U/L 27   AST Latest Ref Range: 0 - 45 U/L 25   Lactate Dehydrogenase Latest Ref Range: 85 - 227 U/L 188   Uric Acid Latest Ref Range: 3.5 - 7.2 mg/dL 2.9 (L)   Glucose Latest Ref Range: 70 - 99 mg/dL " 90   WBC Latest Ref Range: 4.0 - 11.0 10e9/L 10.2   Hemoglobin Latest Ref Range: 13.3 - 17.7 g/dL 14.1   Hematocrit Latest Ref Range: 40.0 - 53.0 % 42.9   Platelet Count Latest Ref Range: 150 - 450 10e9/L 221   RBC Count Latest Ref Range: 4.4 - 5.9 10e12/L 4.98   MCV Latest Ref Range: 78 - 100 fl 86   MCH Latest Ref Range: 26.5 - 33.0 pg 28.3   MCHC Latest Ref Range: 31.5 - 36.5 g/dL 32.9   RDW Latest Ref Range: 10.0 - 15.0 % 12.9   Diff Method Unknown Automated Method   % Neutrophils Latest Units: % 46.4   % Lymphocytes Latest Units: % 40.7   % Monocytes Latest Units: % 10.0   % Eosinophils Latest Units: % 2.2   % Basophils Latest Units: % 0.4   % Immature Granulocytes Latest Units: % 0.3   Nucleated RBCs Latest Ref Range: 0 /100 0   Absolute Neutrophil Latest Ref Range: 1.6 - 8.3 10e9/L 4.8   Absolute Lymphocytes Latest Ref Range: 0.8 - 5.3 10e9/L 4.2   Absolute Monocytes Latest Ref Range: 0.0 - 1.3 10e9/L 1.0   Absolute Eosinophils Latest Ref Range: 0.0 - 0.7 10e9/L 0.2   Absolute Basophils Latest Ref Range: 0.0 - 0.2 10e9/L 0.0   Abs Immature Granulocytes Latest Ref Range: 0 - 0.4 10e9/L 0.0   Absolute Nucleated RBC Unknown 0.0       Impression/plan:   Nodular skin lesions of unclear etiology. All of the lesions have lessened,but I'm not sure if that is the results of the corticosteroid. What concerns me is that the skin lesions are similar to the original lesions he presented with at diagnosis.  -I would like him to see Dr. Ab Perez for biopsy. I asked him to stop using the steroid cream until then. Would  Need special studies, including molecular and flow, also possible studies to determine if the cells are his or his donor.   Discussed with the on-call Derm resident who will work on adding him to Dr. Perez's clinic schedule.    Colton understood and will be waiting to hear from the schedulers. Will follow after derm sees him.  Answers for HPI/ROS submitted by the patient on 9/24/2017   General Symptoms:  No  Skin Symptoms: Yes  HENT Symptoms: Yes  EYE SYMPTOMS: No  HEART SYMPTOMS: No  LUNG SYMPTOMS: No  INTESTINAL SYMPTOMS: No  URINARY SYMPTOMS: No  REPRODUCTIVE SYMPTOMS: No  SKELETAL SYMPTOMS: No  BLOOD SYMPTOMS: Yes  NERVOUS SYSTEM SYMPTOMS: No  MENTAL HEALTH SYMPTOMS: Yes  Changes in hair: No  Changes in moles/birth marks: Yes  Itching: Yes  Rashes: Yes  Changes in nails: No  Acne: No  Change in facial hair: No  Warts: No  Non-healing sores: No  Scarring: No  Flaking of skin: No  Color changes of hands/feet in cold : No  Sun sensitivity: No  Skin thickening: No  Ear pain: No  Ear discharge: No  Hearing loss: Yes  Tinnitus: Yes  Nosebleeds: No  Congestion: No  Sinus pain: No  Trouble swallowing: No   Voice hoarseness: No  Mouth sores: No  Sore throat: No  Tooth pain: No  Gum tenderness: No  Bleeding gums: No  Change in taste: No  Change in sense of smell: No  Dry mouth: No  Hearing aid used: Yes  Neck lump: No  Anemia: No  Swollen glands: No  Easy bleeding or bruising: Yes  Edema or swelling: No  Nervous or Anxious: No  Depression: Yes  Trouble sleeping: No  Trouble thinking or concentrating: No  Mood changes: Yes  Panic attacks: No

## 2017-09-26 NOTE — LETTER
9/26/2017       RE: Hi Smith  [id number] [id number]     Dear Colleague,    Thank you for referring your patient, Hi Smith, to the [facility]. Please see a copy of my visit note below.    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  He received chemotherapy, initially with SMILE (3 cycles) with inability to control the disease, followed by ICE for 2 cycles in 01/2015 and then autologous stem cell transplant (03/04/2015) with BEAM conditioning. 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  By 12/2015, he again had evidence for recurrence on the right lower extremity (biopsy proven).  Recurrence appeared to be localized to the right ankle and possibly, although undocumented, on the right wrist.  He was given involved field radiation to these areas.  Subsequently, he had the appearance of multiple subcutaneous nodules on his legs, especially, but also on his arms and trunk, that arose and often resolved spontaneously.  Some showed inflammation and cutaneous crusting with erythema and subsequent regression. It was thought he may be experiencing a graft vs lymphoma effect. He continued to have new lesions, predominantly on his lower legs, that appeared and resolved. Those on his legs that ulcerated resolved with the care provide by Dr. Eaton. Over time, the frequency of new lesions and their ulceration lessened.      A pneumonia required brief hospitalization in 04/2017 thought to be RSV bronchiolitis with suspected superimposed bacterial pneumonia. Related symptoms cleared with antibiotics.    Interval  history:   About 2 weeks ago, Colton called in with concerns about a new lump behind the knee (right). He also had what he thought were 3 bug bites on the left ankle, calf and arm. He monitored this. He followed with his community dermatologist last week and was found to have some erythema behind the knees. This was thought to be eczema and he was started on corticosteroid cream. He started using this and the rash behind the knees has resolved. He has noted multiple skin lumps on the extremities that are improving. The nodularities are similar to how he presented with the lymphoma initially. However, these start out itchy and slightly red. He used the steroid cream to a nodule on the right anteriolateral knee surface last night. Today, he notes that all the nodules (even those not exposed to the corticosteroid) are better. No itching currently. Skin nodules that he has noted include the left upper arm and shoulder, ankle and calf. Left thigh, left forehead and right knee. Most of these are resolving. The initial lump behind the right knee remains.     Was diagnosed with basal cell carcinoma of the left scapula and will have this removed next week.    Has otherwise felt well. No fevers/chills/sweats. Energy and appetite are good. Weight is stable. No neck or axillae lumps. No mouth sores. No nasal drainage. No vision/hearing changes. No headaches or focal neurologic complaints. Bowel/bladder function wnl. No cough/wheezing, SOB, CP, palpitation.    His close contacts do not have this rash. No new medications. No oral steroids.    Current Outpatient Prescriptions   Medication Sig Dispense Refill     nitroGLYcerin (NITROSTAT) 0.4 MG sublingual tablet Place 0.4 mg under the tongue       AMLODIPINE BESYLATE PO Take by mouth daily       venlafaxine (EFFEXOR XR) 37.5 MG 24 hr capsule Take 1 capsule (37.5 mg) by mouth daily 90 capsule 3     PHOSPHA 250 NEUTRAL 155-852-130 MG tablet TAKE ONE TABLET BY MOUTH TWICE A   "tablet 3     ASPIRIN PO Take 81 mg by mouth daily       Atorvastatin Calcium (LIPITOR PO) Take 10 mg by mouth daily             Allergies   Allergen Reactions     Nka [No Known Allergies]          Exam: alert, appears well. Blood pressure 128/81, pulse 76, temperature 97.8  F (36.6  C), temperature source Oral, resp. rate 16, height 1.829 m (6' 0.01\"), weight 88 kg (194 lb 1.6 oz), SpO2 96 %.  Wt Readings from Last 4 Encounters:   09/26/17 88 kg (194 lb 1.6 oz)   08/16/17 88.1 kg (194 lb 3.2 oz)   05/12/17 84.8 kg (187 lb)   04/20/17 83.7 kg (184 lb 9.6 oz)     Oropharynx is moist, no focal lesion. No icterus. Neck supple and without adenopathy. Lungs:CTA. Heart: RRR, no murmur or rub. Abdomen: soft, nontender, BS active. No organomegaly or mass. Extremities: warm, no edema. Skin: Small, firm nodular lesions measuring 2-3 mm in size are palpable in the subcutaneous tissues of the right anterolateral knee and right posterior knee. There are a few foci of erythematous nodules on the right scapula and back, left upper arm and left thigh. Multiple scars noted from prior ulcerations on the BLE.    Labs: Results for THOM LARSON (MRN [id number]) as of 9/26/2017 17:16   Ref. Range 9/26/2017 16:18   Sodium Latest Ref Range: 133 - 144 mmol/L 141   Potassium Latest Ref Range: 3.4 - 5.3 mmol/L 3.6   Chloride Latest Ref Range: 94 - 109 mmol/L 108   Carbon Dioxide Latest Ref Range: 20 - 32 mmol/L 26   Urea Nitrogen Latest Ref Range: 7 - 30 mg/dL 17   Creatinine Latest Ref Range: 0.66 - 1.25 mg/dL 1.17   GFR Estimate Latest Ref Range: >60 mL/min/1.7m2 63   GFR Estimate If Black Latest Ref Range: >60 mL/min/1.7m2 76   Calcium Latest Ref Range: 8.5 - 10.1 mg/dL 8.4 (L)   Anion Gap Latest Ref Range: 3 - 14 mmol/L 8   Albumin Latest Ref Range: 3.4 - 5.0 g/dL 3.5   Protein Total Latest Ref Range: 6.8 - 8.8 g/dL 6.3 (L)   Bilirubin Total Latest Ref Range: 0.2 - 1.3 mg/dL 0.3   Alkaline Phosphatase Latest Ref Range: 40 - 150 U/L 110 "   ALT Latest Ref Range: 0 - 70 U/L 27   AST Latest Ref Range: 0 - 45 U/L 25   Lactate Dehydrogenase Latest Ref Range: 85 - 227 U/L 188   Uric Acid Latest Ref Range: 3.5 - 7.2 mg/dL 2.9 (L)   Glucose Latest Ref Range: 70 - 99 mg/dL 90   WBC Latest Ref Range: 4.0 - 11.0 10e9/L 10.2   Hemoglobin Latest Ref Range: 13.3 - 17.7 g/dL 14.1   Hematocrit Latest Ref Range: 40.0 - 53.0 % 42.9   Platelet Count Latest Ref Range: 150 - 450 10e9/L 221   RBC Count Latest Ref Range: 4.4 - 5.9 10e12/L 4.98   MCV Latest Ref Range: 78 - 100 fl 86   MCH Latest Ref Range: 26.5 - 33.0 pg 28.3   MCHC Latest Ref Range: 31.5 - 36.5 g/dL 32.9   RDW Latest Ref Range: 10.0 - 15.0 % 12.9   Diff Method Unknown Automated Method   % Neutrophils Latest Units: % 46.4   % Lymphocytes Latest Units: % 40.7   % Monocytes Latest Units: % 10.0   % Eosinophils Latest Units: % 2.2   % Basophils Latest Units: % 0.4   % Immature Granulocytes Latest Units: % 0.3   Nucleated RBCs Latest Ref Range: 0 /100 0   Absolute Neutrophil Latest Ref Range: 1.6 - 8.3 10e9/L 4.8   Absolute Lymphocytes Latest Ref Range: 0.8 - 5.3 10e9/L 4.2   Absolute Monocytes Latest Ref Range: 0.0 - 1.3 10e9/L 1.0   Absolute Eosinophils Latest Ref Range: 0.0 - 0.7 10e9/L 0.2   Absolute Basophils Latest Ref Range: 0.0 - 0.2 10e9/L 0.0   Abs Immature Granulocytes Latest Ref Range: 0 - 0.4 10e9/L 0.0   Absolute Nucleated RBC Unknown 0.0       Impression/plan:   Nodular skin lesions of unclear etiology. All of the lesions have lessened,but I'm not sure if that is the results of the corticosteroid. What concerns me is that the skin lesions are similar to the original lesions he presented with at diagnosis.  -I would like him to see Dr. Ab Perez for biopsy. I asked him to stop using the steroid cream until then. Would  Need special studies, including molecular and flow, also possible studies to determine if the cells are his or his donor.   Discussed with the on-call Derm resident who will  work on adding him to Dr. Perez's clinic schedule.    Colton understood and will be waiting to hear from the schedulers. Will follow after derm sees him.    Again, thank you for allowing me to participate in the care of your patient.      Sincerely,    MALENA Greenwood CNP

## 2017-09-26 NOTE — NURSING NOTE
Labs completed via  and vitals obtained without complication.    See flowsheets.    Mala Weaver CMA ([facility])

## 2017-09-26 NOTE — NURSING NOTE
"Oncology Rooming Note    September 26, 2017 4:36 PM   Hi Smith is a 62 year old male who presents for:    Chief Complaint   Patient presents with     Blood Draw     Oncology Clinic Visit     T cell lymphoma- Labs done- lumps bilateral legs      Initial Vitals: /81  Pulse 76  Temp 97.8  F (36.6  C) (Oral)  Resp 16  Ht 1.829 m (6' 0.01\")  Wt 88 kg (194 lb 1.6 oz)  SpO2 96%  BMI 26.32 kg/m2 Estimated body mass index is 26.32 kg/(m^2) as calculated from the following:    Height as of this encounter: 1.829 m (6' 0.01\").    Weight as of this encounter: 88 kg (194 lb 1.6 oz). Body surface area is 2.11 meters squared.  No Pain (0) Comment: Data Unavailable   No LMP for male patient.  Allergies reviewed: Yes  Medications reviewed: Yes    Medications: Medication refills not needed today.  Pharmacy name entered into [facility]:    [facility] [place], [place] - [address] [address], [address]  [facility] - [place], [place] - [address]    Clinical concerns: Patients states he has new lumps in bilateral legs. Had labs done today. Wife in room.     10 minutes for nursing intake (face to face time)     Mandie Hernandez LPN            "

## 2017-10-03 ENCOUNTER — TRANSFERRED RECORDS (OUTPATIENT)
Dept: HEALTH INFORMATION MANAGEMENT | Facility: CLINIC | Age: 62
End: 2017-10-03

## 2017-10-03 ENCOUNTER — OFFICE VISIT (OUTPATIENT)
Dept: DERMATOLOGY | Facility: CLINIC | Age: 62
End: 2017-10-03

## 2017-10-03 DIAGNOSIS — D48.5 NEOPLASM OF UNCERTAIN BEHAVIOR OF SKIN: Primary | ICD-10-CM

## 2017-10-03 DIAGNOSIS — Z85.828 HISTORY OF NONMELANOMA SKIN CANCER: ICD-10-CM

## 2017-10-03 DIAGNOSIS — Z85.72 HISTORY OF CUTANEOUS T-CELL LYMPHOMA: ICD-10-CM

## 2017-10-03 PROCEDURE — 88342 IMHCHEM/IMCYTCHM 1ST ANTB: CPT | Performed by: DERMATOLOGY

## 2017-10-03 PROCEDURE — 88305 TISSUE EXAM BY PATHOLOGIST: CPT | Performed by: DERMATOLOGY

## 2017-10-03 PROCEDURE — 88365 INSITU HYBRIDIZATION (FISH): CPT | Performed by: DERMATOLOGY

## 2017-10-03 RX ORDER — LIDOCAINE HYDROCHLORIDE AND EPINEPHRINE 10; 10 MG/ML; UG/ML
1 INJECTION, SOLUTION INFILTRATION; PERINEURAL ONCE
Qty: 1 ML | Refills: 0 | OUTPATIENT
Start: 2017-10-03 | End: 2017-10-03

## 2017-10-03 ASSESSMENT — PAIN SCALES - GENERAL
PAINLEVEL: NO PAIN (0)
PAINLEVEL: NO PAIN (0)

## 2017-10-03 NOTE — PATIENT INSTRUCTIONS

## 2017-10-03 NOTE — NURSING NOTE
Dermatology Rooming Note    Hi Smith's goals for this visit include:   Chief Complaint   Patient presents with     Derm Problem     Hi is here with concerns of new cutaneous lesions.     Laura Dominique LPN

## 2017-10-03 NOTE — PROGRESS NOTES
[facility] Dermatology Note      Dermatology Problem List:  1.   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   - declared in remission April 2017 by Onc  2.   NMSC (BCC), s/p MMS in 2000.  3.   Non-healing ulcers, bilateral LEs and left forearm, resolved   - Bacterial culture 3/2016 with normal skin giuliana.   - Bx obtained 3/31/2016 for H&E, sterile culture, flow cytometry, and donor chimerism studies, and if possible TCR gene rearrangement studies, suggested NK-Tcell lymphoma  4. NUB, left lower medial leg, bx 10/3/17, concern for recurrence of lymphoma    Encounter Date: Oct 3, 2017    CC:   Chief Complaint   Patient presents with     Derm Problem     Hi is here with concerns of new cutaneous lesions.       History of Present Illness:  Mr. Hi Smith is a 62 year old male with history of extranodal NK/T-cell lymphoma who presents with his wife as a follow-up for new concerning lesions on the face, back and legs. The patient was last seen 3/31/16 at which time he had deep ulcers, one of which was biopsied and found to be positive for lymphoma. Since we last saw him he has been doing well and states that the oncologist declared him in remission in April. About 5 weeks ago he noted several new lesions with two discrete morphologies. He states that he has a few that were subq nodules including one that was about 6mm on the lateral lower right thigh and then the other lesions which were on the face, and back were itchy red bumps without scale. He states that he make an appt to see his oncologist and the PA there recommended that he have them biopsied by dermatology. He made an appt with us but unfortunately the lesions largely resolved before today's visit. He only has two that are left today and one is barely  palpable. He recalls that around the time of the new lesions he had a cold which is very unusual for him. He denies  Fevers, chills, sweats or wt loss. Otherwise in baseline state of health, no additional skin concerns. His oncology team had suggested a long list of studied for the lesions present at their visit including flow cytometry and genetic studies to determine the origin of the cells (host vs graft line).    The patient states that the lesions on his legs have healed well but the skin there is very fragile and tears easily. For the areas on the feet he has to wear bandaid covering to protect the skin. He is wondering if there is anything that can be done for this.     He follows with an outside dermatologist for his regular skin checks.    Past Medical History:   Patient Active Problem List   Diagnosis     Extranodal NK/T-cell lymphoma, nasal type (H)     Nausea with vomiting     Natural killer (NK) cell lymphoblastic lymphoma (H)     Thrombocytopenia (H)     Anemia in neoplastic disease     Febrile neutropenia (H)     Immunocompromised (H)     Pancytopenia due to chemotherapy (H)     Bacteremia     NHL (non-Hodgkin's lymphoma) (H)     Lymphoma (H)     DVT of upper extremity (deep vein thrombosis) (H)     Fever     Lymphoma, non-Hodgkin's (H)     Neutropenic fever (H)     Neoplasm of uncertain behavior of skin     T-cell or NK-cell neoplasm (H)     Other specified prophylactic or treatment measure     ACP (advance care planning)     Status post cord blood transplantation     Physical deconditioning     History of peripheral stem cell transplant (H)     Dysuria     Hypogammaglobulinemia (H)     Status post bone marrow transplant (H)     Complications of bone marrow transplant (H)     Nonhealing skin ulcer, limited to breakdown of skin (H)     Ulcer of skin, limited to breakdown of skin (H)     Cellulitis     Hypophosphatemia     CAP (community acquired pneumonia)       Past Medical History:   Diagnosis Date      Alcohol abuse     in remission x 20 yrs     CAD (coronary artery disease) of artery bypass graft     CABG 2005, hx of stents     Depression      DVT (deep venous thrombosis) (H) November, 2014    upper left arm after PICC line     GERD (gastroesophageal reflux disease)      Hearing loss      HTN (hypertension)      Lymphoma (H)      S/P autologous bone marrow transplantation (H) 3/4/2015     Skin cancer     15 y ago.? basal cell     Tinnitus      VRE (vancomycin resistant enterococcus) culture positive      Past Surgical History:   Procedure Laterality Date     BYPASS GRAFT ARTERY CORONARY      2005     CYSTOSCOPY, TRANSURETHRAL RESECTION (TUR) TUMOR BLADDER, COMBINED N/A 12/29/2015    Procedure: COMBINED CYSTOSCOPY, TRANSURETHRAL RESECTION (TUR) TUMOR BLADDER;  Surgeon: Orville Boyle MD;  Location: [facility] OR     MOHS MICROGRAPHIC PROCEDURE  2000    nose     PICC INSERTION Left 9/26/2014    5fr DL Power PICC, 43cm (1cm external) in the L basilic vein w/ tip in the low SVC.     PICC INSERTION Left 10/28/2014    5fr DL Power PICC, 41cm (1cm external) in the L basilic vein w/ tip in the SVC RA junction.     PICC INSERTION Right 12/31/2014    5fr DL Power PICC, 39cm (1cm external) in the R medial brachial vein w/ tip in the low SVC.       Social History:  The patient works in the [employer] for half days again after taking of a long period for treatment.  has three children    Family History:  There is no family history of skin cancer. Dad had prostate cancer and mom had esophageal cancer.    Medications:  Current Outpatient Prescriptions   Medication Sig Dispense Refill     nitroGLYcerin (NITROSTAT) 0.4 MG sublingual tablet Place 0.4 mg under the tongue       AMLODIPINE BESYLATE PO Take by mouth daily       venlafaxine (EFFEXOR XR) 37.5 MG 24 hr capsule Take 1 capsule (37.5 mg) by mouth daily 90 capsule 3     PHOSPHA 250 NEUTRAL [id number] MG tablet TAKE ONE TABLET BY MOUTH TWICE  A  tablet 3     ASPIRIN PO Take 81 mg by mouth daily       Atorvastatin Calcium (LIPITOR PO) Take 10 mg by mouth daily           Allergies   Allergen Reactions     Nka [No Known Allergies]          Review of Systems:  -Constitutional: The patient denies fevers, chills, unintended weight loss, and night sweats.  -Skin: As above in HPI. No additional skin concerns.  No intraoral sores.    Physical exam:  GEN: This is a well developed, well-nourished male in no acute distress, in a pleasant mood.    SKIN: Focused examination of the face, and legs was performed:  - numerous well healed scarred lesions on the b/l lower legs with very thin wrinkled scar tissue and peripheral hyperpigmentation  - single 3mm nodule on the left medial lower leg without any overlying surface change   - No other lesions of concern on areas examined.     Impression/Plan:  1. 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  - H&E, sterile culture, flow cytometry, donor chimerism, and if possible TCR gene rearrangement studies requested by onc however due to the small size of the single lesion present today we spoke to Dr. Hill who recommended an H& E with immunohistochemistry and if a second lesion was found to bx then flow cytometry    2. NMSC (BCC), s/p MMS in 2000. NERD.     3. Healed ulcers on the lower legs   - pt was offered tretinoin though told that this would be of mild benefit in strengthening his scars, preventing tearing of skin   - recommended use of duoderm/hydrocolloid or moleskin     4. NUB, left lower medial leg, bx 10/3/17, concern for recurrence of lymphoma   - Punch biopsy:  After discussion of benefits and risks including but not limited to bleeding/bruising, pain/swelling, infection, scar, incomplete removal, nerve damage/numbness,  recurrence, and non-diagnostic biopsy, written consent, verbal consent and photographs were obtained. Time-out was performed. The area was cleaned with isopropyl alcohol. 1mL of 1% lidocaine with epinephrine was injected to obtain adequate anesthesia of the lesion on the left lower medial leg. A 4 mm punch biopsy was performed.  4-0 prolene sutures were utilized to approximate the epidermal edges.  White petroleum jelly/VaselineTM and a bandage was applied to the wound.  Explicit verbal and written wound care instructions were provided.  The patient left the Dermatology Clinic in good condition. The patient was counseled to follow up for suture removal in approximately 14 days.      CC Dr. Yuri Hill on close of this encounter.  Follow-up prn pending biopsy results.     Dr. Ab Perez staffed the patient.    Staff Involved:  Resident (Charlotte George MD) / Staff (as above)    I have seen and examined this patient and agree with the assessment and plan as documented in the resident's note, and was present for all procedures.    Ab Perez MD  Dermatology Attending

## 2017-10-03 NOTE — MR AVS SNAPSHOT
After Visit Summary   10/3/2017    Hi Smith    MRN: [id number]           Patient Information     Date Of Birth          1955        Visit Information        Provider Department      10/3/2017 1:00 PM Ab Perez MD [facility] Dermatology        Today's Diagnoses     Neoplasm of uncertain behavior of skin    -  1      Care Instructions    Wound Care After a Biopsy    What is a skin biopsy?  A skin biopsy allows the doctor to examine a very small piece of tissue under the microscope to determine the diagnosis and the best treatment for the skin condition. A local anesthetic (numbing medicine)  is injected with a very small needle into the skin area to be tested. A small piece of skin is taken from the area. Sometimes a suture (stitch) is used.     What are the risks of a skin biopsy?  I will experience scar, bleeding, swelling, pain, crusting and redness. I may experience incomplete removal or recurrence. Risks of this procedure are excessive bleeding, bruising, infection, nerve damage, numbness, thick (hypertrophic or keloidal) scar and non-diagnostic biopsy.    How should I care for my wound for the first 24 hours?    Keep the wound dry and covered for 24 hours    If it bleeds, hold direct pressure on the area for 15 minutes. If bleeding does not stop then go to the emergency room    Avoid strenuous exercise the first 1-2 days or as your doctor instructs you    How should I care for the wound after 24 hours?    After 24 hours, remove the bandage    You may bathe or shower as normal    If you had a scalp biopsy, you can shampoo as usual and can use shower water to clean the biopsy site daily    Clean the wound twice a day with gentle soap and water    Do not scrub, be gentle    Apply white petroleum/Vaseline after cleaning the wound with a cotton swab or a clean finger, and keep the site covered with a Bandaid /bandage. Bandages are not necessary with a scalp biopsy    If you are unable to  cover the site with a Bandaid /bandage, re-apply ointment 2-3 times a day to keep the site moist. Moisture will help with healing    Avoid strenuous activity for first 1-2 days    Avoid lakes, rivers, pools, and oceans until the stitches are removed or the site is healed    How do I clean my wound?    Wash hands thoroughly with soap or use hand  before all wound care    Clean the wound with gentle soap and water    Apply white petroleum/Vaseline  to wound after it is clean    Replace the Bandaid /bandage to keep the wound covered for the first few days or as instructed by your doctor    If you had a scalp biopsy, warm shower water to the area on a daily basis should suffice    What should I use to clean my wound?     Cotton-tipped applicators (Qtips )    White petroleum jelly (Vaseline ). Use a clean new container and use Q-tips to apply.    Bandaids   as needed    Gentle soap     How should I care for my wound long term?    Do not get your wound dirty    Keep up with wound care for one week or until the area is healed.    A small scab will form and fall off by itself when the area is completely healed. The area will be red and will become pink in color as it heals. Sun protection is very important for how your scar will turn out. Sunscreen with an SPF 30 or greater is recommended once the area is healed.    If you have stitches, stitches need to be removed in 14 days. You may return to our clinic for this or you may have it done locally at your doctor s office.    You should have some soreness but it should be mild and slowly go away over several days. Talk to your doctor about using tylenol for pain,    When should I call my doctor?  If you have increased:     Pain or swelling    Pus or drainage (clear or slightly yellow drainage is ok)    Temperature over 100F    Spreading redness or warmth around wound    When will I hear about my results?  The biopsy results can take 2-3 weeks to come back. The clinic  will call you with the results, send you a [employer] message, or have you schedule a follow-up clinic or phone time to discuss the results. Contact our clinics if you do not hear from us in 3 weeks.     Who should I call with questions?    [facility]: [phone]     [facility]: [phone]    For urgent needs outside of business hours call the [facility] at [phone] and ask for the dermatology resident on call            Follow-ups after your visit        Your next 10 appointments already scheduled     Nov 16, 2017  9:30 AM CST   [facility] with  [facility]   [facility] ([facility])    [address] [place] [id number] [phone]            Nov 16, 2017 10:00 AM CST   (Arrive by 9:45 AM)   Return Visit with Yuri Hill MD   [facility] ([facility])    [address] [place] [id number] [phone]              Who to contact     Please call your clinic at [phone] to:    Ask questions about your health    Make or cancel appointments    Discuss your medicines    Learn about your test results    Speak to your doctor   If you have compliments or concerns about an experience at your clinic, or if you wish to file a complaint, please contact [facility] Patient Relations at [phone] or email us at [email or web address]         Additional Information About Your Visit        MyChart Information     [employer] gives you secure access to your electronic health record. If you see a primary care provider, you can also send messages to your care team and make appointments. If you have questions, please call your primary care clinic.  If you do not have a primary care provider, please call [phone] and they will assist you.      [employer] is an electronic  gateway that provides easy, online access to your medical records. With [employer], you can request a clinic appointment, read your test results, renew a prescription or communicate with your care team.     To access your existing account, please contact your [facility] or call [phone] for assistance.        Care EveryWhere ID     This is your Care EveryWhere ID. This could be used by other organizations to access your [place] medical records  [id number]         Blood Pressure from Last 3 Encounters:   09/26/17 128/81   08/16/17 130/76   04/20/17 115/77    Weight from Last 3 Encounters:   09/26/17 88 kg (194 lb 1.6 oz)   08/16/17 88.1 kg (194 lb 3.2 oz)   05/12/17 84.8 kg (187 lb)              We Performed the Following     BIOPSY SKIN/SUBQ/MUC MEM, SINGLE LESION     Surgical pathology exam          Today's Medication Changes          These changes are accurate as of: 10/3/17  2:27 PM.  If you have any questions, ask your nurse or doctor.               Start taking these medicines.        Dose/Directions    lidocaine 1% with EPINEPHrine 1:100,000 1 %-1:997808 injection   Used for:  Neoplasm of uncertain behavior of skin        Dose:  1 mL   Inject 1 mL into the skin once for 1 dose   Quantity:  1 mL   Refills:  0            Where to get your medicines      Some of these will need a paper prescription and others can be bought over the counter.  Ask your nurse if you have questions.     You don't need a prescription for these medications     lidocaine 1% with EPINEPHrine 1:100,000 1 %-1:486420 injection                Primary Care Provider Office Phone # Fax #    Glenroy Brian [phone]       [facility] [address] [facility] [address]        Equal Access to Services     SANDRA LOPEZ : Crista Abarca, anderson humphreys, phuc livingston. So [facility] [phone].    ATENCIÓN: Linwood cisneros  español, tiene a singh disposición servicios gratuitos de asistencia lingüística. Ashley donaldson [phone].    We comply with applicable federal civil rights laws and [place] laws. We do not discriminate on the basis of race, color, national origin, age, disability, sex, sexual orientation, or gender identity.            Thank you!     Thank you for choosing [facility] DERMATOLOGY  for your care. Our goal is always to provide you with excellent care. Hearing back from our patients is one way we can continue to improve our services. Please take a few minutes to complete the written survey that you may receive in the mail after your visit with us. Thank you!             Your Updated Medication List - Protect others around you: Learn how to safely use, store and throw away your medicines at www.disposemymeds.org.          This list is accurate as of: 10/3/17  2:27 PM.  Always use your most recent med list.                   Brand Name Dispense Instructions for use Diagnosis    AMLODIPINE BESYLATE PO      Take by mouth daily    Extranodal NK/T-cell lymphoma, nasal type (H)       ASPIRIN PO      Take 81 mg by mouth daily    T-cell or NK-cell neoplasm (H)       lidocaine 1% with EPINEPHrine 1:100,000 1 %-1:063804 injection     1 mL    Inject 1 mL into the skin once for 1 dose    Neoplasm of uncertain behavior of skin       LIPITOR PO      Take 10 mg by mouth daily    T-cell or NK-cell neoplasm (H)       nitroGLYcerin 0.4 MG sublingual tablet    NITROSTAT     Place 0.4 mg under the tongue    Extranodal NK/T-cell lymphoma, nasal type (H), Hypogammaglobulinemia (H)       PHOSPHA 250 NEUTRAL 250 MG per tablet   Generic drug:  phosphorus tablet 250 mg     270 tablet    TAKE ONE TABLET BY MOUTH TWICE A DAY    Hypophosphatemia       venlafaxine 37.5 MG 24 hr capsule    EFFEXOR XR    90 capsule    Take 1 capsule (37.5 mg) by mouth daily    Lymphoma (H)

## 2017-10-03 NOTE — LETTER
10/3/2017       RE: Hi Smith  [address] [facility] [address]     Dear Colleague,    Thank you for referring your patient, Hi Smith, to the [facility] DERMATOLOGY at [facility]. Please see a copy of my visit note below.    [facility] Dermatology Note      Dermatology Problem List:  1.   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   - declared in remission April 2017 by Onc  2.   NMSC (BCC), s/p MMS in 2000.  3.   Non-healing ulcers, bilateral LEs and left forearm, resolved   - Bacterial culture 3/2016 with normal skin giuliana.   - Bx obtained 3/31/2016 for H&E, sterile culture, flow cytometry, and donor chimerism studies, and if possible TCR gene rearrangement studies, suggested NK-Tcell lymphoma  4. NUB, left lower medial leg, bx 10/3/17, concern for recurrence of lymphoma    Encounter Date: Oct 3, 2017    CC:   Chief Complaint   Patient presents with     Derm Problem     Hi is here with concerns of new cutaneous lesions.       History of Present Illness:  Mr. Hi Smith is a 62 year old male with history of extranodal NK/T-cell lymphoma who presents with his wife as a follow-up for new concerning lesions on the face, back and legs. The patient was last seen 3/31/16 at which time he had deep ulcers, one of which was biopsied and found to be positive for lymphoma. Since we last saw him he has been doing well and states that the oncologist declared him in remission in April. About 5 weeks ago he noted several new lesions with two discrete morphologies. He states that he has a few that were subq nodules including one that was about 6mm on the lateral lower right thigh and then the other lesions which were on the face, and back were itchy red bumps without  scale. He states that he make an appt to see his oncologist and the PA there recommended that he have them biopsied by dermatology. He made an appt with us but unfortunately the lesions largely resolved before today's visit. He only has two that are left today and one is barely palpable. He recalls that around the time of the new lesions he had a cold which is very unusual for him. He denies  Fevers, chills, sweats or wt loss. Otherwise in baseline state of health, no additional skin concerns. His oncology team had suggested a long list of studied for the lesions present at their visit including flow cytometry and genetic studies to determine the origin of the cells (host vs graft line).    The patient states that the lesions on his legs have healed well but the skin there is very fragile and tears easily. For the areas on the feet he has to wear bandaid covering to protect the skin. He is wondering if there is anything that can be done for this.     He follows with an outside dermatologist for his regular skin checks.    Past Medical History:   Patient Active Problem List   Diagnosis     Extranodal NK/T-cell lymphoma, nasal type (H)     Nausea with vomiting     Natural killer (NK) cell lymphoblastic lymphoma (H)     Thrombocytopenia (H)     Anemia in neoplastic disease     Febrile neutropenia (H)     Immunocompromised (H)     Pancytopenia due to chemotherapy (H)     Bacteremia     NHL (non-Hodgkin's lymphoma) (H)     Lymphoma (H)     DVT of upper extremity (deep vein thrombosis) (H)     Fever     Lymphoma, non-Hodgkin's (H)     Neutropenic fever (H)     Neoplasm of uncertain behavior of skin     T-cell or NK-cell neoplasm (H)     Other specified prophylactic or treatment measure     ACP (advance care planning)     Status post cord blood transplantation     Physical deconditioning     History of peripheral stem cell transplant (H)     Dysuria     Hypogammaglobulinemia (H)     Status post bone marrow transplant (H)      Complications of bone marrow transplant (H)     Nonhealing skin ulcer, limited to breakdown of skin (H)     Ulcer of skin, limited to breakdown of skin (H)     Cellulitis     Hypophosphatemia     CAP (community acquired pneumonia)       Past Medical History:   Diagnosis Date     Alcohol abuse     in remission x 20 yrs     CAD (coronary artery disease) of artery bypass graft     CABG 2005, hx of stents     Depression      DVT (deep venous thrombosis) (H) November, 2014    upper left arm after PICC line     GERD (gastroesophageal reflux disease)      Hearing loss      HTN (hypertension)      Lymphoma (H)      S/P autologous bone marrow transplantation (H) 3/4/2015     Skin cancer     15 y ago.? basal cell     Tinnitus      VRE (vancomycin resistant enterococcus) culture positive      Past Surgical History:   Procedure Laterality Date     BYPASS GRAFT ARTERY CORONARY      2005     CYSTOSCOPY, TRANSURETHRAL RESECTION (TUR) TUMOR BLADDER, COMBINED N/A 12/29/2015    Procedure: COMBINED CYSTOSCOPY, TRANSURETHRAL RESECTION (TUR) TUMOR BLADDER;  Surgeon: Orville Boyle MD;  Location: [facility] OR     MOHS MICROGRAPHIC PROCEDURE  2000    nose     PICC INSERTION Left 9/26/2014    5fr DL Power PICC, 43cm (1cm external) in the L basilic vein w/ tip in the low SVC.     PICC INSERTION Left 10/28/2014    5fr DL Power PICC, 41cm (1cm external) in the L basilic vein w/ tip in the SVC RA junction.     PICC INSERTION Right 12/31/2014    5fr DL Power PICC, 39cm (1cm external) in the R medial brachial vein w/ tip in the low SVC.       Social History:  The patient works in the [employer] for half days again after taking of a long period for treatment.  has three children    Family History:  There is no family history of skin cancer. Dad had prostate cancer and mom had esophageal cancer.    Medications:  Current Outpatient Prescriptions   Medication Sig Dispense Refill     nitroGLYcerin (NITROSTAT) 0.4 MG  sublingual tablet Place 0.4 mg under the tongue       AMLODIPINE BESYLATE PO Take by mouth daily       venlafaxine (EFFEXOR XR) 37.5 MG 24 hr capsule Take 1 capsule (37.5 mg) by mouth daily 90 capsule 3     PHOSPHA 250 NEUTRAL 155-852-130 MG tablet TAKE ONE TABLET BY MOUTH TWICE A  tablet 3     ASPIRIN PO Take 81 mg by mouth daily       Atorvastatin Calcium (LIPITOR PO) Take 10 mg by mouth daily           Allergies   Allergen Reactions     Nka [No Known Allergies]          Review of Systems:  -Constitutional: The patient denies fevers, chills, unintended weight loss, and night sweats.  -Skin: As above in HPI. No additional skin concerns.  No intraoral sores.    Physical exam:  GEN: This is a well developed, well-nourished male in no acute distress, in a pleasant mood.    SKIN: Focused examination of the face, and legs was performed:  - numerous well healed scarred lesions on the b/l lower legs with very thin wrinkled scar tissue and peripheral hyperpigmentation  - single 3mm nodule on the left medial lower leg without any overlying surface change   - No other lesions of concern on areas examined.     Impression/Plan:  1. 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  - H&E, sterile culture, flow cytometry, donor chimerism, and if possible TCR gene rearrangement studies requested by onc however due to the small size of the single lesion present today we spoke to Dr. Hill who recommended an H& E with immunohistochemistry and if a second lesion was found to bx then flow cytometry    2. NMSC (BCC), s/p MMS in 2000. NERD.     3. Healed ulcers on the lower legs   - pt was offered tretinoin though told that this would be of mild benefit in strengthening his scars, preventing tearing of skin   - recommended use of  duoderm/hydrocolloid or moleskin     4. NUB, left lower medial leg, bx 10/3/17, concern for recurrence of lymphoma   - Punch biopsy:  After discussion of benefits and risks including but not limited to bleeding/bruising, pain/swelling, infection, scar, incomplete removal, nerve damage/numbness, recurrence, and non-diagnostic biopsy, written consent, verbal consent and photographs were obtained. Time-out was performed. The area was cleaned with isopropyl alcohol. 1mL of 1% lidocaine with epinephrine was injected to obtain adequate anesthesia of the lesion on the left lower medial leg. A 4 mm punch biopsy was performed.  4-0 prolene sutures were utilized to approximate the epidermal edges.  White petroleum jelly/VaselineTM and a bandage was applied to the wound.  Explicit verbal and written wound care instructions were provided.  The patient left the Dermatology Clinic in good condition. The patient was counseled to follow up for suture removal in approximately 14 days.      CC Dr. Yuri Hill on close of this encounter.  Follow-up prn pending biopsy results.     Dr. Ab Perez staffed the patient.    Staff Involved:  Resident (Charlotte George MD) / Staff (as above)    I have seen and examined this patient and agree with the assessment and plan as documented in the resident's note, and was present for all procedures.    Ab Perez MD  Dermatology Attending      Pictures were placed in Pt's chart today for future reference.

## 2017-10-03 NOTE — NURSING NOTE
Lidocaine  1mL once for one use, starting 10/3/2017 ending 10/3/2017,  2mL disp, R-0, injection  Injected by Jimena Armenta, CMA

## 2017-10-05 LAB — COPATH REPORT: NORMAL

## 2017-10-10 ENCOUNTER — CARE COORDINATION (OUTPATIENT)
Dept: ONCOLOGY | Facility: CLINIC | Age: 62
End: 2017-10-10

## 2017-10-10 NOTE — PROGRESS NOTES
Called Mr. Smith this morning per the request of Dr. Hill to inform him: that there is no evidence of lymphoma in his recent skin biopsy. They see only normal cells in a pattern of inflammation. Dr. Hill is aware, as is the Dermatologist, that the lumps were disappearing and it is possible that something might be missed. However, even with this information and having seen his biopsies in the past, they feel this is not lymphoma. Dr. Hill would like to continue to watch as has been done before and Mr. Smith should keep his upcoming appointment with Dr. Hill in November and if any more worrisome spots appear he let us know. Patient and wife were very pleased with this information and will keep Dr. Hill and team update on any new spots.    Mrs. Smith wanted RNCC to relay the message that there are no required vaccinations in traveling to [place].

## 2017-10-22 PROBLEM — Z85.828 HISTORY OF NONMELANOMA SKIN CANCER: Status: ACTIVE | Noted: 2017-10-22

## 2017-11-16 ENCOUNTER — ONCOLOGY VISIT (OUTPATIENT)
Dept: ONCOLOGY | Facility: CLINIC | Age: 62
End: 2017-11-16
Attending: INTERNAL MEDICINE
Payer: MEDICARE

## 2017-11-16 ENCOUNTER — APPOINTMENT (OUTPATIENT)
Dept: LAB | Facility: CLINIC | Age: 62
End: 2017-11-16
Attending: INTERNAL MEDICINE
Payer: MEDICARE

## 2017-11-16 VITALS
SYSTOLIC BLOOD PRESSURE: 145 MMHG | HEIGHT: 72 IN | OXYGEN SATURATION: 98 % | RESPIRATION RATE: 16 BRPM | WEIGHT: 196.9 LBS | HEART RATE: 79 BPM | DIASTOLIC BLOOD PRESSURE: 83 MMHG | BODY MASS INDEX: 26.67 KG/M2 | TEMPERATURE: 98 F

## 2017-11-16 DIAGNOSIS — C96.9 T-CELL OR NK-CELL NEOPLASM (H): ICD-10-CM

## 2017-11-16 DIAGNOSIS — C86.00 EXTRANODAL NK/T-CELL LYMPHOMA, NASAL TYPE: Primary | ICD-10-CM

## 2017-11-16 DIAGNOSIS — Z94.84 HISTORY OF PERIPHERAL STEM CELL TRANSPLANT (H): ICD-10-CM

## 2017-11-16 DIAGNOSIS — D80.1 HYPOGAMMAGLOBULINEMIA (H): ICD-10-CM

## 2017-11-16 DIAGNOSIS — Z29.9 NEED FOR PROPHYLACTIC MEASURE: ICD-10-CM

## 2017-11-16 LAB
ALBUMIN SERPL-MCNC: 3.6 G/DL (ref 3.4–5)
ALP SERPL-CCNC: 94 U/L (ref 40–150)
ALT SERPL W P-5'-P-CCNC: 28 U/L (ref 0–70)
ANION GAP SERPL CALCULATED.3IONS-SCNC: 8 MMOL/L (ref 3–14)
AST SERPL W P-5'-P-CCNC: 21 U/L (ref 0–45)
BASOPHILS # BLD AUTO: 0.1 10E9/L (ref 0–0.2)
BASOPHILS NFR BLD AUTO: 0.4 %
BILIRUB SERPL-MCNC: 0.5 MG/DL (ref 0.2–1.3)
BUN SERPL-MCNC: 15 MG/DL (ref 7–30)
CALCIUM SERPL-MCNC: 8.3 MG/DL (ref 8.5–10.1)
CHLORIDE SERPL-SCNC: 109 MMOL/L (ref 94–109)
CO2 SERPL-SCNC: 24 MMOL/L (ref 20–32)
CREAT SERPL-MCNC: 1.11 MG/DL (ref 0.66–1.25)
DIFFERENTIAL METHOD BLD: ABNORMAL
EOSINOPHIL # BLD AUTO: 0.2 10E9/L (ref 0–0.7)
EOSINOPHIL NFR BLD AUTO: 1.8 %
ERYTHROCYTE [DISTWIDTH] IN BLOOD BY AUTOMATED COUNT: 13.1 % (ref 10–15)
GFR SERPL CREATININE-BSD FRML MDRD: 67 ML/MIN/1.7M2
GLUCOSE SERPL-MCNC: 96 MG/DL (ref 70–99)
HCT VFR BLD AUTO: 45.9 % (ref 40–53)
HGB BLD-MCNC: 14.8 G/DL (ref 13.3–17.7)
IMM GRANULOCYTES # BLD: 0 10E9/L (ref 0–0.4)
IMM GRANULOCYTES NFR BLD: 0.2 %
LDH SERPL L TO P-CCNC: 173 U/L (ref 85–227)
LYMPHOCYTES # BLD AUTO: 3.8 10E9/L (ref 0.8–5.3)
LYMPHOCYTES NFR BLD AUTO: 33 %
MCH RBC QN AUTO: 28.2 PG (ref 26.5–33)
MCHC RBC AUTO-ENTMCNC: 32.2 G/DL (ref 31.5–36.5)
MCV RBC AUTO: 87 FL (ref 78–100)
MONOCYTES # BLD AUTO: 1.1 10E9/L (ref 0–1.3)
MONOCYTES NFR BLD AUTO: 9.2 %
NEUTROPHILS # BLD AUTO: 6.3 10E9/L (ref 1.6–8.3)
NEUTROPHILS NFR BLD AUTO: 55.4 %
NRBC # BLD AUTO: 0 10*3/UL
NRBC BLD AUTO-RTO: 0 /100
PHOSPHATE SERPL-MCNC: 2.6 MG/DL (ref 2.5–4.5)
PLATELET # BLD AUTO: 215 10E9/L (ref 150–450)
POTASSIUM SERPL-SCNC: 3.6 MMOL/L (ref 3.4–5.3)
PROT SERPL-MCNC: 6.4 G/DL (ref 6.8–8.8)
RBC # BLD AUTO: 5.25 10E12/L (ref 4.4–5.9)
SODIUM SERPL-SCNC: 140 MMOL/L (ref 133–144)
URATE SERPL-MCNC: 2.5 MG/DL (ref 3.5–7.2)
WBC # BLD AUTO: 11.5 10E9/L (ref 4–11)

## 2017-11-16 PROCEDURE — G0008 ADMIN INFLUENZA VIRUS VAC: HCPCS

## 2017-11-16 PROCEDURE — 90648 HIB PRP-T VACCINE 4 DOSE IM: CPT | Mod: ZF | Performed by: INTERNAL MEDICINE

## 2017-11-16 PROCEDURE — 90471 IMMUNIZATION ADMIN: CPT

## 2017-11-16 PROCEDURE — 00000402 ZZHCL STATISTIC TOTAL PROTEIN: Performed by: INTERNAL MEDICINE

## 2017-11-16 PROCEDURE — 99212 OFFICE O/P EST SF 10 MIN: CPT | Mod: 25

## 2017-11-16 PROCEDURE — 80053 COMPREHEN METABOLIC PANEL: CPT | Performed by: INTERNAL MEDICINE

## 2017-11-16 PROCEDURE — 90472 IMMUNIZATION ADMIN EACH ADD: CPT

## 2017-11-16 PROCEDURE — 90686 IIV4 VACC NO PRSV 0.5 ML IM: CPT | Mod: ZF | Performed by: INTERNAL MEDICINE

## 2017-11-16 PROCEDURE — 83615 LACTATE (LD) (LDH) ENZYME: CPT | Performed by: INTERNAL MEDICINE

## 2017-11-16 PROCEDURE — G0009 ADMIN PNEUMOCOCCAL VACCINE: HCPCS

## 2017-11-16 PROCEDURE — 36415 COLL VENOUS BLD VENIPUNCTURE: CPT

## 2017-11-16 PROCEDURE — 84550 ASSAY OF BLOOD/URIC ACID: CPT | Performed by: INTERNAL MEDICINE

## 2017-11-16 PROCEDURE — 25000128 H RX IP 250 OP 636: Mod: ZF | Performed by: INTERNAL MEDICINE

## 2017-11-16 PROCEDURE — 99214 OFFICE O/P EST MOD 30 MIN: CPT | Mod: GC | Performed by: INTERNAL MEDICINE

## 2017-11-16 PROCEDURE — 85025 COMPLETE CBC W/AUTO DIFF WBC: CPT | Performed by: INTERNAL MEDICINE

## 2017-11-16 PROCEDURE — 87533 HHV-6 DNA QUANT: CPT | Performed by: INTERNAL MEDICINE

## 2017-11-16 PROCEDURE — 84165 PROTEIN E-PHORESIS SERUM: CPT | Performed by: INTERNAL MEDICINE

## 2017-11-16 PROCEDURE — 87799 DETECT AGENT NOS DNA QUANT: CPT | Performed by: INTERNAL MEDICINE

## 2017-11-16 PROCEDURE — 84100 ASSAY OF PHOSPHORUS: CPT | Performed by: INTERNAL MEDICINE

## 2017-11-16 PROCEDURE — 90723 DTAP-HEP B-IPV VACCINE IM: CPT | Mod: ZF | Performed by: INTERNAL MEDICINE

## 2017-11-16 PROCEDURE — 90670 PCV13 VACCINE IM: CPT | Mod: ZF | Performed by: INTERNAL MEDICINE

## 2017-11-16 RX ADMIN — PNEUMOCOCCAL 13-VALENT CONJUGATE VACCINE 0.5 ML: 2.2; 2.2; 2.2; 2.2; 2.2; 4.4; 2.2; 2.2; 2.2; 2.2; 2.2; 2.2; 2.2 INJECTION, SUSPENSION INTRAMUSCULAR at 11:33

## 2017-11-16 RX ADMIN — DIPHTHERIA AND TETANUS TOXOIDS AND ACELLULAR PERTUSSIS ADSORBED, HEPATITIS B (RECOMBINANT) AND INACTIVATED POLIOVIRUS VACCINE COMBINED 0.5 ML: 25; 10; 25; 25; 8; 10; 40; 8; 32 INJECTION, SUSPENSION INTRAMUSCULAR at 11:34

## 2017-11-16 RX ADMIN — HAEMOPHILUS B POLYSACCHARIDE CONJUGATE VACCINE FOR INJ 0.5 ML: RECON SOLN at 11:36

## 2017-11-16 RX ADMIN — INFLUENZA A VIRUS A/MICHIGAN/45/2015 X-275 (H1N1) ANTIGEN (FORMALDEHYDE INACTIVATED), INFLUENZA A VIRUS A/HONG KONG/4801/2014 X-263B (H3N2) ANTIGEN (FORMALDEHYDE INACTIVATED), INFLUENZA B VIRUS B/PHUKET/3073/2013 ANTIGEN (FORMALDEHYDE INACTIVATED), AND INFLUENZA B VIRUS B/BRISBANE/60/2008 ANTIGEN (FORMALDEHYDE INACTIVATED) 0.5 ML: 15; 15; 15; 15 INJECTION, SUSPENSION INTRAMUSCULAR at 11:32

## 2017-11-16 ASSESSMENT — PAIN SCALES - GENERAL: PAINLEVEL: NO PAIN (0)

## 2017-11-16 NOTE — NURSING NOTE
"Oncology Rooming Note    November 16, 2017 10:09 AM   Hi Smith is a 62 year old male who presents for:    Chief Complaint   Patient presents with     Blood Draw     labs drawn by vpt by rn.  vs taken.     Oncology Clinic Visit     return patient visit for 3 month follow up related to t-cell lymphoma      Initial Vitals: /83 (BP Location: Right arm, Patient Position: Sitting, Cuff Size: Adult Regular)  Pulse 79  Temp 98  F (36.7  C) (Oral)  Resp 16  Ht 1.829 m (6' 0.01\")  Wt 89.3 kg (196 lb 14.4 oz)  SpO2 98%  BMI 26.7 kg/m2 Estimated body mass index is 26.7 kg/(m^2) as calculated from the following:    Height as of this encounter: 1.829 m (6' 0.01\").    Weight as of this encounter: 89.3 kg (196 lb 14.4 oz). Body surface area is 2.13 meters squared.  No Pain (0) Comment: Data Unavailable   No LMP for male patient.  Allergies reviewed: Yes  Medications reviewed: Yes    Medications: Medication refills not needed today.  Pharmacy name entered into [facility]:    [facility] [place], [place] - [address] [facility], [address]  [address] - [address] [facility]    Clinical concerns: no concerns dr. bernal was notified.    6 minutes for nursing intake (face to face time)     Debbie Hernandez CMA              "

## 2017-11-16 NOTE — LETTER
11/16/2017      RE: Hi Smith  [address] [facility] [address]       [facility]  Outpatient Progress Note  Last clinic visit: 9/26/17 (acute visit), 8/16/17 (routine follow up)    Hem/onc History:     Hi Smith is 62 years of age with a history of extranodal NK/T-cell lymphoma (nasal type) (07/17/2014) that was localized to multiple cutaneous/subcutaneous sites.  He received chemotherapy, initially with SMILE (3 cycles) with inability to control the disease, followed by ICE for 2 cycles in 01/2015 and then autologous stem cell transplant (03/04/2015) with BEAM conditioning. 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  By 12/2015, he again had evidence for recurrence on the right lower extremity (biopsy proven).  Recurrence appeared to be localized to the right ankle and possibly, although undocumented, on the right wrist.  He was given involved field radiation to these areas.  Subsequently, he had the appearance of multiple subcutaneous nodules on his legs, especially, but also on his arms and trunk, that arose and often resolved spontaneously.  Some showed inflammation and cutaneous crusting with erythema and subsequent regression. It was thought he may be experiencing a graft vs lymphoma effect. He continued to have new lesions, predominantly on his lower legs, that appeared and resolved. Those on his legs that ulcerated resolved with the care provide by Dr. Eaton. Over time, the frequency of new lesions and their ulceration lessened.      A pneumonia required brief hospitalization in 04/2017 thought to be RSV bronchiolitis with suspected superimposed bacterial pneumonia. Related symptoms cleared with antibiotics.  ~9/2017, Mr. Smith developed new, scattered LE nodules. He was referred to dermatology for biopsy, but the lesions self-resolved. Biopsy of one of the resolving lesions revealed no evidence of lymphoma.    Interval  history:     Mr. Smith presents with his wife Ashley today. He has no specific questions nor concerns. No new lesions. Overall, he reports his energy level being ok. He does not Engage in formal exercise but stays active working around kids and his houses, not napping. He has 3 kids in [facility].    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 He talked about the use of bacteria that alters the stomach pH of mosquito larvae and is safe and organic certified. They also did some spraying which he found less effective. Unfortunately, he retired because of cancer.    On ROS in addition to the above  Endorses:  +Rash on UE - around antecubital fossa, indicated L side, but also occasionally on R; he had something similar a few months ago for which he saw derm and was given a steroid cream. He has not tried to steroid cream again this time around, but today noted it has nearly self-resolved.    Denies fevers, chills, NS, HA, change in weight, change in appetite, cough, SOB, CP, n/v, abd pain, constipation/diarrhea, hematochezia, dysuria, hematuria, swelling, rashes, lymphadenopathy      Past Medical History:   Reviewed in EPIC    Past Medical History:   Diagnosis Date     Alcohol abuse     in remission x 20 yrs     CAD (coronary artery disease) of artery bypass graft     CABG 2005, hx of stents     Depression      DVT (deep venous thrombosis) (H) November, 2014    upper left arm after PICC line     GERD (gastroesophageal reflux disease)      Hearing loss      HTN (hypertension)      Lymphoma (H)      S/P autologous bone marrow transplantation (H) 3/4/2015     Skin cancer     15 y ago.? basal cell     Tinnitus      VRE (vancomycin resistant enterococcus) culture positive           Past Surgical History:   Reviewed in EPIC    Past Surgical History:   Procedure Laterality Date     BYPASS GRAFT ARTERY CORONARY      2005     CYSTOSCOPY, TRANSURETHRAL RESECTION (TUR) TUMOR  "BLADDER, COMBINED N/A 12/29/2015    Procedure: COMBINED CYSTOSCOPY, TRANSURETHRAL RESECTION (TUR) TUMOR BLADDER;  Surgeon: Orville Boyle MD;  Location: [facility] OR     MOHS MICROGRAPHIC PROCEDURE  2000    nose     PICC INSERTION Left 9/26/2014    5fr DL Power PICC, 43cm (1cm external) in the L basilic vein w/ tip in the low SVC.     PICC INSERTION Left 10/28/2014    5fr DL Power PICC, 41cm (1cm external) in the L basilic vein w/ tip in the SVC RA junction.     PICC INSERTION Right 12/31/2014    5fr DL Power PICC, 39cm (1cm external) in the R medial brachial vein w/ tip in the low SVC.          Social History:   Reviewed in EPIC    Social History   Substance Use Topics     Smoking status: Former Smoker     Packs/day: 1.00     Years: 20.00     Types: Cigarettes     Quit date: 2/16/1994     Smokeless tobacco: Never Used     Alcohol use No        Medications:   Reviewed in [facility]    Current Outpatient Prescriptions   Medication Sig     nitroGLYcerin (NITROSTAT) 0.4 MG sublingual tablet Place 0.4 mg under the tongue     AMLODIPINE BESYLATE PO Take by mouth daily     venlafaxine (EFFEXOR XR) 37.5 MG 24 hr capsule Take 1 capsule (37.5 mg) by mouth daily     PHOSPHA 250 NEUTRAL 155-852-130 MG tablet TAKE ONE TABLET BY MOUTH TWICE A DAY     ASPIRIN PO Take 81 mg by mouth daily     Atorvastatin Calcium (LIPITOR PO) Take 10 mg by mouth daily      No current facility-administered medications for this visit.         Physical Exam (Resident / Clinician):   Blood pressure 145/83, pulse 79, temperature 98  F (36.7  C), temperature source Oral, resp. rate 16, height 1.829 m (6' 0.01\"), weight 89.3 kg (196 lb 14.4 oz), SpO2 98 %.    ECOG PS: 0-1  Constitutional: WDWN male in NAD, pleasant and appropriate  HEENT:  NC/AT, no icterus, OP clear, MMM  Skin: scattered hypopigmented macules on b/l LE, No jaundice nor ecchymoses. No evident cutaneous or subcutaneous lesions.  Lungs: CTAB, no w/r/r, nonlabored breathing  Cardiovascular: " RRR, S1, S2, no m/r/g  Abdomen: +BS, soft, nontender, nondistended, no organomegaly nor masses  MSK/Extremities: Warm, well perfused. No edema  LN: no cervical, supraclavicular, axillary, nor inguinal lymphadenopathy  Neurologic: alert, answering questions appropriately, moving all extremities spontaneously  Psych: appropriate affect  Data:   Reviewed in EPIC and notable for:    11.5 > 14.8 < 215 ANC 6.3 (diff wnl)  Cr 1.11, electrolytes, LFTs, LDH, uric acid wnl  SPEP essentially normal, gamma fraction low at 0.6  EBV not detected, HHV6 4.5 log    Assessment and Plan:     # NK/T-cell lymphoma, nasal type   # Mild hypogammaglobulinemia without recurrent infections    Mr. Smith continues to do very well, now over 2 years from the time of his non-myeloablative cord blood transplant. He has had episodes concerning for tumor recurrence, most recently ~9/2017 with the appearance of a nodular rash, which self-resolved and was biopsy negative (biopsied when nearly resolved).    Today he reports no issues and labs are grossly unremarkable. 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 He has a minimal leukocytosis with a normal differential.    Plan  -post-transplant vaccinations today (vaccinations have been delayed by intercurrent events, 12 month post BMT immunizations given today, addiitonal vaccinations in 2-3 months and again in 12 months)  -flu shot today  -RTC 3 months with labs and additional vaccinations.      Labs and treatment plan reviewed with patient. All questions answered.    In this 30 minutes visit, > 50% spent in counseling and coordinating care.    Patient discussed with Dr. Hill.    Ronen Zhu MD ([place]), PhD   Hem/Onc Fellow    Oncology Attending    Patient seen and examined with the Oncology Fellow, Dr. Zhu. Agree with his findings, assessment and plan.    Yuri Hill MD  Professor of Medicine  Oncology  [facility]  Office: [phone]  Clinic Fax:  [phone]    cc:    MD Ruth Prince, MD Ab Perez, MD Krish Mosley MD Nathan Norquist, MD Marie-Claire Buckley, MD Bruce A. Peterson, MD

## 2017-11-16 NOTE — NURSING NOTE
Hi Smith      1.  Has the patient received the information for the influenza vaccine? YES    2.  Does the patient have any of the following contraindications?     Allergy to eggs? No     Allergic reaction to previous influenza vaccines? No     Any other problems to previous influenza vaccines? No     Paralyzed by Guillain-Zap syndrome? No     Currently pregnant? NO     Current moderate or severe illness? No     Allergy to contact lens solution? No    3.  The vaccine has been administered in the usual fashion and the patient was instructed to wait 20 minutes before leaving the building in the event of an allergic reaction: YES    Vaccination given by not yet given as of @10:11am   Recorded by Debbie Hernandez

## 2017-11-16 NOTE — NURSING NOTE
Chief Complaint   Patient presents with     Blood Draw     labs drawn by vpt by rn.  vs taken.     Labs drawn by vpt by rn.  Vital signs taken.    Catherine Ferro RN

## 2017-11-16 NOTE — PROGRESS NOTES
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  He received chemotherapy, initially with SMILE (3 cycles) with inability to control the disease, followed by ICE for 2 cycles in 01/2015 and then autologous stem cell transplant (03/04/2015) with BEAM conditioning. 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  By 12/2015, he again had evidence for recurrence on the right lower extremity (biopsy proven).  Recurrence appeared to be localized to the right ankle and possibly, although undocumented, on the right wrist.  He was given involved field radiation to these areas.  Subsequently, he had the appearance of multiple subcutaneous nodules on his legs, especially, but also on his arms and trunk, that arose and often resolved spontaneously.  Some showed inflammation and cutaneous crusting with erythema and subsequent regression. It was thought he may be experiencing a graft vs lymphoma effect. He continued to have new lesions, predominantly on his lower legs, that appeared and resolved. Those on his legs that ulcerated resolved with the care provide by Dr. Eaton. Over time, the frequency of new lesions and their ulceration lessened.      A pneumonia required brief hospitalization in 04/2017 thought to be RSV bronchiolitis with suspected superimposed bacterial pneumonia. Related symptoms cleared with antibiotics.  ~9/2017, Mr. Smith developed new, scattered LE nodules. He was referred to dermatology for biopsy, but the lesions self-resolved. Biopsy of one of the resolving lesions revealed no evidence of lymphoma.    Interval history:     Mr. Smith presents with his wife Ashley today. He has no specific questions  nor concerns. No new lesions. Overall, he reports his energy level being ok. He does not Engage in formal exercise but stays active working around kids and his houses, not napping. He has 3 kids in [facility].    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 He talked about the use of bacteria that alters the stomach pH of mosquito larvae and is safe and organic certified. They also did some spraying which he found less effective. Unfortunately, he retired because of cancer.    On ROS in addition to the above  Endorses:  +Rash on UE - around antecubital fossa, indicated L side, but also occasionally on R; he had something similar a few months ago for which he saw derm and was given a steroid cream. He has not tried to steroid cream again this time around, but today noted it has nearly self-resolved.    Denies fevers, chills, NS, HA, change in weight, change in appetite, cough, SOB, CP, n/v, abd pain, constipation/diarrhea, hematochezia, dysuria, hematuria, swelling, rashes, lymphadenopathy      Past Medical History:   Reviewed in EPIC    Past Medical History:   Diagnosis Date     Alcohol abuse     in remission x 20 yrs     CAD (coronary artery disease) of artery bypass graft     CABG 2005, hx of stents     Depression      DVT (deep venous thrombosis) (H) November, 2014    upper left arm after PICC line     GERD (gastroesophageal reflux disease)      Hearing loss      HTN (hypertension)      Lymphoma (H)      S/P autologous bone marrow transplantation (H) 3/4/2015     Skin cancer     15 y ago.? basal cell     Tinnitus      VRE (vancomycin resistant enterococcus) culture positive           Past Surgical History:   Reviewed in EPIC    Past Surgical History:   Procedure Laterality Date     BYPASS GRAFT ARTERY CORONARY      2005     CYSTOSCOPY, TRANSURETHRAL RESECTION (TUR) TUMOR BLADDER, COMBINED N/A 12/29/2015    Procedure: COMBINED CYSTOSCOPY, TRANSURETHRAL RESECTION  "(TUR) TUMOR BLADDER;  Surgeon: Orville Boyle MD;  Location: [facility] OR     MOHS MICROGRAPHIC PROCEDURE  2000    nose     PICC INSERTION Left 9/26/2014    5fr DL Power PICC, 43cm (1cm external) in the L basilic vein w/ tip in the low SVC.     PICC INSERTION Left 10/28/2014    5fr DL Power PICC, 41cm (1cm external) in the L basilic vein w/ tip in the SVC RA junction.     PICC INSERTION Right 12/31/2014    5fr DL Power PICC, 39cm (1cm external) in the R medial brachial vein w/ tip in the low SVC.          Social History:   Reviewed in EPIC    Social History   Substance Use Topics     Smoking status: Former Smoker     Packs/day: 1.00     Years: 20.00     Types: Cigarettes     Quit date: 2/16/1994     Smokeless tobacco: Never Used     Alcohol use No        Medications:   Reviewed in [facility]    Current Outpatient Prescriptions   Medication Sig     nitroGLYcerin (NITROSTAT) 0.4 MG sublingual tablet Place 0.4 mg under the tongue     AMLODIPINE BESYLATE PO Take by mouth daily     venlafaxine (EFFEXOR XR) 37.5 MG 24 hr capsule Take 1 capsule (37.5 mg) by mouth daily     PHOSPHA 250 NEUTRAL 155-852-130 MG tablet TAKE ONE TABLET BY MOUTH TWICE A DAY     ASPIRIN PO Take 81 mg by mouth daily     Atorvastatin Calcium (LIPITOR PO) Take 10 mg by mouth daily      No current facility-administered medications for this visit.         Physical Exam (Resident / Clinician):   Blood pressure 145/83, pulse 79, temperature 98  F (36.7  C), temperature source Oral, resp. rate 16, height 1.829 m (6' 0.01\"), weight 89.3 kg (196 lb 14.4 oz), SpO2 98 %.    ECOG PS: 0-1  Constitutional: WDWN male in NAD, pleasant and appropriate  HEENT:  NC/AT, no icterus, OP clear, MMM  Skin: scattered hypopigmented macules on b/l LE, No jaundice nor ecchymoses. No evident cutaneous or subcutaneous lesions.  Lungs: CTAB, no w/r/r, nonlabored breathing  Cardiovascular: RRR, S1, S2, no m/r/g  Abdomen: +BS, soft, nontender, nondistended, no organomegaly nor " masses  MSK/Extremities: Warm, well perfused. No edema  LN: no cervical, supraclavicular, axillary, nor inguinal lymphadenopathy  Neurologic: alert, answering questions appropriately, moving all extremities spontaneously  Psych: appropriate affect  Data:   Reviewed in EPIC and notable for:    11.5 > 14.8 < 215 ANC 6.3 (diff wnl)  Cr 1.11, electrolytes, LFTs, LDH, uric acid wnl  SPEP essentially normal, gamma fraction low at 0.6  EBV not detected, HHV6 4.5 log    Assessment and Plan:     # NK/T-cell lymphoma, nasal type   # Mild hypogammaglobulinemia without recurrent infections    Mr. Smith continues to do very well, now over 2 years from the time of his non-myeloablative cord blood transplant. He has had episodes concerning for tumor recurrence, most recently ~9/2017 with the appearance of a nodular rash, which self-resolved and was biopsy negative (biopsied when nearly resolved).    Today he reports no issues and labs are grossly unremarkable. 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 He has a minimal leukocytosis with a normal differential.    Plan  -post-transplant vaccinations today (vaccinations have been delayed by intercurrent events, 12 month post BMT immunizations given today, addiitonal vaccinations in 2-3 months and again in 12 months)  -flu shot today  -RTC 3 months with labs and additional vaccinations.      Labs and treatment plan reviewed with patient. All questions answered.    In this 30 minutes visit, > 50% spent in counseling and coordinating care.    Patient discussed with Dr. Hill.    Ronen Zhu MD ([place]), PhD   Hem/Onc Fellow    Oncology Attending    Patient seen and examined with the Oncology Fellow, Dr. Zhu. Agree with his findings, assessment and plan.    Yuri Hill MD  Professor of Medicine  Oncology  [facility]  Office: [phone]  Clinic Fax: [phone]    cc:    MD Ruth Prince MD Daniel Miller, MD Bailey Lee,  MD Glenroy Gallardo MD Marie-Claire Buckley, MD

## 2017-11-16 NOTE — MR AVS SNAPSHOT
After Visit Summary   11/16/2017    Hi Smith    MRN: [id number]           Patient Information     Date Of Birth          1955        Visit Information        Provider Department      11/16/2017 10:00 AM Yuri Hill MD [facility]        Today's Diagnoses     Extranodal NK/T-cell lymphoma, nasal type (H)    -  1    Hypogammaglobulinemia (H)        History of peripheral stem cell transplant (H)        Other specified prophylactic or treatment measure        T-cell or NK-cell neoplasm (H)          Care Instructions    -vaccinations today   -return to clinic with labs in 2 months and for vaccinations          Follow-ups after your visit        Follow-up notes from your care team     Return in about 2 months (around 1/16/2018) for Physical Exam, Lab Work.      Your next 10 appointments already scheduled     Jan 18, 2018 10:00 AM CST   (Arrive by 9:45 AM)   Return Visit with Yuri Hill MD   [facility] ([facility] [place])    [address] [place] [id number] [phone]              Who to contact     If you have questions or need follow up information about today's clinic visit or your schedule please contact [facility] directly at [phone].  Normal or non-critical lab and imaging results will be communicated to you by MyChart, letter or phone within 4 business days after the clinic has received the results. If you do not hear from us within 7 days, please contact the clinic through MyChart or phone. If you have a critical or abnormal lab result, we will notify you by phone as soon as possible.  Submit refill requests through [facility] or call your pharmacy and they will forward the refill request to us. Please allow 3 business days for your refill to be completed.          Additional Information About Your Visit        Virtual [facility] Information     [facility] gives you secure access to  "your electronic health record. If you see a primary care provider, you can also send messages to your care team and make appointments. If you have questions, please call your primary care clinic.  If you do not have a primary care provider, please call [phone] and they will assist you.        Care EveryWhere ID     This is your Care EveryWhere ID. This could be used by other organizations to access your [facility] medical records  [id number]        Your Vitals Were     Pulse Temperature Respirations Height Pulse Oximetry BMI (Body Mass Index)    79 98  F (36.7  C) (Oral) 16 1.829 m (6' 0.01\") 98% 26.7 kg/m2       Blood Pressure from Last 3 Encounters:   11/16/17 145/83   09/26/17 128/81   08/16/17 130/76    Weight from Last 3 Encounters:   11/16/17 89.3 kg (196 lb 14.4 oz)   09/26/17 88 kg (194 lb 1.6 oz)   08/16/17 88.1 kg (194 lb 3.2 oz)              We Performed the Following     CBC with platelets differential     Comprehensive metabolic panel     EBV DNA PCR Quantitative Whole Blood     HHV6 DNA Quant PCR     Lactate Dehydrogenase     Phosphorus     Protein electrophoresis     Uric acid        Primary Care Provider Office Phone # Fax #    Glenroy Torres [phone]       [facility] [address] [address] [facility] [address]        Equal Access to Services     SANDRA LOPEZ : Hadii aad ku hadasho Soomaali, waaxda luqadaha, qaybta kaalmada adeegyada, phuc wallace. So [facility] [phone].    ATENCIÓN: Si habla español, tiene a singh disposición servicios gratuitos de asistencia lingüística. Llngozi al [phone].    We comply with applicable federal civil rights laws and [place] laws. We do not discriminate on the basis of race, color, national origin, age, disability, sex, sexual orientation, or gender identity.            Thank you!     Thank you for choosing [facility]  for your care. Our goal is always to provide you with excellent " care. Hearing back from our patients is one way we can continue to improve our services. Please take a few minutes to complete the written survey that you may receive in the mail after your visit with us. Thank you!             Your Updated Medication List - Protect others around you: Learn how to safely use, store and throw away your medicines at www.disposemymeds.org.          This list is accurate as of: 11/16/17 11:59 PM.  Always use your most recent med list.                   Brand Name Dispense Instructions for use Diagnosis    AMLODIPINE BESYLATE PO      Take by mouth daily    Extranodal NK/T-cell lymphoma, nasal type (H)       ASPIRIN PO      Take 81 mg by mouth daily    T-cell or NK-cell neoplasm (H)       LIPITOR PO      Take 10 mg by mouth daily    T-cell or NK-cell neoplasm (H)       nitroGLYcerin 0.4 MG sublingual tablet    NITROSTAT     Place 0.4 mg under the tongue    Extranodal NK/T-cell lymphoma, nasal type (H), Hypogammaglobulinemia (H)       PHOSPHA 250 NEUTRAL 250 MG per tablet   Generic drug:  phosphorus tablet 250 mg     270 tablet    TAKE ONE TABLET BY MOUTH TWICE A DAY    Hypophosphatemia       venlafaxine 37.5 MG 24 hr capsule    EFFEXOR XR    90 capsule    Take 1 capsule (37.5 mg) by mouth daily    Lymphoma (H)

## 2017-11-16 NOTE — NURSING NOTE
Flu vaccination given today in left arm. Patient tolerated well.  Dawn Perez CMA  Given prevnar 13 in left deltoid. Patient tolerated well.  Dawn Perez CMA      Given pediarix in right deltoid. Patient tolerated well.  Dawn Perez CMA    Given ActHib with sodium diluent lot number [id number] exp 4/1/18 in right deltoid. Patient tolerated well.  Dawn Perez CMA

## 2017-11-17 LAB
ALBUMIN SERPL ELPH-MCNC: 3.9 G/DL (ref 3.7–5.1)
ALPHA1 GLOB SERPL ELPH-MCNC: 0.3 G/DL (ref 0.2–0.4)
ALPHA2 GLOB SERPL ELPH-MCNC: 0.7 G/DL (ref 0.5–0.9)
B-GLOBULIN SERPL ELPH-MCNC: 0.6 G/DL (ref 0.6–1)
EBV DNA # SPEC NAA+PROBE: NORMAL {COPIES}/ML
EBV DNA SPEC NAA+PROBE-LOG#: NORMAL {LOG_COPIES}/ML
GAMMA GLOB SERPL ELPH-MCNC: 0.6 G/DL (ref 0.7–1.6)
HHV6 DNA # SPEC NAA+PROBE: ABNORMAL COPIES/ML
HHV6 DNA SPEC NAA+PROBE-LOG#: 4.5 LOG COPIES/ML
M PROTEIN SERPL ELPH-MCNC: 0 G/DL
PROT PATTERN SERPL ELPH-IMP: ABNORMAL
SPECIMEN SOURCE: ABNORMAL

## 2017-11-24 DIAGNOSIS — E83.39 HYPOPHOSPHATEMIA: ICD-10-CM

## 2017-12-05 ENCOUNTER — TELEPHONE (OUTPATIENT)
Dept: ONCOLOGY | Facility: CLINIC | Age: 62
End: 2017-12-05

## 2017-12-05 NOTE — TELEPHONE ENCOUNTER
Central Prior Authorization Team   Phone: [phone]      PA Initiation pa not needed     Medication: phosphorus tablet 250 mg (PHOSPHA 250 NEUTRAL) 250 MG per tablet -pa initiated   Insurance Company:    [facility] [place] [facility]  Filling Pharmacy Phone: [phone]  Filling Pharmacy Fax:    Start Date: 12/5/2017    Pa was not needed was a paid clam and done through pharmacy as of 11/28 disregard

## 2018-01-03 ENCOUNTER — CARE COORDINATION (OUTPATIENT)
Dept: ONCOLOGY | Facility: CLINIC | Age: 63
End: 2018-01-03

## 2018-01-03 NOTE — PROGRESS NOTES
Reason for Outgoing call:    Returning patient's call.     Patients Questions/Concerns:    Patient calling to report that he noticed 2 new lumps this morning on his legs.  They are smaller then a pea and are on the back side of each of his calves. He denies and redness, swelling, or warmth.  He stated that the lump in his right calve is slightly tender.     Nursing Action/Patient Instruction:    RNCC updated Dr. Hill. Per Dr. Hill, he should continue to monitor these areas until he returns to clinic on 1/18/18.  If this two areas worsen at all or any new symptoms develop, he should call into our nursing triage line right away.     Patient Response/Evaluation:    Patient verbalized understanding of plan and was grateful for the call back.

## 2018-01-11 DIAGNOSIS — C96.9 T-CELL OR NK-CELL NEOPLASM (H): Primary | ICD-10-CM

## 2018-01-18 ENCOUNTER — ONCOLOGY VISIT (OUTPATIENT)
Dept: ONCOLOGY | Facility: CLINIC | Age: 63
End: 2018-01-18
Attending: INTERNAL MEDICINE
Payer: MEDICARE

## 2018-01-18 ENCOUNTER — APPOINTMENT (OUTPATIENT)
Dept: LAB | Facility: CLINIC | Age: 63
End: 2018-01-18
Attending: INTERNAL MEDICINE
Payer: MEDICARE

## 2018-01-18 VITALS
BODY MASS INDEX: 26.39 KG/M2 | TEMPERATURE: 98.3 F | WEIGHT: 194.6 LBS | HEART RATE: 84 BPM | OXYGEN SATURATION: 99 % | RESPIRATION RATE: 18 BRPM | DIASTOLIC BLOOD PRESSURE: 82 MMHG | SYSTOLIC BLOOD PRESSURE: 126 MMHG

## 2018-01-18 DIAGNOSIS — C96.9 T-CELL OR NK-CELL NEOPLASM (H): ICD-10-CM

## 2018-01-18 LAB
ALBUMIN SERPL-MCNC: 3.8 G/DL (ref 3.4–5)
ALP SERPL-CCNC: 88 U/L (ref 40–150)
ALT SERPL W P-5'-P-CCNC: 24 U/L (ref 0–70)
ANION GAP SERPL CALCULATED.3IONS-SCNC: 10 MMOL/L (ref 3–14)
AST SERPL W P-5'-P-CCNC: 22 U/L (ref 0–45)
BASOPHILS # BLD AUTO: 0 10E9/L (ref 0–0.2)
BASOPHILS NFR BLD AUTO: 0.3 %
BILIRUB SERPL-MCNC: 0.7 MG/DL (ref 0.2–1.3)
BUN SERPL-MCNC: 18 MG/DL (ref 7–30)
CALCIUM SERPL-MCNC: 8.6 MG/DL (ref 8.5–10.1)
CHLORIDE SERPL-SCNC: 109 MMOL/L (ref 94–109)
CO2 SERPL-SCNC: 21 MMOL/L (ref 20–32)
CREAT SERPL-MCNC: 1.22 MG/DL (ref 0.66–1.25)
DIFFERENTIAL METHOD BLD: ABNORMAL
EOSINOPHIL # BLD AUTO: 0.2 10E9/L (ref 0–0.7)
EOSINOPHIL NFR BLD AUTO: 2 %
ERYTHROCYTE [DISTWIDTH] IN BLOOD BY AUTOMATED COUNT: 13.3 % (ref 10–15)
GFR SERPL CREATININE-BSD FRML MDRD: 60 ML/MIN/1.7M2
GLUCOSE SERPL-MCNC: 140 MG/DL (ref 70–99)
HCT VFR BLD AUTO: 49.5 % (ref 40–53)
HGB BLD-MCNC: 15.7 G/DL (ref 13.3–17.7)
IMM GRANULOCYTES # BLD: 0 10E9/L (ref 0–0.4)
IMM GRANULOCYTES NFR BLD: 0.2 %
LDH SERPL L TO P-CCNC: 198 U/L (ref 85–227)
LYMPHOCYTES # BLD AUTO: 2.9 10E9/L (ref 0.8–5.3)
LYMPHOCYTES NFR BLD AUTO: 28.2 %
MCH RBC QN AUTO: 28 PG (ref 26.5–33)
MCHC RBC AUTO-ENTMCNC: 31.7 G/DL (ref 31.5–36.5)
MCV RBC AUTO: 88 FL (ref 78–100)
MONOCYTES # BLD AUTO: 1.5 10E9/L (ref 0–1.3)
MONOCYTES NFR BLD AUTO: 14.3 %
NEUTROPHILS # BLD AUTO: 5.6 10E9/L (ref 1.6–8.3)
NEUTROPHILS NFR BLD AUTO: 55 %
NRBC # BLD AUTO: 0 10*3/UL
NRBC BLD AUTO-RTO: 0 /100
PLATELET # BLD AUTO: 242 10E9/L (ref 150–450)
POTASSIUM SERPL-SCNC: 3.7 MMOL/L (ref 3.4–5.3)
PROT SERPL-MCNC: 6.9 G/DL (ref 6.8–8.8)
RBC # BLD AUTO: 5.6 10E12/L (ref 4.4–5.9)
SODIUM SERPL-SCNC: 140 MMOL/L (ref 133–144)
URATE SERPL-MCNC: 2.8 MG/DL (ref 3.5–7.2)
WBC # BLD AUTO: 10.2 10E9/L (ref 4–11)

## 2018-01-18 PROCEDURE — 80053 COMPREHEN METABOLIC PANEL: CPT | Performed by: INTERNAL MEDICINE

## 2018-01-18 PROCEDURE — 83615 LACTATE (LD) (LDH) ENZYME: CPT | Performed by: INTERNAL MEDICINE

## 2018-01-18 PROCEDURE — 36415 COLL VENOUS BLD VENIPUNCTURE: CPT

## 2018-01-18 PROCEDURE — 85025 COMPLETE CBC W/AUTO DIFF WBC: CPT | Performed by: INTERNAL MEDICINE

## 2018-01-18 PROCEDURE — 84550 ASSAY OF BLOOD/URIC ACID: CPT | Performed by: INTERNAL MEDICINE

## 2018-01-18 PROCEDURE — G0463 HOSPITAL OUTPT CLINIC VISIT: HCPCS

## 2018-01-18 PROCEDURE — 87533 HHV-6 DNA QUANT: CPT | Performed by: INTERNAL MEDICINE

## 2018-01-18 PROCEDURE — 99215 OFFICE O/P EST HI 40 MIN: CPT | Mod: ZP | Performed by: INTERNAL MEDICINE

## 2018-01-18 NOTE — PROGRESS NOTES
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  He received chemotherapy, initially with SMILE (3 cycles), which did not control the disease, followed by ICE for 2 cycles in 01/2015, good response and then autologous stem cell transplant (03/04/2015) with BEAM conditioning. 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  By 12/2015, he again had evidence for recurrence on the right lower extremity (biopsy proven).  Recurrence appeared to be localized to the right ankle and possibly, although undocumented, on the right wrist.  He was given involved field radiation to these two areas.  Subsequently, he had the appearance of multiple subcutaneous nodules on his legs, especially, but also on his arms and trunk, that arose and often resolved spontaneously.  Some showed inflammation and cutaneous crusting with erythema and subsequent regression. It was thought he may be experiencing a graft vs lymphoma effect. He continued to have new lesions, predominantly on his lower legs, that appeared and resolved. Those on his legs that ulcerated resolved with the care provide by Dr. Eaton. Over time, the frequency of new lesions appearing and their ulceration decreased.      A pneumonia required brief hospitalization in 04/2017 thought to be RSV bronchiolitis with suspected superimposed bacterial pneumonia. Related symptoms cleared with antibiotics.         Also, Mr. Smith developed a few new scattered lower extremity nodules in 2017.  He was referred to Dermatology for evaluation and biopsy, but the lesions had largely resolved by the time a biopsy could be done.  Biopsy of one of the resolving lesions was considered consistent with dermal hypersensitivity reaction with the differential including arthropod bite and possibly drug eruption.   "These lesions cleared.       INTERVAL HISTORY:  Mr. Smith was last in clinic on 11/16/2017.  Generally, over the past 2 months he has been feeling and doing quite well.  On 01/03 he called to report two new \"lumps\", one each on the back of his calves.  They were small and nontender, and he was instructed to watch them.      Now, 2 weeks later he feels that the one on the left leg has not changed.  The one on the right appears to have  into 2 distinct lesions.  There has been slight tenderness at this site.  There is no similarity between these lesions and prior lymphomatous nodules.      The patient also was hospitalized within the past several days at an outside hospital ([facility]) for severe abdominal pain.  CT scan and laboratory studies did not disclose the etiology.  Shortly after discharge, he developed a vesicular rash over the right upper/mid abdomen.  This was diagnosed by his primary physician as zoster and he was started on appropriate medications.  Over the past few days, the rash has been drying and resolving, and the pain has significantly improved.       Otherwise, Mr. Smith has been doing well.  He continues to be anxious about his disease, but happy that things have generally gone well over the past year.      REVIEW OF SYSTEMS:  A 10-point review of systems is otherwise negative.      MEDICATIONS:     1.  Acyclovir.    2.  Phosphorus tablets.   3.  Nitroglycerin p.r.n.   4.  Amlodipine.   5.  Venlafaxine.   6.  Aspirin 81 mg.   7.  Atorvastatin.      ALLERGIES:  None reported.      PHYSICAL EXAMINATION:   VITAL SIGNS:  Weight 88.3 kg (stable), blood pressure 126/82, pulse 84 and regular, respirations 18, temperature 98.3.  O2 saturation 99% on room air.   HEENT:  Anicteric.  No conjunctival injection or lesions.  No sinus tenderness.  Oropharynx - no masses.  Mucosa - moist, without plaque or ulceration.   NECK:  No cervical/supraclavicular masses.   CHEST:  Clear to auscultation.   AXILLAE: "  No nodes.   HEART:  Regular rhythm.  No murmur or gallop.   ABDOMEN:  Soft.  Examination limited by rash.   EXTREMITIES:  No lower extremity edema.   SKIN:  Extensive erythematous/resolving vesicular rash in the right mid-thoracic dermatomes extending anteriorly onto the mid-abdomen, infraumbilical.  Rash appears to be drying.  No active vesicles.  No appearance of secondary infection.     The patient has a roughly 1-cm nodule on the back of his left calf.  It is nontender and covered by normal skin.  On the posterior aspect of his right calf are two lesions, questionably slightly erythematous, with the superior lesion measuring approximately 2 cm in size and the inferior lesion approximately 1.25 cm in size.  Mild tenderness. They do not have the appearance of his typical lymphomatous nodules. No other evidence skin lesions.      LABORATORY:     Hemoglobin 15.7 grams percent with 10,200 WBCs (normal differential) and 242,000 platelets.   Electrolytes, calcium, creatinine (1.22) - normal.     Uric acid 2.8.   Liver enzymes, including serum LDH - normal.       IMAGING:  Outside CT scan report -    1.  No definite CT findings to explain the patient's abdominal pain.    2  Colonic diverticulosis without significant surrounding inflammatory changes.  Segment of sigmoid colon demonstrates minimal wall thickening, although without significant surrounding inflammatory changes.     3.  Concentric bladder wall thickening.   4.  Prostate gland is enlarged.     5.  Asymmetric irregularity of the left renal collecting system with minimal left pelvocaliectasis.  Findings are of uncertain significance.     6.  Negative appendix.    7.  Mild gastric wall thickening, likely due to distention.   8.  Indeterminate, hypoattenuating, round structure measuring low intensity in the left lower pelvis measuring approximately 1.8 cm.  Findings are of uncertain significance and correlation with any prior imaging is recommended.        ASSESSMENT AND PLAN:  NK/T-cell lymphoma, nasal type.  Mr. Smith seems to be doing well with respect to the lymphoma.  Two lesions were identified by the patient on the back of both calves.  Neither has characteristics suggestive of his previous lymphomatous lesions.  He will report any further change.  The patient at this time is approximately 2-1/2 years following his double cord transplant (non-myeloablative).       We will continue to monitor the lymphoma at approximately 3-month intervals, and will get the outside CT scan to compare with his most recent images to assess for any needed imaging further followup.    Anxiety - he believes this is improving and will let us know if it becomes problematic.     Zoster -  improving on the current course of acyclovir with no evidence of superinfection. Discussed with patient and his wife appropriate contact precautions for the immediate future, although he is likely  past the infectious stage.       I spent approximately 40 minutes with Mr. Smith and his wife during this visit, the majority of time in counseling.    Yuri Hill MD  Professor of Medicine  Oncology  [facility]  Office: [phone]  Clinic Fax: [phone]          cc:      MD Ruth Prince MD Daniel Miller, MD Bailey Lee, MD Roger Weenig, MD Nathan Norquist, MD Marie-Claire Buckley, MD

## 2018-01-18 NOTE — Clinical Note
Pt waiting in room - will schedule back later, but please have him sign a release so we can get disk with CT or have CT images pushed to our system from his recent hospitalization at [facility]

## 2018-01-18 NOTE — NURSING NOTE
"Oncology Rooming Note    January 18, 2018 10:01 AM   Hi Smith is a 62 year old male who presents for:    Chief Complaint   Patient presents with     Blood Draw     VPT blood draw and vitals     Oncology Clinic Visit     Return-T-cell Lymphoma     Initial Vitals: /82  Pulse 84  Temp 98.3  F (36.8  C) (Oral)  Resp 18  Wt 88.3 kg (194 lb 9.6 oz)  SpO2 99%  BMI 26.39 kg/m2 Estimated body mass index is 26.39 kg/(m^2) as calculated from the following:    Height as of 11/16/17: 1.829 m (6' 0.01\").    Weight as of this encounter: 88.3 kg (194 lb 9.6 oz). Body surface area is 2.12 meters squared.  Data Unavailable Comment: Data Unavailable   No LMP for male patient.  Allergies reviewed: Yes  Medications reviewed: Yes    Medications: Medication refills not needed today.  Pharmacy name entered into [facility]:    [facility] [place], [place] [facility] [address] [address] [facility] [place] [address]  [address] [facility] [address] [facility]    Clinical concerns: Patient was in [facility] on 1/14/18.     6 minutes for nursing intake (face to face time)     Dorothy Ramon LPN            "

## 2018-01-18 NOTE — MR AVS SNAPSHOT
After Visit Summary   1/18/2018    Hi Smith    MRN: [id number]           Patient Information     Date Of Birth          1955        Visit Information        Provider Department      1/18/2018 10:00 AM Yuri Hill MD [facility]        Today's Diagnoses     T-cell or NK-cell neoplasm (H)           Follow-ups after your visit        Follow-up notes from your care team     Return in about 3 months (around 4/18/2018) for Physical Exam, Lab Work.      Who to contact     If you have questions or need follow up information about today's clinic visit or your schedule please contact [facility] directly at [phone].  Normal or non-critical lab and imaging results will be communicated to you by MyChart, letter or phone within 4 business days after the clinic has received the results. If you do not hear from us within 7 days, please contact the clinic through [employer] or phone. If you have a critical or abnormal lab result, we will notify you by phone as soon as possible.  Submit refill requests through [email or web address] or call your pharmacy and they will forward the refill request to us. Please allow 3 business days for your refill to be completed.          Additional Information About Your Visit        MyChart Information     [email or web address] gives you secure access to your electronic health record. If you see a primary care provider, you can also send messages to your care team and make appointments. If you have questions, please call your primary care clinic.  If you do not have a primary care provider, please call [phone] and they will assist you.        Care EveryWhere ID     This is your Care EveryWhere ID. This could be used by other organizations to access your [place] medical records  [id number]        Your Vitals Were     Pulse Temperature Respirations Pulse Oximetry BMI (Body Mass Index)       84 98.3  F (36.8  C) (Oral) 18 99% 26.39 kg/m2        Blood  Pressure from Last 3 Encounters:   01/18/18 126/82   11/16/17 145/83   09/26/17 128/81    Weight from Last 3 Encounters:   01/18/18 88.3 kg (194 lb 9.6 oz)   11/16/17 89.3 kg (196 lb 14.4 oz)   09/26/17 88 kg (194 lb 1.6 oz)              We Performed the Following     *CBC with platelets differential     Comprehensive metabolic panel     HHV6 DNA Quant PCR     Lactate Dehydrogenase     Uric acid        Primary Care Provider Office Phone # Fax #    Glenroy Torres [phone]       [facility] [address] [facility] [address]        Equal Access to Services     [facility] : Hadii mery Abarca, waaxda luqadaha, qaybta kaalmada suraj, phuc wallace. So [facility] [phone].    ATENCIÓN: Si habla español, tiene a singh disposición servicios gratuitos de asistencia lingüística. Llame al [phone].    We comply with applicable federal civil rights laws and [place] laws. We do not discriminate on the basis of race, color, national origin, age, disability, sex, sexual orientation, or gender identity.            Thank you!     Thank you for choosing [facility]  for your care. Our goal is always to provide you with excellent care. Hearing back from our patients is one way we can continue to improve our services. Please take a few minutes to complete the written survey that you may receive in the mail after your visit with us. Thank you!             Your Updated Medication List - Protect others around you: Learn how to safely use, store and throw away your medicines at www.disposemymeds.org.          This list is accurate as of: 1/18/18 11:59 PM.  Always use your most recent med list.                   Brand Name Dispense Instructions for use Diagnosis    ACYCLOVIR PO       T-cell or NK-cell neoplasm (H)       AMLODIPINE BESYLATE PO      Take by mouth daily    Extranodal NK/T-cell lymphoma, nasal type (H)       ASPIRIN PO       Take 81 mg by mouth daily    T-cell or NK-cell neoplasm (H)       LIPITOR PO      Take 10 mg by mouth daily    T-cell or NK-cell neoplasm (H)       nitroGLYcerin 0.4 MG sublingual tablet    NITROSTAT     Place 0.4 mg under the tongue    Extranodal NK/T-cell lymphoma, nasal type (H), Hypogammaglobulinemia (H)       phosphorus tablet 250 mg 250 MG per tablet    PHOSPHA 250 NEUTRAL    180 tablet    Take 1 tablet (250 mg) by mouth 2 times daily    Hypophosphatemia       venlafaxine 37.5 MG 24 hr capsule    EFFEXOR XR    90 capsule    Take 1 capsule (37.5 mg) by mouth daily    Lymphoma (H)

## 2018-01-18 NOTE — LETTER
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  He received chemotherapy, initially with SMILE (3 cycles), which did not control the disease, followed by ICE for 2 cycles in 01/2015, good response and then autologous stem cell transplant (03/04/2015) with BEAM conditioning. 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  By 12/2015, he again had evidence for recurrence on the right lower extremity (biopsy proven).  Recurrence appeared to be localized to the right ankle and possibly, although undocumented, on the right wrist.  He was given involved field radiation to these two areas.  Subsequently, he had the appearance of multiple subcutaneous nodules on his legs, especially, but also on his arms and trunk, that arose and often resolved spontaneously.  Some showed inflammation and cutaneous crusting with erythema and subsequent regression. It was thought he may be experiencing a graft vs lymphoma effect. He continued to have new lesions, predominantly on his lower legs, that appeared and resolved. Those on his legs that ulcerated resolved with the care provide by Dr. Eaton. Over time, the frequency of new lesions appearing and their ulceration  decreased.      A pneumonia required brief hospitalization in 04/2017 thought to be RSV bronchiolitis with suspected superimposed bacterial pneumonia. Related symptoms cleared with antibiotics.         Also, Mr. Smith developed a few new scattered lower extremity nodules in 2017.  He was referred to Dermatology for evaluation and biopsy, but the lesions had largely resolved by the time a biopsy could be done.  Biopsy of one of the resolving lesions was considered consistent with dermal hypersensitivity  "reaction with the differential including arthropod bite and possibly drug eruption.  These lesions cleared.       INTERVAL HISTORY:  Mr. Smith was last in clinic on 11/16/2017.  Generally, over the past 2 months he has been feeling and doing quite well.  On 01/03 he called to report two new \"lumps\", one each on the back of his calves.  They were small and nontender, and he was instructed to watch them.      Now, 2 weeks later he feels that the one on the left leg has not changed.  The one on the right appears to have  into 2 distinct lesions.  There has been slight tenderness at this site.  There is no similarity between these lesions and prior lymphomatous nodules.      The patient also was hospitalized within the past several days at an outside hospital ([facility]) for severe abdominal pain.  CT scan and laboratory studies did not disclose the etiology.  Shortly after discharge, he developed a vesicular rash over the right upper/mid abdomen.  This was diagnosed by his primary physician as zoster and he was started on appropriate medications.  Over the past few days, the rash has been drying and resolving, and the pain has significantly improved.       Otherwise, Mr. Smith has been doing well.  He continues to be anxious about his disease, but happy that things have generally gone well over the past year.      REVIEW OF SYSTEMS:  A 10-point review of systems is otherwise negative.      MEDICATIONS:     1.  Acyclovir.    2.  Phosphorus tablets.   3.  Nitroglycerin p.r.n.   4.  Amlodipine.   5.  Venlafaxine.   6.  Aspirin 81 mg.   7.  Atorvastatin.      ALLERGIES:  None reported.      PHYSICAL EXAMINATION:   VITAL SIGNS:  Weight 88.3 kg (stable), blood pressure 126/82, pulse 84 and regular, respirations 18, temperature 98.3.  O2 saturation 99% on room air.   HEENT:  Anicteric.  No conjunctival injection or lesions.  No sinus tenderness.  Oropharynx - no masses.  Mucosa - moist, without plaque or ulceration. "   NECK:  No cervical/supraclavicular masses.   CHEST:  Clear to auscultation.   AXILLAE:  No nodes.   HEART:  Regular rhythm.  No murmur or gallop.   ABDOMEN:  Soft.  Examination limited by rash.   EXTREMITIES:  No lower extremity edema.   SKIN:  Extensive erythematous/resolving vesicular rash in the right mid-thoracic dermatomes extending anteriorly onto the mid-abdomen, infraumbilical.  Rash appears to be drying.  No active vesicles.  No appearance of secondary infection.     The patient has a roughly 1-cm nodule on the back of his left calf.  It is nontender and covered by normal skin.  On the posterior aspect of his right calf are two lesions, questionably slightly erythematous, with the superior lesion measuring approximately 2 cm in size and the inferior lesion approximately 1.25 cm in size.  Mild tenderness. They do not have the appearance of his typical lymphomatous nodules. No other evidence skin lesions.      LABORATORY:     Hemoglobin 15.7 grams percent with 10,200 WBCs (normal differential) and 242,000 platelets.   Electrolytes, calcium, creatinine (1.22) - normal.     Uric acid 2.8.   Liver enzymes, including serum LDH - normal.       IMAGING:  Outside CT scan report -    1.  No definite CT findings to explain the patient's abdominal pain.    2  Colonic diverticulosis without significant surrounding inflammatory changes.  Segment of sigmoid colon demonstrates minimal wall thickening, although without significant surrounding inflammatory changes.     3.  Concentric bladder wall thickening.   4.  Prostate gland is enlarged.     5.  Asymmetric irregularity of the left renal collecting system with minimal left pelvocaliectasis.  Findings are of uncertain significance.     6.  Negative appendix.    7.  Mild gastric wall thickening, likely due to distention.   8.  Indeterminate, hypoattenuating, round structure measuring low intensity in the left lower pelvis measuring approximately 1.8 cm.  Findings are of  uncertain significance and correlation with any prior imaging is recommended.       ASSESSMENT AND PLAN:  NK/T-cell lymphoma, nasal type.  Mr. Smith seems to be doing well with respect to the lymphoma.  Two lesions were identified by the patient on the back of both calves.  Neither has characteristics suggestive of his previous lymphomatous lesions.  He will report any further change.  The patient at this time is approximately 2-1/2 years following his double cord transplant (non-myeloablative).       We will continue to monitor the lymphoma at approximately 3-month intervals, and will get the outside CT scan to compare with his most recent images to assess for any needed imaging further followup.    Anxiety - he believes this is improving and will let us know if it becomes problematic.     Zoster -  improving on the current course of acyclovir with no evidence of superinfection. Discussed with patient and his wife appropriate contact precautions for the immediate future, although he is likely  past the infectious stage.       I spent approximately 40 minutes with Mr. Smith and his wife during this visit, the majority of time in counseling.    Yuri Hill MD  Professor of Medicine  Oncology  [facility]  Office: [phone]  Clinic Fax: [phone]          cc:      MD Ruth Prince MD Daniel Miller, MD Bailey Lee, MD Roger Weenig, MD Nathan Norquist, MD Marie-Claire Buckley, MD Bruce A. Peterson, MD

## 2018-01-18 NOTE — NURSING NOTE
Chief Complaint   Patient presents with     Blood Draw     VPT blood draw and vitals     Venipuncture labs drawn from right hand

## 2018-01-19 LAB
HHV6 DNA # SPEC NAA+PROBE: ABNORMAL COPIES/ML
HHV6 DNA SPEC NAA+PROBE-LOG#: 4.4 LOG COPIES/ML
SPECIMEN SOURCE: ABNORMAL

## 2018-02-07 ENCOUNTER — CARE COORDINATION (OUTPATIENT)
Dept: ONCOLOGY | Facility: CLINIC | Age: 63
End: 2018-02-07

## 2018-02-07 DIAGNOSIS — B02.9 SHINGLES: Primary | ICD-10-CM

## 2018-02-07 RX ORDER — OXYCODONE AND ACETAMINOPHEN 5; 325 MG/1; MG/1
1 TABLET ORAL 3 TIMES DAILY
Qty: 42 TABLET | Refills: 0 | Status: SHIPPED | OUTPATIENT
Start: 2018-02-07 | End: 2018-02-21

## 2018-02-07 RX ORDER — GABAPENTIN 300 MG/1
300 CAPSULE ORAL AT BEDTIME
Qty: 60 CAPSULE | Refills: 0 | Status: SHIPPED | OUTPATIENT
Start: 2018-02-07 | End: 2018-03-06

## 2018-02-07 NOTE — PROGRESS NOTES
"Reason for Outgoing call:    Returning patient's call.     Patients Questions/Concerns:    Patient is calling in for a couple of reasons.    1.) To report that the lumps on the back of his calves are still there and do not seem to be getting any better but they are not really getting any worse either. Last time Hi saw Dr. Hill he suggest that he use heat and monitor these areas. He also wanted to report that he has a new lump on his left forearm that is smaller than a \"BB\" and is slightly painful as well. Per his wife she feels like it is getting smaller but Hi is not so sure.     2.) He also wanted Dr. Hill to know that his shingles area healing very nicely and looks really quite good. However, Hi stated that he is still struggling with some episodes of extreme pain. He is wondering if he could get something for this pain as when it is at it's worst it is almost unbearable.     Nursing Action/Patient Instruction:    RNCC updated Dr. Hill on Hi concerns/updates.   1.) As far as the lumps he should continue using heat, monitor them and keep us updated.  2.) For the shingles pain Dr. Hill will write for some Percocet and Gabapentin for pain management.     Patient Response/Evaluation:    Patient verbalized understanding and was grateful for the call/follow up.         "

## 2018-02-12 ENCOUNTER — MYC MEDICAL ADVICE (OUTPATIENT)
Dept: ONCOLOGY | Facility: CLINIC | Age: 63
End: 2018-02-12

## 2018-02-14 NOTE — TELEPHONE ENCOUNTER
He may increase the gabapentin by taking 300 mg q 12 hours x 1-2 days and then 300 mg q 8 hours - should stay on this dose for now. If too drowsy, which is unlikely, should cut back to q 12 hours.     It may take a while for the higher dose to help.     If he needs more drug for his trip, he should let us know. OK to renew.     bp (Routing comment)     Above information relayed to pt. He verbalized understanding and stated he does not need a refill at this time. If he needs a refill while he's out of town he will give us a call.

## 2018-02-25 NOTE — PROGRESS NOTES
Subjective:     Interval History:     Ng tube placed yesterday 800ml out, less distended. Afebrile. Leukocytosis stable at 12. Not passing flatus.  UOP good. Some serous drainage from midline.     Post-Op Info:  Procedure(s) (LRB):  ileocoloc resection, lysis of adhesions (N/A)   5 Days Post-Op      Medications:  Continuous Infusions:   lactated ringers 50 mL/hr at 02/23/18 2155    TPN ADULT CENTRAL LINE CUSTOM 75 mL/hr at 02/24/18 2241     Scheduled Meds:   cyclobenzaprine  5 mg Oral TID    DULoxetine  60 mg Oral Daily    famotidine  20 mg Oral Daily    fat emulsion 20%  250 mL Intravenous Daily    fluticasone  1 spray Each Nare Daily    heparin (porcine)  5,000 Units Subcutaneous Q8H    pantoprazole  40 mg Oral Daily    piperacillin-tazobactam (ZOSYN) IVPB  4.5 g Intravenous Q8H    sodium chloride 0.9%  3 mL Intravenous Q8H     PRN Meds:   diphenhydrAMINE    fentaNYL    ondansetron    oxyCODONE    oxyCODONE    promethazine (PHENERGAN) IVPB    sodium chloride 0.9%        Objective:     Vital Signs (Most Recent):  Temp: 98.1 °F (36.7 °C) (02/25/18 0444)  Pulse: 101 (02/25/18 0700)  Resp: 16 (02/25/18 0444)  BP: (!) 146/79 (02/25/18 0444)  SpO2: 95 % (02/25/18 0444) Vital Signs (24h Range):  Temp:  [96.6 °F (35.9 °C)-99.4 °F (37.4 °C)] 98.1 °F (36.7 °C)  Pulse:  [] 101  Resp:  [14-16] 16  SpO2:  [95 %-100 %] 95 %  BP: (138-158)/(77-97) 146/79     Intake/Output - Last 3 Shifts       02/23 0700 - 02/24 0659 02/24 0700 - 02/25 0659 02/25 0700 - 02/26 0659    P.O. 150 125     I.V. (mL/kg) 1190 (14.4) 1160 (14.1)     IV Piggyback 1300 300     .8 1647.5     Total Intake(mL/kg) 3388.8 (41.1) 3232.5 (39.2)     Urine (mL/kg/hr) 1200 (0.6) 0 (0)     Emesis/NG output  0 (0)     Drains  800 (0.4)     Other 150 (0.1) 50 (0)     Stool  0 (0)     Blood       Total Output 1350 850      Net +2038.8 +2382.5             Urine Occurrence 3 x 8 x     Stool Occurrence 0 x 0 x     Emesis Occurrence  0 x      Mr. Smith's wife called in this AM to report that he is not been feeling well since Tuesday.  He has been having:    - Chills and night sweats  - Low grade fevers T-Max 99.8  - Nasal congestion and mild cough  - Body aches  - Generalized weakness  - Poor appetite    Per Mrs. Smith, patient has been trying to stay hydrated but they are concerned about this.  He has taken Tylenol and Nightquil for his symptoms. He has not taken any Tylenol today.  Updated Dr. Hill who would like Mr. Smith to come in and be seen today if there is any opening.  Labs for BMP, CBC, Nasal Swab will be placed and patient will be here at 1 PM for labs and see Tamie at 1:40PM.  Patient and wife verbalized understanding and were grateful for the appointment and follow up.          Physical Exam      General: In no acute distress, well appearance.   CV: RRR, S1, S2 no murmur or gallops   Pulm: non labored breathing  Abd: soft, less distended, appropiately tender. Incision with glue. Wound vac in place in RLQ, midline with some serous drainage, opened and packed, distended   Ext: wwp      Significant Labs:  BMP (Last 3 Results):     Recent Labs  Lab 02/19/18  0809  02/22/18  0400 02/23/18  0342 02/24/18  0526 02/25/18  0355   *  < > 150* 137* 118* 135*     < > 137 137 137 138   K 3.9  < > 3.4* 3.5 3.8 3.7     < > 102 101 101 102   CO2 26  < > 25 26 27 28   BUN 26*  < > 36* 29* 22 17   CREATININE 1.3  < > 1.8* 1.3 0.9 0.8   CALCIUM 8.1*  < > 8.1* 9.2 9.2 9.0   MG 1.6  --  2.1  --   --   --    < > = values in this interval not displayed.  CBC:     Recent Labs  Lab 02/25/18  0355   WBC 11.89   RBC 3.63*   HGB 9.3*   HCT 28.7*      MCV 79*   MCH 25.6*   MCHC 32.4

## 2018-03-06 DIAGNOSIS — B02.9 SHINGLES: ICD-10-CM

## 2018-03-06 NOTE — TELEPHONE ENCOUNTER
Sent [employer] message asking what dose he is taking of the gabapentin.    Patient called this morning to follow up and provide current regimen of taking the gabapentin every 8 hours.    Kylah Hill RN  Triage

## 2018-03-07 ENCOUNTER — CARE COORDINATION (OUTPATIENT)
Dept: ONCOLOGY | Facility: CLINIC | Age: 63
End: 2018-03-07

## 2018-03-07 DIAGNOSIS — B02.9 SHINGLES: ICD-10-CM

## 2018-03-07 DIAGNOSIS — C96.9 T-CELL OR NK-CELL NEOPLASM (H): Primary | ICD-10-CM

## 2018-03-07 RX ORDER — GABAPENTIN 300 MG/1
300 CAPSULE ORAL 3 TIMES DAILY
Qty: 60 CAPSULE | Refills: 0 | Status: SHIPPED | OUTPATIENT
Start: 2018-03-07 | End: 2018-03-23

## 2018-03-07 NOTE — PROGRESS NOTES
Reason for Incoming call:    Mrs. Smith is calling to update Dr. Hill on Colton's shingles pain.      Patients Questions/Concerns:    Patient is reporting that his pain from his shingles is awful and almost not bearable.   He is currently taking Gabapentin 300 MG TID, Motrin 600 MG f8rqexs, and a Percocet as needed (once a day but not every day).  He is also using a hernia band to apply pressure but states that if air/wind it the area the pain goes through the roof.       Nursing Action/Patient Instruction:    RNCC updated Dr. Hill who would like to increase his Gabapentin as follows and to put in a consult to pain.   Per Dr. Hill, Colton should increase his Gabapentin as follows:    Today 3/7/18 he should increase his HS dose to 600 MG and do that for 4 days. (3/7/18 through 3/10/18)  Then if that still doesn't help he should increase is AM dose to 600 MG. (so he would be taking 600 MG in AM, mid-day 300 MG and  MG)  He should do this for an additional 4 days (3/11/18 through 3/14/18).   If that still doesn't help, then he should increase the mid-day dose to 600 MG so that he is taking the 600 MG TID. He should try that then for 4 days as well. (3/15/18 through 3/18/18)    They will keep us updated through [employer] as to how that is working.  In the mean time will get him a consult in the pain clinic.       Patient Response/Evaluation:     Patient and his wife verbalized understanding of plan and were grateful for the follow up call.

## 2018-03-23 ENCOUNTER — OFFICE VISIT (OUTPATIENT)
Dept: PALLIATIVE CARE | Facility: CLINIC | Age: 63
End: 2018-03-23
Attending: INTERNAL MEDICINE
Payer: MEDICARE

## 2018-03-23 VITALS
BODY MASS INDEX: 25.35 KG/M2 | HEART RATE: 93 BPM | OXYGEN SATURATION: 94 % | WEIGHT: 187.2 LBS | HEIGHT: 72 IN | SYSTOLIC BLOOD PRESSURE: 121 MMHG | RESPIRATION RATE: 16 BRPM | TEMPERATURE: 97 F | DIASTOLIC BLOOD PRESSURE: 74 MMHG

## 2018-03-23 DIAGNOSIS — C96.9 T-CELL OR NK-CELL NEOPLASM (H): ICD-10-CM

## 2018-03-23 DIAGNOSIS — Z94.89 STATUS POST CORD BLOOD TRANSPLANTATION: ICD-10-CM

## 2018-03-23 DIAGNOSIS — B02.23 POST-HERPETIC POLYNEUROPATHY: Primary | Chronic | ICD-10-CM

## 2018-03-23 PROBLEM — B02.9 SHINGLES: Status: RESOLVED | Noted: 2018-02-07 | Resolved: 2018-03-23

## 2018-03-23 PROCEDURE — 99204 OFFICE O/P NEW MOD 45 MIN: CPT | Mod: ZP | Performed by: INTERNAL MEDICINE

## 2018-03-23 PROCEDURE — G0463 HOSPITAL OUTPT CLINIC VISIT: HCPCS | Mod: ZF

## 2018-03-23 RX ORDER — LIDOCAINE 50 MG/G
1-3 PATCH TOPICAL EVERY 24 HOURS
Qty: 600000 PATCH | Refills: 3 | Status: SHIPPED | OUTPATIENT
Start: 2018-03-23 | End: 2018-03-23

## 2018-03-23 RX ORDER — LIDOCAINE 50 MG/G
1-3 PATCH TOPICAL EVERY 24 HOURS
Qty: 60 PATCH | Refills: 3 | Status: SHIPPED | OUTPATIENT
Start: 2018-03-23 | End: 2018-07-12

## 2018-03-23 RX ORDER — GABAPENTIN 400 MG/1
400 CAPSULE ORAL 3 TIMES DAILY
Qty: 270 CAPSULE | Refills: 3 | Status: SHIPPED | OUTPATIENT
Start: 2018-03-23 | End: 2019-06-26

## 2018-03-23 ASSESSMENT — PAIN SCALES - GENERAL: PAINLEVEL: MILD PAIN (2)

## 2018-03-23 NOTE — NURSING NOTE
"Oncology Rooming Note    March 23, 2018 3:07 PM   Hi Smith is a 63 year old male who presents for:    Chief Complaint   Patient presents with     Oncology Clinic Visit     Management of shingles related pain-New     Initial Vitals: /74 (BP Location: Right arm, Patient Position: Chair, Cuff Size: Adult Regular)  Pulse 93  Temp 97  F (36.1  C) (Oral)  Resp 16  Ht 1.829 m (6' 0.01\")  Wt 84.9 kg (187 lb 3.2 oz)  SpO2 94%  BMI 25.38 kg/m2 Estimated body mass index is 25.38 kg/(m^2) as calculated from the following:    Height as of this encounter: 1.829 m (6' 0.01\").    Weight as of this encounter: 84.9 kg (187 lb 3.2 oz). Body surface area is 2.08 meters squared.  Mild Pain (2) Comment: right side of abdomen towards backside   No LMP for male patient.  Allergies reviewed: Yes  Medications reviewed: Yes    Medications: Medication refills not needed today.  Pharmacy name entered into [facility]:    [facility] [place], [place] - [address] [address], [address]  [address] - [address] [facility]    Clinical concerns: pain level 2  right side of abdomen towards backside  Pt received flu shot elsewhere. See Immunizations    pt did not have colonoscopy done yet, contact information has been added to AVS.          6 minutes for nursing intake (face to face time)     Dawn Perez CMA                  "

## 2018-03-23 NOTE — PROGRESS NOTES
Palliative Staff/Outpatient Clinic    (This note was transcribed using voice recognition software. While I review and edit the transcription, I may miss errors. Also, the software capitalizes words strangely sometimes. 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  He received chemotherapy, initially with SMILE (3 cycles), which did not control the disease, followed by ICE for 2 cycles in 01/2015, good response and then autologous stem cell transplant (03/04/2015) with BEAM conditioning. 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  By 12/2015, he again had evidence for recurrence on the right lower extremity (biopsy proven).  Recurrence appeared to be localized to the right ankle and possibly, although undocumented, on the right wrist.  He was given involved field radiation to these two areas.  Subsequently, he had the appearance of multiple subcutaneous nodules on his legs, especially, but also on his arms and trunk, that arose and often resolved spontaneously.  Some showed inflammation and cutaneous crusting with erythema and subsequent regression. It was thought he may be experiencing a graft vs lymphoma effect. He continued to have new lesions, predominantly on his lower legs, that appeared and resolved. Those on his legs that ulcerated resolved with the care provide by Dr. Eaton. Over time, the frequency of new lesions appearing and their ulceration decreased.      A pneumonia required brief hospitalization in 04/2017 thought to be RSV bronchiolitis with suspected superimposed bacterial pneumonia. Related symptoms cleared with antibiotics.         Also, Mr. Smith developed a few new scattered lower  "extremity nodules in 2017.  He was referred to Dermatology for evaluation and biopsy, but the lesions had largely resolved by the time a biopsy could be done.  Biopsy of one of the resolving lesions was considered consistent with dermal hypersensitivity reaction with the differential including arthropod bite and possibly drug eruption.  These lesions cleared.        History:  Mr. Smith is seen with his wife today who supplements history.  The focus of today's visit is on his postherpetic neuralgia.  He had an episode of shingles in January 2018.  It started with right abdominal pain that was very severe he even went to the hospital for.  Nothing came of that but then he shortly afterwards had a shingles eruption and went to urgent care and was put on acyclovir.  Subsequent to that his lesions healed however he has had very persistent and severe pain.  It waxes and wanes and he has good days and bad days but it has not clearly started to get better.    It is a burning and hypersensitivity on his right abdomen to flank.  Air and light touch can be excruciating.  He started wearing his wife's abdominal binder which helps a lot \"better than any medication.\"  He has been on gabapentin which was increased to 300 3 times daily to 4 times daily.  He clearly identifies this is helpful and misses it when he forgets to take it.  He has tried taking 600 mg at once but had nightmares or was lethargic during the day when he took it during the day.  600 mg of ibuprofen really helps and he takes that a couple times a day without any side effects.  He has no history of kidney disease pathologic bleeding or history of peptic ulcer disease.  He has some lidocaine ointment when he had open lesions but he has not used anything since.  He is oxycodone and takes on occasion but finds it helps minimally and does not last that long.    His wife is worried that the pain is making him depressed.  He says he has some days where he is really " "down and lethargic and not eating and drinking very much.  He relates this to the pain being out of control.  He worries gets dehydrated.  The patient is less convinced that these episodes which he agrees do happen are related to his pain.  He is on Effexor for over a year which he describes this is \"happy pill.\"  This was for depression and mood disturbance from the cancer.  He talks about understanding he is a rare cancer that does not have any cure available and how difficult it was going through multiple treatments just to have the cancer come right back.  Lately he has been off anticancer treatment and my sense is Dr. Hill is allowing his graft versus tumor effect to control his tumors and according to the patient that is overall going well-he has a couple small subcutaneous lesions bilaterally on his calf.    They just went on a cruise to [place] and his wife got some cannabis oil which did not help-rubbed on his skin.    SH:   Lives with his wife.  They have adult children and grandchildren.  He does get out of the house and do things.    ROS:  Some fatigue.  Remainder of the comprehensive review of systems is negative    PE: /74 (BP Location: Right arm, Patient Position: Chair, Cuff Size: Adult Regular)  Pulse 93  Temp 97  F (36.1  C) (Oral)  Resp 16  Ht 1.829 m (6' 0.01\")  Wt 84.9 kg (187 lb 3.2 oz)  SpO2 94%  BMI 25.38 kg/m2  Alert, comfortable appearing, NAD. Healthy appearing.  Head NCAT. Hearing aides  Eyes anicteric without injection  Face symmetric, eyes conjugate  Lungs unlabored, CTAB  CV rrr s1s2  Abd soft, ntnd, benign  No LE edema  Skin warm, dry, with healed dermatomal hyper&hypo pigmented macules on R abd and flank c/w 'healed' shingles lesions  MSK joints of hand normal;   Neuro Face symmetric,   Neuropsych exam normal including affect, sensorium, gross memory, thought processes, and fund of knowledge.     Impression & Recommendations:  63-year-old man with a NK cell " lymphoblastic lymphoma mostly manifesting as subcutaneous lesions status post cord blood transplant who has severe postherpetic neuralgia.    Discussed with him the natural history of PHN including that it is likely to slowly attenuate but could easily be very symptomatic for a year.  Explained how postherpetic neuralgia works and our treatment approach for it.  Immediately I would recommend Lidoderm patches which is one of the few FDA approved treatments for this indication.  He is clearly sensitive to gabapentin but it helps and I want to try to push him up to 400 mg 3 times daily given that he cannot tolerate 600 mg 3 times daily.  He should continue the binder-I think the proprioceptive counter stimulation is counteracting nociception and that can be a really nice effect for him.  Further steps include tricyclics, increase in the venlafaxine, pregabalin instead of gabapentin, or other topicals including high concentration lidocaine ointment, or topical capsaicin. Opioids are reasonable for this, but with all their side effects and he's already tried some oxycodone without benefit I think that's not a great idea currently.    I also emphasized for him the importance that he remain positive and hopeful that he will improve this will get better and is also really important for his recovery that he remain active with doing at least gentle aerobic exercise such as walking and remaining involved with important life activities that he finds meaningful stimulating-this can be simple stuff like getting out with his grandkids, fishing, going to [employer], what ever, but it is very important that he get out of the house most days of the week and not a whole lot even with the pain is bad as it is.    We will call him in a week-follow-up in 2-3 months    Chart data reviewed today:    Family History   Problem Relation Age of Onset     CANCER Mother      ovarian, esophageal     C.A.D. Sister      CANCER Father      prostate  cancer, lung fibrosis     HEART DISEASE Father      Heart Disease     Hypertension Father      Melanoma No family hx of      Skin Cancer No family hx of      Past Medical History:   Diagnosis Date     Alcohol abuse     in remission x 20 yrs     CAD (coronary artery disease) of artery bypass graft     CABG 2005, hx of stents     Depression      DVT (deep venous thrombosis) (H) November, 2014    upper left arm after PICC line     GERD (gastroesophageal reflux disease)      Hearing loss      HTN (hypertension)      Lymphoma (H)      S/P autologous bone marrow transplantation (H) 3/4/2015     Skin cancer     15 y ago.? basal cell     Tinnitus      VRE (vancomycin resistant enterococcus) culture positive      Past Surgical History:   Procedure Laterality Date     BYPASS GRAFT ARTERY CORONARY      2005     CYSTOSCOPY, TRANSURETHRAL RESECTION (TUR) TUMOR BLADDER, COMBINED N/A 12/29/2015    Procedure: COMBINED CYSTOSCOPY, TRANSURETHRAL RESECTION (TUR) TUMOR BLADDER;  Surgeon: Orville Boyle MD;  Location: [facility] OR     MOHS MICROGRAPHIC PROCEDURE  2000    nose     PICC INSERTION Left 9/26/2014    5fr DL Power PICC, 43cm (1cm external) in the L basilic vein w/ tip in the low SVC.     PICC INSERTION Left 10/28/2014    5fr DL Power PICC, 41cm (1cm external) in the L basilic vein w/ tip in the SVC RA junction.     PICC INSERTION Right 12/31/2014    5fr DL Power PICC, 39cm (1cm external) in the R medial brachial vein w/ tip in the low SVC.     Allergies   Allergen Reactions     Nka [No Known Allergies]           Medications:   I have reviewed this patient's medication profile.  MNPMP: reviewed      Data reviewed:  I reviewed recent labs and imaging, comments on pertinent findings:  Cr 1.2    Thank you for involving us in the patient's care.   Evaristo Blackwood MD / Palliative Medicine / Pager [phone] / [facility] Inpatient Team Consult Pager [phone] (answered 8am-430pm M-F) - ok to text page via myairmail / After-Hours  Answering Service [phone] / Palliative Clinic in the [facility] - [phone] (scheduling); [phone] (triage).

## 2018-03-23 NOTE — MR AVS SNAPSHOT
After Visit Summary   3/23/2018    Hi Smith    MRN: [id number]           Patient Information     Date Of Birth          1955        Visit Information        Provider Department      3/23/2018 3:00 PM Evaristo Blackwood MD [facility]        Today's Diagnoses     Post-herpetic polyneuropathy    -  1    T-cell or NK-cell neoplasm (H)        Status post cord blood transplantation          Care Instructions    Preventive Care:     Colorectal Cancer Screening: During our visit today, we discussed that it is recommended you receive colorectal cancer screening. Please call or make an appointment with your primary care provider to discuss this. You may also call the [facility] scheduling line ([phone]) to set up a colonoscopy appointment.            Follow-ups after your visit        Your next 10 appointments already scheduled     Apr 12, 2018  8:30 AM CDT   [facility] Draw with  [facility] LAB DRAW   [facility] Lab Draw ([facility])    [address] [place] [address]  [address] [place] [id number] [phone]            Apr 12, 2018  9:00 AM CDT   (Arrive by 8:45 AM)   Return Visit with Yuri Hill MD   [facility] Cancer [facility] ([facility])    [address] [place] [address]  [address]  [facility] [address] [phone]              Who to contact     If you have questions or need follow up information about today's clinic visit or your schedule please contact [facility] directly at [phone].  Normal or non-critical lab and imaging results will be communicated to you by MyChart, letter or phone within 4 business days after the clinic has received the results. If you do not hear from us within 7 days, please contact the clinic through [employer] or phone. If you have a critical or abnormal lab result, we will notify you by phone as soon as possible.  Submit refill requests through [employer] or  "call your pharmacy and they will forward the refill request to us. Please allow 3 business days for your refill to be completed.          Additional Information About Your Visit        [employer] Information     [employer] gives you secure access to your electronic health record. If you see a primary care provider, you can also send messages to your care team and make appointments. If you have questions, please call your primary care clinic.  If you do not have a primary care provider, please call [phone] and they will assist you.        Care EveryWhere ID     This is your Care EveryWhere ID. This could be used by other organizations to access your [place] medical records  [id number]        Your Vitals Were     Pulse Temperature Respirations Height Pulse Oximetry BMI (Body Mass Index)    93 97  F (36.1  C) (Oral) 16 1.829 m (6' 0.01\") 94% 25.38 kg/m2       Blood Pressure from Last 3 Encounters:   03/23/18 121/74   01/18/18 126/82   11/16/17 145/83    Weight from Last 3 Encounters:   03/23/18 84.9 kg (187 lb 3.2 oz)   01/18/18 88.3 kg (194 lb 9.6 oz)   11/16/17 89.3 kg (196 lb 14.4 oz)              Today, you had the following     No orders found for display         Today's Medication Changes          These changes are accurate as of 3/23/18  4:28 PM.  If you have any questions, ask your nurse or doctor.               Start taking these medicines.        Dose/Directions    lidocaine 5 % Patch   Commonly known as:  LIDODERM   Started by:  Evaristo Blackwood MD        Dose:  1-3 patch   Place 1-3 patches onto the skin every 24 hours   Quantity:  60 patch   Refills:  3         These medicines have changed or have updated prescriptions.        Dose/Directions    gabapentin 400 MG capsule   Commonly known as:  NEURONTIN   This may have changed:    - medication strength  - how much to take   Used for:  Post-herpetic polyneuropathy   Changed by:  Evaristo Blackwood MD        Dose:  400 mg   Take 1 capsule (400 mg) " by mouth 3 times daily   Quantity:  270 capsule   Refills:  3            Where to get your medicines      These medications were sent to [facility] - [place], [place] - [address]  [address], [facility] [address]     Phone:  [phone]     gabapentin 400 MG capsule    lidocaine 5 % Patch                Primary Care Provider Office Phone # Fax #    Glenroy Torres [phone]       [facility] [address] [facility] [address]        Equal Access to Services     [facility]: Hadii aad ku hadasho Soomaali, waaxda luqadaha, qaybta kaalmada adeegyada, waxay idiin hayaan adeeg kharasami butt . So [facility] [phone].    ATENCIÓN: Si habla español, tiene a singh disposición servicios gratuitos de asistencia lingüística. [facility] [phone].    We comply with applicable federal civil rights laws and [place] laws. We do not discriminate on the basis of race, color, national origin, age, disability, sex, sexual orientation, or gender identity.            Thank you!     Thank you for choosing [facility]  for your care. Our goal is always to provide you with excellent care. Hearing back from our patients is one way we can continue to improve our services. Please take a few minutes to complete the written survey that you may receive in the mail after your visit with us. Thank you!             Your Updated Medication List - Protect others around you: Learn how to safely use, store and throw away your medicines at www.disposemymeds.org.          This list is accurate as of 3/23/18  4:28 PM.  Always use your most recent med list.                   Brand Name Dispense Instructions for use Diagnosis    ACYCLOVIR PO       T-cell or NK-cell neoplasm (H)       AMLODIPINE BESYLATE PO      Take by mouth daily    Extranodal NK/T-cell lymphoma, nasal type (H)       ASPIRIN PO      Take 81 mg by mouth daily    T-cell or NK-cell neoplasm (H)       gabapentin  400 MG capsule    NEURONTIN    270 capsule    Take 1 capsule (400 mg) by mouth 3 times daily    Post-herpetic polyneuropathy       lidocaine 5 % Patch    LIDODERM    60 patch    Place 1-3 patches onto the skin every 24 hours        LIPITOR PO      Take 10 mg by mouth daily    T-cell or NK-cell neoplasm (H)       nitroGLYcerin 0.4 MG sublingual tablet    NITROSTAT     Place 0.4 mg under the tongue    Extranodal NK/T-cell lymphoma, nasal type (H), Hypogammaglobulinemia (H)       phosphorus tablet 250 mg 250 MG per tablet    PHOSPHA 250 NEUTRAL    180 tablet    Take 1 tablet (250 mg) by mouth 2 times daily    Hypophosphatemia       venlafaxine 37.5 MG 24 hr capsule    EFFEXOR XR    90 capsule    Take 1 capsule (37.5 mg) by mouth daily    Lymphoma (H)

## 2018-03-23 NOTE — LETTER
3/23/2018       RE: Hi Smith  [id number] [address] [facility] [id number]     Dear Colleague,    Thank you for referring your patient, Hi Smith, to the [facility] at [facility]. Please see a copy of my visit note below.    Palliative Staff/Outpatient Clinic    (This note was transcribed using voice recognition software. While I review and edit the transcription, I may miss errors. Also, the software capitalizes words strangely sometimes. 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  He received chemotherapy, initially with SMILE (3 cycles), which did not control the disease, followed by ICE for 2 cycles in 01/2015, good response and then autologous stem cell transplant (03/04/2015) with BEAM conditioning. 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  By 12/2015, he again had evidence for recurrence on the right lower extremity (biopsy proven).  Recurrence appeared to be localized to the right ankle and possibly, although undocumented, on the right wrist.  He was given involved field radiation to these two areas.  Subsequently, he had the appearance of multiple subcutaneous nodules on his legs, especially, but also on his arms and trunk, that arose and often resolved spontaneously.  Some showed inflammation and cutaneous crusting with erythema and subsequent regression. It was thought he may be experiencing a graft vs lymphoma effect. He continued to have new lesions, predominantly on his lower legs, that appeared and resolved. Those on his legs that ulcerated resolved with the care provide by Dr. Eaton. Over time, the frequency of new lesions  "appearing and their ulceration decreased.      A pneumonia required brief hospitalization in 04/2017 thought to be RSV bronchiolitis with suspected superimposed bacterial pneumonia. Related symptoms cleared with antibiotics.         Also, Mr. Smith developed a few new scattered lower extremity nodules in 2017.  He was referred to Dermatology for evaluation and biopsy, but the lesions had largely resolved by the time a biopsy could be done.  Biopsy of one of the resolving lesions was considered consistent with dermal hypersensitivity reaction with the differential including arthropod bite and possibly drug eruption.  These lesions cleared.        History:  Mr. Smith is seen with his wife today who supplements history.  The focus of today's visit is on his postherpetic neuralgia.  He had an episode of shingles in January 2018.  It started with right abdominal pain that was very severe he even went to the hospital for.  Nothing came of that but then he shortly afterwards had a shingles eruption and went to urgent care and was put on acyclovir.  Subsequent to that his lesions healed however he has had very persistent and severe pain.  It waxes and wanes and he has good days and bad days but it has not clearly started to get better.    It is a burning and hypersensitivity on his right abdomen to flank.  Air and light touch can be excruciating.  He started wearing his wife's abdominal binder which helps a lot \"better than any medication.\"  He has been on gabapentin which was increased to 300 3 times daily to 4 times daily.  He clearly identifies this is helpful and misses it when he forgets to take it.  He has tried taking 600 mg at once but had nightmares or was lethargic during the day when he took it during the day.  600 mg of ibuprofen really helps and he takes that a couple times a day without any side effects.  He has no history of kidney disease pathologic bleeding or history of peptic ulcer disease.  He has some " "lidocaine ointment when he had open lesions but he has not used anything since.  He is oxycodone and takes on occasion but finds it helps minimally and does not last that long.    His wife is worried that the pain is making him depressed.  He says he has some days where he is really down and lethargic and not eating and drinking very much.  He relates this to the pain being out of control.  He worries gets dehydrated.  The patient is less convinced that these episodes which he agrees do happen are related to his pain.  He is on Effexor for over a year which he describes this is \"happy pill.\"  This was for depression and mood disturbance from the cancer.  He talks about understanding he is a rare cancer that does not have any cure available and how difficult it was going through multiple treatments just to have the cancer come right back.  Lately he has been off anticancer treatment and my sense is Dr. Hill is allowing his graft versus tumor effect to control his tumors and according to the patient that is overall going well-he has a couple small subcutaneous lesions bilaterally on his calf.    They just went on a cruise to [place] and his wife got some cannabis oil which did not help-rubbed on his skin.    SH:   Lives with his wife.  They have adult children and grandchildren.  He does get out of the house and do things.    ROS:  Some fatigue.  Remainder of the comprehensive review of systems is negative    PE: /74 (BP Location: Right arm, Patient Position: Chair, Cuff Size: Adult Regular)  Pulse 93  Temp 97  F (36.1  C) (Oral)  Resp 16  Ht 1.829 m (6' 0.01\")  Wt 84.9 kg (187 lb 3.2 oz)  SpO2 94%  BMI 25.38 kg/m2  Alert, comfortable appearing, NAD. Healthy appearing.  Head NCAT. Hearing aides  Eyes anicteric without injection  Face symmetric, eyes conjugate  Lungs unlabored, CTAB  CV rrr s1s2  Abd soft, ntnd, benign  No LE edema  Skin warm, dry, with healed dermatomal hyper&hypo pigmented " macules on R abd and flank c/w 'healed' shingles lesions  MSK joints of hand normal;   Neuro Face symmetric,   Neuropsych exam normal including affect, sensorium, gross memory, thought processes, and fund of knowledge.     Impression & Recommendations:  63-year-old man with a NK cell lymphoblastic lymphoma mostly manifesting as subcutaneous lesions status post cord blood transplant who has severe postherpetic neuralgia.    Discussed with him the natural history of PHN including that it is likely to slowly attenuate but could easily be very symptomatic for a year.  Explained how postherpetic neuralgia works and our treatment approach for it.  Immediately I would recommend Lidoderm patches which is one of the few FDA approved treatments for this indication.  He is clearly sensitive to gabapentin but it helps and I want to try to push him up to 400 mg 3 times daily given that he cannot tolerate 600 mg 3 times daily.  He should continue the binder-I think the proprioceptive counter stimulation is counteracting nociception and that can be a really nice effect for him.  Further steps include tricyclics, increase in the venlafaxine, pregabalin instead of gabapentin, or other topicals including high concentration lidocaine ointment, or topical capsaicin. Opioids are reasonable for this, but with all their side effects and he's already tried some oxycodone without benefit I think that's not a great idea currently.    I also emphasized for him the importance that he remain positive and hopeful that he will improve this will get better and is also really important for his recovery that he remain active with doing at least gentle aerobic exercise such as walking and remaining involved with important life activities that he finds meaningful stimulating-this can be simple stuff like getting out with his grandkids, fishing, going to [employer], what ever, but it is very important that he get out of the house most days of the week and  not a whole lot even with the pain is bad as it is.    We will call him in a week-follow-up in 2-3 months    Chart data reviewed today:    Family History   Problem Relation Age of Onset     CANCER Mother      ovarian, esophageal     C.A.D. Sister      CANCER Father      prostate cancer, lung fibrosis     HEART DISEASE Father      Heart Disease     Hypertension Father      Melanoma No family hx of      Skin Cancer No family hx of      Past Medical History:   Diagnosis Date     Alcohol abuse     in remission x 20 yrs     CAD (coronary artery disease) of artery bypass graft     CABG 2005, hx of stents     Depression      DVT (deep venous thrombosis) (H) November, 2014    upper left arm after PICC line     GERD (gastroesophageal reflux disease)      Hearing loss      HTN (hypertension)      Lymphoma (H)      S/P autologous bone marrow transplantation (H) 3/4/2015     Skin cancer     15 y ago.? basal cell     Tinnitus      VRE (vancomycin resistant enterococcus) culture positive      Past Surgical History:   Procedure Laterality Date     BYPASS GRAFT ARTERY CORONARY      2005     CYSTOSCOPY, TRANSURETHRAL RESECTION (TUR) TUMOR BLADDER, COMBINED N/A 12/29/2015    Procedure: COMBINED CYSTOSCOPY, TRANSURETHRAL RESECTION (TUR) TUMOR BLADDER;  Surgeon: Orville Boyle MD;  Location: [facility] OR     MOHS MICROGRAPHIC PROCEDURE  2000    nose     PICC INSERTION Left 9/26/2014    5fr DL Power PICC, 43cm (1cm external) in the L basilic vein w/ tip in the low SVC.     PICC INSERTION Left 10/28/2014    5fr DL Power PICC, 41cm (1cm external) in the L basilic vein w/ tip in the SVC RA junction.     PICC INSERTION Right 12/31/2014    5fr DL Power PICC, 39cm (1cm external) in the R medial brachial vein w/ tip in the low SVC.     Allergies   Allergen Reactions     Nka [No Known Allergies]           Medications:   I have reviewed this patient's medication profile.  MNPMP: reviewed      Data reviewed:  I reviewed recent labs and  imaging, comments on pertinent findings:  Cr 1.2    Again, thank you for allowing me to participate in the care of your patient.      Sincerely,    Evaristo Blackwood MD

## 2018-03-23 NOTE — PATIENT INSTRUCTIONS
Preventive Care:     Colorectal Cancer Screening: During our visit today, we discussed that it is recommended you receive colorectal cancer screening. Please call or make an appointment with your primary care provider to discuss this. You may also call the [facility] scheduling line ([phone]) to set up a colonoscopy appointment.

## 2018-03-27 DIAGNOSIS — C85.90 LYMPHOMA (H): ICD-10-CM

## 2018-03-27 RX ORDER — VENLAFAXINE HYDROCHLORIDE 37.5 MG/1
CAPSULE, EXTENDED RELEASE ORAL
Qty: 90 CAPSULE | Refills: 1 | Status: SHIPPED | OUTPATIENT
Start: 2018-03-27 | End: 2018-10-07

## 2018-03-30 ENCOUNTER — TELEPHONE (OUTPATIENT)
Dept: PALLIATIVE CARE | Facility: CLINIC | Age: 63
End: 2018-03-30

## 2018-03-30 NOTE — TELEPHONE ENCOUNTER
Attempted to call patient to follow up after apt last week, but was unable to reach him. Left  requesting call back when he is able.

## 2018-04-02 ENCOUNTER — TELEPHONE (OUTPATIENT)
Dept: PALLIATIVE CARE | Facility: CLINIC | Age: 63
End: 2018-04-02

## 2018-04-02 NOTE — TELEPHONE ENCOUNTER
Prior Authorization Retail Medication Request    Medication/Dose: LIDOCAINE 5% PATCH  ICD code (if different than what is on RX): B02.23 Post-herpetic polyneuropathy   Previously Tried and Failed:     Rationale:      Insurance Name:  [employer]  Insurance ID:  [id number]  BIN: [id number]  PCN: [id number]  GROUP: [id number]      Pharmacy Information (if different than what is on RX)  Name:  [facility]   Phone:  [phone]

## 2018-04-04 NOTE — TELEPHONE ENCOUNTER
Received VM from patient returning my below call - he stated the shingles related pain seems to be improving day by day and he is doing okay.

## 2018-04-04 NOTE — TELEPHONE ENCOUNTER
Central Prior Authorization Team   Phone: [phone]    PA Tier exception Initiation    Medication: LIDOCAINE 5% PATCH - Approved  Insurance Company: [employer] - Phone [phone] Fax [phone]  Pharmacy Filling the Rx: [facility] [place], [place] - [facility]  Filling Pharmacy Phone: [phone]  Start Date: 4/4/2018    Tier exception initiated by phone

## 2018-04-04 NOTE — TELEPHONE ENCOUNTER
Central Prior Authorization Team   Phone: [phone]      PA Initiation    Medication: LIDOCAINE 5% PATCH - Initiated  Insurance Company: [employer] - Phone [phone] Fax [phone]  Pharmacy Filling the Rx: [facility] - [place], [place] - [address] [place] [address]  Filling Pharmacy Phone: [phone]  Filling Pharmacy Fax:    Start Date: 4/4/2018

## 2018-04-04 NOTE — TELEPHONE ENCOUNTER
Central Prior Authorization Team   Phone: [phone]      Prior Authorization Approval    Authorization Effective Date: 1/4/2018  Authorization Expiration Date: 4/4/2019  Medication: LIDOCAINE 5% PATCH - Approved  Approved Dose/Quantity: 60 per 20 days  Insurance Company: [employer] - Phone [phone] Fax [phone]  Expected CoPay: 405$      Which Pharmacy is filling the prescription (Not needed for infusion/clinic administered): [facility] [address] [place] [address] - [address] [place] [address]  Pharmacy Notified: Yes  Patient Notified: Yes

## 2018-04-04 NOTE — TELEPHONE ENCOUNTER
Central Prior Authorization Team   Phone: [phone]      Prior Authorization Tier Exception Approval    Authorization Effective Date: 1/4/2018  Authorization Expiration Date: 4/4/2019  Medication: LIDOCAINE 5% PATCH -  Tier Exception Approved  Approved Dose/Quantity: 60 per 20 days  Insurance Company: Abdi Hardy - Phone [phone] Fax [phone]  Expected CoPay: 405$ down to 1$ co-pay!!! Yay!  Which Pharmacy is filling the prescription (Not needed for infusion/clinic administered): [facility] [address] [place] [address] - [address] [place] [address]  Pharmacy Notified: Yes  Patient Notified: Yes    Pharmacy ran a paid claim with 1$ co-pay! :) Tier is back dated to 01/04/2018 and good through 12/01/2019.

## 2018-04-12 ENCOUNTER — ONCOLOGY VISIT (OUTPATIENT)
Dept: ONCOLOGY | Facility: CLINIC | Age: 63
End: 2018-04-12
Attending: INTERNAL MEDICINE
Payer: MEDICARE

## 2018-04-12 ENCOUNTER — APPOINTMENT (OUTPATIENT)
Dept: LAB | Facility: CLINIC | Age: 63
End: 2018-04-12
Attending: INTERNAL MEDICINE
Payer: MEDICARE

## 2018-04-12 VITALS
RESPIRATION RATE: 16 BRPM | OXYGEN SATURATION: 99 % | TEMPERATURE: 98.6 F | SYSTOLIC BLOOD PRESSURE: 127 MMHG | HEART RATE: 79 BPM | WEIGHT: 192.3 LBS | HEIGHT: 72 IN | BODY MASS INDEX: 26.05 KG/M2 | DIASTOLIC BLOOD PRESSURE: 78 MMHG

## 2018-04-12 DIAGNOSIS — C86.00 EXTRANODAL NK/T-CELL LYMPHOMA, NASAL TYPE: Primary | ICD-10-CM

## 2018-04-12 DIAGNOSIS — C96.9 T-CELL OR NK-CELL NEOPLASM (H): ICD-10-CM

## 2018-04-12 LAB
ALBUMIN SERPL-MCNC: 3.7 G/DL (ref 3.4–5)
ALP SERPL-CCNC: 102 U/L (ref 40–150)
ALT SERPL W P-5'-P-CCNC: 20 U/L (ref 0–70)
ANION GAP SERPL CALCULATED.3IONS-SCNC: 7 MMOL/L (ref 3–14)
AST SERPL W P-5'-P-CCNC: 18 U/L (ref 0–45)
BASOPHILS # BLD AUTO: 0 10E9/L (ref 0–0.2)
BASOPHILS NFR BLD AUTO: 0.2 %
BILIRUB SERPL-MCNC: 0.5 MG/DL (ref 0.2–1.3)
BUN SERPL-MCNC: 15 MG/DL (ref 7–30)
CALCIUM SERPL-MCNC: 8.9 MG/DL (ref 8.5–10.1)
CHLORIDE SERPL-SCNC: 109 MMOL/L (ref 94–109)
CO2 SERPL-SCNC: 23 MMOL/L (ref 20–32)
CREAT SERPL-MCNC: 1.14 MG/DL (ref 0.66–1.25)
DIFFERENTIAL METHOD BLD: ABNORMAL
EOSINOPHIL # BLD AUTO: 0.3 10E9/L (ref 0–0.7)
EOSINOPHIL NFR BLD AUTO: 2.3 %
ERYTHROCYTE [DISTWIDTH] IN BLOOD BY AUTOMATED COUNT: 14.5 % (ref 10–15)
GFR SERPL CREATININE-BSD FRML MDRD: 65 ML/MIN/1.7M2
GLUCOSE SERPL-MCNC: 74 MG/DL (ref 70–99)
HCT VFR BLD AUTO: 41 % (ref 40–53)
HGB BLD-MCNC: 12.8 G/DL (ref 13.3–17.7)
IMM GRANULOCYTES # BLD: 0 10E9/L (ref 0–0.4)
IMM GRANULOCYTES NFR BLD: 0.3 %
LDH SERPL L TO P-CCNC: 174 U/L (ref 85–227)
LYMPHOCYTES # BLD AUTO: 3.4 10E9/L (ref 0.8–5.3)
LYMPHOCYTES NFR BLD AUTO: 29 %
MCH RBC QN AUTO: 28.6 PG (ref 26.5–33)
MCHC RBC AUTO-ENTMCNC: 31.2 G/DL (ref 31.5–36.5)
MCV RBC AUTO: 92 FL (ref 78–100)
MONOCYTES # BLD AUTO: 1 10E9/L (ref 0–1.3)
MONOCYTES NFR BLD AUTO: 8.7 %
NEUTROPHILS # BLD AUTO: 7.1 10E9/L (ref 1.6–8.3)
NEUTROPHILS NFR BLD AUTO: 59.5 %
NRBC # BLD AUTO: 0 10*3/UL
NRBC BLD AUTO-RTO: 0 /100
PLATELET # BLD AUTO: 256 10E9/L (ref 150–450)
POTASSIUM SERPL-SCNC: 3.7 MMOL/L (ref 3.4–5.3)
PROT SERPL-MCNC: 7 G/DL (ref 6.8–8.8)
RBC # BLD AUTO: 4.47 10E12/L (ref 4.4–5.9)
SODIUM SERPL-SCNC: 140 MMOL/L (ref 133–144)
WBC # BLD AUTO: 11.9 10E9/L (ref 4–11)

## 2018-04-12 PROCEDURE — 36415 COLL VENOUS BLD VENIPUNCTURE: CPT

## 2018-04-12 PROCEDURE — 80053 COMPREHEN METABOLIC PANEL: CPT | Performed by: INTERNAL MEDICINE

## 2018-04-12 PROCEDURE — 85025 COMPLETE CBC W/AUTO DIFF WBC: CPT | Performed by: INTERNAL MEDICINE

## 2018-04-12 PROCEDURE — 83615 LACTATE (LD) (LDH) ENZYME: CPT | Performed by: INTERNAL MEDICINE

## 2018-04-12 PROCEDURE — 87799 DETECT AGENT NOS DNA QUANT: CPT | Performed by: INTERNAL MEDICINE

## 2018-04-12 PROCEDURE — 84165 PROTEIN E-PHORESIS SERUM: CPT | Performed by: INTERNAL MEDICINE

## 2018-04-12 PROCEDURE — G0463 HOSPITAL OUTPT CLINIC VISIT: HCPCS | Mod: ZF

## 2018-04-12 PROCEDURE — 00000402 ZZHCL STATISTIC TOTAL PROTEIN: Performed by: INTERNAL MEDICINE

## 2018-04-12 PROCEDURE — 99214 OFFICE O/P EST MOD 30 MIN: CPT | Mod: ZP | Performed by: INTERNAL MEDICINE

## 2018-04-12 ASSESSMENT — PAIN SCALES - GENERAL: PAINLEVEL: NO PAIN (1)

## 2018-04-12 NOTE — PROGRESS NOTES
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  He received chemotherapy, initially with SMILE (3 cycles), which did not control the disease, followed by ICE for 2 cycles in 01/2015, good response and then autologous stem cell transplant (03/04/2015) with BEAM conditioning. 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      By 12/2015, he again had evidence for recurrence on the right lower extremity (biopsy proven).  Recurrence appeared to be localized to the right ankle and possibly, although undocumented, on the right wrist.  He was given involved field radiation to these two areas.  Subsequently, he had the appearance of multiple subcutaneous nodules on his legs, especially, but also on his arms and trunk, that arose and often resolved spontaneously.  Some showed inflammation and cutaneous crusting with erythema and subsequent regression. Biopsies demonstrated lymphoma. Because of the course it was thought he might be experiencing a graft vs lymphoma effect. He continued to have new lesions, predominantly on his lower legs, that appeared and resolved. Those on his legs that ulcerated resolved with the care provided by Dr. Eaton. Over time, the frequency of new lesions and progressing to th point of ulceration decreased.         Mr. Smith developed a few new scattered lower extremity nodules in 2017.  He was referred to Dermatology for evaluation and biopsy, but the lesions had largely resolved by the time a biopsy could be done.  Biopsy of one of the resolving lesions was considered consistent with dermal hypersensitivity reaction with the differential including arthropod bite and possibly drug eruption.  These lesions cleared.        INTERVAL HISTORY:  I last saw the patient on 01/18/2018.  Subsequently, he developed a  "severe post herpetic neuralgia and has been seen in the palliative care clinic by Dr. Blackwood.  The patient is currently doing very well with respect to this problem and has been reducing his medications, gabapentin - only taking 400 at bedtime.  Slight pruritus and a sense of \"knowing it is there\", but no pain.      In terms of the skin nodules, he is only aware of 3 skin nodules that may have represented lymphomatous lesions.  One on the left forearm is stable.  One on each of the backside of both lower legs appear to be decreasing in size.      Overall, Mr. Smith is feeling very well.  Increasing activities.  No interval infections, fevers, night sweats or weight loss.  No open skin lesions.      REVIEW OF SYSTEMS:  A 10-point review of systems is otherwise negative.      MEDICATIONS:   1.  Venlafaxine.   2.  Lidocaine patch.   3.  Gabapentin 400 at bedtime.   4.  Neutra-Phos.   5.  Nitroglycerin p.r.n.    6.  Amlodipine.   7.  Aspirin 81 mg.   8.  Atorvastatin.      ALLERGIES:  None reported.      PHYSICAL EXAMINATION:   VITAL SIGNS:  Weight 87.2 kg (stable), blood pressure 127/78, pulse 79 and regular, respirations 16, temperature 98.6.  O2 saturation 99% on room air.   HEENT:  Anicteric.  No conjunctival injection.  Oropharynx with no masses.  Mucosae are moist without lesion.   CHEST:  Clear to auscultation/percussion.   HEART:  Regular rhythm.  No murmur or gallop.   ABDOMEN:  Soft and nontender.  Bowel sounds present.  No organomegaly.  No masses.  No inguinal/femoral nodes.   EXTREMITIES:  No lower extremity edema.   SKIN:  No evident rashes. Subcutaneous nodules are restricted to a 1 cm small subcutaneous relatively flat nodule on the left forearm and smaller lesions just below the popliteal spaces on the backs of both legs.  Nontender.  None with signs of inflammation.  None increased in size.      LABORATORY DATA:      Hemoglobin 12.8 grams % with 11,900 WBCs (normal differential) and 256,000 " platelets.   Electrolytes, calcium, creatinine (1.14) - normal.   Liver enzymes, including serum LDH - normal.   EBV - pending. (Rarely detected (twice) since 09/2015 and then below reportable range)      ASSESSMENT/PLAN:  NK/T-cell lymphoma, nasal type.  The patient is doing very well now approaching 4 years from diagnosis and 3 years from his nonmyeloablative double cord transplant.  Disease appears to be very well-controlled.  The significance of the small residual skin nodules is uncertain.  None is changing, nor representative of what he has had in the past.  Will continue to monitor.      The patient will return in 3 months with laboratory studies.  Encouraged to report any new skin findings in the interim.  Will continue with full activities.      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  Monitor.     Yuri Hill MD  Professor of Medicine  Oncology  [facility]  Office: [phone]  Clinic Fax: [phone]       cc:   MD Ruth Grover MD Daniel Miller, MD Bailey Lee, MD Roger Weenig, MD Nathan Norquist, MD Marie Claire Buckley, MD Drew Rosielle, MD

## 2018-04-12 NOTE — NURSING NOTE
Oncology Rooming Note    April 12, 2018 9:15 AM   Hi Smith is a 63 year old male who presents for:    Chief Complaint   Patient presents with     Blood Draw     labs drawn with vpt by rn.  vs taken     Oncology Clinic Visit     T Cell Lymphoma     Initial Vitals: /78 (BP Location: Right arm, Patient Position: Sitting, Cuff Size: Adult Regular)  Pulse 79  Temp 98.6  F (37  C) (Oral)  Resp 16  Ht 1.829 m (6')  Wt 87.2 kg (192 lb 4.8 oz)  SpO2 99%  BMI 26.08 kg/m2 Estimated body mass index is 26.08 kg/(m^2) as calculated from the following:    Height as of this encounter: 1.829 m (6').    Weight as of this encounter: 87.2 kg (192 lb 4.8 oz). Body surface area is 2.1 meters squared.  No Pain (1) Comment: Data Unavailable   No LMP for male patient.  Allergies reviewed: Yes  Medications reviewed: Yes    Medications: Medication refills not needed today.  Pharmacy name entered into [facility]:    [facility] [place], [place] - [address] [facility], [address]  [address] - [address] [facility]    Clinical concerns: No New Concerns    5 minutes for nursing intake (face to face time)     BRAYDEN Bosch

## 2018-04-12 NOTE — NURSING NOTE
Chief Complaint   Patient presents with     Blood Draw     labs drawn with vpt by rn.  vs taken     Labs drawn with vpt by rn.  Pt tolerated well.  VS taken.  Pt checked in for next appt.    Mariah Gaytan RN

## 2018-04-12 NOTE — LETTER
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  He received chemotherapy, initially with SMILE (3 cycles), which did not control the disease, followed by ICE for 2 cycles in 01/2015, good response and then autologous stem cell transplant (03/04/2015) with BEAM conditioning. 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      By 12/2015, he again had evidence for recurrence on the right lower extremity (biopsy proven).  Recurrence appeared to be localized to the right ankle and possibly, although undocumented, on the right wrist.  He was given involved field radiation to these two areas.  Subsequently, he had the appearance of multiple subcutaneous nodules on his legs, especially, but also on his arms and trunk, that arose and often resolved spontaneously.  Some showed inflammation and cutaneous crusting with erythema and subsequent regression. Biopsies demonstrated lymphoma. Because of the course it was thought he might be experiencing a graft vs lymphoma effect. He continued to have new lesions, predominantly on his lower legs, that appeared and resolved. Those on his legs that ulcerated resolved with the care provided by Dr. Eaton. Over time, the frequency of new lesions and progressing to th point of ulceration decreased.         Mr. Smith developed a few new scattered lower extremity nodules in 2017.  He was referred to Dermatology for evaluation and biopsy, but the lesions had largely resolved by the time a biopsy could be done.  Biopsy of one of the resolving lesions was considered consistent with dermal hypersensitivity reaction with the differential including arthropod bite and possibly drug eruption.  These lesions cleared.   "      INTERVAL HISTORY:  I last saw the patient on 01/18/2018.  Subsequently, he developed a severe post herpetic neuralgia and has been seen in the palliative care clinic by Dr. Blackwood.  The patient is currently doing very well with respect to this problem and has been reducing his medications, gabapentin - only taking 400 at bedtime.  Slight pruritus and a sense of \"knowing it is there\", but no pain.      In terms of the skin nodules, he is only aware of 3 skin nodules that may have represented lymphomatous lesions.  One on the left forearm is stable.  One on each of the backside of both lower legs appear to be decreasing in size.      Overall, Mr. Smith is feeling very well.  Increasing activities.  No interval infections, fevers, night sweats or weight loss.  No open skin lesions.      REVIEW OF SYSTEMS:  A 10-point review of systems is otherwise negative.      MEDICATIONS:   1.  Venlafaxine.   2.  Lidocaine patch.   3.  Gabapentin 400 at bedtime.   4.  Neutra-Phos.   5.  Nitroglycerin p.r.n.    6.  Amlodipine.   7.  Aspirin 81 mg.   8.  Atorvastatin.      ALLERGIES:  None reported.      PHYSICAL EXAMINATION:   VITAL SIGNS:  Weight 87.2 kg (stable), blood pressure 127/78, pulse 79 and regular, respirations 16, temperature 98.6.  O2 saturation 99% on room air.   HEENT:  Anicteric.  No conjunctival injection.  Oropharynx with no masses.  Mucosae are moist without lesion.   CHEST:  Clear to auscultation/percussion.   HEART:  Regular rhythm.  No murmur or gallop.   ABDOMEN:  Soft and nontender.  Bowel sounds present.  No organomegaly.  No masses.  No inguinal/femoral nodes.   EXTREMITIES:  No lower extremity edema.   SKIN:  No evident rashes. Subcutaneous nodules are restricted to a 1 cm small subcutaneous relatively flat nodule on the left forearm and smaller lesions just below the popliteal spaces on the backs of both legs.  Nontender.  None with signs of inflammation.  None increased in size.      LABORATORY " DATA:      Hemoglobin 12.8 grams % with 11,900 WBCs (normal differential) and 256,000 platelets.   Electrolytes, calcium, creatinine (1.14) - normal.   Liver enzymes, including serum LDH - normal.   EBV - pending. (Rarely detected (twice) since 09/2015 and then below reportable range)      ASSESSMENT/PLAN:  NK/T-cell lymphoma, nasal type.  The patient is doing very well now approaching 4 years from diagnosis and 3 years from his nonmyeloablative double cord transplant.  Disease appears to be very well-controlled.  The significance of the small residual skin nodules is uncertain.  None is changing, nor representative of what he has had in the past.  Will continue to monitor.      The patient will return in 3 months with laboratory studies.  Encouraged to report any new skin findings in the interim.  Will continue with full activities.      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  Monitor.     Yuri Hill MD  Professor of Medicine  Oncology  [facility]  Office: [phone]  Clinic Fax: [phone]       cc:   MD Ruth Grover MD Daniel Miller, MD Bailey Lee, MD Roger Weenig, MD Nathan Norquist, MD Marie Claire Buckley, MD Drew Rosielle, MD Bruce A. Peterson, MD

## 2018-04-12 NOTE — MR AVS SNAPSHOT
After Visit Summary   4/12/2018    Hi Smith    MRN: [id number]           Patient Information     Date Of Birth          1955        Visit Information        Provider Department      4/12/2018 9:00 AM Yuri Hill MD [facility]        Today's Diagnoses     Extranodal NK/T-cell lymphoma, nasal type (H)    -  1    T-cell or NK-cell neoplasm (H)           Follow-ups after your visit        Follow-up notes from your care team     Return in about 3 months (around 7/12/2018) for Physical Exam, Lab Work.      Your next 10 appointments already scheduled     May 30, 2018  9:30 AM CDT   (Arrive by 9:15 AM)   Return Visit with Evaristo Blackwood MD   [facility] ([facility])    [address] [place] [address]  [address]  [facility] [id number] [phone]            Jul 12, 2018 10:30 AM CDT   [facility] Lab Draw with  [facility] DRAW   [facility] Lab Draw ([facility])    [address] [place] [address]  [address]  [facility] [id number] [phone]            Jul 12, 2018 11:00 AM CDT   (Arrive by 10:45 AM)   Return Visit with Yuri Hill MD   [facility] ([facility])    [address] [place] [address]  [address]  [facility] [id number] [phone]              Future tests that were ordered for you today     Open Future Orders        Priority Expected Expires Ordered    Comprehensive metabolic panel Routine 7/12/2018 4/12/2019 4/12/2018    Lactate Dehydrogenase Routine 7/12/2018 4/12/2019 4/12/2018    *CBC with platelets differential Routine 7/12/2018 4/12/2019 4/12/2018            Who to contact     If you have questions or need follow up information about today's clinic visit or your schedule please contact [facility] directly at [phone].  Normal or non-critical lab and imaging results will be communicated to you by MyChart, letter or phone  within 4 business days after the clinic has received the results. If you do not hear from us within 7 days, please contact the clinic through [employer]lines or phone. If you have a critical or abnormal lab result, we will notify you by phone as soon as possible.  Submit refill requests through 20lines or call your pharmacy and they will forward the refill request to us. Please allow 3 business days for your refill to be completed.          Additional Information About Your Visit        [employer] Information     20lines gives you secure access to your electronic health record. If you see a primary care provider, you can also send messages to your care team and make appointments. If you have questions, please call your primary care clinic.  If you do not have a primary care provider, please call [phone] and they will assist you.        Care EveryWhere ID     This is your Care EveryWhere ID. This could be used by other organizations to access your [place] medical records  [id number]        Your Vitals Were     Pulse Temperature Respirations Height Pulse Oximetry BMI (Body Mass Index)    79 98.6  F (37  C) (Oral) 16 1.829 m (6') 99% 26.08 kg/m2       Blood Pressure from Last 3 Encounters:   04/12/18 127/78   03/23/18 121/74   01/18/18 126/82    Weight from Last 3 Encounters:   04/12/18 87.2 kg (192 lb 4.8 oz)   03/23/18 84.9 kg (187 lb 3.2 oz)   01/18/18 88.3 kg (194 lb 9.6 oz)              We Performed the Following     *CBC with platelets differential     Comprehensive metabolic panel     EBV DNA PCR Quantitative Whole Blood     Lactate Dehydrogenase     Protein electrophoresis        Primary Care Provider Office Phone # Fax #    Glenroy Torres [phone]       [facility] [address] [facility] [address]        Equal Access to Services     SANDRA LOPEZ : Crista Abarca, anderson humphreys, samanta ruelas, phuc wallace. So [facility]  [phone].    ATENCIÓN: Si amelia byrd, tiene a singh disposición servicios gratuitos de asistencia lingüística. Ashley donaldson [phone].    We comply with applicable federal civil rights laws and [place] laws. We do not discriminate on the basis of race, color, national origin, age, disability, sex, sexual orientation, or gender identity.            Thank you!     Thank you for choosing [facility]  for your care. Our goal is always to provide you with excellent care. Hearing back from our patients is one way we can continue to improve our services. Please take a few minutes to complete the written survey that you may receive in the mail after your visit with us. Thank you!             Your Updated Medication List - Protect others around you: Learn how to safely use, store and throw away your medicines at www.disposemymeds.org.          This list is accurate as of 4/12/18 11:59 PM.  Always use your most recent med list.                   Brand Name Dispense Instructions for use Diagnosis    ACYCLOVIR PO       T-cell or NK-cell neoplasm (H)       AMLODIPINE BESYLATE PO      Take by mouth daily    Extranodal NK/T-cell lymphoma, nasal type (H)       ASPIRIN PO      Take 81 mg by mouth daily    T-cell or NK-cell neoplasm (H)       gabapentin 400 MG capsule    NEURONTIN    270 capsule    Take 1 capsule (400 mg) by mouth 3 times daily    Post-herpetic polyneuropathy       lidocaine 5 % Patch    LIDODERM    60 patch    Place 1-3 patches onto the skin every 24 hours        LIPITOR PO      Take 10 mg by mouth daily    T-cell or NK-cell neoplasm (H)       nitroGLYcerin 0.4 MG sublingual tablet    NITROSTAT     Place 0.4 mg under the tongue    Extranodal NK/T-cell lymphoma, nasal type (H), Hypogammaglobulinemia (H)       phosphorus tablet 250 mg 250 MG per tablet    PHOSPHA 250 NEUTRAL    180 tablet    Take 1 tablet (250 mg) by mouth 2 times daily    Hypophosphatemia       venlafaxine 37.5 MG 24 hr  capsule    EFFEXOR-XR    90 capsule    TAKE ONE CAPSULE BY MOUTH ONCE DAILY    Lymphoma (H)

## 2018-04-13 LAB
ALBUMIN SERPL ELPH-MCNC: 4.1 G/DL (ref 3.7–5.1)
ALPHA1 GLOB SERPL ELPH-MCNC: 0.3 G/DL (ref 0.2–0.4)
ALPHA2 GLOB SERPL ELPH-MCNC: 0.7 G/DL (ref 0.5–0.9)
B-GLOBULIN SERPL ELPH-MCNC: 0.6 G/DL (ref 0.6–1)
EBV DNA # SPEC NAA+PROBE: NORMAL {COPIES}/ML
EBV DNA SPEC NAA+PROBE-LOG#: NORMAL {LOG_COPIES}/ML
GAMMA GLOB SERPL ELPH-MCNC: 0.9 G/DL (ref 0.7–1.6)
M PROTEIN SERPL ELPH-MCNC: 0 G/DL
PROT PATTERN SERPL ELPH-IMP: NORMAL

## 2018-05-19 DIAGNOSIS — C85.90 LYMPHOMA (H): ICD-10-CM

## 2018-07-12 ENCOUNTER — APPOINTMENT (OUTPATIENT)
Dept: LAB | Facility: CLINIC | Age: 63
End: 2018-07-12
Attending: INTERNAL MEDICINE
Payer: MEDICARE

## 2018-07-12 ENCOUNTER — ONCOLOGY VISIT (OUTPATIENT)
Dept: ONCOLOGY | Facility: CLINIC | Age: 63
End: 2018-07-12
Attending: INTERNAL MEDICINE
Payer: MEDICARE

## 2018-07-12 VITALS
BODY MASS INDEX: 26.5 KG/M2 | DIASTOLIC BLOOD PRESSURE: 82 MMHG | HEART RATE: 70 BPM | SYSTOLIC BLOOD PRESSURE: 131 MMHG | TEMPERATURE: 99.2 F | RESPIRATION RATE: 16 BRPM | OXYGEN SATURATION: 98 % | WEIGHT: 195.4 LBS

## 2018-07-12 DIAGNOSIS — C96.9 T-CELL OR NK-CELL NEOPLASM (H): ICD-10-CM

## 2018-07-12 DIAGNOSIS — C86.00 EXTRANODAL NK/T-CELL LYMPHOMA, NASAL TYPE: ICD-10-CM

## 2018-07-12 LAB
ALBUMIN SERPL-MCNC: 3.9 G/DL (ref 3.4–5)
ALP SERPL-CCNC: 88 U/L (ref 40–150)
ALT SERPL W P-5'-P-CCNC: 23 U/L (ref 0–70)
ANION GAP SERPL CALCULATED.3IONS-SCNC: 7 MMOL/L (ref 3–14)
AST SERPL W P-5'-P-CCNC: 22 U/L (ref 0–45)
BASOPHILS # BLD AUTO: 0 10E9/L (ref 0–0.2)
BASOPHILS NFR BLD AUTO: 0.5 %
BILIRUB SERPL-MCNC: 0.4 MG/DL (ref 0.2–1.3)
BUN SERPL-MCNC: 16 MG/DL (ref 7–30)
CALCIUM SERPL-MCNC: 8.8 MG/DL (ref 8.5–10.1)
CHLORIDE SERPL-SCNC: 111 MMOL/L (ref 94–109)
CO2 SERPL-SCNC: 23 MMOL/L (ref 20–32)
CREAT SERPL-MCNC: 1.11 MG/DL (ref 0.66–1.25)
DIFFERENTIAL METHOD BLD: NORMAL
EOSINOPHIL # BLD AUTO: 0.3 10E9/L (ref 0–0.7)
EOSINOPHIL NFR BLD AUTO: 2.9 %
ERYTHROCYTE [DISTWIDTH] IN BLOOD BY AUTOMATED COUNT: 14 % (ref 10–15)
GFR SERPL CREATININE-BSD FRML MDRD: 67 ML/MIN/1.7M2
GLUCOSE SERPL-MCNC: 74 MG/DL (ref 70–99)
HCT VFR BLD AUTO: 46.1 % (ref 40–53)
HGB BLD-MCNC: 14.5 G/DL (ref 13.3–17.7)
IMM GRANULOCYTES # BLD: 0 10E9/L (ref 0–0.4)
IMM GRANULOCYTES NFR BLD: 0.1 %
LDH SERPL L TO P-CCNC: 188 U/L (ref 85–227)
LYMPHOCYTES # BLD AUTO: 3.9 10E9/L (ref 0.8–5.3)
LYMPHOCYTES NFR BLD AUTO: 45.4 %
MCH RBC QN AUTO: 28 PG (ref 26.5–33)
MCHC RBC AUTO-ENTMCNC: 31.5 G/DL (ref 31.5–36.5)
MCV RBC AUTO: 89 FL (ref 78–100)
MONOCYTES # BLD AUTO: 1.1 10E9/L (ref 0–1.3)
MONOCYTES NFR BLD AUTO: 12.3 %
NEUTROPHILS # BLD AUTO: 3.4 10E9/L (ref 1.6–8.3)
NEUTROPHILS NFR BLD AUTO: 38.8 %
NRBC # BLD AUTO: 0 10*3/UL
NRBC BLD AUTO-RTO: 0 /100
PLATELET # BLD AUTO: 223 10E9/L (ref 150–450)
POTASSIUM SERPL-SCNC: 3.8 MMOL/L (ref 3.4–5.3)
PROT SERPL-MCNC: 7.1 G/DL (ref 6.8–8.8)
RBC # BLD AUTO: 5.17 10E12/L (ref 4.4–5.9)
SODIUM SERPL-SCNC: 140 MMOL/L (ref 133–144)
WBC # BLD AUTO: 8.7 10E9/L (ref 4–11)

## 2018-07-12 PROCEDURE — 36415 COLL VENOUS BLD VENIPUNCTURE: CPT

## 2018-07-12 PROCEDURE — 85025 COMPLETE CBC W/AUTO DIFF WBC: CPT | Performed by: INTERNAL MEDICINE

## 2018-07-12 PROCEDURE — 99214 OFFICE O/P EST MOD 30 MIN: CPT | Mod: ZP | Performed by: INTERNAL MEDICINE

## 2018-07-12 PROCEDURE — 83615 LACTATE (LD) (LDH) ENZYME: CPT | Performed by: INTERNAL MEDICINE

## 2018-07-12 PROCEDURE — 80053 COMPREHEN METABOLIC PANEL: CPT | Performed by: INTERNAL MEDICINE

## 2018-07-12 PROCEDURE — G0463 HOSPITAL OUTPT CLINIC VISIT: HCPCS | Mod: ZF

## 2018-07-12 ASSESSMENT — PAIN SCALES - GENERAL: PAINLEVEL: NO PAIN (0)

## 2018-07-12 NOTE — NURSING NOTE
Chief Complaint   Patient presents with     Blood Draw     Labs drawn from vpt by RN. Vs taken and pt checked in for appt     Labs collected from venipuncture by RN. Vitals taken. Checked in for appointment(s).    Bridgett Linton RN

## 2018-07-12 NOTE — MR AVS SNAPSHOT
After Visit Summary   7/12/2018    Hi Smith    MRN: [id number]           Patient Information     Date Of Birth          1955        Visit Information        Provider Department      7/12/2018 11:00 AM Yuri Hill MD [facility]        Today's Diagnoses     T-cell or NK-cell neoplasm (H)        Extranodal NK/T-cell lymphoma, nasal type (H)          Care Instructions    Preventive Care:    Colorectal Cancer Screening: During our visit today, we discussed that it is recommended you receive colorectal cancer screening. Please call or make an appointment with your primary care provider to discuss this. You may also call the [facility] scheduling line ([phone]) to set up a colonoscopy appointment.              Follow-ups after your visit        Follow-up notes from your care team     Return in about 3 months (around 10/12/2018) for Physical Exam.      Your next 10 appointments already scheduled     Aug 31, 2018 10:30 AM CDT   (Arrive by 10:15 AM)   Return Visit with Evaristo Blackwood MD   [facility] ([facility])    [address] [place] [address]  [address] [place] [address] [phone]            Oct 11, 2018 10:30 AM CDT   (Arrive by 10:15 AM)   Return Visit with Yuri Hill MD   [facility] ([facility])    [address] [place] [address]  [address] [place] [id number] [phone]              Who to contact     If you have questions or need follow up information about today's clinic visit or your schedule please contact [facility] directly at [phone].  Normal or non-critical lab and imaging results will be communicated to you by MyChart, letter or phone within 4 business days after the clinic has received the results. If you do not hear from us within 7 days, please contact the clinic through MyChart or phone. If you have a critical or abnormal  lab result, we will notify you by phone as soon as possible.  Submit refill requests through [employer] or call your pharmacy and they will forward the refill request to us. Please allow 3 business days for your refill to be completed.          Additional Information About Your Visit        [employer] Information     [employer] gives you secure access to your electronic health record. If you see a primary care provider, you can also send messages to your care team and make appointments. If you have questions, please call your primary care clinic.  If you do not have a primary care provider, please call [phone] and they will assist you.        Care EveryWhere ID     This is your Care EveryWhere ID. This could be used by other organizations to access your [place] medical records  [id number]        Your Vitals Were     Pulse Temperature Respirations Pulse Oximetry BMI (Body Mass Index)       70 99.2  F (37.3  C) (Oral) 16 98% 26.5 kg/m2        Blood Pressure from Last 3 Encounters:   07/12/18 131/82   04/12/18 127/78   03/23/18 121/74    Weight from Last 3 Encounters:   07/12/18 88.6 kg (195 lb 6.4 oz)   04/12/18 87.2 kg (192 lb 4.8 oz)   03/23/18 84.9 kg (187 lb 3.2 oz)              We Performed the Following     *CBC with platelets differential     Comprehensive metabolic panel     Lactate Dehydrogenase        Primary Care Provider Office Phone # Fax #    Glenroy Torres [phone]       [facility] [address] [facility] [address]        Equal Access to Services     SANDRA LOPEZ : Hadii aad ku hadasho Soomaali, waaxda luqadaha, qaybta kaalmada adeegyada, waxay eliuin haystephanie butt . So [facility] [phone].    ATENCIÓN: Si habla español, tiene a singh disposición servicios gratuitos de asistencia lingüística. Llame al [phone].    We comply with applicable federal civil rights laws and [place] laws. We do not discriminate on the basis of race, color, national origin,  age, disability, sex, sexual orientation, or gender identity.            Thank you!     Thank you for choosing [facility]  for your care. Our goal is always to provide you with excellent care. Hearing back from our patients is one way we can continue to improve our services. Please take a few minutes to complete the written survey that you may receive in the mail after your visit with us. Thank you!             Your Updated Medication List - Protect others around you: Learn how to safely use, store and throw away your medicines at www.disposemymeds.org.          This list is accurate as of 7/12/18 11:59 PM.  Always use your most recent med list.                   Brand Name Dispense Instructions for use Diagnosis    ACYCLOVIR PO       T-cell or NK-cell neoplasm (H)       AMLODIPINE BESYLATE PO      Take by mouth daily    Extranodal NK/T-cell lymphoma, nasal type (H)       ASPIRIN PO      Take 81 mg by mouth daily    T-cell or NK-cell neoplasm (H)       gabapentin 400 MG capsule    NEURONTIN    270 capsule    Take 1 capsule (400 mg) by mouth 3 times daily    Post-herpetic polyneuropathy       LIPITOR PO      Take 10 mg by mouth daily    T-cell or NK-cell neoplasm (H)       nitroGLYcerin 0.4 MG sublingual tablet    NITROSTAT     Place 0.4 mg under the tongue    Extranodal NK/T-cell lymphoma, nasal type (H), Hypogammaglobulinemia (H)       phosphorus tablet 250 mg 250 MG per tablet    PHOSPHA 250 NEUTRAL    180 tablet    Take 1 tablet (250 mg) by mouth 2 times daily    Hypophosphatemia       venlafaxine 37.5 MG 24 hr capsule    EFFEXOR-XR    90 capsule    TAKE ONE CAPSULE BY MOUTH ONCE DAILY    Lymphoma (H)

## 2018-07-12 NOTE — LETTER
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  He received chemotherapy, initially with SMILE (3 cycles), which did not control the disease, followed by ICE for 2 cycles in 01/2015, good response and then autologous stem cell transplant (03/04/2015) with BEAM conditioning. 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       By 12/2015, he again had evidence for recurrence on the right lower extremity (biopsy proven).  Recurrence appeared to be localized to the right ankle and possibly, although undocumented, on the right wrist.  He was given involved field radiation to these two areas.  Subsequently, he had the appearance of multiple subcutaneous nodules on his legs, especially, but also on his arms and trunk, that arose and often resolved spontaneously.  Some showed inflammation and cutaneous crusting with erythema and subsequent regression. Biopsies demonstrated lymphoma. Because of the course it was thought he might be experiencing a graft vs lymphoma effect. He continued to have new lesions, predominantly on his lower legs, that appeared and resolved. Those on his legs that ulcerated resolved with the care provided by Dr. Eaton. Over time, the frequency of new lesions and progressing to th point of ulceration decreased.      Mr. Smith developed a few new scattered lower extremity nodules in 2017.  He was referred to Dermatology for evaluation and biopsy, but the lesions had largely resolved by the time a biopsy could be done.  Biopsy of one of the resolving lesions was considered consistent with dermal hypersensitivity reaction with the differential including arthropod bite and possibly drug eruption.  These lesions cleared.        In late  01/2018 he developed zoster followed by severe post herpetic neuralgia that has been responding to treatment.     INTERVAL HISTORY:  Mr. Smith was last in my clinic on 04/12/2018.  He has continued to do very well since that visit.      At that visit, he had 1 lesion near each popliteal space on his lower leg.  Both have totally resolved.  He has had a few other very small papules on his lower extremities and left arm.  These also have resolved and he thinks they may have been mosquito or bug bites because they were pruritic, whereas his lymphomatous lesions never have been.      Otherwise, episode of sinusitis apparently addressed with antibiotics.  No other infections, fevers, night sweats or weight loss.  The patient is feeling well.  He states that there is nothing that he wants or needs to do that he cannot.  The patient and wife are planning another cruise to [place] with friends this September.     The patient is considering a colonoscopy.  Encouraged.     REVIEW OF SYSTEMS:  A 10-point review of systems is otherwise negative.       MEDICATIONS:   1.  Amlodipine.   2.  Aspirin 81 mg.   3.  Atorvastatin.   4.  Gabapentin.   5.  Phosphorus.     6.  Venlafaxine.     7.  Acyclovir p.r.n.   8.  Nitroglycerin p.r.n.      ALLERGIES:  None reported.      PHYSICAL EXAMINATION:   VITAL SIGNS:  Weight 88.6 kg (stable), blood pressure 131/82, pulse 70 and regular, respirations 16, temperature 99.23.  O2 saturation at 98% on room air.   HEENT:  No icterus.  No conjunctival injection.  Pupils equal and reactive.  EOMs intact.  Oropharynx negative.  Mucosa moist.  No plaque or ulceration.   NECK:  No cervical/supraclavicular adenopathy.   CHEST:  Clear to auscultation.   AXILLAE:  No nodes.   HEART:  Regular rhythm.  No murmur.   ABDOMEN:  Soft.  No tenderness.  Bowel sounds active.  No detectable organomegaly.  No masses.  No inguinal nodes.   EXTREMITIES:  No lower extremity edema.   SKIN:  No subcutaneous nodules as  previously noted.  No other new lesions identified.  Considerable scarring from prior lesions on both lower extremities.      LABORATORY DATA:     Hemoglobin 14.5 grams percent with 8700 WBCs (39% neutrophils, 45% lymphocytes) and 223,000 platelets.   Electrolytes, calcium, creatinine (1.11) normal.   Liver enzymes, including serum LDH, normal.     Glucose 74.      ASSESSMENT AND PLAN:  NK/T-cell lymphoma, nasal type.  Mr. Smith is now 4 years from original diagnosis and 3 years from the non-myeloablative double cord transplant.  No evidence of recurrent disease on today's evaluation.  No clear evidence of any new associated lesions over the past 3 months and 2 prior lesions, thought associated, have spontaneously resolved.      Plan to return in 3 months.  Laboratory studies are not ordered in advance.    EBV has been not detected or, rarely, <500 copies since 08/25/2015. HHV6 present at a stable low level since 09/2015.      The patient continues to have some persistence of the postherpetic neuralgia.  Scheduled to see Dr. Blackwood in late August. Not a pressing issue for the patient.        Yuri Hill MD  Professor of Medicine  Oncology  [facility]  Office: [phone]  Clinic Fax: [phone]        cc:   MD Ab Leone MD Bailey Lee, MD Roger Weenig, MD Nathan Norquist, MD Marie Claire Buckley, MD Drew Rosielle, MD Bruce A. Peterson, MD

## 2018-07-12 NOTE — NURSING NOTE
Oncology Rooming Note    July 12, 2018 10:56 AM   Hi Smith is a 63 year old male who presents for:    Chief Complaint   Patient presents with     Blood Draw     Labs drawn from vpt by RN. Vs taken and pt checked in for appt     Oncology Clinic Visit     Return, lymphoma     Initial Vitals: /82 (BP Location: Right arm, Cuff Size: Adult Regular)  Pulse 70  Temp 99.2  F (37.3  C) (Oral)  Resp 16  Wt 88.6 kg (195 lb 6.4 oz)  SpO2 98%  BMI 26.5 kg/m2 Estimated body mass index is 26.5 kg/(m^2) as calculated from the following:    Height as of 4/12/18: 1.829 m (6').    Weight as of this encounter: 88.6 kg (195 lb 6.4 oz). Body surface area is 2.12 meters squared.  No Pain (0) Comment: Data Unavailable   No LMP for male patient.  Allergies reviewed: Yes  Medications reviewed: Yes    Medications: Medication refills not needed today.  Pharmacy name entered into [facility]:    [facility] [place], [place] - [address] [address] [facility], [address]  [address] - [address] [facility]    Clinical concerns: None Dr Hill was NOT notified.    10 minutes for nursing intake (face to face time)     Dawn Wong CMA

## 2018-07-12 NOTE — PROGRESS NOTES
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  He received chemotherapy, initially with SMILE (3 cycles), which did not control the disease, followed by ICE for 2 cycles in 01/2015, good response and then autologous stem cell transplant (03/04/2015) with BEAM conditioning. 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       By 12/2015, he again had evidence for recurrence on the right lower extremity (biopsy proven).  Recurrence appeared to be localized to the right ankle and possibly, although undocumented, on the right wrist.  He was given involved field radiation to these two areas.  Subsequently, he had the appearance of multiple subcutaneous nodules on his legs, especially, but also on his arms and trunk, that arose and often resolved spontaneously.  Some showed inflammation and cutaneous crusting with erythema and subsequent regression. Biopsies demonstrated lymphoma. Because of the course it was thought he might be experiencing a graft vs lymphoma effect. He continued to have new lesions, predominantly on his lower legs, that appeared and resolved. Those on his legs that ulcerated resolved with the care provided by Dr. Eaton. Over time, the frequency of new lesions and progressing to th point of ulceration decreased.      Mr. Smith developed a few new scattered lower extremity nodules in 2017.  He was referred to Dermatology for evaluation and biopsy, but the lesions had largely resolved by the time a biopsy could be done.  Biopsy of one of the resolving lesions was considered consistent with dermal hypersensitivity reaction with the differential including arthropod bite and possibly drug eruption.  These lesions cleared.        In late 01/2018 he developed zoster followed by severe post herpetic neuralgia that has  been responding to treatment.     INTERVAL HISTORY:  Mr. Smith was last in my clinic on 04/12/2018.  He has continued to do very well since that visit.      At that visit, he had 1 lesion near each popliteal space on his lower leg.  Both have totally resolved.  He has had a few other very small papules on his lower extremities and left arm.  These also have resolved and he thinks they may have been mosquito or bug bites because they were pruritic, whereas his lymphomatous lesions never have been.      Otherwise, episode of sinusitis apparently addressed with antibiotics.  No other infections, fevers, night sweats or weight loss.  The patient is feeling well.  He states that there is nothing that he wants or needs to do that he cannot.  The patient and wife are planning another cruise to [place] with friends this September.     The patient is considering a colonoscopy.  Encouraged.     REVIEW OF SYSTEMS:  A 10-point review of systems is otherwise negative.       MEDICATIONS:   1.  Amlodipine.   2.  Aspirin 81 mg.   3.  Atorvastatin.   4.  Gabapentin.   5.  Phosphorus.     6.  Venlafaxine.     7.  Acyclovir p.r.n.   8.  Nitroglycerin p.r.n.      ALLERGIES:  None reported.      PHYSICAL EXAMINATION:   VITAL SIGNS:  Weight 88.6 kg (stable), blood pressure 131/82, pulse 70 and regular, respirations 16, temperature 99.23.  O2 saturation at 98% on room air.   HEENT:  No icterus.  No conjunctival injection.  Pupils equal and reactive.  EOMs intact.  Oropharynx negative.  Mucosa moist.  No plaque or ulceration.   NECK:  No cervical/supraclavicular adenopathy.   CHEST:  Clear to auscultation.   AXILLAE:  No nodes.   HEART:  Regular rhythm.  No murmur.   ABDOMEN:  Soft.  No tenderness.  Bowel sounds active.  No detectable organomegaly.  No masses.  No inguinal nodes.   EXTREMITIES:  No lower extremity edema.   SKIN:  No subcutaneous nodules as previously noted.  No other new lesions identified.  Considerable scarring from prior  lesions on both lower extremities.      LABORATORY DATA:     Hemoglobin 14.5 grams percent with 8700 WBCs (39% neutrophils, 45% lymphocytes) and 223,000 platelets.   Electrolytes, calcium, creatinine (1.11) normal.   Liver enzymes, including serum LDH, normal.     Glucose 74.      ASSESSMENT AND PLAN:  NK/T-cell lymphoma, nasal type.  Mr. Smith is now 4 years from original diagnosis and 3 years from the non-myeloablative double cord transplant.  No evidence of recurrent disease on today's evaluation.  No clear evidence of any new associated lesions over the past 3 months and 2 prior lesions, thought associated, have spontaneously resolved.      Plan to return in 3 months.  Laboratory studies are not ordered in advance.    EBV has been not detected or, rarely, <500 copies since 08/25/2015. HHV6 present at a stable low level since 09/2015.      The patient continues to have some persistence of the postherpetic neuralgia.  Scheduled to see Dr. Blackwood in late August. Not a pressing issue for the patient.        Yuri Hill MD  Professor of Medicine  Oncology  [facility]  Office: [phone]  Clinic Fax: [phone]        cc:   MD Ab Leone MD Bailey Lee, MD Roger Weenig, MD Nathan Norquist, MD Marie Claire Buckley, MD Drew Rosielle, MD

## 2018-07-12 NOTE — PATIENT INSTRUCTIONS
Preventive Care:    Colorectal Cancer Screening: During our visit today, we discussed that it is recommended you receive colorectal cancer screening. Please call or make an appointment with your primary care provider to discuss this. You may also call the [facility] scheduling line ([phone]) to set up a colonoscopy appointment.

## 2018-07-17 RX ORDER — AMLODIPINE BESYLATE 5 MG/1
TABLET ORAL
Qty: 90 TABLET | Refills: 3 | OUTPATIENT
Start: 2018-07-17

## 2018-08-13 ENCOUNTER — CARE COORDINATION (OUTPATIENT)
Dept: ONCOLOGY | Facility: CLINIC | Age: 63
End: 2018-08-13

## 2018-08-13 NOTE — PROGRESS NOTES
Ashley called on Friday with an update on Hi. Per Ashley when he last saw Dr. Hill, he had a small hive that y'all thought was a bug bite. Since that time he now has a total of 6 of these that are itchy and of different consistency then the lumps/bumps he has had before. Ashley said that this has been really mentally hard on Hi and that he just wants a biopsy done. They are not sure what to think of these new findings and would like Dr. Hill to advise. [facility] called them this morning with plan but now patient and wife are saying that over the weekend these have improved and no new ones have developed. They are now wondering if they should just watch and wait for now. Per Dr. Hill, this is what he would like them to do and then follow up with him again in 2 weeks. Patient and wife verbalized understanding and know to call if they have any questions, comments, or concerns. Patient is schedule for an appointment on 8/29/18 at 9:30 AM.

## 2018-10-07 ENCOUNTER — MYC REFILL (OUTPATIENT)
Dept: ONCOLOGY | Facility: CLINIC | Age: 63
End: 2018-10-07

## 2018-10-07 DIAGNOSIS — C85.90 LYMPHOMA (H): ICD-10-CM

## 2018-10-08 RX ORDER — VENLAFAXINE HYDROCHLORIDE 37.5 MG/1
CAPSULE, EXTENDED RELEASE ORAL
Qty: 90 CAPSULE | Refills: 1 | OUTPATIENT
Start: 2018-10-08

## 2018-10-08 RX ORDER — VENLAFAXINE HYDROCHLORIDE 37.5 MG/1
37.5 CAPSULE, EXTENDED RELEASE ORAL DAILY
Qty: 90 CAPSULE | Refills: 1 | Status: SHIPPED | OUTPATIENT
Start: 2018-10-08 | End: 2019-01-10

## 2018-10-11 ENCOUNTER — ONCOLOGY VISIT (OUTPATIENT)
Dept: ONCOLOGY | Facility: CLINIC | Age: 63
End: 2018-10-11
Attending: INTERNAL MEDICINE
Payer: MEDICARE

## 2018-10-11 VITALS
WEIGHT: 200.8 LBS | RESPIRATION RATE: 16 BRPM | TEMPERATURE: 99.1 F | OXYGEN SATURATION: 94 % | SYSTOLIC BLOOD PRESSURE: 111 MMHG | BODY MASS INDEX: 27.2 KG/M2 | DIASTOLIC BLOOD PRESSURE: 73 MMHG | HEIGHT: 72 IN | HEART RATE: 71 BPM

## 2018-10-11 DIAGNOSIS — C86.00 EXTRANODAL NK/T-CELL LYMPHOMA, NASAL TYPE: Primary | ICD-10-CM

## 2018-10-11 DIAGNOSIS — E83.39 HYPOPHOSPHATEMIA: ICD-10-CM

## 2018-10-11 PROCEDURE — G0463 HOSPITAL OUTPT CLINIC VISIT: HCPCS | Mod: ZF

## 2018-10-11 PROCEDURE — 99214 OFFICE O/P EST MOD 30 MIN: CPT | Mod: ZP | Performed by: INTERNAL MEDICINE

## 2018-10-11 ASSESSMENT — PAIN SCALES - GENERAL: PAINLEVEL: NO PAIN (0)

## 2018-10-11 NOTE — LETTER
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 Disease appeared limited to the skin. He received chemotherapy, initially with SMILE (3 cycles), which did not control the disease, followed in 01/2015 by ICE (2 cycles) with a good response and then autologous stem cell transplant (03/04/2015) with BEAM conditioning. 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        By 12/2015, he again had evidence for recurrence on the right lower extremity (biopsy proven).  Recurrence appeared to be localized to the right ankle and possibly, although undocumented, on the right wrist.  He was given involved field radiation to these two areas.  Subsequently, he had the appearance of multiple subcutaneous nodules on his legs, especially, but also on his arms and trunk, that arose and often resolved spontaneously.  Some showed inflammation and cutaneous crusting with erythema and subsequent regression. Biopsies demonstrated lymphoma. Because of the clinical behavior it was thought he was likely experiencing a graft vs lymphoma effect. He continued to have new lesions, predominantly on his lower legs, that appeared and resolved. Those on his legs that ulcerated resolved with the wound care provided by Dr. Eaton. Over time, the frequency of new lesions and progressing to the point of ulceration decreased.      Mr. Smith developed a few new scattered lower extremity nodules in 2017.  He was referred to Dermatology for evaluation and biopsy, but the lesions had largely resolved spontaneously by the time a biopsy could be done.  Biopsy of one of the resolving lesions was considered consistent with dermal hypersensitivity reaction with the differential including arthropod  "bite and possibly drug eruption.  These lesions totally cleared.        In late 01/2018 he developed zoster followed by post herpetic neuralgia.     At a clinic visit on 04/12/2018 he had 1 lesion near each popliteal space, which subsequently resolved.  He has had a few other very small papules on his lower extremities and left arm that also resolved. These were different in character and he thinks they may have been mosquito or bug bites because, unlike the lymphomatous lesions, they were pruritic.      INTERVAL HISTORY:  Mr. Smith was last in my clinic on 07/12/2018.  He has had no significant new lesions since that time. There have been a few small red papules around his ankles that he still feels more nearly resemble \"bug bites.\"  Photographs confirm.  He had a mosquito bite on his right arm and had some swelling thereafter; however, no lesions typical for his disease with the possible exception of 1 on the anterior upper right leg.  This appeared spontaneously, appears to have been about a cm in diameter and has cleared.  No other new lesions.      Generally, feeling well.  Able to do everything he wants or needs to do.  No intercurrent infections.  No night sweats, weight loss, no fevers or sweats. Still some persistent post-herpetic neuralgia, but not a major issue.    Mr. Smith and his wife enjoyed their trip to [place].      REVIEW OF SYSTEMS:  A 10-point review of systems is otherwise negative.      MEDICATIONS:   1.  Amlodipine.   2.  Aspirin 81 mg.   3.  Atorvastatin.   4.  Gabapentin.   5.  Nitroglycerin.   6.  Phosphorus tablets.   7.  Venlafaxine.   8.  Acyclovir.      ALLERGIES:  None reported.      PHYSICAL EXAMINATION:   VITAL SIGNS:  Weight 91.1 kg (compared with 88.6 kg in July), blood pressure 111/73, pulse 71 and regular, respirations 16, temperature 99.1, O2 saturation 94% on room air.   HEENT:  No icterus or conjunctival injection.  Oropharynx - no masses.  Mucosa - moist without lesion.   NECK: "  No cervical/supraclavicular adenopathy.   CHEST:  Clear to auscultation.   HEART:  Regular rhythm without murmur.   ABDOMEN:  Soft.  Bowel sounds present.  No hepatosplenomegaly or mass.  No inguinal nodes.   EXTREMITIES:  No lower extremity edema.   SKIN:  No significant lesions identified.  On the left forearm, ulnar side, is a 4 mm defined soft tissue flat subcutaneous lesion with no overlying skin changes.  There is a small ring of raised soft tissue surrounding the lesion. No edema.  This has been present and appears to be unchanged.      LABORATORY DATA:  None obtained.      ASSESSMENT AND PLAN:  NK/T-cell lymphoma, nasal type - Mr. Smith continues to do very well and is now over 4 years from diagnosis and just over 3 years from the non-myeloablative double cord transplant.  Continues to have very good disease control after initial recurrence of disease.     Hypophosphatemia - on phosphorus replacement for some time.  Interested in determining whether or not he could come off.  Will obtain serum phosphorus  at return and make a determination.  If supplementation is going to be discontinued, would need close followup until known stable.    Continue to monitor at 3-month intervals.  Laboratory studies upon return.      Patient will get influenza vaccine in late October.       Yuri Hill MD  Professor of Medicine  Oncology  [facility]  Office: [phone]  Clinic Fax: [phone]       cc:   MD Ab Leone MD Bailey Lee, MD Roger Weenig, MD Nathan Norquist, MD Marie Claire Buckley, MD Drew Rosielle, MD Bruce A. Peterson, MD

## 2018-10-11 NOTE — NURSING NOTE
"Oncology Rooming Note    October 11, 2018 10:31 AM   Hi Smith is a 63 year old male who presents for:    Chief Complaint   Patient presents with     RECHECK     ONC NK t cell lymphoma 3 mos f/up      Initial Vitals: /73 (BP Location: Left arm, Patient Position: Sitting, Cuff Size: Adult Regular)  Pulse 71  Temp 99.1  F (37.3  C) (Oral)  Resp 16  Ht 1.829 m (6' 0.01\")  Wt 91.1 kg (200 lb 12.8 oz)  SpO2 94%  BMI 27.23 kg/m2 Estimated body mass index is 27.23 kg/(m^2) as calculated from the following:    Height as of this encounter: 1.829 m (6' 0.01\").    Weight as of this encounter: 91.1 kg (200 lb 12.8 oz). Body surface area is 2.15 meters squared.  No Pain (0) Comment: Data Unavailable   No LMP for male patient.  Allergies reviewed: Yes  Medications reviewed: Yes    Medications: Medication refills not needed today.  Pharmacy name entered into [facility]:    [facility] [place], [place] - [address], [address]  [facility] - [place], [place] - [address]    Clinical concerns: none     8 minutes for nursing intake (face to face time)     Mary Kay DAE Negro              "

## 2018-10-11 NOTE — MR AVS SNAPSHOT
After Visit Summary   10/11/2018    Hi Smith    MRN: [id number]           Patient Information     Date Of Birth          1955        Visit Information        Provider Department      10/11/2018 10:30 AM Yuri Hill MD [facility]        Today's Diagnoses     Extranodal NK/T-cell lymphoma, nasal type (H)    -  1    Hypophosphatemia           Follow-ups after your visit        Follow-up notes from your care team     Return in about 3 months (around 1/11/2019) for Physical Exam, Lab Work.      Your next 10 appointments already scheduled     Calin 10, 2019 10:00 AM CST   [facility] Draw with  [facility]   [facility] ([facility])    [address] [place] [address]  [address]  [facility] [address] [phone]            Calin 10, 2019 10:30 AM CST   (Arrive by 10:15 AM)   Return Visit with Yuri Hill MD   [facility] ([facility])    [address] [place] [address]  [address]  [facility] [address] [phone]              Future tests that were ordered for you today     Open Future Orders        Priority Expected Expires Ordered    Phosphorus Routine 1/11/2019 10/11/2019 10/11/2018    Lactate Dehydrogenase Routine 1/11/2019 10/11/2019 10/11/2018    Comprehensive metabolic panel Routine 1/11/2019 10/11/2019 10/11/2018    Uric acid Routine 1/11/2019 10/11/2019 10/11/2018    Protein electrophoresis Routine 1/11/2019 10/11/2019 10/11/2018    *CBC with platelets differential Routine 1/11/2019 10/11/2019 10/11/2018    Magnesium Routine 1/11/2019 10/11/2019 10/11/2018            Who to contact     If you have questions or need follow up information about today's clinic visit or your schedule please contact [facility] directly at [phone].  Normal or non-critical lab and imaging results will be communicated to you by MyChart, letter or phone within 4 business days after  "the clinic has received the results. If you do not hear from us within 7 days, please contact the clinic through [facility] or phone. If you have a critical or abnormal lab result, we will notify you by phone as soon as possible.  Submit refill requests through [employer] or call your pharmacy and they will forward the refill request to us. Please allow 3 business days for your refill to be completed.          Additional Information About Your Visit        [employer] Information     [employer] gives you secure access to your electronic health record. If you see a primary care provider, you can also send messages to your care team and make appointments. If you have questions, please call your primary care clinic.  If you do not have a primary care provider, please call [phone] and they will assist you.        Care EveryWhere ID     This is your Care EveryWhere ID. This could be used by other organizations to access your [facility] medical records  [id number]        Your Vitals Were     Pulse Temperature Respirations Height Pulse Oximetry BMI (Body Mass Index)    71 99.1  F (37.3  C) (Oral) 16 1.829 m (6' 0.01\") 94% 27.23 kg/m2       Blood Pressure from Last 3 Encounters:   10/11/18 111/73   07/12/18 131/82   04/12/18 127/78    Weight from Last 3 Encounters:   10/11/18 91.1 kg (200 lb 12.8 oz)   07/12/18 88.6 kg (195 lb 6.4 oz)   04/12/18 87.2 kg (192 lb 4.8 oz)               Primary Care Provider Office Phone # Fax #    Glenroy Torres [phone]       [facility] [address] [address]        Equal Access to Services     LORETTA LOPEZ : Hadodalis Abarca, anderson humphreys, [facility]. So [facility] [phone].    ATENCIÓN: Si habla español, tiene a singh disposición servicios gratuitos de asistencia lingüística. Llame al [phone].    We comply with applicable federal civil rights laws and [place] laws. We do not " discriminate on the basis of race, color, national origin, age, disability, sex, sexual orientation, or gender identity.            Thank you!     Thank you for choosing [facility]  for your care. Our goal is always to provide you with excellent care. Hearing back from our patients is one way we can continue to improve our services. Please take a few minutes to complete the written survey that you may receive in the mail after your visit with us. Thank you!             Your Updated Medication List - Protect others around you: Learn how to safely use, store and throw away your medicines at www.disposemymeds.org.          This list is accurate as of 10/11/18 12:00 PM.  Always use your most recent med list.                   Brand Name Dispense Instructions for use Diagnosis    ACYCLOVIR PO       T-cell or NK-cell neoplasm (H)       AMLODIPINE BESYLATE PO      Take by mouth daily    Extranodal NK/T-cell lymphoma, nasal type (H)       ASPIRIN PO      Take 81 mg by mouth daily    T-cell or NK-cell neoplasm (H)       gabapentin 400 MG capsule    NEURONTIN    270 capsule    Take 1 capsule (400 mg) by mouth 3 times daily    Post-herpetic polyneuropathy       LIPITOR PO      Take 10 mg by mouth daily    T-cell or NK-cell neoplasm (H)       nitroGLYcerin 0.4 MG sublingual tablet    NITROSTAT     Place 0.4 mg under the tongue    Extranodal NK/T-cell lymphoma, nasal type (H), Hypogammaglobulinemia (H)       phosphorus tablet 250 mg 250 MG per tablet    phosphorus tablet 250 mg    180 tablet    Take 1 tablet (250 mg) by mouth 2 times daily    Hypophosphatemia       venlafaxine 37.5 MG 24 hr capsule    EFFEXOR-XR    90 capsule    Take 1 capsule (37.5 mg) by mouth daily    Lymphoma (H)

## 2018-10-11 NOTE — PROGRESS NOTES
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 Disease appeared limited to the skin. He received chemotherapy, initially with SMILE (3 cycles), which did not control the disease, followed in 01/2015 by ICE (2 cycles) with a good response and then autologous stem cell transplant (03/04/2015) with BEAM conditioning. 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        By 12/2015, he again had evidence for recurrence on the right lower extremity (biopsy proven).  Recurrence appeared to be localized to the right ankle and possibly, although undocumented, on the right wrist.  He was given involved field radiation to these two areas.  Subsequently, he had the appearance of multiple subcutaneous nodules on his legs, especially, but also on his arms and trunk, that arose and often resolved spontaneously.  Some showed inflammation and cutaneous crusting with erythema and subsequent regression. Biopsies demonstrated lymphoma. Because of the clinical behavior it was thought he was likely experiencing a graft vs lymphoma effect. He continued to have new lesions, predominantly on his lower legs, that appeared and resolved. Those on his legs that ulcerated resolved with the wound care provided by Dr. Eaton. Over time, the frequency of new lesions and progressing to the point of ulceration decreased.      Mr. Smith developed a few new scattered lower extremity nodules in 2017.  He was referred to Dermatology for evaluation and biopsy, but the lesions had largely resolved spontaneously by the time a biopsy could be done.  Biopsy of one of the resolving lesions was considered consistent with dermal hypersensitivity reaction with the differential including arthropod bite and possibly drug eruption.  These lesions totally cleared.        In late  "01/2018 he developed zoster followed by post herpetic neuralgia.     At a clinic visit on 04/12/2018 he had 1 lesion near each popliteal space, which subsequently resolved.  He has had a few other very small papules on his lower extremities and left arm that also resolved. These were different in character and he thinks they may have been mosquito or bug bites because, unlike the lymphomatous lesions, they were pruritic.      INTERVAL HISTORY:  Mr. Smith was last in my clinic on 07/12/2018.  He has had no significant new lesions since that time. There have been a few small red papules around his ankles that he still feels more nearly resemble \"bug bites.\"  Photographs confirm.  He had a mosquito bite on his right arm and had some swelling thereafter; however, no lesions typical for his disease with the possible exception of 1 on the anterior upper right leg.  This appeared spontaneously, appears to have been about a cm in diameter and has cleared.  No other new lesions.      Generally, feeling well.  Able to do everything he wants or needs to do.  No intercurrent infections.  No night sweats, weight loss, no fevers or sweats. Still some persistent post-herpetic neuralgia, but not a major issue.    Mr. Smith and his wife enjoyed their trip to [place].      REVIEW OF SYSTEMS:  A 10-point review of systems is otherwise negative.      MEDICATIONS:   1.  Amlodipine.   2.  Aspirin 81 mg.   3.  Atorvastatin.   4.  Gabapentin.   5.  Nitroglycerin.   6.  Phosphorus tablets.   7.  Venlafaxine.   8.  Acyclovir.      ALLERGIES:  None reported.      PHYSICAL EXAMINATION:   VITAL SIGNS:  Weight 91.1 kg (compared with 88.6 kg in July), blood pressure 111/73, pulse 71 and regular, respirations 16, temperature 99.1, O2 saturation 94% on room air.   HEENT:  No icterus or conjunctival injection.  Oropharynx - no masses.  Mucosa - moist without lesion.   NECK:  No cervical/supraclavicular adenopathy.   CHEST:  Clear to auscultation. "   HEART:  Regular rhythm without murmur.   ABDOMEN:  Soft.  Bowel sounds present.  No hepatosplenomegaly or mass.  No inguinal nodes.   EXTREMITIES:  No lower extremity edema.   SKIN:  No significant lesions identified.  On the left forearm, ulnar side, is a 4 mm defined soft tissue flat subcutaneous lesion with no overlying skin changes.  There is a small ring of raised soft tissue surrounding the lesion. No edema.  This has been present and appears to be unchanged.      LABORATORY DATA:  None obtained.      ASSESSMENT AND PLAN:  NK/T-cell lymphoma, nasal type - Mr. Smith continues to do very well and is now over 4 years from diagnosis and just over 3 years from the non-myeloablative double cord transplant.  Continues to have very good disease control after initial recurrence of disease.     Hypophosphatemia - on phosphorus replacement for some time.  Interested in determining whether or not he could come off.  Will obtain serum phosphorus  at return and make a determination.  If supplementation is going to be discontinued, would need close followup until known stable.    Continue to monitor at 3-month intervals.  Laboratory studies upon return.      Patient will get influenza vaccine in late October.       Yuri Hill MD  Professor of Medicine  Oncology  [facility]  Office: [phone]  Clinic Fax: [phone]       cc:   MD Ab Leone MD Bailey Lee, MD Roger Weenig, MD Nathan Norquist, MD Marie Claire Buckley, MD Drew Rosielle, MD

## 2018-12-30 DIAGNOSIS — E83.39 HYPOPHOSPHATEMIA: ICD-10-CM

## 2019-01-02 RX ORDER — SOD PHOS DI, MONO/K PHOS MONO 250 MG
TABLET ORAL
Qty: 180 TABLET | Refills: 3 | Status: SHIPPED | OUTPATIENT
Start: 2019-01-02 | End: 2019-12-28

## 2019-01-10 ENCOUNTER — APPOINTMENT (OUTPATIENT)
Dept: LAB | Facility: CLINIC | Age: 64
End: 2019-01-10
Attending: INTERNAL MEDICINE
Payer: MEDICARE

## 2019-01-10 ENCOUNTER — ONCOLOGY VISIT (OUTPATIENT)
Dept: ONCOLOGY | Facility: CLINIC | Age: 64
End: 2019-01-10
Attending: INTERNAL MEDICINE
Payer: MEDICARE

## 2019-01-10 VITALS
RESPIRATION RATE: 16 BRPM | BODY MASS INDEX: 26.97 KG/M2 | OXYGEN SATURATION: 98 % | SYSTOLIC BLOOD PRESSURE: 128 MMHG | WEIGHT: 199.1 LBS | TEMPERATURE: 98.7 F | HEART RATE: 75 BPM | HEIGHT: 72 IN | DIASTOLIC BLOOD PRESSURE: 80 MMHG

## 2019-01-10 DIAGNOSIS — C86.00 EXTRANODAL NK/T-CELL LYMPHOMA, NASAL TYPE: ICD-10-CM

## 2019-01-10 DIAGNOSIS — C85.90 LYMPHOMA (H): ICD-10-CM

## 2019-01-10 DIAGNOSIS — E83.39 HYPOPHOSPHATEMIA: ICD-10-CM

## 2019-01-10 LAB
ALBUMIN SERPL-MCNC: 3.6 G/DL (ref 3.4–5)
ALP SERPL-CCNC: 104 U/L (ref 40–150)
ALT SERPL W P-5'-P-CCNC: 22 U/L (ref 0–70)
ANION GAP SERPL CALCULATED.3IONS-SCNC: 6 MMOL/L (ref 3–14)
AST SERPL W P-5'-P-CCNC: 20 U/L (ref 0–45)
BASOPHILS # BLD AUTO: 0.1 10E9/L (ref 0–0.2)
BASOPHILS NFR BLD AUTO: 0.5 %
BILIRUB SERPL-MCNC: 0.6 MG/DL (ref 0.2–1.3)
BUN SERPL-MCNC: 18 MG/DL (ref 7–30)
CALCIUM SERPL-MCNC: 8.8 MG/DL (ref 8.5–10.1)
CHLORIDE SERPL-SCNC: 108 MMOL/L (ref 94–109)
CO2 SERPL-SCNC: 25 MMOL/L (ref 20–32)
CREAT SERPL-MCNC: 1.11 MG/DL (ref 0.66–1.25)
DIFFERENTIAL METHOD BLD: ABNORMAL
EOSINOPHIL # BLD AUTO: 0.2 10E9/L (ref 0–0.7)
EOSINOPHIL NFR BLD AUTO: 1.8 %
ERYTHROCYTE [DISTWIDTH] IN BLOOD BY AUTOMATED COUNT: 13.4 % (ref 10–15)
GFR SERPL CREATININE-BSD FRML MDRD: 69 ML/MIN/{1.73_M2}
GLUCOSE SERPL-MCNC: 85 MG/DL (ref 70–99)
HCT VFR BLD AUTO: 46.8 % (ref 40–53)
HGB BLD-MCNC: 14.6 G/DL (ref 13.3–17.7)
IMM GRANULOCYTES # BLD: 0 10E9/L (ref 0–0.4)
IMM GRANULOCYTES NFR BLD: 0.3 %
LDH SERPL L TO P-CCNC: 174 U/L (ref 85–227)
LYMPHOCYTES # BLD AUTO: 3.5 10E9/L (ref 0.8–5.3)
LYMPHOCYTES NFR BLD AUTO: 33.7 %
MAGNESIUM SERPL-MCNC: 2.3 MG/DL (ref 1.6–2.3)
MCH RBC QN AUTO: 27 PG (ref 26.5–33)
MCHC RBC AUTO-ENTMCNC: 31.2 G/DL (ref 31.5–36.5)
MCV RBC AUTO: 87 FL (ref 78–100)
MONOCYTES # BLD AUTO: 1.3 10E9/L (ref 0–1.3)
MONOCYTES NFR BLD AUTO: 12.9 %
NEUTROPHILS # BLD AUTO: 5.3 10E9/L (ref 1.6–8.3)
NEUTROPHILS NFR BLD AUTO: 50.8 %
NRBC # BLD AUTO: 0 10*3/UL
NRBC BLD AUTO-RTO: 0 /100
PHOSPHATE SERPL-MCNC: 2.6 MG/DL (ref 2.5–4.5)
PLATELET # BLD AUTO: 251 10E9/L (ref 150–450)
POTASSIUM SERPL-SCNC: 3.6 MMOL/L (ref 3.4–5.3)
PROT SERPL-MCNC: 7.1 G/DL (ref 6.8–8.8)
RBC # BLD AUTO: 5.41 10E12/L (ref 4.4–5.9)
SODIUM SERPL-SCNC: 139 MMOL/L (ref 133–144)
URATE SERPL-MCNC: 2.7 MG/DL (ref 3.5–7.2)
WBC # BLD AUTO: 10.4 10E9/L (ref 4–11)

## 2019-01-10 PROCEDURE — 83735 ASSAY OF MAGNESIUM: CPT | Performed by: INTERNAL MEDICINE

## 2019-01-10 PROCEDURE — 99214 OFFICE O/P EST MOD 30 MIN: CPT | Mod: ZP | Performed by: INTERNAL MEDICINE

## 2019-01-10 PROCEDURE — 84550 ASSAY OF BLOOD/URIC ACID: CPT | Performed by: INTERNAL MEDICINE

## 2019-01-10 PROCEDURE — 84165 PROTEIN E-PHORESIS SERUM: CPT | Performed by: INTERNAL MEDICINE

## 2019-01-10 PROCEDURE — 84100 ASSAY OF PHOSPHORUS: CPT | Performed by: INTERNAL MEDICINE

## 2019-01-10 PROCEDURE — 85025 COMPLETE CBC W/AUTO DIFF WBC: CPT | Performed by: INTERNAL MEDICINE

## 2019-01-10 PROCEDURE — 36415 COLL VENOUS BLD VENIPUNCTURE: CPT

## 2019-01-10 PROCEDURE — 87799 DETECT AGENT NOS DNA QUANT: CPT | Performed by: INTERNAL MEDICINE

## 2019-01-10 PROCEDURE — 83615 LACTATE (LD) (LDH) ENZYME: CPT | Performed by: INTERNAL MEDICINE

## 2019-01-10 PROCEDURE — 00000402 ZZHCL STATISTIC TOTAL PROTEIN: Performed by: INTERNAL MEDICINE

## 2019-01-10 PROCEDURE — G0463 HOSPITAL OUTPT CLINIC VISIT: HCPCS | Mod: ZF

## 2019-01-10 PROCEDURE — 80053 COMPREHEN METABOLIC PANEL: CPT | Performed by: INTERNAL MEDICINE

## 2019-01-10 PROCEDURE — 87533 HHV-6 DNA QUANT: CPT | Performed by: INTERNAL MEDICINE

## 2019-01-10 RX ORDER — VENLAFAXINE HYDROCHLORIDE 37.5 MG/1
37.5 CAPSULE, EXTENDED RELEASE ORAL DAILY
Qty: 90 CAPSULE | Refills: 1 | Status: SHIPPED | OUTPATIENT
Start: 2019-01-10 | End: 2019-06-26

## 2019-01-10 ASSESSMENT — MIFFLIN-ST. JEOR: SCORE: 1731.27

## 2019-01-10 ASSESSMENT — PAIN SCALES - GENERAL: PAINLEVEL: NO PAIN (0)

## 2019-01-10 NOTE — NURSING NOTE
"Oncology Rooming Note    January 10, 2019 10:28 AM   Hi Smith is a 63 year old male who presents for:    Chief Complaint   Patient presents with     Blood Draw     Pt here for venipuncture blood draw, vitals completed by MA, pt checked in for appt.      Oncology Clinic Visit     Return visit related to K T Cell Lymphoma     Initial Vitals: /80 (BP Location: Left arm, Patient Position: Sitting, Cuff Size: Adult Regular)   Pulse 75   Temp 98.7  F (37.1  C) (Oral)   Resp 16   Ht 1.829 m (6' 0.01\")   Wt 90.3 kg (199 lb 1.6 oz)   SpO2 98%   BMI 27.00 kg/m   Estimated body mass index is 27 kg/m  as calculated from the following:    Height as of this encounter: 1.829 m (6' 0.01\").    Weight as of this encounter: 90.3 kg (199 lb 1.6 oz). Body surface area is 2.14 meters squared.  No Pain (0) Comment: Data Unavailable   No LMP for male patient.  Allergies reviewed: Yes  Medications reviewed: Yes    Medications: MEDICATION REFILLS NEEDED TODAY. Provider was notified.  Pharmacy name entered into [facility]:    [facility] [place], [place] - [address] [facility], [address]  [address] - [address] [facility]    Clinical concerns: Refills needed today. Provider was notified.    10 minutes for nursing intake (face to face time)     Elda Madden LPN            "

## 2019-01-10 NOTE — LETTER
1/10/2019      RE: Hi Smith  [address] [facility] [id number]       ONCOLOGY OUTPATIENT NOTE      ONCOLOGY SUMMARY (updated): Hi Smith is 63 years of age with a history of extranodal NK/T-cell lymphoma (nasal type) (diagnosed 07/17/2014) that involved multiple cutaneous/subcutaneous sites at diagnosis. Disease appeared limited to the skin and subcutaneous tissue. He received chemotherapy, initially with SMILE (3 cycles), which did not control the disease, followed in 01/2015 by ICE (2 cycles) with a very good response and then autologous stem cell transplant (03/04/2015) with BEAM conditioning. 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        By 12/2015, he again had evidence for recurrence on the right lower extremity (biopsy proven).  Recurrence appeared to be localized to the right ankle and possibly, although undocumented, near the right wrist.  He was given involved field radiation to these two areas ending on 01/08 2016. The irradiation was the last directed anti-lymphoma therapy he received.    Subsequently, he had the appearance of multiple subcutaneous nodules on his legs, especially, but also on his arms and trunk, that arose and often resolved spontaneously, but some ulcerated.  Some showed inflammation and cutaneous crusting with erythema and subsequent regression. Biopsies demonstrated lymphoma. Because of the clinical behavior it was thought he was likely experiencing a graft vs lymphoma effect. He continued to have new lesions, predominantly on his lower legs, that appeared and resolved. Those on his legs that had ulcerated resolved with wound care provided by Dr. Eaton. Over time, the frequency of new lesions appearing and the number progressing to the point of ulceration decreased.      Mr. Smith developed a few new scattered lower extremity nodules in 2017.  He was referred to Dermatology for  "evaluation and biopsy, but the lesions had largely spontaneously resolved by the time a biopsy could be done.  Biopsy of one of the resolving lesions was considered consistent with dermal hypersensitivity reaction with the differential including arthropod bite and, possibly, drug eruption.  These lesions totally cleared.       At a clinic visit on 04/12/2018 he had 1 lesion near each popliteal space, which subsequently resolved.  Subsequent similar lesions have not been identified.    Post-transplant he had BK viruria (resolved) and persistent modest HHV6 viremia since 08/2015.     In late 01/2018 he developed zoster followed by post herpetic neuralgia.    INTERVAL HISTORY:  I last saw Mr. Smith in my clinic on 10/11/2018; he returns today in scheduled followup.  At the last visit he had no significant new lesions.  He had a few small red papules around his ankles that may have been \"bug bites.\"  There was 1 lesion on the anterior upper right leg that might have been lymphoma-related lesion that was approximately 1 cm in size.  It has since cleared.      Overall, Mr. Smith is feeling quite well.  No interval infections, fevers, night sweats or weight loss.  He has had no new skin lesions or persistent older skin lesions that he is concerned about relative to his lymphoma.  Stable small lesion on right forearm. Generally feeling well.     Patient was considering coming off of phosphorus.  He ran out and stayed off for a brief period of time.  He developed symptoms that his wife checked on the Internet, and found they might be related to low phosphorus.  They resumed the phosphorus and symptoms have disappeared and not returned. Phosphorus was not measured.       REVIEW OF SYSTEMS:  A 10-point review of systems is negative.      The patient has received the influenza vaccine.      MEDICATIONS:   1.  Amlodipine.   2.  Aspirin.   3.  Atorvastatin.   4.  Potassium phosphate.     5.  Acyclovir.   6.  Gabapentin.   7.  " "Nitroglycerin p.r.n.   8.  Venlafaxine.      ALLERGIES:  None reported.      PHYSICAL EXAMINATION:   VITAL SIGNS:  Weight 90.3 kg (stable), blood pressure 128/80, pulse 75 and regular, respirations 16, temperature 98.7.  O2 saturation 98% on room air.   HEENT:  No icterus.  Oropharynx no masses.  No sinus tenderness.  Mucosa moist without lesion.   NECK:  No cervical or supraclavicular adenopathy.   CHEST:  Clear to auscultation.   AXILLAE:  Negative.   HEART:  Regular rhythm without murmur or gallop.   ABDOMEN:  Soft and nontender.  Bowel sounds active.  No detectable organomegaly or mass.  No inguinal nodes.   EXTREMITIES:  No lower extremity edema.   SKIN:  No evident skin or subcutaneous lesions suggestive of recurrent or persistent lymphoma.  There is a 1 cm lesion on the ulnar side of the left forearm that is most likely a cyst or a small lipoma.  It is not changing.  No other lesions on total skin examination.      LABORATORY:   Hemoglobin 14.6 grams percent with 10,100 WBCs (51% neutrophils, 34% lymphocytes) and 251,000 platelets.   Electrolytes, calcium, creatinine (1.11) normal.  Uric acid 2.7.  Phosphorus - 2.6 (borderline).   Liver enzymes, including serum LDH, normal.   Serum protein electrophoresis:  Albumin 4.2, gamma fraction 0.8, no monoclonal peak.   EBV DNA - not detected  HHV6 - 34,966 copies, log 4.5.     ASSESSMENT AND PLAN:  NK/T-cell lymphoma, nasal type.  No evidence of disease on today's clinical evaluation now 4-1/2 years from diagnosis. His last \"systemic\" therapy was a nonmyeloablative double cord transplant on 07/31/2015 and \"local\" therapy, radiation to right ankle and wrist lesions ending 01/2016 for a documented recurrence.  Subsequently, all disease has slowly resolved without additional therapy.  None was detected in 07/2018 and none is detectable today.      Continue to monitor at 3- to 4-month intervals with laboratory studies.  Additional imaging as necessary.    "   Hypophosphatemia: Recent symptoms possible related off replacement briefly. Phosphorus today is at lower normal range. Continue on phosphorus replacement.      Has received influenza vaccine.     Mr. Smith is aware of my planned departure from the [facility] this spring. We discussed that I will remain his Oncologist through the end of March 2019, as well as our plan for the subsequent transition of his care to another provider.     Yuri Hill MD  Professor of Medicine  Oncology  [facility]  Office: [phone]  Clinic Fax: [phone]    cc:   MD Ab Leone MD Bailey Lee, MD Roger Weenig, MD Nathan Norquist, MD Marie Claire Buckley, MD Drew Rosielle, MD Bruce A. Peterson, MD

## 2019-01-10 NOTE — NURSING NOTE
Chief Complaint   Patient presents with     Blood Draw     Pt here for venipuncture blood draw, vitals completed by MA, pt checked in for appt.      Francia Deleon MA

## 2019-01-11 LAB
ALBUMIN SERPL ELPH-MCNC: 4.2 G/DL (ref 3.7–5.1)
ALPHA1 GLOB SERPL ELPH-MCNC: 0.4 G/DL (ref 0.2–0.4)
ALPHA2 GLOB SERPL ELPH-MCNC: 0.8 G/DL (ref 0.5–0.9)
B-GLOBULIN SERPL ELPH-MCNC: 0.6 G/DL (ref 0.6–1)
EBV DNA # SPEC NAA+PROBE: NORMAL {COPIES}/ML
EBV DNA SPEC NAA+PROBE-LOG#: NORMAL {LOG_COPIES}/ML
GAMMA GLOB SERPL ELPH-MCNC: 0.8 G/DL (ref 0.7–1.6)
HHV6 DNA # SPEC NAA+PROBE: ABNORMAL COPIES/ML
HHV6 DNA SPEC NAA+PROBE-LOG#: 4.5 LOG COPIES/ML
M PROTEIN SERPL ELPH-MCNC: 0 G/DL
PROT PATTERN SERPL ELPH-IMP: NORMAL
SPECIMEN SOURCE: ABNORMAL

## 2019-04-10 ENCOUNTER — MYC REFILL (OUTPATIENT)
Dept: ONCOLOGY | Facility: CLINIC | Age: 64
End: 2019-04-10

## 2019-04-10 DIAGNOSIS — C85.90 LYMPHOMA (H): ICD-10-CM

## 2019-04-10 RX ORDER — VENLAFAXINE HYDROCHLORIDE 37.5 MG/1
37.5 CAPSULE, EXTENDED RELEASE ORAL DAILY
Qty: 90 CAPSULE | Refills: 1 | OUTPATIENT
Start: 2019-04-10

## 2019-05-23 ENCOUNTER — OFFICE VISIT (OUTPATIENT)
Dept: TRANSPLANT | Facility: CLINIC | Age: 64
End: 2019-05-23
Attending: INTERNAL MEDICINE
Payer: MEDICARE

## 2019-05-23 VITALS
HEART RATE: 69 BPM | TEMPERATURE: 98.9 F | BODY MASS INDEX: 27.82 KG/M2 | SYSTOLIC BLOOD PRESSURE: 143 MMHG | WEIGHT: 205.2 LBS | RESPIRATION RATE: 18 BRPM | OXYGEN SATURATION: 97 % | DIASTOLIC BLOOD PRESSURE: 81 MMHG

## 2019-05-23 VITALS
DIASTOLIC BLOOD PRESSURE: 81 MMHG | SYSTOLIC BLOOD PRESSURE: 143 MMHG | RESPIRATION RATE: 16 BRPM | OXYGEN SATURATION: 97 % | HEART RATE: 69 BPM | TEMPERATURE: 98.9 F

## 2019-05-23 DIAGNOSIS — E83.39 HYPOPHOSPHATEMIA: ICD-10-CM

## 2019-05-23 DIAGNOSIS — C86.00 EXTRANODAL NK/T-CELL LYMPHOMA, NASAL TYPE: Primary | ICD-10-CM

## 2019-05-23 DIAGNOSIS — C86.00 EXTRANODAL NK/T-CELL LYMPHOMA, NASAL TYPE: ICD-10-CM

## 2019-05-23 LAB
ALBUMIN SERPL-MCNC: 3.7 G/DL (ref 3.4–5)
ALP SERPL-CCNC: 108 U/L (ref 40–150)
ALT SERPL W P-5'-P-CCNC: 30 U/L (ref 0–70)
ANION GAP SERPL CALCULATED.3IONS-SCNC: 8 MMOL/L (ref 3–14)
AST SERPL W P-5'-P-CCNC: 26 U/L (ref 0–45)
BASOPHILS # BLD AUTO: 0.1 10E9/L (ref 0–0.2)
BASOPHILS NFR BLD AUTO: 0.4 %
BILIRUB SERPL-MCNC: 0.4 MG/DL (ref 0.2–1.3)
BUN SERPL-MCNC: 19 MG/DL (ref 7–30)
CALCIUM SERPL-MCNC: 9.3 MG/DL (ref 8.5–10.1)
CHLORIDE SERPL-SCNC: 109 MMOL/L (ref 94–109)
CO2 SERPL-SCNC: 23 MMOL/L (ref 20–32)
CREAT SERPL-MCNC: 1.12 MG/DL (ref 0.66–1.25)
DIFFERENTIAL METHOD BLD: ABNORMAL
EOSINOPHIL # BLD AUTO: 0.2 10E9/L (ref 0–0.7)
EOSINOPHIL NFR BLD AUTO: 2 %
ERYTHROCYTE [DISTWIDTH] IN BLOOD BY AUTOMATED COUNT: 13.4 % (ref 10–15)
GFR SERPL CREATININE-BSD FRML MDRD: 68 ML/MIN/{1.73_M2}
GLUCOSE SERPL-MCNC: 88 MG/DL (ref 70–99)
HCT VFR BLD AUTO: 45.3 % (ref 40–53)
HGB BLD-MCNC: 14.7 G/DL (ref 13.3–17.7)
IMM GRANULOCYTES # BLD: 0 10E9/L (ref 0–0.4)
IMM GRANULOCYTES NFR BLD: 0.3 %
LDH SERPL L TO P-CCNC: 213 U/L (ref 85–227)
LYMPHOCYTES # BLD AUTO: 4.5 10E9/L (ref 0.8–5.3)
LYMPHOCYTES NFR BLD AUTO: 39.7 %
MCH RBC QN AUTO: 28.2 PG (ref 26.5–33)
MCHC RBC AUTO-ENTMCNC: 32.5 G/DL (ref 31.5–36.5)
MCV RBC AUTO: 87 FL (ref 78–100)
MONOCYTES # BLD AUTO: 1.1 10E9/L (ref 0–1.3)
MONOCYTES NFR BLD AUTO: 10.1 %
NEUTROPHILS # BLD AUTO: 5.3 10E9/L (ref 1.6–8.3)
NEUTROPHILS NFR BLD AUTO: 47.5 %
NRBC # BLD AUTO: 0 10*3/UL
NRBC BLD AUTO-RTO: 0 /100
PHOSPHATE SERPL-MCNC: 3.1 MG/DL (ref 2.5–4.5)
PLATELET # BLD AUTO: 246 10E9/L (ref 150–450)
POTASSIUM SERPL-SCNC: 3.7 MMOL/L (ref 3.4–5.3)
PROT SERPL-MCNC: 6.6 G/DL (ref 6.8–8.8)
RBC # BLD AUTO: 5.21 10E12/L (ref 4.4–5.9)
SODIUM SERPL-SCNC: 140 MMOL/L (ref 133–144)
WBC # BLD AUTO: 11.2 10E9/L (ref 4–11)

## 2019-05-23 PROCEDURE — 84100 ASSAY OF PHOSPHORUS: CPT | Performed by: INTERNAL MEDICINE

## 2019-05-23 PROCEDURE — G0463 HOSPITAL OUTPT CLINIC VISIT: HCPCS | Mod: ZF

## 2019-05-23 PROCEDURE — 85025 COMPLETE CBC W/AUTO DIFF WBC: CPT | Performed by: INTERNAL MEDICINE

## 2019-05-23 PROCEDURE — 80053 COMPREHEN METABOLIC PANEL: CPT | Performed by: INTERNAL MEDICINE

## 2019-05-23 PROCEDURE — 36415 COLL VENOUS BLD VENIPUNCTURE: CPT

## 2019-05-23 PROCEDURE — 83615 LACTATE (LD) (LDH) ENZYME: CPT | Performed by: INTERNAL MEDICINE

## 2019-05-23 ASSESSMENT — PAIN SCALES - GENERAL: PAINLEVEL: NO PAIN (0)

## 2019-05-23 NOTE — NURSING NOTE
Chief Complaint   Patient presents with     Blood Draw     Labs drawn via  by RN in lab. VS taken.     Ludmila Red RN

## 2019-05-23 NOTE — NURSING NOTE
"   Oncology Rooming Note    May 23, 2019 3:55 PM   Hi Smith is a 64 year old male who presents for:    Chief Complaint   Patient presents with     RECHECK     Pt is here for a NK-Cell Lymphoma     Initial Vitals: /81   Pulse 69   Temp 98.9  F (37.2  C) (Oral)   Resp 16   SpO2 97%  Estimated body mass index is 27.82 kg/m  as calculated from the following:    Height as of 1/10/19: 1.829 m (6' 0.01\").    Weight as of an earlier encounter on 5/23/19: 93.1 kg (205 lb 3.2 oz). There is no height or weight on file to calculate BSA.  Data Unavailable Comment: Data Unavailable   No LMP for male patient.  Allergies reviewed: Yes  Medications reviewed: Yes    Medications: Medication refills not needed today.  Pharmacy name entered into [facility]:    [facility] [place], [place] - [address] [address] [facility], [address]  [address] [facility] [address]    Clinical concerns: N/A      Francia Deleon CMA              "

## 2019-05-30 DIAGNOSIS — C86.00 EXTRANODAL NK/T-CELL LYMPHOMA, NASAL TYPE: Primary | ICD-10-CM

## 2019-05-31 NOTE — PROGRESS NOTES
ONCOLOGY OUTPATIENT NOTE      ONCOLOGY SUMMARY (updated): Hi Smith is 63 years of age with a history of extranodal NK/T-cell lymphoma (nasal type) (diagnosed 07/17/2014) that involved multiple cutaneous/subcutaneous sites at diagnosis. Disease appeared limited to the skin and subcutaneous tissue. He received chemotherapy, initially with SMILE (3 cycles), which did not control the disease, followed in 01/2015 by ICE (2 cycles) with a very good response and then autologous stem cell transplant (03/04/2015) with BEAM conditioning. 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        By 12/2015, he again had evidence for recurrence on the right lower extremity (biopsy proven).  Recurrence appeared to be localized to the right ankle and possibly, although undocumented, near the right wrist.  He was given involved field radiation to these two areas ending on 01/08 2016. The irradiation was the last directed anti-lymphoma therapy he received.    Subsequently, he had the appearance of multiple subcutaneous nodules on his legs, especially, but also on his arms and trunk, that arose and often resolved spontaneously, but some ulcerated.  Some showed inflammation and cutaneous crusting with erythema and subsequent regression. Biopsies demonstrated lymphoma. Because of the clinical behavior it was thought he was likely experiencing a graft vs lymphoma effect. He continued to have new lesions, predominantly on his lower legs, that appeared and resolved. Those on his legs that had ulcerated resolved with wound care provided by Dr. Eaton. Over time, the frequency of new lesions appearing and the number progressing to the point of ulceration decreased.      Mr. Smith developed a few new scattered lower extremity nodules in 2017.  He was referred to Dermatology for evaluation and biopsy, but the lesions had largely spontaneously resolved by the  time a biopsy could be done.  Biopsy of one of the resolving lesions was considered consistent with dermal hypersensitivity reaction with the differential including arthropod bite and, possibly, drug eruption.  These lesions totally cleared.       At a clinic visit on 04/12/2018 he had 1 lesion near each popliteal space, which subsequently resolved. Subsequent similar lesions have not been identified.     In late 01/2018 he developed zoster followed by post herpetic neuralgia.    INTERVAL HISTORY:   Overall he is doing well and he is asymptomatic. No interval infections, fevers, night sweats or weight loss.  He has had no new skin lesions or persistent older skin lesions that he is concerned about relative to his lymphoma.  I have advised a referral to a long term allogeneic survivors clinic and he is agreeable.    REVIEW OF SYSTEMS:  A 10-point review of systems is negative.      The patient has received the influenza vaccine.      MEDICATIONS:   1.  Amlodipine.   2.  Aspirin.   3.  Atorvastatin.   4.  Potassium phosphate.     5.  Acyclovir.   6.  Gabapentin.   7.  Nitroglycerin p.r.n.   8.  Venlafaxine.      ALLERGIES:  None reported.      PHYSICAL EXAMINATION:   VITAL SIGNS:  Weight 90.3 kg (stable), blood pressure 128/80, pulse 75 and regular, respirations 16, temperature 98.7.  O2 saturation 98% on room air.   HEENT:  No icterus.  Oropharynx no masses.  No sinus tenderness.  Mucosa moist without lesion.   NECK:  No cervical or supraclavicular adenopathy.   CHEST:  Clear to auscultation. CABG scar  AXILLAE:  Negative.   HEART:  Regular rhythm without murmur or gallop.   ABDOMEN:  Soft and nontender.  Bowel sounds active.  No detectable organomegaly or mass.  No inguinal nodes.   EXTREMITIES:  No lower extremity edema.   SKIN:  No evident skin or subcutaneous lesions suggestive of recurrent or persistent lymphoma.  There is a 1 cm lesion on the ulnar side of the left forearm that is most likely a cyst or a small  "lipoma.  It is not changing.  No other lesions on total skin examination.      LABORATORY:   Reviewed     ASSESSMENT AND PLAN:  NK/T-cell lymphoma, nasal type.  No evidence of disease on today's clinical evaluation now 4-1/2 years from diagnosis. His last \"systemic\" therapy was a nonmyeloablative double cord transplant on 07/31/2015 and \"local\" therapy, radiation to right ankle and wrist lesions ending 01/2016 for a documented recurrence.  Subsequently, all disease has slowly resolved without additional therapy.  None was detected in 07/2018 and none is detectable today.          Guido Lyons MD            "

## 2019-06-26 ENCOUNTER — OFFICE VISIT (OUTPATIENT)
Dept: INTERNAL MEDICINE | Facility: CLINIC | Age: 64
End: 2019-06-26
Payer: MEDICARE

## 2019-06-26 VITALS
HEART RATE: 77 BPM | BODY MASS INDEX: 27.36 KG/M2 | HEIGHT: 72 IN | DIASTOLIC BLOOD PRESSURE: 86 MMHG | SYSTOLIC BLOOD PRESSURE: 137 MMHG | TEMPERATURE: 98.1 F | WEIGHT: 202 LBS | RESPIRATION RATE: 16 BRPM

## 2019-06-26 DIAGNOSIS — R39.12 WEAK URINARY STREAM: ICD-10-CM

## 2019-06-26 DIAGNOSIS — D80.1 HYPOGAMMAGLOBULINEMIA (H): ICD-10-CM

## 2019-06-26 DIAGNOSIS — Z12.11 SPECIAL SCREENING FOR MALIGNANT NEOPLASMS, COLON: ICD-10-CM

## 2019-06-26 DIAGNOSIS — Z12.5 ENCOUNTER FOR SCREENING FOR MALIGNANT NEOPLASM OF PROSTATE: ICD-10-CM

## 2019-06-26 DIAGNOSIS — N52.39 POST-PROCEDURAL ERECTILE DYSFUNCTION, UNSPECIFIED TYPE: ICD-10-CM

## 2019-06-26 DIAGNOSIS — E83.39 HYPOPHOSPHATEMIA: ICD-10-CM

## 2019-06-26 DIAGNOSIS — F32.9 REACTIVE DEPRESSION: ICD-10-CM

## 2019-06-26 DIAGNOSIS — B02.23 POST-HERPETIC POLYNEUROPATHY: Chronic | ICD-10-CM

## 2019-06-26 DIAGNOSIS — D69.6 THROMBOCYTOPENIA (H): ICD-10-CM

## 2019-06-26 DIAGNOSIS — C86.00 EXTRANODAL NK/T-CELL LYMPHOMA, NASAL TYPE: ICD-10-CM

## 2019-06-26 DIAGNOSIS — C96.9 T-CELL OR NK-CELL NEOPLASM (H): ICD-10-CM

## 2019-06-26 DIAGNOSIS — Z13.1 SCREENING FOR DIABETES MELLITUS: ICD-10-CM

## 2019-06-26 DIAGNOSIS — B02.29 POSTHERPETIC NEURALGIA: ICD-10-CM

## 2019-06-26 DIAGNOSIS — I25.810 CORONARY ARTERY DISEASE INVOLVING AUTOLOGOUS ARTERY CORONARY BYPASS GRAFT WITHOUT ANGINA PECTORIS: ICD-10-CM

## 2019-06-26 DIAGNOSIS — Z00.00 WELCOME TO MEDICARE PREVENTIVE VISIT: Primary | ICD-10-CM

## 2019-06-26 DIAGNOSIS — Z00.00 ENCOUNTER FOR MEDICARE ANNUAL WELLNESS EXAM: ICD-10-CM

## 2019-06-26 PROCEDURE — G0402 INITIAL PREVENTIVE EXAM: HCPCS | Performed by: INTERNAL MEDICINE

## 2019-06-26 PROCEDURE — 99214 OFFICE O/P EST MOD 30 MIN: CPT | Mod: 25 | Performed by: INTERNAL MEDICINE

## 2019-06-26 RX ORDER — TAMSULOSIN HYDROCHLORIDE 0.4 MG/1
0.4 CAPSULE ORAL DAILY
Qty: 14 CAPSULE | Refills: 0 | Status: SHIPPED | OUTPATIENT
Start: 2019-06-26 | End: 2019-07-17

## 2019-06-26 RX ORDER — NITROGLYCERIN 0.4 MG/1
0.4 TABLET SUBLINGUAL EVERY 5 MIN PRN
Qty: 12 TABLET | Refills: 0 | Status: SHIPPED | OUTPATIENT
Start: 2019-06-26 | End: 2020-07-27

## 2019-06-26 RX ORDER — ATORVASTATIN CALCIUM 20 MG/1
20 TABLET, FILM COATED ORAL DAILY
Qty: 90 TABLET | Refills: 3 | Status: SHIPPED | OUTPATIENT
Start: 2019-06-26 | End: 2020-07-10

## 2019-06-26 RX ORDER — AMLODIPINE BESYLATE 5 MG/1
5 TABLET ORAL DAILY
Qty: 90 TABLET | Refills: 3 | Status: SHIPPED | OUTPATIENT
Start: 2019-06-26 | End: 2020-08-21

## 2019-06-26 RX ORDER — GABAPENTIN 400 MG/1
400 CAPSULE ORAL DAILY
Qty: 90 CAPSULE | Refills: 3 | COMMUNITY
Start: 2019-06-26 | End: 2020-01-17

## 2019-06-26 ASSESSMENT — MIFFLIN-ST. JEOR: SCORE: 1739.27

## 2019-06-26 NOTE — PATIENT INSTRUCTIONS
Patient Education   Personalized Prevention Plan  You are due for the preventive services outlined below.  Your care team is available to assist you in scheduling these services.  If you have already completed any of these items, please share that information with your care team to update in your medical record.  Health Maintenance Due   Topic Date Due     Anxiety Assessment  1955     Colonscopy  02/09/1965     Zoster (Shingles) Vaccine (1 of 2) 02/09/2004     Depression Assessment  09/02/2015     Annual Wellness Visit  02/09/2019     FALL RISK ASSESSMENT  02/09/2019

## 2019-06-26 NOTE — PROGRESS NOTES
"SUBJECTIVE:   Hi Smith is a 64 year old male who presents for Preventive Visit.      Are you in the first 12 months of your Medicare Part B coverage?  Yes,  Visual Acuity:  Right Eye: 20/25   Left Eye: 20/25  Both Eyes: 20/25    Physical Health:    In general, how would you rate your overall physical health? good    Outside of work, how many days during the week do you exercise? none    Outside of work, approximately how many minutes a day do you exercise?not applicable    If you drink alcohol do you typically have >3 drinks per day or >7 drinks per week? No    Do you usually eat at least 4 servings of fruit and vegetables a day, include whole grains & fiber and avoid regularly eating high fat or \"junk\" foods? NO    Do you have any problems taking medications regularly?  No    Do you have any side effects from medications? not applicable    Needs assistance for the following daily activities: no assistance needed    Which of the following safety concerns are present in your home?  none identified     Hearing impairment: Yes, currently wears a hearing aid    In the past 6 months, have you been bothered by leaking of urine? no    Mental Health:    In general, how would you rate your overall mental or emotional health? good  PHQ-2 Score:      Do you feel safe in your environment? Yes    Do you have a Health Care Directive? Yes: Advance Directive has been received and scanned.    Additional concerns to address?  YES  Would like to wean off antidepressant - mood has been good and he's tolerated low dose Effexor a while.    Erectile dysfunction   Weak stream after normal cystoscopy with Dr. Boyle years ago    CAD  Quiescent - hasn't taken NTG for years    Fall risk:       Cognitive Screenin) Repeat 3 items (Leader, Season, Table)    2) Clock draw: NORMAL  3) 3 item recall: Recalls 3 objects  Results: 3 items recalled: COGNITIVE IMPAIRMENT LESS LIKELY    Mini-CogTM Copyright S Niko. Licensed by the author for " use in [facility]; reprinted with permission ([email or web address].[email or web address]). All rights reserved.      Do you have sleep apnea, excessive snoring or daytime drowsiness?: no    Reviewed and updated as needed this visit by clinical staff         Reviewed and updated as needed this visit by Provider        Social History     Tobacco Use     Smoking status: Former Smoker     Packs/day: 1.00     Years: 20.00     Pack years: 20.00     Types: Cigarettes     Last attempt to quit: 1994     Years since quittin.3     Smokeless tobacco: Never Used   Substance Use Topics     Alcohol use: No                           Current providers sharing in care for this patient include:   Patient Care Team:  Glenroy Torres as PCP - General (Family Practice)  Yuri Hill MD as MD (Hematology & Oncology)  Ramakrishna Cameron MD as MD (Infectious Diseases)  Orville Boyle MD as MD (Urology)  Ab Perez MD as MD (Dermapathology)  Amie Car, LORRAINE as Registered Nurse (Urology)  Ruth Bejarano MD as MD (Radiation Oncology)  Delroy Ratliff DPM (Podiatry)  Jesika Eaton MD as MD (Plastic Surgery)  Yuri Hill MD as Assigned PCP    The following health maintenance items are reviewed in Epic and correct as of today:  Health Maintenance   Topic Date Due     RAMESH ASSESSMENT  1955     COLONOSCOPY  1964     ZOSTER IMMUNIZATION (1 of 2) 2004     PHQ-9  2015     MEDICARE ANNUAL WELLNESS VISIT  2019     FALL RISK ASSESSMENT  2019     LIPID  2020     ADVANCE CARE PLANNING  2022     DTAP/TDAP/TD IMMUNIZATION (2 - Tdap) 2027     HEPATITIS C SCREENING  Completed     HIV SCREENING  Completed     INFLUENZA VACCINE  Completed     AORTIC ANEURYSM SCREENING (SYSTEM ASSIGNED)  Completed     IPV IMMUNIZATION  Aged Out     MENINGITIS IMMUNIZATION  Aged Out     ROS:  Constitutional, HEENT, cardiovascular, pulmonary, GI, , musculoskeletal, neuro, skin,  "endocrine and psych systems are negative, except as otherwise noted.    OBJECTIVE:   /86   Pulse 77   Temp 98.1  F (36.7  C) (Tympanic)   Resp 16   Ht 1.829 m (6')   Wt 91.6 kg (202 lb)   BMI 27.40 kg/m   Estimated body mass index is 27.82 kg/m  as calculated from the following:    Height as of 1/10/19: 1.829 m (6' 0.01\").    Weight as of 5/23/19: 93.1 kg (205 lb 3.2 oz).  EXAM:   GENERAL: healthy, alert and no distress  EYES: Eyes grossly normal to inspection, and conjunctivae and sclerae normal  HENT: ear canals and TM's normal, nose and mouth without ulcers or lesions  NECK: no adenopathy, no asymmetry, masses, or scars and thyroid normal to palpation  RESP: lungs clear to auscultation - no rales, rhonchi or wheezes  CHEST: well healed sternotomy scar midline chest   CV: regular rate and rhythm, normal S1 S2, no S3 or S4, no murmur, click or rub, no peripheral edema and peripheral pulses strong  ABDOMEN: soft, nontender, no hepatosplenomegaly, no masses and bowel sounds normal  MS: no gross musculoskeletal defects noted, no edema  SKIN: no suspicious lesions or rashes  NEURO: Normal strength and tone, mentation intact and speech normal  PSYCH: mentation appears normal, affect normal/bright    Diagnostic Test Results:  Labs reviewed in Epic    ASSESSMENT / PLAN:   1. Welcome to Medicare preventive visit  2. Encounter for Medicare annual wellness exam    3. Postherpetic neuralgia  Ok to continue gabapentin    CAD s/p CABG, erectile dysfunction, possible BPH/LUTS vs post-procedural urethral narrowing  12. Weak urinary stream  11. Post-procedural erectile dysfunction, unspecified type  May benefit from slightly tighter blood pressure control  Check response to Flomax trial first as started post-cytoscopy years ago.  Could trial T vs phosphodiesterase 5 inhibitor.  Does not use NTG but we discussed role of having access as well as risk with possible future phosphodiesterase 5 inhibitor.  Discussed " "potential lethal danger in combining blood pressure lowering agents.  - nitroGLYcerin (NITROSTAT) 0.4 MG sublingual tablet; Place 1 tablet (0.4 mg) under the tongue every 5 minutes as needed for chest pain  Dispense: 12 tablet; Refill: 0  - amLODIPine (NORVASC) 5 MG tablet; Take 1 tablet (5 mg) by mouth daily  Dispense: 90 tablet; Refill: 3  - atorvastatin (LIPITOR) 20 MG tablet; Take 1 tablet (20 mg) by mouth daily  Dispense: 90 tablet; Refill: 3  - **Testosterone Free and Total FUTURE anytime; Future  - Prostate spec antigen screen; Future  - tamsulosin (FLOMAX) 0.4 MG capsule; Take 1 capsule (0.4 mg) by mouth daily  Dispense: 14 capsule; Refill: 0    5. Thrombocytopenia (H)  4. Hypogammaglobulinemia (H)  8. Extranodal NK/T-cell lymphoma, nasal type (H)  9. T-cell or NK-cell neoplasm  In sustained remission.    7. Hypophosphatemia  Normal with supplementation -- continue     10. Special screening for malignant neoplasms, colon  - GASTROENTEROLOGY ADULT REF PROCEDURE ONLY    13. Screening for diabetes mellitus  - Glucose; Future    14. Encounter for screening for malignant neoplasm of prostate   - Prostate spec antigen screen; Future    15. Coronary artery disease involving autologous artery coronary bypass graft without angina pectoris  - Lipid panel reflex to direct LDL Fasting; Future    16. Depression in remission - OK to discontinue SNRI - discussed tapering strategies    End of Life Planning:  Patient currently has an advanced directive: Yes.  Practitioner is supportive of decision.    COUNSELING:  Reviewed preventive health counseling, as reflected in patient instructions    Estimated body mass index is 27.82 kg/m  as calculated from the following:    Height as of 1/10/19: 1.829 m (6' 0.01\").    Weight as of 5/23/19: 93.1 kg (205 lb 3.2 oz).         reports that he quit smoking about 25 years ago. His smoking use included cigarettes. He has a 20.00 pack-year smoking history. He has never used smokeless " tobacco.      Appropriate preventive services were discussed with this patient, including applicable screening as appropriate for cardiovascular disease, diabetes, osteopenia/osteoporosis, and glaucoma.  As appropriate for age/gender, discussed screening for colorectal cancer, prostate cancer, breast cancer, and cervical cancer. Checklist reviewing preventive services available has been given to the patient.    Reviewed patients plan of care and provided an AVS. The Basic Care Plan (routine screening as documented in Health Maintenance) for Hi meets the Care Plan requirement. This Care Plan has been established and reviewed with the Patient.    Counseling Resources:  ATP IV Guidelines  Pooled Cohorts Equation Calculator  Breast Cancer Risk Calculator  FRAX Risk Assessment  ICSI Preventive Guidelines  Dietary Guidelines for Americans, 2010  USDA's MyPlate  ASA Prophylaxis  Lung CA Screening    Michelet Carter MD  [facility]

## 2019-07-02 DIAGNOSIS — Z13.1 SCREENING FOR DIABETES MELLITUS: ICD-10-CM

## 2019-07-02 DIAGNOSIS — I25.810 CORONARY ARTERY DISEASE INVOLVING AUTOLOGOUS ARTERY CORONARY BYPASS GRAFT WITHOUT ANGINA PECTORIS: ICD-10-CM

## 2019-07-02 DIAGNOSIS — Z12.5 ENCOUNTER FOR SCREENING FOR MALIGNANT NEOPLASM OF PROSTATE: ICD-10-CM

## 2019-07-02 DIAGNOSIS — N52.39 POST-PROCEDURAL ERECTILE DYSFUNCTION, UNSPECIFIED TYPE: ICD-10-CM

## 2019-07-02 DIAGNOSIS — R39.12 WEAK URINARY STREAM: ICD-10-CM

## 2019-07-02 LAB
CHOLEST SERPL-MCNC: 164 MG/DL
GLUCOSE SERPL-MCNC: 92 MG/DL (ref 70–99)
HDLC SERPL-MCNC: 54 MG/DL
LDLC SERPL CALC-MCNC: 87 MG/DL
NONHDLC SERPL-MCNC: 110 MG/DL
PSA SERPL-ACNC: 2.16 UG/L (ref 0–4)
TRIGL SERPL-MCNC: 115 MG/DL

## 2019-07-02 PROCEDURE — 84270 ASSAY OF SEX HORMONE GLOBUL: CPT | Performed by: INTERNAL MEDICINE

## 2019-07-02 PROCEDURE — 36415 COLL VENOUS BLD VENIPUNCTURE: CPT | Performed by: INTERNAL MEDICINE

## 2019-07-02 PROCEDURE — 84403 ASSAY OF TOTAL TESTOSTERONE: CPT | Performed by: INTERNAL MEDICINE

## 2019-07-02 PROCEDURE — G0103 PSA SCREENING: HCPCS | Performed by: INTERNAL MEDICINE

## 2019-07-02 PROCEDURE — 80061 LIPID PANEL: CPT | Performed by: INTERNAL MEDICINE

## 2019-07-02 PROCEDURE — 82947 ASSAY GLUCOSE BLOOD QUANT: CPT | Performed by: INTERNAL MEDICINE

## 2019-07-03 LAB
SHBG SERPL-SCNC: 69 NMOL/L (ref 11–80)
TESTOST FREE SERPL-MCNC: 9.49 NG/DL (ref 4.7–24.4)
TESTOST SERPL-MCNC: 716 NG/DL (ref 240–950)

## 2019-07-08 DIAGNOSIS — C96.9 T-CELL OR NK-CELL NEOPLASM (H): ICD-10-CM

## 2019-07-08 DIAGNOSIS — Z92.21 PERSONAL HISTORY OF CHEMOTHERAPY: Primary | ICD-10-CM

## 2019-07-08 DIAGNOSIS — Z13.21 SCREENING FOR MALNUTRITION: ICD-10-CM

## 2019-07-08 DIAGNOSIS — R53.81 PHYSICAL DECONDITIONING: ICD-10-CM

## 2019-07-08 DIAGNOSIS — Z13.6 SCREENING FOR CARDIOVASCULAR CONDITION: ICD-10-CM

## 2019-07-08 DIAGNOSIS — C86.00 EXTRANODAL NK/T-CELL LYMPHOMA, NASAL TYPE: ICD-10-CM

## 2019-07-12 ENCOUNTER — DOCUMENTATION ONLY (OUTPATIENT)
Dept: CARE COORDINATION | Facility: CLINIC | Age: 64
End: 2019-07-12

## 2019-07-17 ENCOUNTER — OFFICE VISIT (OUTPATIENT)
Dept: INTERNAL MEDICINE | Facility: CLINIC | Age: 64
End: 2019-07-17
Payer: MEDICARE

## 2019-07-17 VITALS
TEMPERATURE: 98.6 F | WEIGHT: 202 LBS | DIASTOLIC BLOOD PRESSURE: 73 MMHG | HEART RATE: 78 BPM | BODY MASS INDEX: 27.36 KG/M2 | SYSTOLIC BLOOD PRESSURE: 111 MMHG | RESPIRATION RATE: 16 BRPM | HEIGHT: 72 IN

## 2019-07-17 DIAGNOSIS — N52.9 ERECTILE DYSFUNCTION, UNSPECIFIED ERECTILE DYSFUNCTION TYPE: Primary | ICD-10-CM

## 2019-07-17 PROBLEM — I25.10 CORONARY ATHEROSCLEROSIS: Status: ACTIVE | Noted: 2019-07-17

## 2019-07-17 PROBLEM — E78.5 HYPERLIPIDEMIA: Status: ACTIVE | Noted: 2019-07-17

## 2019-07-17 PROCEDURE — 99214 OFFICE O/P EST MOD 30 MIN: CPT | Performed by: INTERNAL MEDICINE

## 2019-07-17 RX ORDER — SILDENAFIL CITRATE 20 MG/1
20-50 TABLET ORAL DAILY PRN
Qty: 30 TABLET | Refills: 5 | Status: SHIPPED | OUTPATIENT
Start: 2019-07-17 | End: 2019-07-17

## 2019-07-17 RX ORDER — SILDENAFIL CITRATE 20 MG/1
20-100 TABLET ORAL DAILY PRN
Qty: 30 TABLET | Refills: 5 | Status: SHIPPED | OUTPATIENT
Start: 2019-07-17 | End: 2019-11-15

## 2019-07-17 ASSESSMENT — MIFFLIN-ST. JEOR: SCORE: 1739.27

## 2019-07-17 NOTE — PROGRESS NOTES
Subjective     Hi Smith is a 64 year old male who presents to clinic today for the following health issues:     Erectile Dysfunction  Discuss options    The patient desires Viagra to treat his erectile dysfunction. History and physical exam, testosterone evaluation (and tamsulosin trial) have not disclosed any obvious treatable cause of this complaint.  He did have this post-procedurally after cystoscopy.  He endorses normal sensation.      He was informed that sildenafil is sometimes not covered by insurance with off-label indication.  It is available on a fee-for-service cost basis, and may be relatively expensive.  He can start with 20 mg dose, and increase daily by 20 mg until 100 mg if necessary. The method of use 1 hour prior to anticipated intercourse is explained.      He should not use any more than one dose of  mg in a 24 hour period. The side effects of possible headache, flushing, dyspepsia and transient changes in vision have been explained.     The patient is prescribed but is not taking nitrates, has used NTG once distantly and got a bad headache.  At our last office visit, I encouraged him to have access but never use along with the phosphodiesterase 5 inhibitor.  He denies recreational nitrate/nitrite use, retinitis pigmentosa, pulmonary hypertension, cancer, kidney or liver disease.    I have counseled him that taking sildenafil with nitrates of any form can cause death.     /73   Pulse 78   Temp 98.6  F (37  C) (Tympanic)   Resp 16   Ht 1.829 m (6')   Wt 91.6 kg (202 lb)   BMI 27.40 kg/m      GEN: No acute distress  EYES: No conjunctival injection or icterus, EOMI grossly intact  RESP: Unlabored, regular  NEURO: Normal gait, MAEx4, light touch sensation grossly intact  PSYCH: Normal mood and affect    1. Erectile dysfunction, unspecified erectile dysfunction type  Consider avoiding amlodipine use on days he anticipates using phosphodiesterase 5 inhibitor given very good  blood pressure today.  - sildenafil (REVATIO) 20 MG tablet; Take 1-2.5 tablets (20-50 mg) by mouth daily as needed (30-60 minutes before sexual activity--do not take with blood pressure lowering meds)  Dispense: 30 tablet; Refill: 5    I spent a total of 30 minutes with the patient of which greater than 50% were devoted to counseling and coordination of care: erectile dysfunction.

## 2019-07-17 NOTE — LETTER
My Depression Action Plan  Name: Hi Smith   Date of Birth 1955  Date: 7/17/2019    My doctor: Michelet Carter   My clinic: [facility]  [address]  [place] [id number] [phone]          GREEN    ZONE   Good Control    What it looks like:     Things are going generally well. You have normal up s and down s. You may even feel depressed from time to time, but bad moods usually last less than a day.   What you need to do:  1. Continue to care for yourself (see self care plan)  2. Check your depression survival kit and update it as needed  3. Follow your physician s recommendations including any medication.  4. Do not stop taking medication unless you consult with your physician first.           YELLOW         ZONE Getting Worse    What it looks like:     Depression is starting to interfere with your life.     It may be hard to get out of bed; you may be starting to isolate yourself from others.    Symptoms of depression are starting to last most all day and this has happened for several days.     You may have suicidal thoughts but they are not constant.   What you need to do:     1. Call your care team, your response to treatment will improve if you keep your care team informed of your progress. Yellow periods are signs an adjustment may need to be made.     2. Continue your self-care, even if you have to fake it!    3. Talk to someone in your support network    4. Open up your depression survival kit           RED    ZONE Medical Alert - Get Help    What it looks like:     Depression is seriously interfering with your life.     You may experience these or other symptoms: You can t get out of bed most days, can t work or engage in other necessary activities, you have trouble taking care of basic hygiene, or basic responsibilities, thoughts of suicide or death that will not go away, self-injurious behavior.     What you need to do:  1. Call your care team and request a  same-day appointment. If they are not available (weekends or after hours) call your local crisis line, emergency room or 911.            Depression Self Care Plan / Survival Kit    Self-Care for Depression  Here s the deal. Your body and mind are really not as separate as most people think.  What you do and think affects how you feel and how you feel influences what you do and think. This means if you do things that people who feel good do, it will help you feel better.  Sometimes this is all it takes.  There is also a place for medication and therapy depending on how severe your depression is, so be sure to consult with your medical provider and/ or Behavioral Health Consultant if your symptoms are worsening or not improving.     In order to better manage my stress, I will:    Exercise  Get some form of exercise, every day. This will help reduce pain and release endorphins, the  feel good  chemicals in your brain. This is almost as good as taking antidepressants!  This is not the same as joining a gym and then never going! (they count on that by the way ) It can be as simple as just going for a walk or doing some gardening, anything that will get you moving.      Hygiene   Maintain good hygiene (Get out of bed in the morning, Make your bed, Brush your teeth, Take a shower, and Get dressed like you were going to work, even if you are unemployed).  If your clothes don't fit try to get ones that do.    Diet  I will strive to eat foods that are good for me, drink plenty of water, and avoid excessive sugar, caffeine, alcohol, and other mood-altering substances.  Some foods that are helpful in depression are: complex carbohydrates, B vitamins, flaxseed, fish or fish oil, fresh fruits and vegetables.    Psychotherapy  I agree to participate in Individual Therapy (if recommended).    Medication  If prescribed medications, I agree to take them.  Missing doses can result in serious side effects.  I understand that drinking  alcohol, or other illicit drug use, may cause potential side effects.  I will not stop my medication abruptly without first discussing it with my provider.    Staying Connected With Others  I will stay in touch with my friends, family members, and my primary care provider/team.    Use your imagination  Be creative.  We all have a creative side; it doesn t matter if it s oil painting, sand castles, or mud pies! This will also kick up the endorphins.    Witness Beauty  (AKA stop and smell the roses) Take a look outside, even in mid-winter. Notice colors, textures. Watch the squirrels and birds.     Service to others  Be of service to others.  There is always someone else in need.  By helping others we can  get out of ourselves  and remember the really important things.  This also provides opportunities for practicing all the other parts of the program.    Humor  Laugh and be silly!  Adjust your TV habits for less news and crime-drama and more comedy.    Control your stress  Try breathing deep, massage therapy, biofeedback, and meditation. Find time to relax each day.     My support system    Clinic Contact:  Phone number:    Contact 1:  Phone number:    Contact 2:  Phone number:    Presybeterian/:  Phone number:    Therapist:  Phone number:    Local crisis center:    Phone number:    Other community support:  Phone number:

## 2019-09-30 ENCOUNTER — HEALTH MAINTENANCE LETTER (OUTPATIENT)
Age: 64
End: 2019-09-30

## 2019-10-02 ENCOUNTER — TELEPHONE (OUTPATIENT)
Dept: INTERNAL MEDICINE | Facility: CLINIC | Age: 64
End: 2019-10-02

## 2019-10-02 DIAGNOSIS — H90.3 BILATERAL SENSORINEURAL HEARING LOSS: Primary | ICD-10-CM

## 2019-10-02 NOTE — TELEPHONE ENCOUNTER
Your provider has referred you to: [facility]: [facility] [phone]     Treatment:  Evaluation & Treatment  Specialty Testing:  Audiogram w/Tymps and Reflexes and Hearing Aid Consultation

## 2019-10-02 NOTE — TELEPHONE ENCOUNTER
[facility]: [facility] [place] [phone]   [email or web address]

## 2019-11-15 ENCOUNTER — OFFICE VISIT (OUTPATIENT)
Dept: TRANSPLANT | Facility: CLINIC | Age: 64
End: 2019-11-15
Attending: NURSE PRACTITIONER
Payer: MEDICARE

## 2019-11-15 ENCOUNTER — OFFICE VISIT (OUTPATIENT)
Dept: DERMATOLOGY | Facility: CLINIC | Age: 64
End: 2019-11-15
Payer: MEDICARE

## 2019-11-15 ENCOUNTER — THERAPY VISIT (OUTPATIENT)
Dept: PHYSICAL THERAPY | Facility: CLINIC | Age: 64
End: 2019-11-15
Attending: NURSE PRACTITIONER
Payer: MEDICARE

## 2019-11-15 VITALS
BODY MASS INDEX: 27.42 KG/M2 | TEMPERATURE: 98.8 F | DIASTOLIC BLOOD PRESSURE: 81 MMHG | RESPIRATION RATE: 16 BRPM | SYSTOLIC BLOOD PRESSURE: 124 MMHG | HEART RATE: 64 BPM | OXYGEN SATURATION: 98 % | WEIGHT: 202.2 LBS

## 2019-11-15 DIAGNOSIS — R53.81 PHYSICAL DECONDITIONING: ICD-10-CM

## 2019-11-15 DIAGNOSIS — Z13.6 SCREENING FOR CARDIOVASCULAR CONDITION: ICD-10-CM

## 2019-11-15 DIAGNOSIS — Z71.9 ENCOUNTER FOR COUNSELING: Primary | ICD-10-CM

## 2019-11-15 DIAGNOSIS — C86.00 EXTRANODAL NK/T-CELL LYMPHOMA, NASAL TYPE: ICD-10-CM

## 2019-11-15 DIAGNOSIS — Z92.21 PERSONAL HISTORY OF CHEMOTHERAPY: ICD-10-CM

## 2019-11-15 DIAGNOSIS — D48.5 NEOPLASM OF UNCERTAIN BEHAVIOR OF SKIN: Primary | ICD-10-CM

## 2019-11-15 DIAGNOSIS — Z13.21 SCREENING FOR MALNUTRITION: ICD-10-CM

## 2019-11-15 DIAGNOSIS — Z12.83 SKIN CANCER SCREENING: ICD-10-CM

## 2019-11-15 DIAGNOSIS — C86.00 EXTRANODAL NK/T-CELL LYMPHOMA, NASAL TYPE: Primary | ICD-10-CM

## 2019-11-15 DIAGNOSIS — N52.1 ERECTILE DYSFUNCTION DUE TO DISEASES CLASSIFIED ELSEWHERE: Primary | ICD-10-CM

## 2019-11-15 DIAGNOSIS — C96.9 T-CELL OR NK-CELL NEOPLASM (H): ICD-10-CM

## 2019-11-15 LAB
ABO + RH BLD: NORMAL
ABO + RH BLD: NORMAL
ALBUMIN SERPL-MCNC: 3.8 G/DL (ref 3.4–5)
ALP SERPL-CCNC: 97 U/L (ref 40–150)
ALT SERPL W P-5'-P-CCNC: 32 U/L (ref 0–70)
ANION GAP SERPL CALCULATED.3IONS-SCNC: 6 MMOL/L (ref 3–14)
AST SERPL W P-5'-P-CCNC: 25 U/L (ref 0–45)
BASOPHILS # BLD AUTO: 0.1 10E9/L (ref 0–0.2)
BASOPHILS NFR BLD AUTO: 0.7 %
BILIRUB SERPL-MCNC: 0.8 MG/DL (ref 0.2–1.3)
BUN SERPL-MCNC: 17 MG/DL (ref 7–30)
CALCIUM SERPL-MCNC: 8.8 MG/DL (ref 8.5–10.1)
CHLORIDE SERPL-SCNC: 109 MMOL/L (ref 94–109)
CO2 SERPL-SCNC: 23 MMOL/L (ref 20–32)
CREAT SERPL-MCNC: 1.12 MG/DL (ref 0.66–1.25)
CRP SERPL-MCNC: <2.9 MG/L (ref 0–8)
DIFFERENTIAL METHOD BLD: NORMAL
EOSINOPHIL # BLD AUTO: 0.2 10E9/L (ref 0–0.7)
EOSINOPHIL NFR BLD AUTO: 2.4 %
ERYTHROCYTE [DISTWIDTH] IN BLOOD BY AUTOMATED COUNT: 13.3 % (ref 10–15)
FERRITIN SERPL-MCNC: 670 NG/ML (ref 26–388)
GFR SERPL CREATININE-BSD FRML MDRD: 68 ML/MIN/{1.73_M2}
GLUCOSE SERPL-MCNC: 82 MG/DL (ref 70–99)
HCT VFR BLD AUTO: 46.5 % (ref 40–53)
HGB BLD-MCNC: 15.2 G/DL (ref 13.3–17.7)
IMM GRANULOCYTES # BLD: 0.1 10E9/L (ref 0–0.4)
IMM GRANULOCYTES NFR BLD: 0.9 %
LYMPHOCYTES # BLD AUTO: 2.9 10E9/L (ref 0.8–5.3)
LYMPHOCYTES NFR BLD AUTO: 32.6 %
MCH RBC QN AUTO: 29.2 PG (ref 26.5–33)
MCHC RBC AUTO-ENTMCNC: 32.7 G/DL (ref 31.5–36.5)
MCV RBC AUTO: 89 FL (ref 78–100)
MONOCYTES # BLD AUTO: 1.2 10E9/L (ref 0–1.3)
MONOCYTES NFR BLD AUTO: 13 %
NEUTROPHILS # BLD AUTO: 4.6 10E9/L (ref 1.6–8.3)
NEUTROPHILS NFR BLD AUTO: 50.4 %
NRBC # BLD AUTO: 0 10*3/UL
NRBC BLD AUTO-RTO: 0 /100
PLATELET # BLD AUTO: 225 10E9/L (ref 150–450)
POTASSIUM SERPL-SCNC: 4 MMOL/L (ref 3.4–5.3)
PROT SERPL-MCNC: 6.9 G/DL (ref 6.8–8.8)
RBC # BLD AUTO: 5.2 10E12/L (ref 4.4–5.9)
SODIUM SERPL-SCNC: 138 MMOL/L (ref 133–144)
SPECIMEN EXP DATE BLD: NORMAL
TSH SERPL DL<=0.005 MIU/L-ACNC: 3.99 MU/L (ref 0.4–4)
WBC # BLD AUTO: 9 10E9/L (ref 4–11)

## 2019-11-15 PROCEDURE — 88305 TISSUE EXAM BY PATHOLOGIST: CPT | Mod: TC | Performed by: DERMATOLOGY

## 2019-11-15 PROCEDURE — 80053 COMPREHEN METABOLIC PANEL: CPT | Performed by: INTERNAL MEDICINE

## 2019-11-15 PROCEDURE — 36415 COLL VENOUS BLD VENIPUNCTURE: CPT

## 2019-11-15 PROCEDURE — 84443 ASSAY THYROID STIM HORMONE: CPT | Performed by: INTERNAL MEDICINE

## 2019-11-15 PROCEDURE — 86900 BLOOD TYPING SEROLOGIC ABO: CPT | Performed by: PATHOLOGY

## 2019-11-15 PROCEDURE — 85025 COMPLETE CBC W/AUTO DIFF WBC: CPT | Performed by: INTERNAL MEDICINE

## 2019-11-15 PROCEDURE — 82728 ASSAY OF FERRITIN: CPT | Performed by: INTERNAL MEDICINE

## 2019-11-15 PROCEDURE — 86900 BLOOD TYPING SEROLOGIC ABO: CPT | Performed by: INTERNAL MEDICINE

## 2019-11-15 PROCEDURE — G0463 HOSPITAL OUTPT CLINIC VISIT: HCPCS

## 2019-11-15 PROCEDURE — G0463 HOSPITAL OUTPT CLINIC VISIT: HCPCS | Mod: 25,ZF

## 2019-11-15 PROCEDURE — 86140 C-REACTIVE PROTEIN: CPT | Performed by: INTERNAL MEDICINE

## 2019-11-15 RX ORDER — TADALAFIL 5 MG/1
5 TABLET ORAL EVERY 24 HOURS
Qty: 5 TABLET | Refills: 0 | Status: SHIPPED | OUTPATIENT
Start: 2019-11-15 | End: 2020-01-17

## 2019-11-15 RX ORDER — LIDOCAINE HYDROCHLORIDE AND EPINEPHRINE 10; 10 MG/ML; UG/ML
3 INJECTION, SOLUTION INFILTRATION; PERINEURAL ONCE
Status: DISCONTINUED | OUTPATIENT
Start: 2019-11-15 | End: 2020-01-17

## 2019-11-15 ASSESSMENT — PAIN SCALES - GENERAL
PAINLEVEL: NO PAIN (0)
PAINLEVEL: NO PAIN (0)

## 2019-11-15 ASSESSMENT — 6 MINUTE WALK TEST (6MWT)
TOTAL DISTANCE WALKED (FT): 1000
COMMENTS: NO DEVICE
TOTAL DISTANCE WALKED (METERS): 304.8

## 2019-11-15 NOTE — NURSING NOTE
Chief Complaint   Patient presents with     RECHECK     NHL~ here for provider visit     Oncology Rooming Note    Vitals: WDL  Allergies Reviewed: Yes  Medication Reviewed: Yes  Medication Refills Needed: No  Clinical Concerns: None.  Patient has no complaints of pain.  Patient has no complaints of nausea.    Health Maintenance:  Care Team:Done  PHQ2/PHQ9:Done  Health History:Done  Immunizations:Done  Partner:Done  Care Everywhere: Done  Falls Assessment: Done  Education: Done      Ayah Osborne RN

## 2019-11-15 NOTE — PROGRESS NOTES
"  [id number]: Informed Consent Note     The consent form, including purpose, risks and benefits, was reviewed with Hi Smith, and all questions were answered before he signed the consent form. The patient understands that the study involves collection of an onsite blood sample, a home stool sample, onsite  and gait assessment, a brief survey and review of his medical record.     Present during the discussion was his wife Natalya (\"Ashley\").  A copy of the signed form was provided to the patient. No procedures specific to this study were performed prior to the patient signing the consent form.    Consent Version Date: 9/17/18  Consent obtained by: Emanuel Alvarado   Date: 11/15/19  HIPAA authorization signed?:yes  HIPAA authorization version date: 7/18/16    [id number]: Study Visit Note   Subject name: Hi Smith     Visit: [facility]     Did the study visit occur within the appropriate window allowed by the protocol? yes      I have personally interviewed Hi Smith and completed his frailty survey. Hi' 15-foot walking test was completed in 4.62 seconds; his  was assessed x3 with Gareth dynanometer in right (dominant) hand with result of 52, 58 and 50 kg.    Hi Smith was given the opportunity to ask any trial related questions.  Please see provider progress note for physical exam and other clinical information. Labs were reviewed - any significant lab values were addressed and reviewed.    Emanuel Alvarado    Form 503.03.01 (Version 2)     Effective date: 01AUG2018     Next Review Date: 01AUG2020  "

## 2019-11-15 NOTE — PROGRESS NOTES
BMT Transplant Late-Effects Clinic   Survivorship Care Plan    Date: November 14, 2019    Treatment Team:  Patient Care Team:  Michelet Carter MD as PCP - General (Internal Medicine)  Yuri Hill MD as MD (Hematology & Oncology)  Ramakrishna Cameron MD as MD (Infectious Diseases)  Orville Boyle MD as MD (Urology)  Ab Perez MD as MD (Dermapathology)  Amie Car, RN as Registered Nurse (Urology)  Ruth Bejarano MD as MD (Radiation Oncology)  Delroy Ratliff DPM (Podiatry)  Jesika Eaton MD as MD (Plastic Surgery)  Michelet Carter MD as Assigned PCP    Date of Transplant: 7/31/2015    Mr. 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 Please treatment history outlined below. He is being evaluated in the BMT Transplant Late-Effects Clinic today for a systemic ROS, counseling regarding potential late-effects of transplant therapy, and for recommendations for follow up care going forward.       Oncology Treatment History: (Delete if not needed)    Treatment  Number of Cycles Date Range Tolerance & Outcomes   SMILE  3 2014 Disease progression   ICE 2 2014 Remission   BEAM conditioning for Auto HSCT Single transplant 3/4/2016 Relapse disease   Non-myeloablative double umbilical cord blood transplant  Single transplant  7/31/2015 Relapsed disease (cutaneous ankle & wrist), withdrawal of immunosuppression for GVL effect   Local radiation to ankle and wrist  Total of 7 treatments to R ankle  2016 Symptoms monitored with waxing/waning nodules. At most recent evaluation 5/19, he was found to be in remission with no evidence of disease        Current Medications:  Current Outpatient Medications   Medication     amLODIPine (NORVASC) 5 MG tablet     ASPIRIN PO      atorvastatin (LIPITOR) 20 MG tablet     gabapentin (NEURONTIN) 400 MG capsule     K Phos [place]-Sod Phos Di & Mono (VIRT-PHOS 250 NEUTRAL) 155-852-130 MG TABS     nitroGLYcerin (NITROSTAT) 0.4 MG sublingual tablet     sildenafil (REVATIO) 20 MG tablet     No current facility-administered medications for this visit.      Medical History:  Past Medical History:   Diagnosis Date     Alcohol abuse     in remission x 20 yrs     CAD (coronary artery disease) of artery bypass graft     CABG 2005, hx of stents     Depression      DVT (deep venous thrombosis) (H) November, 2014    upper left arm after PICC line     Extranodal NK/T-cell lymphoma, nasal type (H) 7/23/2014     GERD (gastroesophageal reflux disease)      Hearing loss      HTN (hypertension)      Lymphoma (H)      Natural killer (NK) cell lymphoblastic lymphoma (H) 8/15/2014     S/P autologous bone marrow transplantation (H) 3/4/2015     Skin cancer     15 y ago.? basal cell     Tinnitus      VRE (vancomycin resistant enterococcus) culture positive        General Health Maintenance:     Call the BMT clinic if you develop a skin rash, oral sores, eye pain or excessive dryness, shortness of breath, new cough, bleeding, diarrhea, nausea, or vomiting.Vaccinations should be given at 1 and 2 years after your transplant, these may be given at your annual anniversary visits in the BMT clinic, by your primary care provider, or your local oncologist. An exception is the influenza vaccine, which can be given after day +60 post-transplant during influenza season. See table below for more information.     For general health concerns you can be seen by your primary care provider.     If you have questions about your transplant or follow up tests contact your BMT RN coordinator.     Documented Vaccinations:  Most Recent Immunizations   Administered Date(s) Administered     DTaP / Hep B / IPV 11/16/2017     Hib (PRP-T) 11/16/2017     Influenza Vaccine IM > 6 months Valent IIV4  11/16/2017     Pneumo Conj 13-V (2010&after) 11/16/2017         Vaccine Administration Schedule - Months Post HCT     12 months 14 months 24 months 26 months   29 months     All Patients   FlubokÆ/Fluzon  Seasonal administration is recommended annually for all patients at Day +60 or later.  Delay of administration is at the discretion of the provider.   Pediarix  Pediarix  Pediarix  Pediarix      ActHIB  ActHIB  ActHIB  ActHIB      Pneumococcal [id number]  Feyrrmy61  Qhtlrmi03  Pneumovax  or [id number]     Havrix  Havrix   (peds only)  Havrix     (peds only)     Menveo  MenveoÆ   (all patients up to 17 yoa, then see special populations) MenveoÆ   (all patients up to 17 yoa, then see special populations)      M-M-R II ( live)a   MMR II  MMR II     Varivax (live)a    Varivax  Varivax     Special Populations    Gardasil 9Æc Gardasil 9  Gardasil 9  Gardasil 9      MenveoÆb Menveo  Menveo       BexeroÆ Bexero  Bexero         ShingrixÆ Shingrix  Shingrix       ZostavaxÆ (live)a      Zostavax   May substitute with different brands that contain equivalent vaccine components.        All Patients   Flubok /Fluzone -Influenza quadrivalent (inactivated)    Seasonal administration is recommended annually for all patients at Day +60 or later.  Delay of administration is at the discretion of the provider.    Children age 6 months - 8 yrs may need 2 doses - Please follow CDC guidelines.    Live virus vaccine (nasal FluMist) is contraindicated in HCT patients Min Time Between Vaccines: 4 weeks   Pediarix   -Diphtheria, tetanus, & acellular pertussis (DTaP)  -Hepatitis B (HBV)  -Inactivated Polio (IPV)    Administer at 12 months, 14 months, and 24 months post HCT     Full dose diphtheria, tetanus, & pertussis is recommended (DTaP) for all patients   o Reduced doses of diphtheria toxoid and acellular pertussis in Tdap vaccines may be associated with a poor response in HCT patients  Min Time Between Vaccines:  8 weeks    ActHIB   -Haemophilus influenzae     type B conjugate (Hib)    Administer at 12 months, 14 months, and 24 months post HCT  Min Time Between Vaccines: 4 weeks   Solsmhw54   -Pneumococcal polysaccharide conjugate vaccine (PCV13)    Administer at 12 months, 14 months, and 24 months post HCT    If chronic GVHD, [id number]  should be administered at 26 months or later post HCT (in place of Pneumovax )  Min Time Between Vaccines:  8 weeks   Pneumovax    -Pneumococcal polysaccharide (PPSV23)   Administer 26 months or later post HCT     If chronic GVHD, do not administer Pneumovax  (admin a 4th dose of Keljdyt73 )  Min Time Between Vaccines:  4 weeks   Havrix   -Hepatitis A (HepA)   Administer 12 months and 24 months post HCT for PEDIATRIC recipients  ONLY    Only administer to ADULT HCT recipients  who anticipate Hepatitis A exposure (e.g., during travel to endemic areas) and/or post-exposure prophylaxis Min Time Between Vaccines:    6 months   M-M-R II a  -Measles, mumps, rubella     Administer at 24 months and 26 months or later post HCT Min Time Between Vaccines:  4 weeks   Varivax a  -Varicella virus (DIONTE)    Administer at 24 months and 26 months or later post HCT to recipients without evidence of varicella immunity  o If a recipient develops zoster post transplant, they should NOT receive the varicella vaccine   o If a recipient received Shingrix , discuss with the provider if Varivax  should be administered. Min Time Between Vaccines:  4 weeks     Special Populations   Gardasil 9   -Nonavalent human papillomavirus (HPV9)   Only for HCT recipients 11-26 years of age (male and female)   o Fanconi Anemia and Dyskeratosis Congenita patients: administer at 9-26 years of age (male and female)     Administer at 12 months, 14 months, and 24 months post HCT  Min Time Between Vaccines:   4 weeks between doses 1 and 2; 12 weeks between doses 2 and 3   Menveo   -Meningococcal vaccine tetravalent protein-conjugated (MCV4);  MenACWY-D(Menactra )      If HCT recipient is 18 years of age or older, only administer if:   o Live in a dorm setting,  recruits  o Functionally or anatomically asplenic (including patients with chronic GVHD)  o Complement deficiency and/or human immunodeficiency and/or plan to travel in hyperendemic or endemic areas  Administer 2 doses ( by 8 weeks) 12 months or later post HCT    Bexero   -Meninococcal vaccine    Only for HCT recipients who are:   o > 2 years of age with functionally or anatomically asplenic, human immunodeficiency, and/or complement deficiency  o 10-25 years of age   o >25 years of age and live in a dorm setting,  recruits, and/or plan to travel in hyperendemic or endemic areas     Administer 2 doses ( by 8 weeks) 12 months or later post HCT  Min Time Between Vaccines:  8 weeks   Shingrix a   -Varicella-zoster virus    Only for HCT recipients who are:   o 10 years of age and older  o IF LESS THAN 50 YEARS OF AGE:  CHECK INSURANCE APPROVAL prior to administration      Administer 2 doses ( by 8 weeks) 12 months or later post HCT   Zostavax a         -Varicella-zoster virus    In patient received Shingrix    Only for HCT recipients who are > 60 years of age and meet the following criteria:   o > 5 years after transplant AND have been off immunosuppression for > 1 year      Administer at 29 months or later post HCT 1 Dose Only   Vaccination Information for Family Members/Close Contacts   General Guidelines    Individuals should receive vaccinations according to ACIP recommendations     Fluzone    -Influenza quadrivalent (inactivated)   Annual administration of inactivated flu vaccine is strongly encouraged     Live attenuated influenza vaccine (FluMist) should be avoided     If FluMist is given, this person should avoid HCT recipients for 7 days    Varivax a  -Varicella virus (DIONTE)                 AND  Zostavax a          -Varicella-zoster virus   If  post-vaccination rash develops within 42 days of varicella or zoster vaccination, utilize contact precautions until all rash lesions are crusted over  or rash resolved     Direct chicken pox exposure in a HCT recipient with no history of chicken pox or no prior chicken pox vaccination should have VZV serology testing and receive acyclovir until seropositivity is confirmed negative    Havrix   -Hepatitis A (HepA)   Routine hepatitis A vaccination is recommended in endemic areas or during outbreaks (according to existing ACIP guidelines)    RotaTeq /Rotarix   -Rotavirus (live)    Avoid rotavirus vaccination in household contacts if possible     Highly immunocompromised patients should avoid handling diapers of vaccinated infants/children for the first 4 weeks following vaccination    Ipol (Polio)    -Inactivated polio vaccine (IPV)   Inactivated polio should be used when indicated per ACIP guidelines     Avoid oral polio vaccine as it can be transmitted person-to-person          Has Hi Smith been diagnosed with chronic GVHD?  No current documented chronic GVH. Per assessment today 11/15 patient does not have any active symptoms per NIH scoring system.     Current Systemic Immunosuppression:  (list) NONE    Chronic GVHD NIH Score At Today's Visit   Score 0 Score 1 Score 2 Score 3   % 80-90% 60-70% <60%          Skin No sclerotic features Superficial sclerotic features Deep sclerotic features (hidebound)    No skin changes BSA 1-18% BSA 19-50% BSA >50%          Mouth No symptoms Mild, PO intake not limited Moderate, partial limitation in PO intake Severe, major limitation in PO intake          Eyes No symptoms Mild dry eyes Moderate, partially affecting ADL Severe, significantly affecting ADL          GI Tract No symptoms Symptoms without significant weight loss (<5%) Mild-mod weight loss (5-15%) OR diarrhea without significant impact in ADL Severe weight loss (>15%) OR esophageal dilatation required OR  severe diarrhea impacting ADL          Liver Normal total bilirubin and transaminases < 3x ULN Normal total bilirubin with ALT/AST ? 3-5x ULN OR ALP ? 3x ULN Elevated total bilirubin but <3 mg/dl OR ALT >5x ULN Elevated total bilirubin > 3 mg/dl          Lungs No symptoms Mild dyspnea with exertion Moderate dyspnea (walking on flat ground) Severe dyspnea (requiring O2)    FEV1?80% FEV1 60-79% FEV1 40-59% FEV1 <39%          Joints/Fascia No symptoms Mild tightness and decreased ROM not affecting ADL Moderate tightness and decreased ROM affecting ADL Contractures with significant limitation of ADL          Genital No symptoms Mild signs/symptoms Moderate signs/symptoms Severe signs/symptoms          Other Indicators Ascites Pericardial Effusion Pleural Effusion Nephrotic Syndrome           Myasthenia Gravis Peripheral Neuropathy Polymyositis Weight loss >5% without GI sxs           Eosinophilia >500 Platelets <100 Other (specify) Other (specify)          Overall NIH Chronic GVHD Score All scores = 0 Lung score = 0 PLUS   1 or 2 organs with score =1 Lung score = 1  OR  At least 1 organ with   score of 2  OR  3 organs with score = 1 Lung score = 2 or 3  OR  At least 1 other organ   with score = 3    No cGVHD Mild Moderate Severe    X        Survivorship Care Plan:     This individualized care plan is designed to inform you and your healthcare team of the recommended follow-up visits, tests, health maintenance activities, and cancer screening you should receive after transplant.     Immune System:  Risks Preventative Measures Recommendations   Infections, viral reactivations     Symptoms of a cold such as fever, cough, congestion, and shortness of breath should be reported to your primary care provider    Immunizations as per above tables . Vaccines to be administered by PCP or local oncologist     He needs to have shingles and MMR     Eyes:   Risks Preventative Measures Recommendations   Cataracts, dry eyes, GVH of the  eyes, viral infections, and other eye changes   Yearly eye exam     Screening and treatment for high blood pressure or diabetes    Call if you have eye pain, visual changes, or floaters immediately    Call If you are experiencing dry eyes and symptoms do not improve with Refresh eye drops  Patient will schedule an annual routine exam with community ophthalmologist     Mouth:  Risks Preventative Measures Recommendations   Dry mouth, oral GVH, cavities, and oral cancer   Report any new symptoms such as dry mouth or painful sores     You can use over the counter Biotene for dry mouth or try sucking on sour candy before meals    Get a dental checkup every year and a cleaning every 6 months    If you have a prosthetic heart valve, central venous catheter or  port , or are still taking immunosuppression you may need to take an antibiotic before your dental visits. None at this time, patient has dental provider and will schedule routine exam       Lungs  Risks Preventative Measures Recommendations   Changes in function from chemotherapy or radiation; lung infections (pneumonia)   Tell your provider about difficulty breathing, a cough, or new shortness of breath     Avoid use of tobacco products or smoking    Routine lung exams   Pulmonary Function Tests (Recommended annually post-transplant)       Heart and Blood Vessels  Heart Disease Risk Score: The 10-year ASCVD risk score ([place]., et al., 2013) is: 9.7%    Values used to calculate the score:      Age: 64 years      Sex: Male      Is Non- : No      Diabetic: No      Tobacco smoker: No      Systolic Blood Pressure: 111 mmHg      Is BP treated: Yes      HDL Cholesterol: 54 mg/dL      Total Cholesterol: 164 mg/dL     Hi has a intermediate ASCVD risk score and is currently on statin and ASA therapy.    Risks Preventative Measures Recommendations   Damage from chemotherapy and/or radiation; early development of heart valve disease    Ask your  provider if you should receive consultation from a cardiologist (heart doctor) or have special screening    Follow a  heart healthy  lifestyle. For more information visit the National Heart, Lung, and Blood [place] website at: https://www.nhlbi.nih.gov/health/health-topics/topics/heart-healthy-lifestyle-changes     Don t smoke. If you currently smoke and are ready to quit, we can help you find ways to quit    Maintain a healthy weight    Exercise regularly    Avoid foods that have high amounts of:  o Salt/sodium (less than 2,300 mg of sodium per day)  o Saturated and trans fats  o Limit alcohol to less than 1 drink for women and 2 drinks for men per day  o Sugar such as soft drinks or sugary snacks Fasting Lipid Profile or Direct LDL (Recommended annually)       Hormones  Risks Preventative Measures Recommendations   Low thyroid function, low function of other glands (adrenals and others)   Certain endocrine/hormone disorders are more common after transplant. For this reason, talk to your provider about:  o Fatigue  o Muscle weakness  o Changes in cold tolerance  o Reduced interest in sex  o Erectile dysfunction     Certain tests may be used to monitor for hormone changes if you are experiencing symptoms. These tests are done at 1 year    If you have been on steroids you will need to slowly taper or decrease the dose as recommended by your provider/pharmacist. Do not stop taking steroids without consulting with your provider.     If you have been on steroids in the past, you may need steroids during periods of illness TSH with T4 reflex (Recommended 1 & 2 years post-transplant) and Hgb A1C (Recommended annually post-transplant)    TSH was checked today and was 3.99. Recommend re-checking in 6 months.      Liver:  Risks Preventative Measures Recommendations      Damage from chemotherapy or other drugs, buildup of iron from blood transfusions, infections, and GVHD     Certain tests may be ordered at your next  "survivorship visit to evaluate liver function based on your individual risk factors.    Talk to your provider before taking herbal supplements or over the counter drugs like Tylenol.    Ask your doctor if you should be treated for iron overload    Avoid alcohol in excess     Hepatic Panel/LFTs (Recommended every 3-6 mos the 1st year post-transplant & annually) and Serum Ferritin (Recommended 1 year post-transplant)    Note Ferritin was high 11/15/19 at 670 likely secondary to history of blood transfusions. Recommend rechecking annually.      Kidneys and Bladder:  Risks Preventative Measures Recommendations   Damage from chemotherapy or other drugs, infections, high blood pressure   Monitoring blood tests of kidney function during follow up visits    Treating high blood pressure and diabetes     Drink adequate amounts of water    Report symptoms of infection such as frequent urination, pain with urination, foul odor to urine, or blood in the urine    Talk to your provider before taking herbal supplements or over the counter drugs like Ibuprofen Serum creatinine checked as part of anniversary labs or at least annually by primary provider       Nervous System:  Risks Preventative Measures Recommendations   Neuropathy from chemotherapy, changes in cognitive function   Report changes in sensation or discomfort in feet or hands    Tell your provider about ongoing changes in memory, ability to concentrate, or inability to make decisions   Neuropsychology Referral (Ordered based on concerns for cognitive dysfunction) and None at this time    Have recommended he work on \"memory\" strengthening exercises such as luminosity, cross word puzzles, and mind games. If this doesn't help to request a neuropsychology referral.      Muscle & Connective Tissue  Risks Preventative Measures Recommendations   Reduced muscle strength, reduced stamina, GVHD causing hardening of muscle tissues (scleroderma) or inflammation of tissue over " muscles (fascitis)  Let your provider know if:    You notice changes in your muscle strength    Require extra assistance with daily activities    Experience pain or difficult bending or moving joints    If you have been on steroids and are experiencing weakness particularly when standing up from a chair     Need help creating an exercise routine    Notice reduced range of motion of the arms, hips, or legs  Follow general guidelines for physical activity as recommended by the Office of Disease Prevention & Health Promotion:    Avoid Inactivity  Some physical activity is better than none -- any amount has benefits.    Do Aerobic Activity  Do aerobic physical activity in episodes of at least 10 minutes, as many times as possible per day. This could include going for walks or using the elliptical or stationary bike.  Ask your doctor what aerobic activities would be safe and helpful for you, and set a goal for yourself!    Strengthen Muscles  Do muscle-strengthening activities (such as lifting light weights or using resistance bands and/or going up and down stairs) that are moderate or high intensity and involve all major muscle groups at least 4 days a week. Bone Scan (Recommended 1 year) and Calcium & Vitamin D Levels (Recommended annually)   Screening for Steroid-Induced Myopathy: 30 Second Chair Stand (Delete if not needed) Number of Repetitions:12     Reference Range: Below Average Scores  Age Men Women   < 64 < 14 <12   65-69 < 12 < 11   70-74 < 12 < 10   75-79 < 11 < 10   80-84 < 10 < 9   85-89 < 8 < 8      Center for Disease Control and Prevention. (2017). Assessment: 30-Second Chair Stand. Retrieved online www.cdc.gov/steadi.    Emotional Health  Risks Preventative Measures Recommendations   Stress, depression, anxiety Talk to your provider about:    Changes in feelings, mood, or emotional wellbeing    Interest in support groups    If you are concerned about your caregivers emotional wellbeing    Desire to speak  with a counselor for ongoing support  None at this time       Sexual Health  Risks Preventative Measures Recommendations   Reduced libido due to hormonal changes, erectile dysfunction, vaginal dryness, vaginal bxvta-vj-wuyx disease, vaginal infections, sexually transmitted diseases Talk to your provider about:    Reduced libido or concerns regarding your sexual health    Men: Erectile dysfunction     Women: Vaginal dryness, pain during intercourse, vaginal bleeding after intercourse, changes in vaginal discharge that may indicate infection (green/white/foul odor)     General Recommendations:    It is safe to have sex if your platelet count is > 50,000 and you feel physically and emotionally ready     Women can use water-based lubricants to reduce discomfort from dryness. Prescription topical estrogen may help as well.    Use barrier protection such as condoms to prevent sexually transmitted diseases, you are at higher risk for infections due to a weakened immune system    For more information check out the [employer] web page on this topic:  https://bethematch.org/patients-and-families/life-after-transplant/coping-with-life-after-transplant/relationships-and-sexual-health/  None at this time      Cancer Screening:  Risks Preventative Measures Recommendations   Higher risk for the development of solid tumors, PTLD, and blood cancers   Preform a full self skin exam monthly to assess for any changes in moles or signs of skin cancer    Minimize excessive sun exposure and wear sunscreen    Women should preform breast-self exams and report any changes in such as a new lump, discharge from nipples, and red or dimpled areas of the breast.     Imaging for breast cancer known as mammography is recommended for women starting at age 40 or 8 years after radiation exposure.     Screening for colorectal cancer should begin at age 50.     All women should have a pap smear every 1-3 years beginning at age  21    Men should perform a monthly testicular self-exam if they have been exposed to radiation as part of their cancer treatment.    Colonoscopy (Recommended every 10 years after the age of 50)       Recommended Tests & Follow-Up:  Referrals & Tests Ordered Today:  Your BMT physician will review these tests and referral results. If you require treatment based on the results, a member of the BMT team will contact you.  Orders placed today:  No orders of the defined types were placed in this encounter.    (Note to providers: After signing orders use the refresh tool in the note window to display results)   Future Tests/Referrals:   All recommendations above should be completed within 2 months either by your primary care provider or local oncologist (cancer doctor).   Recommendations that were not ordered today should be completed by your:  Primary Care Provider   Follow Up Care:  Continue to see your care team members routinely after transplant.  BMT Provider/Oncologist: 1 year in April of 2020 follow up with your oncology doctor   Primary Care Provider: January 2020   Referral to Transplant Late-Effects Clinic (TLC):  Our TLC clinic is designed to meet the needs of survivors more than 2 years post-transplant. You can call [phone] at anytime in the future to self-refer into this clinic if you feel like you need ongoing survivorship support. Recommend Future TLC Visit: Come back for a survivorship visit in the TLC clinic in 2 years. Use phone number listed here to schedule this.    (Note to providers: To place a TLC referral send an in-basket to Barak Celestin RN).     Agatha Sandoval I spent 90 minutes with the patient and family, over half of which was spent discussing preventative care strategies, self-management practices, and potential complications after transplant.      Information used for these recommendations was obtained from: STU Antunez., WILLIAM Contreras, CLARE Eastman, RASHAD Sanchez, OSCAR Potter,  OSCAR Mead,   YAO Shi. (2013). Prevalence of Hematopoietic Cell Transplant Survivors in the [place]. Biology of Blood and Marrow Transplantation?: Journal of the American Society for Blood and Marrow Transplantation, 19(10), 7885-2289. doi 10.1016/j.bbmt.2013.07.020

## 2019-11-15 NOTE — PROGRESS NOTES
CLINICAL NUTRITION SERVICES - ASSESSMENT NOTE    REASON FOR ASSESSMENT  Hi Smith is a/an 64 year old male assessed by the dietitian for TLC clinic.  Referring Provider: Michelet Carter MD.    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     NUTRITION HISTORY  Visited with pt and spouse in consult room. Pt reports eating well prior to admission without any barriers. Endorses stable weight. Likes vegetables and eats a generally balanced diet although does not care much for fruits and does struggle with this. Spouse likes fruit and encourages intakes. Pt doesn't do much, if any, beef/red meat intake. Pt with history of CABG in 2005 and did briefly discuss role of sodium and heart healthy diet.    LABS  Labs reviewed  No recent labs  Lipid panel WNL 7/2/19    MEDICATIONS  Medications reviewed  Nitroglycerin  aspirin    ANTHROPOMETRICS  Height:   Ht Readings from Last 2 Encounters:   07/17/19 1.829 m (6')   06/26/19 1.829 m (6')     Most Recent Weight:    91.7 kg (11/15)  IBW: 80.9 kg  BMI: 27.4 --  Overweight BMI 25-29.9  Weight History:   Wt Readings from Last 15 Encounters:   11/15/19 91.7 kg (202 lb 3.2 oz)   07/17/19 91.6 kg (202 lb)   06/26/19 91.6 kg (202 lb)   05/23/19 93.1 kg (205 lb 3.2 oz)   01/10/19 90.3 kg (199 lb 1.6 oz)   10/11/18 91.1 kg (200 lb 12.8 oz)   07/12/18 88.6 kg (195 lb 6.4 oz)   04/12/18 87.2 kg (192 lb 4.8 oz)   03/23/18 84.9 kg (187 lb 3.2 oz)   01/18/18 88.3 kg (194 lb 9.6 oz)   11/16/17 89.3 kg (196 lb 14.4 oz)   09/26/17 88 kg (194 lb 1.6 oz)   08/16/17 88.1 kg (194 lb 3.2 oz)   05/12/17 84.8 kg (187 lb)   04/20/17 83.7 kg (184 lb 9.6 oz)     Dosing Weight: 92 kg (Actual)    ASSESSED NUTRITION NEEDS  Estimated Energy Needs: 3945-4219 kcals/day (25 - 30 kcals/kg)  Justification: Maintenance  Estimated Protein Needs: 75-95 grams protein/day (0.8 - 1 grams of  pro/kg)  Justification: Maintenance  Estimated Fluid Needs:  (1 mL/kcal)   Justification: Maintenance and Per provider pending fluid status    PHYSICAL FINDINGS  See malnutrition section below.    MALNUTRITION  Malnutrition Diagnosis: Patient does not meet two of the above criteria necessary for diagnosing malnutrition but is at risk for malnutrition    NUTRITION DIAGNOSIS  No nutrition diagnosis at this time    INTERVENTIONS  Implementation  Nutrition Education: Provided education on RD role. Discussed role of following a heart healthy diet and strategies to cooking heart healthy and eating out in a heart healthy manner. Provided handouts: Heart Health Guidelines (includes Heart Healthy Food Choices), Your Heart-Healthy Diet (Words You Will Hear), 10 Tips for Heart-Healthy Cooking, Recipe Changes for Heart-Healthy Cooking,  How to Read Nutrition Labels, Low-Sodium Foods and Drinks, Tips for a Low-Sodium Diet, and Seasoning Your Foods Without Adding Salt.    Goals        Pt will list at least two interventions to make current meal plan more heart-healthy.     Monitoring/Evaluation  Progress toward goals will be monitored and evaluated per protocol.    Patient Understanding: Good  Expected Compliance: Good  Follow-Up Plans: PRN - pt has RD contact information    Time Spent with Patient: <15 minutes    Susana Murray MS, RD, , LD.  5C/BMT Pager:[phone]      Note: patient signed ABN form today - good for 1 year from today (11/15/19 to 11/15/20).

## 2019-11-15 NOTE — LETTER
11/15/2019       RE: Hi Smith  [address] [id number]     Dear Colleague,    Thank you for referring your patient, Hi Smith, to the [facility] DERMATOLOGY at [facility]. Please see a copy of my visit note below.    CHIEF COMPLAINT:  Dermatology visit as part of BMT TLC Clinic.      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  I had previously seen him in our Dermatology Clinic between [id number], at which time we performed several biopsies to confirm persistence of cutaneous disease; these biopsies were positive for ongoing skin involvement; however, he is believed to be currently free of disease.  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  He has been in complete remission for the past year and is currently off all immunosuppressive therapies.  He denies any known history of qmczb-khrzcn-ngqg disease; specifically, he had no rashes, mouth sores or eye involvement in the first 1-2 months after either of his transplants and has not subsequently had any sclerodermoid/scar-like changes in his skin or any subsequent rashes over the past 2-3 years.  Similarly, he denies any dry mouth, dry eyes or oral sores.  Regarding his skin cancer history, he has a history of one basal cell carcinoma biopsied by Dr. Stephanie Vick at [facility] and treated by their dermatologic surgeon.  He has no personal history of melanoma or squamous cell carcinoma.  Today he denies any localized areas of itch, pain or bleeding, has no nonhealing sores and denies any new or changing pigmented lesions.      REVIEW OF SYSTEMS:  See HPI.  No recent fevers or chills.  No nonhealing oral sores.      PHYSICAL EXAMINATION:   GENERAL:  This is a well-appearing,  well-nourished male with a normal mood and affect who is oriented x3.   SKIN:  A cutaneous exam of the head, neck, chest, abdomen, back, bilateral upper and lower extremities and buttocks was performed.  On the right nasal sidewall, there is a linear, well-healed scar without nodularity or erythema.  Diffusely on the back, there are many light-tan to light-brown, well-marginated macules and flat-topped papules with horn cysts on dermoscopy consistent with seborrheic keratoses.  On the left superior posterior shoulder, there is a somewhat scar-like-appearing, 5 mm, flat-topped, pink papule, which on dermoscopy demonstrates light-pink erythema without atypical vascular structures centrally and light-tan pigmentation peripherally, findings most consistent with a late phase or partially treated lichenoid keratosis.  On the left anterior superior shoulder, there is a 3 mm, domed, shiny, pink papule with peripheral flecks of pigment on dermoscopy.  The rest of his skin exam demonstrates scattered, light-tan macules on the dorsal hands and forearms consistent with solar lentigines and dermatoheliosis on the face and neck.      ASSESSMENT AND PLAN:   1.  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  He has no history of uncco-igtgsq-eamd disease today or previously.  Today we discussed his modestly increased lifetime risk of skin cancer, particularly squamous cell carcinoma, given his history of immunosuppression.  We discussed that he has no cutaneous features of GVHD and is unlikely to develop it at this point, as he is now several years beyond his transplant with no prior episodes.  We discussed the importance of ongoing sunscreen, sun avoidance and sun-protective clothing as well as annual skin checks, given his immunosuppressive history as well as history of basal cell carcinoma.  He will continue to follow up with Dr. Stephanie Vick at [facility] in [place]  for routine skin checks.   2.  History of cutaneous involvement by NKT-cell lymphoma.  Clinically, there is no evidence of recurrence today.  Previously, he had a highly unusual presentation of multifocal small papules, which resembled small lipomas or folliculitis.  None of these lesions are apparent on exam today.  He was reassured in this regard.   3.  Neoplasm of uncertain behavior of the left anterior shoulder.  The differential diagnosis includes basal cell carcinoma or inflamed nevus.  A shave biopsy was performed today.  Please see the procedure note below.   4.  He will follow up in our clinic on an as-needed basis.  We are very pleased to be involved in the care of this patient.      PROCEDURE NOTE:  After signed informed consent, the affected area was swabbed with an alcohol pad and injected with 1% lidocaine with epinephrine.  A biopsy via shave technique was taken and sent for histopathology.  Hemostasis was achieved with aluminum chloride 20%, and the defect was covered with petrolatum and a bandage.  The patient tolerated this without complication.       Ab Perez MD  Dermatology Attending

## 2019-11-15 NOTE — PROGRESS NOTES
CHIEF COMPLAINT:  Dermatology visit as part of BMT TLC Clinic.      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  I had previously seen him in our Dermatology Clinic between [id number], at which time we performed several biopsies to confirm persistence of cutaneous disease; these biopsies were positive for ongoing skin involvement; however, he is believed to be currently free of disease.  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  He has been in complete remission for the past year and is currently off all immunosuppressive therapies.  He denies any known history of ipgsm-aulclq-gebt disease; specifically, he had no rashes, mouth sores or eye involvement in the first 1-2 months after either of his transplants and has not subsequently had any sclerodermoid/scar-like changes in his skin or any subsequent rashes over the past 2-3 years.  Similarly, he denies any dry mouth, dry eyes or oral sores.  Regarding his skin cancer history, he has a history of one basal cell carcinoma biopsied by Dr. Stephanie Vick at [facility] and treated by their dermatologic surgeon.  He has no personal history of melanoma or squamous cell carcinoma.  Today he denies any localized areas of itch, pain or bleeding, has no nonhealing sores and denies any new or changing pigmented lesions.      REVIEW OF SYSTEMS:  See HPI.  No recent fevers or chills.  No nonhealing oral sores.      PHYSICAL EXAMINATION:   GENERAL:  This is a well-appearing, well-nourished male with a normal mood and affect who is oriented x3.   SKIN:  A cutaneous exam of the head, neck, chest, abdomen, back, bilateral upper and lower extremities and buttocks was performed.  On the right nasal sidewall, there is a linear, well-healed scar without  nodularity or erythema.  Diffusely on the back, there are many light-tan to light-brown, well-marginated macules and flat-topped papules with horn cysts on dermoscopy consistent with seborrheic keratoses.  On the left superior posterior shoulder, there is a somewhat scar-like-appearing, 5 mm, flat-topped, pink papule, which on dermoscopy demonstrates light-pink erythema without atypical vascular structures centrally and light-tan pigmentation peripherally, findings most consistent with a late phase or partially treated lichenoid keratosis.  On the left anterior superior shoulder, there is a 3 mm, domed, shiny, pink papule with peripheral flecks of pigment on dermoscopy.  The rest of his skin exam demonstrates scattered, light-tan macules on the dorsal hands and forearms consistent with solar lentigines and dermatoheliosis on the face and neck.      ASSESSMENT AND PLAN:   1.  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  He has no history of tkkjg-tvsecf-fkjh disease today or previously.  Today we discussed his modestly increased lifetime risk of skin cancer, particularly squamous cell carcinoma, given his history of immunosuppression.  We discussed that he has no cutaneous features of GVHD and is unlikely to develop it at this point, as he is now several years beyond his transplant with no prior episodes.  We discussed the importance of ongoing sunscreen, sun avoidance and sun-protective clothing as well as annual skin checks, given his immunosuppressive history as well as history of basal cell carcinoma.  He will continue to follow up with Dr. Stephanie Vick at [facility] in [place] for routine skin checks.   2.  History of cutaneous involvement by NKT-cell lymphoma.  Clinically, there is no evidence of recurrence today.  Previously, he had a highly unusual presentation of multifocal small papules, which resembled small lipomas or folliculitis.  None of  these lesions are apparent on exam today.  He was reassured in this regard.   3.  Neoplasm of uncertain behavior of the left anterior shoulder.  The differential diagnosis includes basal cell carcinoma or inflamed nevus.  A shave biopsy was performed today.  Please see the procedure note below.   4.  He will follow up in our clinic on an as-needed basis.  We are very pleased to be involved in the care of this patient.      PROCEDURE NOTE:  After signed informed consent, the affected area was swabbed with an alcohol pad and injected with 1% lidocaine with epinephrine.  A biopsy via shave technique was taken and sent for histopathology.  Hemostasis was achieved with aluminum chloride 20%, and the defect was covered with petrolatum and a bandage.  The patient tolerated this without complication.       Ab Perez MD  Dermatology Attending

## 2019-11-15 NOTE — NURSING NOTE
Chief Complaint   Patient presents with     Blood Draw     Labs drawn via VPT by RN in lab.     Labs collected from venipuncture by RN.     Tamera SOLORZANO RN PHN BSN  BMT/Oncology Lab

## 2019-11-15 NOTE — PROGRESS NOTES
Clinical   Blood and Marrow Transplant Service  Transplant Late Effects Clinic     Reason for Intervention: Hi Smith presents to the BMT Transplant Late Effects Clinic (TLC) for follow-up. Per their completed TLC questionnaire the patient did not identify any concerns for Social Work.     Data: Hi Smith is 4 years & 3 months s/p unrelated allogeneic transplant for NHL diagnosis.    Intervention: SW met with pt and pt's wife-Natalya and introduced self/role. Pt did not identify any concerns for Social Work on the TLC questionnaire. Pt shared he has been doing well and traveling a lot. Pt and pt's wife just got back from the [facility]. Pt is retired and enjoying retired life. Pt has no questions or concerns at this time. SW provided SW card and encouraged them to call if they ever need anything. Provided assessment of coping, supportive counseling, validation of concerns, encouragement and resources.    Education Provided: SW role    Follow-up Required: None    Encouraged patient/family to reach out as questions or concerns arise.     ALIX Cordero, [facility]  Phone [phone]  Pager [phone]

## 2019-11-15 NOTE — PROGRESS NOTES
BMT Teaching Flowsheet      Teaching Topic: work up    Person(s) involved in teaching: Patient  Motivation Level  Asks Questions: Yes  Eager to Learn: Yes  Cooperative: Yes  Receptive (willing/able to accept information): Yes  Any cultural factors/Scientologist beliefs that may influence understanding or compliance? No    Patient had vitals, and weight done.  Consents explained and signed.  Calendar reviewed and questions were answered.      Instructional Materials Used/Given: Consents    Time spent with patient: 15 minutes.    Specific Concerns: NA

## 2019-11-15 NOTE — PROGRESS NOTES
11/15/19 1100   Quick Adds   Quick Adds Certification   Type of Visit Initial OP PT Evaluation   General Information   Start of Care Date 11/15/19   Referring Physician  Agatha Sandoval APRN CNP   Orders Evaluate and Treat as Indicated   Order Date 11/15/19   Medical Diagnosis Physical deconditioning R53.81    Onset of illness/injury or Date of Surgery 11/15/19   Surgical/Medical history reviewed Yes   Pertinent history of current problem (include personal factors and/or comorbidities that impact the POC) Patient underwent BMT 3/2015 s/p diagnosis of Stage IV extranodal NK/T-cell lymphoma (diagnosed 7/2014). Patient underwent additional BMT 7/2015 then radiation treatment for continued scattered nodules in LEs in 2017. Latest scan 5/2019; no evidence of returning disease.   Pertinent Visual History  no deficits; wears glasses   Prior level of function comment Independent, active- walks ~1 mile/day with his son's dog. No issues reported with walking 10,000 steps/day. Performs all household chores and leisure activities include home projects: able to walk up/down stairs with 30-50# loads without HR use   Previous/Current Treatment Physical Therapy   Improvement after PT   (inpatient)   Patient role/Employment history Retired   [facility]   Home/Community Accessibility Comments No issues   Assistive Devices Comments None   Patient/Family Goals Statement Patient did not state   Fall Risk Screen   Fall screen completed by PT   Have you fallen 2 or more times in the past year? No   Have you fallen and had an injury in the past year? No   Is patient a fall risk? No   System Outcome Measures   Outcome Measures Cancer Rehab   FACIT Fatigue Subscale (score out of 52). The higher the score, the better the QOL. 51   Six Minute Walk (meters). An increase of 70 or more meters indicates statistically significant change. 304.8   Pain   Patient currently in pain No   Vitals Signs   Heart Rate 67    Blood Pressure 151/79   Vital Signs Comments s/p 6MWT   Cognitive Status Examination   Orientation orientation to person, place and time   Level of Consciousness alert   Follows Commands and Answers Questions 100% of the time;able to follow multistep instructions   Personal Safety and Judgment intact   Memory intact   Integumentary   Integumentary No deficits were identified   Posture   Posture Normal   Range of Motion (ROM)   ROM Quick Adds no deficits were identified   Strength   Manual Muscle Testing Quick Adds No deficits were identified   Bed Mobility   Bed Mobility Comments Independent   Transfer Skills   Transfer Comments Independent   Gait   Gait Comments Patient ambulates without device independently; no increase in gait deficits s/p 6MWT   Gait Special Tests   Gait Special Tests SIX MINUTE WALK TEST   Gait Special Tests Six Minute Walk Test   Feet 1000 Feet   Comments no device   Sensory Examination   Sensory Perception no deficits were identified   Coordination   Coordination no deficits were identified   Muscle Tone   Muscle Tone no deficits were identified   Clinical Impression   Criteria for Skilled Therapeutic Interventions Met evaluation only;no problems identified which require skilled intervention   Patient, Family & other staff in agreement with plan of care Yes   Clinical Impression Comments Patient is a 64 year old male who presents to OP PT under the long term survivalship BMT program. Patient denies any physical functional impairments/limitations and reports independent with all mobility. Patient demonstrates no impairments on examination; no skilled PT services warranted at this time   Total Evaluation Time   PT Vikas Low Complexity Minutes (43023) 30                                                                                                 Rehabilitation Services        OUTPATIENT PHYSICAL THERAPY FUNCTIONAL EVALUATION  PLAN OF TREATMENT FOR OUTPATIENT REHABILITATION  (COMPLETE FOR  INITIAL CLAIMS ONLY)  Patient's Last Name, First Name, M.I.  YOB: 1955  Hi Smith     Provider's Name   Silvana Garcia PT   Medical Record No.  [id number]     Start of Care Date:  11/15/19   Onset Date:  11/15/19   Type:     _X__PT   ____OT  ____SLP Medical Diagnosis:   Physical deconditioning     PT Diagnosis:   N/A Visits from SOC:  1                              __________________________________________________________________________________  Plan of Treatment/Functional Goals: no skilled PT recommended at this time       Therapy Frequency:    eval only  Predicted Duration of Therapy Intervention:   1 day    Silvana Garcia PT                                    I CERTIFY THE NEED FOR THESE SERVICES FURNISHED UNDER        THIS PLAN OF TREATMENT AND WHILE UNDER MY CARE     (Physician attestation of this document indicates review and certification of the therapy plan).                Certification Date From:    11/15/2019  Certification Date To:   11/15/2019    Referring Provider:   Agatha Sandoval APRN CNP    Initial Assessment  See Epic Evaluation- Start of Care Date: 11/15/19

## 2019-11-18 LAB — COPATH REPORT: NORMAL

## 2019-12-19 LAB
ABO + RH BLD: NORMAL
ABO + RH BLD: NORMAL
SPECIMEN EXP DATE BLD: NORMAL

## 2020-01-06 ENCOUNTER — TELEPHONE (OUTPATIENT)
Dept: INTERNAL MEDICINE | Facility: CLINIC | Age: 65
End: 2020-01-06

## 2020-01-06 NOTE — TELEPHONE ENCOUNTER
Patient is wondering if the orders for immunizations has been placed from the [facility].  Please call to advise patient.  Thank you

## 2020-01-06 NOTE — TELEPHONE ENCOUNTER
Returned patient's call.  Do not see any orders from [facility].  Do not need doctors orders for immunizations.  Please find out what vaccines he is inquiring about

## 2020-01-08 NOTE — TELEPHONE ENCOUNTER
Spoke to patient, he believes he needs an MMR and chicken pox. He had stem cell transplant and per [facility] needs to have immunizations again. He will be contacting his care team there and see if orders can be faxed/entered into system.

## 2020-01-14 NOTE — TELEPHONE ENCOUNTER
Transplant care plan received and given to MA for review. Patient is scheduled to see  on 01/17/20.

## 2020-01-15 NOTE — TELEPHONE ENCOUNTER
Reason for Call:  Other     Detailed comments: Care coordinator stating patient is needing MMR and Shingrix at 1/17/20 appt wit PCP, and will need a second round in two months.     Phone Number Patient can be reached at: Other phone number:  [place] - [phone]    Best Time:     Can we leave a detailed message on this number? YES    Call taken on 1/15/2020 at 3:11 PM by Gill Keane

## 2020-01-17 ENCOUNTER — OFFICE VISIT (OUTPATIENT)
Dept: INTERNAL MEDICINE | Facility: CLINIC | Age: 65
End: 2020-01-17
Payer: MEDICARE

## 2020-01-17 VITALS
SYSTOLIC BLOOD PRESSURE: 118 MMHG | TEMPERATURE: 98.5 F | BODY MASS INDEX: 26.95 KG/M2 | OXYGEN SATURATION: 100 % | RESPIRATION RATE: 16 BRPM | DIASTOLIC BLOOD PRESSURE: 72 MMHG | HEIGHT: 72 IN | HEART RATE: 73 BPM | WEIGHT: 199 LBS

## 2020-01-17 DIAGNOSIS — N52.1 ERECTILE DYSFUNCTION DUE TO DISEASES CLASSIFIED ELSEWHERE: ICD-10-CM

## 2020-01-17 DIAGNOSIS — Z23 IMMUNIZATION DUE: Primary | ICD-10-CM

## 2020-01-17 DIAGNOSIS — C86.00 EXTRANODAL NK/T-CELL LYMPHOMA, NASAL TYPE: ICD-10-CM

## 2020-01-17 DIAGNOSIS — C96.9 T-CELL OR NK-CELL NEOPLASM (H): ICD-10-CM

## 2020-01-17 PROCEDURE — 90471 IMMUNIZATION ADMIN: CPT | Performed by: INTERNAL MEDICINE

## 2020-01-17 PROCEDURE — 90472 IMMUNIZATION ADMIN EACH ADD: CPT | Performed by: INTERNAL MEDICINE

## 2020-01-17 PROCEDURE — 90707 MMR VACCINE SC: CPT | Performed by: INTERNAL MEDICINE

## 2020-01-17 PROCEDURE — 99214 OFFICE O/P EST MOD 30 MIN: CPT | Mod: 25 | Performed by: INTERNAL MEDICINE

## 2020-01-17 PROCEDURE — 90750 HZV VACC RECOMBINANT IM: CPT | Performed by: INTERNAL MEDICINE

## 2020-01-17 RX ORDER — AMLODIPINE BESYLATE 5 MG/1
5 TABLET ORAL DAILY
Qty: 90 TABLET | Refills: 3 | Status: CANCELLED | OUTPATIENT
Start: 2020-01-17

## 2020-01-17 RX ORDER — ATORVASTATIN CALCIUM 20 MG/1
20 TABLET, FILM COATED ORAL DAILY
Qty: 90 TABLET | Refills: 3 | Status: CANCELLED | OUTPATIENT
Start: 2020-01-17

## 2020-01-17 RX ORDER — TADALAFIL 20 MG/1
20 TABLET ORAL DAILY PRN
Qty: 12 TABLET | Refills: 3 | Status: SHIPPED | OUTPATIENT
Start: 2020-01-17 | End: 2021-06-14

## 2020-01-17 ASSESSMENT — ANXIETY QUESTIONNAIRES
IF YOU CHECKED OFF ANY PROBLEMS ON THIS QUESTIONNAIRE, HOW DIFFICULT HAVE THESE PROBLEMS MADE IT FOR YOU TO DO YOUR WORK, TAKE CARE OF THINGS AT HOME, OR GET ALONG WITH OTHER PEOPLE: NOT DIFFICULT AT ALL
3. WORRYING TOO MUCH ABOUT DIFFERENT THINGS: NOT AT ALL
1. FEELING NERVOUS, ANXIOUS, OR ON EDGE: NOT AT ALL
6. BECOMING EASILY ANNOYED OR IRRITABLE: NOT AT ALL
2. NOT BEING ABLE TO STOP OR CONTROL WORRYING: NOT AT ALL
7. FEELING AFRAID AS IF SOMETHING AWFUL MIGHT HAPPEN: NOT AT ALL
5. BEING SO RESTLESS THAT IT IS HARD TO SIT STILL: NOT AT ALL
GAD7 TOTAL SCORE: 0

## 2020-01-17 ASSESSMENT — MIFFLIN-ST. JEOR: SCORE: 1725.66

## 2020-01-17 ASSESSMENT — PATIENT HEALTH QUESTIONNAIRE - PHQ9
SUM OF ALL RESPONSES TO PHQ QUESTIONS 1-9: 0
5. POOR APPETITE OR OVEREATING: NOT AT ALL

## 2020-01-17 NOTE — PROGRESS NOTES
Subjective     Hi Smith is a 64 year old male who presents to clinic today for the following health issues:    HPI   Hypertension Follow-up      Do you check your blood pressure regularly outside of the clinic? Yes     Are you following a low salt diet? Yes    Are your blood pressures ever more than 140 on the top number (systolic) OR more   than 90 on the bottom number (diastolic), for example 140/90? No      How many servings of fruits and vegetables do you eat daily?  2-3    On average, how many sweetened beverages do you drink each day (Examples: soda, juice, sweet tea, etc.  Do NOT count diet or artificially sweetened beverages)?   1    How many days per week do you exercise enough to make your heart beat faster? 7    How many minutes a day do you exercise enough to make your heart beat faster? 20 - 29    How many days per week do you miss taking your medication? 0    Feeling well overall without complaints.  Seeing dermatology and having multiple biopsies to be safe.  No spots of immediate concern at this time.    1. Immunization due    2. Extranodal NK/T-cell lymphoma, nasal type (H)    3. T-cell or NK-cell neoplasm    4. Erectile dysfunction due to diseases classified elsewhere        PMH: Updated and/or reviewed in chart.    ROS:  Constitutional, HEENT, cardiovascular, pulmonary, gi and gu systems are otherwise negative.    Objective   OBJECTIVE:                                                    /72   Pulse 73   Temp 98.5  F (36.9  C) (Oral)   Resp 16   Ht 1.829 m (6')   Wt 90.3 kg (199 lb)   SpO2 100%   BMI 26.99 kg/m       GEN: No acute distress  EYES: No conjunctival injection or icterus, EOMI grossly intact  RESP: Unlabored, regular  NEURO: Normal gait, MAEx4, light touch sensation grossly intact  PSYCH: Normal mood and affect    No results found for any visits on 01/17/20.   Assessment & Plan   ASSESSMENT/PLAN:                                                      I spent a total of  25 minutes with the patient of which greater than 50% were devoted to counseling and coordination of care:     Erectile dysfunction / discuss Cialis  Side effect-headache.  Still had mild headache with tadalafil but tolerable compared to sildenafil prior.  He has used only 5 mg up to 10 mg thus far.  Hasn't used NTG in >2 years.  We again discussed risks and benefits of phosphodiesterase 5 inhibitor treatment of erectile dysfunction and specifically to seek emergency assistance if he experiences any chest pain or thinks he may need a NTG dose within 72 hours of having taken Cialis.  Can reduce to 10 mg if worsening headache or lack of improvement in erectile dysfunction over 10 mg dose.  - tadalafil (CIALIS) 20 MG tablet; Take 1 tablet (20 mg) by mouth daily as needed (erectile dysfunction)  Dispense: 12 tablet; Refill: 3    Extranodal NK/T-cell lymphoma, nasal type (H)  BMT status: immunization schedule reviewed with patient and separately with Ananya EVANS.  He would like to avoid 5 immunizations today and will defer 3 (Prevnar-13, Hib, Pediarix) for a month then repeat after 2 months and a year per schedule.       Immunization due  - MMR VIRUS IMMUNIZATION, SUBCUT  - ZOSTER VACCINE RECOMBINANT ADJUVANTED IM NJX  - ADMIN 1st VACCINE  - EA ADD'L VACCINE      Orders Placed This Encounter     MMR VIRUS IMMUNIZATION, SUBCUT     ZOSTER VACCINE RECOMBINANT ADJUVANTED IM NJX     ADMIN 1st VACCINE     EA ADD'L VACCINE     tadalafil (CIALIS) 20 MG tablet        Return in about 6 months (around 7/17/2020) for Follow-up.    Michelet Carter MD

## 2020-01-18 ASSESSMENT — ANXIETY QUESTIONNAIRES: GAD7 TOTAL SCORE: 0

## 2020-03-10 ENCOUNTER — OFFICE VISIT (OUTPATIENT)
Dept: DERMATOLOGY | Facility: CLINIC | Age: 65
End: 2020-03-10
Payer: MEDICARE

## 2020-03-10 DIAGNOSIS — D48.5 NEOPLASM OF UNCERTAIN BEHAVIOR OF SKIN: Primary | ICD-10-CM

## 2020-03-10 DIAGNOSIS — C44.519 BASAL CELL CARCINOMA OF TRUNCAL SKIN: ICD-10-CM

## 2020-03-10 PROCEDURE — 88305 TISSUE EXAM BY PATHOLOGIST: CPT | Mod: TC | Performed by: DERMATOLOGY

## 2020-03-10 PROCEDURE — 88342 IMHCHEM/IMCYTCHM 1ST ANTB: CPT | Mod: TC | Performed by: DERMATOLOGY

## 2020-03-10 PROCEDURE — 88341 IMHCHEM/IMCYTCHM EA ADD ANTB: CPT | Mod: TC | Performed by: DERMATOLOGY

## 2020-03-10 ASSESSMENT — PAIN SCALES - GENERAL: PAINLEVEL: NO PAIN (0)

## 2020-03-10 NOTE — PATIENT INSTRUCTIONS

## 2020-03-10 NOTE — NURSING NOTE
Dermatology Rooming Note    Hi Smith's goals for this visit include:   Chief Complaint   Patient presents with     Derm Problem     Colton is concerned about a spot on the neck     Sosa Henry, EMT

## 2020-03-10 NOTE — LETTER
3/10/2020       RE: Hi Smith  [id number] [address] [facility] [id number]     Dear Colleague,    Thank you for referring your patient, Hi Smith, to the [facility] DERMATOLOGY at [facility]. Please see a copy of my visit note below.    CHIEF COMPLAINT:  New lump on right neck.      SUBJECTIVE:  Colton is a very pleasant, 65-year-old male with a past medical history significant for extranodal NK/T-cell lymphoma, with a history of multifocal skin involvement.  I saw him in our Dermatology Clinic between [id number], at which time several biopsies of small papules on his skin demonstrated persistence of cutaneous NK/T lymphoma; however, more recently he has been currently free of disease.  He was last seen by me on 11/15/2019 in our B[facility] Clinic, at which time he had no evidence of recurrent cutaneous lymphoma and no features of GVHD.  We did perform a biopsy of a pink papule on his left anterior shoulder, which returned as a basal cell carcinoma, nodular type.  He has not had subsequent excision for this, though this is scheduled with Dr. Stephanie Vick, his primary dermatologist, in approximately 1 week's time.  Today his primary concern is a new nodular lump on the right neck, which has been present for several weeks.  He reports that this area has swollen and become more inflamed and is now slightly smaller than it was 1 week ago, but is still palpable and relatively large.  At first, he wondered if this might be a recurrence of his lymphoma; however, he and his wife report that they drained some whitish material, and he is wondering if it may be a cyst.  He denies any other similar lumps elsewhere and has no other nonhealing skin lesions.      REVIEW OF SYSTEMS:  No recent fevers or chills.  No nonhealing oral sores.      PHYSICAL EXAMINATION:   GENERAL:  This is a well-appearing, well-nourished male with a normal mood and affect who is oriented x3.   SKIN:  A cutaneous exam of  the head, neck, bilateral upper extremities and chest was performed.  On the right neck, there is an approximately 2.5 cm, domed, erythematous nodule with a small central punctum and surrounding erythema.  On the left anterior shoulder, there is an oval, atrophic scar without nodularity.      ASSESSMENT AND PLAN:   1.  Neoplasm of uncertain behavior of the right neck.  This lesion very likely represents an inflamed epidermal inclusion cyst.  I discussed this with Colton and his wife today.  However, we also discussed that the clinical manifestations of his previous skin involvement by extranodal NKT-cell lymphoma were so atypical, that my threshold for biopsies for him is very low.  Previously, he presented with small subcutaneous papules, which resembled small cysts or folliculitis, but these were atypical lymphoid infiltrates.  Thus, we elected to perform a small punch biopsy today to verify that the affected area is, in fact, an inflamed cyst.  He understands this rationale and is amenable to this.  Please see the procedure note below.   2.  Biopsy-proven basal cell carcinoma of the left anterior shoulder.  He has a scheduled surgical procedure with Dr. Stephanie Vick in 1 week's time.  Given that this biopsy was performed relatively remotely (in 11/2019), we both printed his biopsy report and provided a photo from the time of the biopsy that he can take with him to his appointment with Dr. Vick.     3.  He will follow up in our clinic in 6 months' time, sooner for any new or changing lesions.  He may also request that Dr. Vick remove the cyst once our biopsy confirms this diagnosis at his followup visit with her.      PROCEDURE NOTE:  After signed informed consent, the affected area was swabbed with an alcohol pad and injected with 1% lidocaine with epinephrine.  A 3 mm punch biopsy was taken and sent for histopathology.  The defect was closed with a simple Prolene 4-0 interrupted suture and covered with petrolatum and a  bandage.  The patient tolerated this without complication.       Ab Perez MD  Dermatology Attending          Again, thank you for allowing me to participate in the care of your patient.      Sincerely,    Ab Perez MD       7

## 2020-03-10 NOTE — PROGRESS NOTES
CHIEF COMPLAINT:  New lump on right neck.      SUBJECTIVE:  Colton is a very pleasant, 65-year-old male with a past medical history significant for extranodal NK/T-cell lymphoma, with a history of multifocal skin involvement.  I saw him in our Dermatology Clinic between [id number], at which time several biopsies of small papules on his skin demonstrated persistence of cutaneous NK/T lymphoma; however, more recently he has been currently free of disease.  He was last seen by me on 11/15/2019 in our B[facility], at which time he had no evidence of recurrent cutaneous lymphoma and no features of GVHD.  We did perform a biopsy of a pink papule on his left anterior shoulder, which returned as a basal cell carcinoma, nodular type.  He has not had subsequent excision for this, though this is scheduled with Dr. Stephanie Vick, his primary dermatologist, in approximately 1 week's time.  Today his primary concern is a new nodular lump on the right neck, which has been present for several weeks.  He reports that this area has swollen and become more inflamed and is now slightly smaller than it was 1 week ago, but is still palpable and relatively large.  At first, he wondered if this might be a recurrence of his lymphoma; however, he and his wife report that they drained some whitish material, and he is wondering if it may be a cyst.  He denies any other similar lumps elsewhere and has no other nonhealing skin lesions.      REVIEW OF SYSTEMS:  No recent fevers or chills.  No nonhealing oral sores.      PHYSICAL EXAMINATION:   GENERAL:  This is a well-appearing, well-nourished male with a normal mood and affect who is oriented x3.   SKIN:  A cutaneous exam of the head, neck, bilateral upper extremities and chest was performed.  On the right neck, there is an approximately 2.5 cm, domed, erythematous nodule with a small central punctum and surrounding erythema.  On the left anterior shoulder, there is an oval, atrophic scar without  nodularity.      ASSESSMENT AND PLAN:   1.  Neoplasm of uncertain behavior of the right neck.  This lesion very likely represents an inflamed epidermal inclusion cyst.  I discussed this with Colton and his wife today.  However, we also discussed that the clinical manifestations of his previous skin involvement by extranodal NKT-cell lymphoma were so atypical, that my threshold for biopsies for him is very low.  Previously, he presented with small subcutaneous papules, which resembled small cysts or folliculitis, but these were atypical lymphoid infiltrates.  Thus, we elected to perform a small punch biopsy today to verify that the affected area is, in fact, an inflamed cyst.  He understands this rationale and is amenable to this.  Please see the procedure note below.   2.  Biopsy-proven basal cell carcinoma of the left anterior shoulder.  He has a scheduled surgical procedure with Dr. Stephanie Vick in 1 week's time.  Given that this biopsy was performed relatively remotely (in 11/2019), we both printed his biopsy report and provided a photo from the time of the biopsy that he can take with him to his appointment with Dr. Vick.     3.  He will follow up in our clinic in 6 months' time, sooner for any new or changing lesions.  He may also request that Dr. Vick remove the cyst once our biopsy confirms this diagnosis at his followup visit with her.      PROCEDURE NOTE:  After signed informed consent, the affected area was swabbed with an alcohol pad and injected with 1% lidocaine with epinephrine.  A 3 mm punch biopsy was taken and sent for histopathology.  The defect was closed with a simple Prolene 4-0 interrupted suture and covered with petrolatum and a bandage.  The patient tolerated this without complication.       Ab Perez MD  Dermatology Attending

## 2020-03-22 PROBLEM — C44.519 BASAL CELL CARCINOMA OF TRUNCAL SKIN: Status: ACTIVE | Noted: 2020-03-22

## 2020-03-23 LAB — COPATH REPORT: NORMAL

## 2020-03-26 ENCOUNTER — MYC REFILL (OUTPATIENT)
Dept: ONCOLOGY | Facility: CLINIC | Age: 65
End: 2020-03-26

## 2020-03-26 DIAGNOSIS — E83.39 HYPOPHOSPHATEMIA: ICD-10-CM

## 2020-03-26 RX ORDER — SOD PHOS DI, MONO/K PHOS MONO 250 MG
TABLET ORAL
Qty: 180 TABLET | Refills: 0 | OUTPATIENT
Start: 2020-03-26

## 2020-03-26 RX ORDER — SOD PHOS DI, MONO/K PHOS MONO 250 MG
1 TABLET ORAL 2 TIMES DAILY
Qty: 180 TABLET | Refills: 0 | Status: SHIPPED | OUTPATIENT
Start: 2020-03-26 | End: 2020-06-29

## 2020-06-29 ENCOUNTER — MYC REFILL (OUTPATIENT)
Dept: ONCOLOGY | Facility: CLINIC | Age: 65
End: 2020-06-29

## 2020-06-29 DIAGNOSIS — E83.39 HYPOPHOSPHATEMIA: ICD-10-CM

## 2020-07-04 ENCOUNTER — MYC REFILL (OUTPATIENT)
Dept: ONCOLOGY | Facility: CLINIC | Age: 65
End: 2020-07-04

## 2020-07-04 DIAGNOSIS — E83.39 HYPOPHOSPHATEMIA: ICD-10-CM

## 2020-07-07 RX ORDER — SOD PHOS DI, MONO/K PHOS MONO 250 MG
1 TABLET ORAL 2 TIMES DAILY
Qty: 180 TABLET | Refills: 0 | Status: SHIPPED | OUTPATIENT
Start: 2020-07-07 | End: 2020-10-15

## 2020-07-07 RX ORDER — SOD PHOS DI, MONO/K PHOS MONO 250 MG
1 TABLET ORAL 2 TIMES DAILY
Qty: 180 TABLET | Refills: 0 | OUTPATIENT
Start: 2020-07-07

## 2020-07-08 DIAGNOSIS — C96.9 T-CELL OR NK-CELL NEOPLASM (H): ICD-10-CM

## 2020-07-08 NOTE — TELEPHONE ENCOUNTER
Requested Prescriptions   Pending Prescriptions Disp Refills     atorvastatin (LIPITOR) 20 MG tablet 90 tablet 3     Sig: Take 1 tablet (20 mg) by mouth daily   Last Written Prescription Date:  1955  Last Fill Quantity: 90,  # refills: 3   Last office visit: 1/17/2020 with prescribing provider:  1-17-20   Future Office Visit:            There is no refill protocol information for this order

## 2020-07-10 RX ORDER — ATORVASTATIN CALCIUM 20 MG/1
20 TABLET, FILM COATED ORAL DAILY
Qty: 90 TABLET | Refills: 0 | Status: SHIPPED | OUTPATIENT
Start: 2020-07-10 | End: 2021-06-14

## 2020-07-10 NOTE — TELEPHONE ENCOUNTER
Medication is being filled for 1 time refill only due to:  Patient needs to be seen because needs appt.   Return in about 6 months (around 7/17/2020) for Follow-up.   Michelet Carter MD  Sent pt my chart to schedule.

## 2020-07-24 DIAGNOSIS — D80.1 HYPOGAMMAGLOBULINEMIA (H): ICD-10-CM

## 2020-07-24 DIAGNOSIS — C86.00 EXTRANODAL NK/T-CELL LYMPHOMA, NASAL TYPE: ICD-10-CM

## 2020-07-28 RX ORDER — NITROGLYCERIN 0.4 MG/1
0.4 TABLET SUBLINGUAL EVERY 5 MIN PRN
Qty: 12 TABLET | Refills: 0 | Status: SHIPPED | OUTPATIENT
Start: 2020-07-28 | End: 2022-01-13

## 2020-07-28 NOTE — TELEPHONE ENCOUNTER
"Routing refill request to provider for review/approval because:  Patient needs to be seen because:  Pt due for follow up 7/17/20  Sublingual med, pt is also on tadalafil    Med pended with reminder          Requested Prescriptions   Pending Prescriptions Disp Refills     nitroGLYcerin (NITROSTAT) 0.4 MG sublingual tablet 12 tablet 0     Sig: Place 1 tablet (0.4 mg) under the tongue every 5 minutes as needed for chest pain       Nitrates Failed - 7/24/2020  3:36 PM        Failed - Pt is not on erectile dysfunction medications        Failed - Sublingual nitro order needs review     If refill exceeds 1 bottle per month, please forward request to provider.           Passed - Blood pressure under 140/90 in past 12 months     BP Readings from Last 3 Encounters:   01/17/20 118/72   11/15/19 124/81   07/17/19 111/73                 Passed - Recent (12 mo) or future (30 days) visit within the authorizing provider's specialty     Patient has had an office visit with the authorizing provider or a provider within the authorizing providers department within the previous 12 mos or has a future within next 30 days. See \"Patient Info\" tab in inbasket, or \"Choose Columns\" in Meds & Orders section of the refill encounter.              Passed - Medication is active on med list        Passed - Patient is age 18 or older             "

## 2020-07-29 ENCOUNTER — TRANSFERRED RECORDS (OUTPATIENT)
Dept: HEALTH INFORMATION MANAGEMENT | Facility: CLINIC | Age: 65
End: 2020-07-29

## 2020-07-30 ENCOUNTER — TRANSFERRED RECORDS (OUTPATIENT)
Dept: HEALTH INFORMATION MANAGEMENT | Facility: CLINIC | Age: 65
End: 2020-07-30

## 2020-08-10 ENCOUNTER — TRANSFERRED RECORDS (OUTPATIENT)
Dept: HEALTH INFORMATION MANAGEMENT | Facility: CLINIC | Age: 65
End: 2020-08-10

## 2020-08-13 ENCOUNTER — TRANSFERRED RECORDS (OUTPATIENT)
Dept: HEALTH INFORMATION MANAGEMENT | Facility: CLINIC | Age: 65
End: 2020-08-13

## 2020-08-19 DIAGNOSIS — C86.00 EXTRANODAL NK/T-CELL LYMPHOMA, NASAL TYPE: ICD-10-CM

## 2020-08-19 NOTE — TELEPHONE ENCOUNTER
Requested Prescriptions   Pending Prescriptions Disp Refills     amLODIPine (NORVASC) 5 MG tablet 90 tablet 3     Sig: Take 1 tablet (5 mg) by mouth daily   Last Written Prescription Date:  5-26-20  Last Fill Quantity: 90,  # refills: 3   Last office visit: 1/17/2020 with prescribing provider:   1-17-20  Future Office Visit:            There is no refill protocol information for this order

## 2020-08-21 RX ORDER — AMLODIPINE BESYLATE 5 MG/1
5 TABLET ORAL DAILY
Qty: 90 TABLET | Refills: 0 | Status: SHIPPED | OUTPATIENT
Start: 2020-08-21 | End: 2020-12-04

## 2020-08-21 NOTE — TELEPHONE ENCOUNTER
"Prescription approved per [facility] Refill Protocol.        Requested Prescriptions   Pending Prescriptions Disp Refills     amLODIPine (NORVASC) 5 MG tablet 90 tablet 3     Sig: Take 1 tablet (5 mg) by mouth daily       Calcium Channel Blockers Protocol  Passed - 8/19/2020 12:21 PM        Passed - Blood pressure under 140/90 in past 12 months     BP Readings from Last 3 Encounters:   01/17/20 118/72   11/15/19 124/81   07/17/19 111/73                 Passed - Recent (12 mo) or future (30 days) visit within the authorizing provider's specialty     Patient has had an office visit with the authorizing provider or a provider within the authorizing providers department within the previous 12 mos or has a future within next 30 days. See \"Patient Info\" tab in inbasket, or \"Choose Columns\" in Meds & Orders section of the refill encounter.              Passed - Medication is active on med list        Passed - Patient is age 18 or older        Passed - Normal serum creatinine on file in past 12 months     Recent Labs   Lab Test 11/15/19  1116   CR 1.12       Ok to refill medication if creatinine is low               "

## 2020-10-13 ENCOUNTER — MYC REFILL (OUTPATIENT)
Dept: ONCOLOGY | Facility: CLINIC | Age: 65
End: 2020-10-13

## 2020-10-13 DIAGNOSIS — E83.39 HYPOPHOSPHATEMIA: ICD-10-CM

## 2020-10-13 RX ORDER — SOD PHOS DI, MONO/K PHOS MONO 250 MG
1 TABLET ORAL 2 TIMES DAILY
Qty: 180 TABLET | Refills: 0 | Status: CANCELLED | OUTPATIENT
Start: 2020-10-13

## 2020-10-14 NOTE — TELEPHONE ENCOUNTER
Pt called in to triage stating he has sent several requests for K-Phos and he is now out, last filled on 7/7/20 by Dr. Lyons for a 3 month supply. Stated he was last seen 5/2019 and wasn't sure if he was supposed to follow up. Requesting refill at this time and if Dr. Lyons does want to see him, please call back to let him know and he'll make an apt. Stated he has not had labs done anywhere else, [facility] was probably a year ago. Last labs in our system is from 11/15/2019. Stated he is not having any symptoms or concerns at this time.

## 2020-10-15 RX ORDER — SOD PHOS DI, MONO/K PHOS MONO 250 MG
1 TABLET ORAL 2 TIMES DAILY
Qty: 180 TABLET | Refills: 0 | Status: SHIPPED | OUTPATIENT
Start: 2020-10-15 | End: 2021-01-13

## 2020-10-22 ENCOUNTER — OFFICE VISIT (OUTPATIENT)
Dept: DERMATOLOGY | Facility: CLINIC | Age: 65
End: 2020-10-22
Payer: MEDICARE

## 2020-10-22 DIAGNOSIS — D48.5 NEOPLASM OF UNCERTAIN BEHAVIOR OF SKIN: Primary | ICD-10-CM

## 2020-10-22 DIAGNOSIS — L65.0 TELOGEN EFFLUVIUM: ICD-10-CM

## 2020-10-22 DIAGNOSIS — L57.0 AK (ACTINIC KERATOSIS): ICD-10-CM

## 2020-10-22 PROCEDURE — 17000 DESTRUCT PREMALG LESION: CPT | Performed by: DERMATOLOGY

## 2020-10-22 PROCEDURE — 88305 TISSUE EXAM BY PATHOLOGIST: CPT | Performed by: DERMATOLOGY

## 2020-10-22 PROCEDURE — 99213 OFFICE O/P EST LOW 20 MIN: CPT | Mod: 25 | Performed by: DERMATOLOGY

## 2020-10-22 PROCEDURE — 11102 TANGNTL BX SKIN SINGLE LES: CPT | Performed by: DERMATOLOGY

## 2020-10-22 ASSESSMENT — PAIN SCALES - GENERAL: PAINLEVEL: NO PAIN (0)

## 2020-10-22 NOTE — LETTER
"10/22/2020       RE: Hi Smith  [address] [facility] [id number]     Dear Colleague,    Thank you for referring your patient, Hi Smith, to the [facility] [place] at [facility]. Please see a copy of my visit note below.    [facility] Dermatology Note      Dermatology Problem List:  1. 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              - declared in remission April 2017 by Onc  2. NMSC (BCC), s/p MMS in 2000.  3. Non-healing ulcers, bilateral LEs and left forearm, resolved              - Bacterial culture 3/2016 with normal skin giuliana.              - Bx obtained 3/31/2016 for H&E, sterile culture, flow cytometry, and donor chimerism studies, and if possible TCR gene rearrangement studies, suggested NK-Tcell lymphoma  4. BCC, left anterior shoulder, s/p bx 11/15/2019 with excision shortly after  5. Telogen effluvium - minoxidil 5% foam  6. AK, s/p cryo 10/22/20  7. NUB left ear, s/p bx 10/22/20    Encounter Date: Oct 22, 2020    CC:  Chief Complaint   Patient presents with     Skin Check     Colton is here today for a skin check. He has a growth on his left ear.       History of Present Illness:  Mr. Hi Smith is a 65 year old male who with a history of skin cancer presents for full-body skin examination. Last seen on 3/10/2020 for epidermal inclusion cyst removal from right neck. Wears a hat with a brim. Doesn't use sunscreen often.  never used tanning booths.    Concerned about a growing growth on his left ear that has been there for a few months. No associated pruritus, burning sensation, pain, or bleeding.     Bruises easily for \"a day short of forever\". Current medications include 81mg ASA, statin, amlodipine, Plavix, " phosphorus, and multi-vitamin.     Has noticed scalp hair thinning over the years with an increase in hair loss over the past 3 months. No associated pruritus, burning sensation, or pain. Doesn't believe there is any family history of hair loss. Interested in what could be done. Recently undergone a stent placement.    Wife notes that he develops pustules on his back during the winter which she attributes to him getting dry skin. Doesn't moisturize often due to disliking the greasy sensation.     Reports incision sites on left anterior shoulder and right neck have healed well. No other skin concerns.      Past Medical History:   Patient Active Problem List   Diagnosis     Extranodal NK/T-cell lymphoma, nasal type (H)     Nausea with vomiting     Anemia in neoplastic disease     Bacteremia     NHL (non-Hodgkin's lymphoma) (H)     Lymphoma (H)     Fever     Lymphoma, non-Hodgkin's (H)     Neoplasm of uncertain behavior of skin     T-cell or NK-cell neoplasm (H)     Other specified prophylactic or treatment measure     Skin cancer screening     Status post cord blood transplantation     Physical deconditioning     History of peripheral stem cell transplant (H)     Dysuria     Hypogammaglobulinemia (H)     Status post bone marrow transplant (H)     Complications of bone marrow transplant (H)     Cellulitis     Hypophosphatemia     CAP (community acquired pneumonia)     History of nonmelanoma skin cancer     Hypertension     Hyperlipidemia     Coronary atherosclerosis     History of deep vein thrombosis     Basal cell carcinoma of truncal skin     Past Medical History:   Diagnosis Date     Alcohol abuse     in remission x 20 yrs     CAD (coronary artery disease) of artery bypass graft     CABG 2005, hx of stents     Depression      DVT (deep venous thrombosis) (H) November, 2014    upper left arm after PICC line     Extranodal NK/T-cell lymphoma, nasal type (H) 7/23/2014     GERD (gastroesophageal reflux disease)       Hearing loss      HTN (hypertension)      Lymphoma (H)      Natural killer (NK) cell lymphoblastic lymphoma (H) 8/15/2014     S/P autologous bone marrow transplantation (H) 3/4/2015     Skin cancer     15 y ago.? basal cell     Tinnitus      VRE (vancomycin resistant enterococcus) culture positive      Past Surgical History:   Procedure Laterality Date     BYPASS GRAFT ARTERY CORONARY      2005     CYSTOSCOPY, TRANSURETHRAL RESECTION (TUR) TUMOR BLADDER, COMBINED N/A 12/29/2015    Procedure: COMBINED CYSTOSCOPY, TRANSURETHRAL RESECTION (TUR) TUMOR BLADDER;  Surgeon: Orville Boyle MD;  Location: [facility] OR     MOHS MICROGRAPHIC PROCEDURE  2000    nose     PICC INSERTION Left 9/26/2014    5fr DL Power PICC, 43cm (1cm external) in the L basilic vein w/ tip in the low SVC.     PICC INSERTION Left 10/28/2014    5fr DL Power PICC, 41cm (1cm external) in the L basilic vein w/ tip in the SVC RA junction.     PICC INSERTION Right 12/31/2014    5fr DL Power PICC, 39cm (1cm external) in the R medial brachial vein w/ tip in the low SVC.       Social History:  Patient reports that he quit smoking about 29 years ago. His smoking use included cigarettes. He has a 20.00 pack-year smoking history. He has never used smokeless tobacco. He reports that he does not drink alcohol or use drugs.    Family History:  Family History   Problem Relation Age of Onset     Cancer Mother         esophageal     Cancer Father         prostate cancer, lung fibrosis     Heart Disease Father         Heart Disease     Hypertension Father      C.A.D. Sister      ALS Sister      Melanoma No family hx of      Skin Cancer No family hx of        Medications:  Current Outpatient Medications   Medication Sig Dispense Refill     amLODIPine (NORVASC) 5 MG tablet Take 1 tablet (5 mg) by mouth daily 90 tablet 0     ASPIRIN PO Take 81 mg by mouth daily       atorvastatin (LIPITOR) 20 MG tablet Take 1 tablet (20 mg) by mouth daily 90 tablet 0     K Phos  Mono-Sod Phos Di & Mono (VIRT-PHOS 250 NEUTRAL) 155-852-130 MG TABS Take 1 tablet by mouth 2 times daily 180 tablet 0     nitroGLYcerin (NITROSTAT) 0.4 MG sublingual tablet Place 1 tablet (0.4 mg) under the tongue every 5 minutes as needed for chest pain 12 tablet 0     tadalafil (CIALIS) 20 MG tablet Take 1 tablet (20 mg) by mouth daily as needed (erectile dysfunction) (Patient not taking: Reported on 10/22/2020) 12 tablet 3     Allergies   Allergen Reactions     Nka [No Known Allergies]          Review of Systems:  -As per HPI  -Constitutional: Otherwise feeling well today, in usual state of health.  -HEENT: Patient denies nonhealing oral sores.  -Skin: As above in HPI. No additional skin concerns.  No cough or SOB  No fevers    Physical exam:  Vitals: There were no vitals taken for this visit.  GEN: This is a well developed, well-nourished male in no acute distress, in a pleasant mood.    SKIN: Full skin, which includes the head/face, both arms, chest, back, abdomen,both legs, and/or groin buttocks, digits and/or nails, was examined.  -Cash skin type: II  -Rough pink scaly domed 4mm papule near antihelix on left ear  -Ecchymoses scattered over upper extremities  -Decreased hair density on crown and top of scalp  -Pustule on back  -No erythema, telangectasias, nodularity, or pigmentation on the site of prior skin cancer on left anterior shoulder  -Rough scaly 3mm pink papule on the crown of the scalp  -Tan-to-brown waxy papules with stuck-on appearance on posterior left ear, back, and left chest  -No lymphadenopathy on upper extremities  -Positive hair pull test  -Varying thickness of hair thickness  -No other lesions of concern on areas examined.     Impression/Plan:  1. Neoplasm of uncertain behavior on the left ear. The differential diagnosis includes SCC vs irritated wart.     Shave biopsy(performed by faculty): After discussion of benefits and risks including but not limited to bleeding, infection,  scar, incomplete removal, recurrence, and non-diagnostic biopsy, written consent and photographs were obtained. Time-out was performed. The area was cleaned with isopropyl alcohol. 1mL of 1% lidocaine with 1:100,000 epinephrine was injected to obtain adequate anesthesia of the lesion on the let ear. A shave biopsy was performed. Hemostasis was achieved with aluminium chloride. Vaseline and a sterile dressing were applied. The patient tolerated the procedure and no complications were noted. The patient was provided with verbal and written post care instructions.     2. Actinic keratosis of vertex scalp.  Cryotherapy procedure note (performed by faculty): After verbal consent and discussion of risks and benefits including but no limited to dyspigmentation/scar, blister, infection, recurrence,1 was(were) treated with 1-2mm freeze border for 2 cycles with liquid nitrogen. Post cryotherapy instructions were provided.     3. Possible telogen effluvium, potentially triggered by recent cardiac stenting procedure.  Also with evidence of underlying androgenetic alopecia.    Recommended OTC minoxidil 5% foam    Discussed that this may be due to any stressful period that happened recently    Reassured as to nonscarring and likely self-limited nature of TE    4. Xerosis    Moisturizers recommended    5. Easy brusiability    Discussed this is due to a combination of sun damage, getting chemo, and being on aspirin    CC Referred Self, MD  No address on file on close of this encounter.  Follow-up in 6 months, earlier for new or changing lesions.     Staff Involved:  I, Nabeel Feliciano (MS4), saw and examined the patient in the presence of Dr. Perez.    I was present with the medical student who participated in the service and in the documentation.  I have verified the history and personally performed the physical exam and medical decision making.  I agree with the assessment and plan of care as documented in the note.  I personally  performed all procedures.    Ab Perez MD  Dermatology Attending

## 2020-10-22 NOTE — NURSING NOTE
Dermatology Rooming Note    Hi Smith's goals for this visit include:   Chief Complaint   Patient presents with     Skin Check     Colton is here today for a skin check. He has a growth on his left ear.     Coleman Peterson, CMA

## 2020-10-22 NOTE — NURSING NOTE
Lidocaine-epinephrine 1-1:038859 % injection   1mL once for one use, starting 10/22/2020 ending 10/22/2020,  2mL disp, R-0, injection  Injected by Coleman Peterson CMA

## 2020-10-22 NOTE — PROGRESS NOTES
"[facility] Dermatology Note      Dermatology Problem List:  1. 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              - declared in remission April 2017 by Onc  2. NMSC (BCC), s/p MMS in 2000.  3. Non-healing ulcers, bilateral LEs and left forearm, resolved              - Bacterial culture 3/2016 with normal skin giuliana.              - Bx obtained 3/31/2016 for H&E, sterile culture, flow cytometry, and donor chimerism studies, and if possible TCR gene rearrangement studies, suggested NK-Tcell lymphoma  4. BCC, left anterior shoulder, s/p bx 11/15/2019 with excision shortly after  5. Telogen effluvium - minoxidil 5% foam  6. AK, s/p cryo 10/22/20  7. NUB left ear, s/p bx 10/22/20    Encounter Date: Oct 22, 2020    CC:  Chief Complaint   Patient presents with     Skin Check     Colton is here today for a skin check. He has a growth on his left ear.       History of Present Illness:  Mr. Hi Smith is a 65 year old male who with a history of skin cancer presents for full-body skin examination. Last seen on 3/10/2020 for epidermal inclusion cyst removal from right neck. Wears a hat with a brim. Doesn't use sunscreen often.  never used tanning booths.    Concerned about a growing growth on his left ear that has been there for a few months. No associated pruritus, burning sensation, pain, or bleeding.     Bruises easily for \"a day short of forever\". Current medications include 81mg ASA, statin, amlodipine, Plavix, phosphorus, and multi-vitamin.     Has noticed scalp hair thinning over the years with an increase in hair loss over the past 3 months. No associated pruritus, burning sensation, or pain. Doesn't believe there is any family history of hair loss. Interested in what could be done. Recently undergone a stent " placement.    Wife notes that he develops pustules on his back during the winter which she attributes to him getting dry skin. Doesn't moisturize often due to disliking the greasy sensation.     Reports incision sites on left anterior shoulder and right neck have healed well. No other skin concerns.      Past Medical History:   Patient Active Problem List   Diagnosis     Extranodal NK/T-cell lymphoma, nasal type (H)     Nausea with vomiting     Anemia in neoplastic disease     Bacteremia     NHL (non-Hodgkin's lymphoma) (H)     Lymphoma (H)     Fever     Lymphoma, non-Hodgkin's (H)     Neoplasm of uncertain behavior of skin     T-cell or NK-cell neoplasm (H)     Other specified prophylactic or treatment measure     Skin cancer screening     Status post cord blood transplantation     Physical deconditioning     History of peripheral stem cell transplant (H)     Dysuria     Hypogammaglobulinemia (H)     Status post bone marrow transplant (H)     Complications of bone marrow transplant (H)     Cellulitis     Hypophosphatemia     CAP (community acquired pneumonia)     History of nonmelanoma skin cancer     Hypertension     Hyperlipidemia     Coronary atherosclerosis     History of deep vein thrombosis     Basal cell carcinoma of truncal skin     Past Medical History:   Diagnosis Date     Alcohol abuse     in remission x 20 yrs     CAD (coronary artery disease) of artery bypass graft     CABG 2005, hx of stents     Depression      DVT (deep venous thrombosis) (H) November, 2014    upper left arm after PICC line     Extranodal NK/T-cell lymphoma, nasal type (H) 7/23/2014     GERD (gastroesophageal reflux disease)      Hearing loss      HTN (hypertension)      Lymphoma (H)      Natural killer (NK) cell lymphoblastic lymphoma (H) 8/15/2014     S/P autologous bone marrow transplantation (H) 3/4/2015     Skin cancer     15 y ago.? basal cell     Tinnitus      VRE (vancomycin resistant enterococcus) culture positive      Past  Surgical History:   Procedure Laterality Date     BYPASS GRAFT ARTERY CORONARY      2005     CYSTOSCOPY, TRANSURETHRAL RESECTION (TUR) TUMOR BLADDER, COMBINED N/A 12/29/2015    Procedure: COMBINED CYSTOSCOPY, TRANSURETHRAL RESECTION (TUR) TUMOR BLADDER;  Surgeon: Orville Boyle MD;  Location: [facility] OR     MOHS MICROGRAPHIC PROCEDURE  2000    nose     PICC INSERTION Left 9/26/2014    5fr DL Power PICC, 43cm (1cm external) in the L basilic vein w/ tip in the low SVC.     PICC INSERTION Left 10/28/2014    5fr DL Power PICC, 41cm (1cm external) in the L basilic vein w/ tip in the SVC RA junction.     PICC INSERTION Right 12/31/2014    5fr DL Power PICC, 39cm (1cm external) in the R medial brachial vein w/ tip in the low SVC.       Social History:  Patient reports that he quit smoking about 29 years ago. His smoking use included cigarettes. He has a 20.00 pack-year smoking history. He has never used smokeless tobacco. He reports that he does not drink alcohol or use drugs.    Family History:  Family History   Problem Relation Age of Onset     Cancer Mother         esophageal     Cancer Father         prostate cancer, lung fibrosis     Heart Disease Father         Heart Disease     Hypertension Father      C.A.D. Sister      ALS Sister      Melanoma No family hx of      Skin Cancer No family hx of        Medications:  Current Outpatient Medications   Medication Sig Dispense Refill     amLODIPine (NORVASC) 5 MG tablet Take 1 tablet (5 mg) by mouth daily 90 tablet 0     ASPIRIN PO Take 81 mg by mouth daily       atorvastatin (LIPITOR) 20 MG tablet Take 1 tablet (20 mg) by mouth daily 90 tablet 0     K Phos [place]-Sod Phos Di & Mono (VIRT-PHOS 250 NEUTRAL) 155-852-130 MG TABS Take 1 tablet by mouth 2 times daily 180 tablet 0     nitroGLYcerin (NITROSTAT) 0.4 MG sublingual tablet Place 1 tablet (0.4 mg) under the tongue every 5 minutes as needed for chest pain 12 tablet 0     tadalafil (CIALIS) 20 MG tablet Take 1  tablet (20 mg) by mouth daily as needed (erectile dysfunction) (Patient not taking: Reported on 10/22/2020) 12 tablet 3     Allergies   Allergen Reactions     Nka [No Known Allergies]          Review of Systems:  -As per HPI  -Constitutional: Otherwise feeling well today, in usual state of health.  -HEENT: Patient denies nonhealing oral sores.  -Skin: As above in HPI. No additional skin concerns.  No cough or SOB  No fevers    Physical exam:  Vitals: There were no vitals taken for this visit.  GEN: This is a well developed, well-nourished male in no acute distress, in a pleasant mood.    SKIN: Full skin, which includes the head/face, both arms, chest, back, abdomen,both legs, and/or groin buttocks, digits and/or nails, was examined.  -Cash skin type: II  -Rough pink scaly domed 4mm papule near antihelix on left ear  -Ecchymoses scattered over upper extremities  -Decreased hair density on crown and top of scalp  -Pustule on back  -No erythema, telangectasias, nodularity, or pigmentation on the site of prior skin cancer on left anterior shoulder  -Rough scaly 3mm pink papule on the crown of the scalp  -Tan-to-brown waxy papules with stuck-on appearance on posterior left ear, back, and left chest  -No lymphadenopathy on upper extremities  -Positive hair pull test  -Varying thickness of hair thickness  -No other lesions of concern on areas examined.     Impression/Plan:  1. Neoplasm of uncertain behavior on the left ear. The differential diagnosis includes SCC vs irritated wart.     Shave biopsy(performed by faculty): After discussion of benefits and risks including but not limited to bleeding, infection, scar, incomplete removal, recurrence, and non-diagnostic biopsy, written consent and photographs were obtained. Time-out was performed. The area was cleaned with isopropyl alcohol. 1mL of 1% lidocaine with 1:100,000 epinephrine was injected to obtain adequate anesthesia of the lesion on the let ear. A shave  biopsy was performed. Hemostasis was achieved with aluminium chloride. Vaseline and a sterile dressing were applied. The patient tolerated the procedure and no complications were noted. The patient was provided with verbal and written post care instructions.     2. Actinic keratosis of vertex scalp.  Cryotherapy procedure note (performed by faculty): After verbal consent and discussion of risks and benefits including but no limited to dyspigmentation/scar, blister, infection, recurrence,1 was(were) treated with 1-2mm freeze border for 2 cycles with liquid nitrogen. Post cryotherapy instructions were provided.     3. Possible telogen effluvium, potentially triggered by recent cardiac stenting procedure.  Also with evidence of underlying androgenetic alopecia.    Recommended OTC minoxidil 5% foam    Discussed that this may be due to any stressful period that happened recently    Reassured as to nonscarring and likely self-limited nature of TE    4. Xerosis    Moisturizers recommended    5. Easy brusiability    Discussed this is due to a combination of sun damage, getting chemo, and being on aspirin    CC Referred Self, MD  No address on file on close of this encounter.  Follow-up in 6 months, earlier for new or changing lesions.     Staff Involved:  I, Nabeel Feliciano (MS4), saw and examined the patient in the presence of Dr. Perez.    I was present with the medical student who participated in the service and in the documentation.  I have verified the history and personally performed the physical exam and medical decision making.  I agree with the assessment and plan of care as documented in the note.  I personally performed all procedures.    Ab Perez MD  Dermatology Attending

## 2020-10-23 PROBLEM — L65.0 TELOGEN EFFLUVIUM: Status: ACTIVE | Noted: 2020-10-23

## 2020-10-23 PROBLEM — L57.0 AK (ACTINIC KERATOSIS): Status: ACTIVE | Noted: 2020-10-23

## 2020-10-23 LAB — COPATH REPORT: NORMAL

## 2020-11-18 DIAGNOSIS — C86.00 EXTRANODAL NK/T-CELL LYMPHOMA, NASAL TYPE: ICD-10-CM

## 2020-12-04 RX ORDER — AMLODIPINE BESYLATE 5 MG/1
5 TABLET ORAL DAILY
Qty: 30 TABLET | Refills: 0 | Status: SHIPPED | OUTPATIENT
Start: 2020-12-04 | End: 2021-07-05

## 2021-01-06 DIAGNOSIS — C86.00 EXTRANODAL NK/T-CELL LYMPHOMA, NASAL TYPE: Primary | ICD-10-CM

## 2021-01-13 ENCOUNTER — VIRTUAL VISIT (OUTPATIENT)
Dept: TRANSPLANT | Facility: CLINIC | Age: 66
End: 2021-01-13
Attending: INTERNAL MEDICINE
Payer: MEDICARE

## 2021-01-13 DIAGNOSIS — E83.39 HYPOPHOSPHATEMIA: ICD-10-CM

## 2021-01-13 PROCEDURE — 99212 OFFICE O/P EST SF 10 MIN: CPT | Mod: 95 | Performed by: INTERNAL MEDICINE

## 2021-01-13 PROCEDURE — 999N001193 HC VIDEO/TELEPHONE VISIT; NO CHARGE

## 2021-01-13 RX ORDER — SOD PHOS DI, MONO/K PHOS MONO 250 MG
1 TABLET ORAL 2 TIMES DAILY
Qty: 180 TABLET | Refills: 1 | Status: SHIPPED | OUTPATIENT
Start: 2021-01-13 | End: 2021-06-11

## 2021-01-13 NOTE — LETTER
1/13/2021         RE: Hi Smith  [id number] [facility] [address]        Dear Colleague,    Thank you for referring your patient, Hi Smith, to the [facility] [place]. Please see a copy of my visit note below.    Colton is a 65 year old who is being evaluated via a billable video visit.      How would you like to obtain your AVS? MyChart  If the video visit is dropped, the invitation should be resent by: Send to e-mail at: [email or web address]  Will anyone else be joining your video visit? No    Vitals - Patient Reported  Weight (Patient Reported): 88.5 kg (195 lb)  Height (Patient Reported): 182.9 cm (6')  BMI (Based on Pt Reported Ht/Wt): 26.45  Pain Score: No Pain (0)    ONCOLOGY OUTPATIENT NOTE      ONCOLOGY SUMMARY (updated): Hi Smith is 63 years of age with a history of extranodal NK/T-cell lymphoma (nasal type) (diagnosed 07/17/2014) that involved multiple cutaneous/subcutaneous sites at diagnosis. Disease appeared limited to the skin and subcutaneous tissue. He received chemotherapy, initially with SMILE (3 cycles), which did not control the disease, followed in 01/2015 by ICE (2 cycles) with a very good response and then autologous stem cell transplant (03/04/2015) with BEAM conditioning. 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        By 12/2015, he again had evidence for recurrence on the right lower extremity (biopsy proven).  Recurrence appeared to be localized to the right ankle and possibly, although undocumented, near the right wrist.  He was given involved field radiation to these two areas ending on 01/08 2016. The irradiation was the last directed anti-lymphoma therapy he received.    Subsequently, he had the appearance of multiple subcutaneous nodules on his legs, especially, but also on his arms and trunk, that arose and often  resolved spontaneously, but some ulcerated.  Some showed inflammation and cutaneous crusting with erythema and subsequent regression. Biopsies demonstrated lymphoma. Because of the clinical behavior it was thought he was likely experiencing a graft vs lymphoma effect. He continued to have new lesions, predominantly on his lower legs, that appeared and resolved. Those on his legs that had ulcerated resolved with wound care provided by Dr. Eaton. Over time, the frequency of new lesions appearing and the number progressing to the point of ulceration decreased.      Mr. Smith developed a few new scattered lower extremity nodules in 2017.  He was referred to Dermatology for evaluation and biopsy, but the lesions had largely spontaneously resolved by the time a biopsy could be done.  Biopsy of one of the resolving lesions was considered consistent with dermal hypersensitivity reaction with the differential including arthropod bite and, possibly, drug eruption.  These lesions totally cleared.       At a clinic visit on 04/12/2018 he had 1 lesion near each popliteal space, which subsequently resolved. Subsequent similar lesions have not been identified.     In late 01/2018 he developed zoster followed by post herpetic neuralgia.    INTERVAL HISTORY:   Overall he is doing well and he is asymptomatic. No interval infections, fevers, night sweats or weight loss.  He has had no new skin lesions or persistent older skin lesions that he is concerned about relative to his lymphoma.        ALLERGIES:  None reported.      ECOG PS 0     General Appearance: healthy, alert and no distress  Eyes: PERRL, conjunctiva and lids normal, sclera nonicteric  Neck supple, free range of motion,  Resp Effort And Auscultation: Normal respiratory effort without distress  Skin: Skin color, texture, turgor normal.  Psych/Affect:Normal mood and affect ,         LABORATORY:   Reviewed     ASSESSMENT AND PLAN:  NK/T-cell lymphoma, nasal type.  No  "evidence of disease on today's clinical evaluation now 6 years from diagnosis. His last \"systemic\" therapy was a nonmyeloablative double cord transplant on 07/31/2015 and \"local\" therapy, radiation to right ankle and wrist lesions ending 01/2016 for a documented recurrence.  Subsequently, all disease has slowly resolved without additional therapy.      No concerns at this time of NK/TC Lymphoma  recurrence  I reviewed his CMP, CBC from 8/2020 - mild stable lymphocytosis over the past 3 years  Advised to receive COVID vaccine  S/p DUCB - 07/2015 - No evidence of GVHD. Advised routine colonoscopy when indicated, control of cardiac risk factors - BP, Glucose, Lipids, annual eye exam for cataracts  I will f/v in 1 year       Guido Lyons MD    "

## 2021-01-13 NOTE — PROGRESS NOTES
Colton is a 65 year old who is being evaluated via a billable video visit.      How would you like to obtain your AVS? MyChart  If the video visit is dropped, the invitation should be resent by: Send to e-mail at: [email or web address]  Will anyone else be joining your video visit? No    Vitals - Patient Reported  Weight (Patient Reported): 88.5 kg (195 lb)  Height (Patient Reported): 182.9 cm (6')  BMI (Based on Pt Reported Ht/Wt): 26.45  Pain Score: No Pain (0)    ONCOLOGY OUTPATIENT NOTE      ONCOLOGY SUMMARY (updated): Hi Smith is 63 years of age with a history of extranodal NK/T-cell lymphoma (nasal type) (diagnosed 07/17/2014) that involved multiple cutaneous/subcutaneous sites at diagnosis. Disease appeared limited to the skin and subcutaneous tissue. He received chemotherapy, initially with SMILE (3 cycles), which did not control the disease, followed in 01/2015 by ICE (2 cycles) with a very good response and then autologous stem cell transplant (03/04/2015) with BEAM conditioning. 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        By 12/2015, he again had evidence for recurrence on the right lower extremity (biopsy proven).  Recurrence appeared to be localized to the right ankle and possibly, although undocumented, near the right wrist.  He was given involved field radiation to these two areas ending on 01/08 2016. The irradiation was the last directed anti-lymphoma therapy he received.    Subsequently, he had the appearance of multiple subcutaneous nodules on his legs, especially, but also on his arms and trunk, that arose and often resolved spontaneously, but some ulcerated.  Some showed inflammation and cutaneous crusting with erythema and subsequent regression. Biopsies demonstrated lymphoma. Because of the clinical behavior it was thought he was likely experiencing a graft vs lymphoma effect. He continued to have new  "lesions, predominantly on his lower legs, that appeared and resolved. Those on his legs that had ulcerated resolved with wound care provided by Dr. Eaton. Over time, the frequency of new lesions appearing and the number progressing to the point of ulceration decreased.      Mr. Smith developed a few new scattered lower extremity nodules in 2017.  He was referred to Dermatology for evaluation and biopsy, but the lesions had largely spontaneously resolved by the time a biopsy could be done.  Biopsy of one of the resolving lesions was considered consistent with dermal hypersensitivity reaction with the differential including arthropod bite and, possibly, drug eruption.  These lesions totally cleared.       At a clinic visit on 04/12/2018 he had 1 lesion near each popliteal space, which subsequently resolved. Subsequent similar lesions have not been identified.     In late 01/2018 he developed zoster followed by post herpetic neuralgia.    INTERVAL HISTORY:   Overall he is doing well and he is asymptomatic. No interval infections, fevers, night sweats or weight loss.  He has had no new skin lesions or persistent older skin lesions that he is concerned about relative to his lymphoma.        ALLERGIES:  None reported.      ECOG PS 0     General Appearance: healthy, alert and no distress  Eyes: PERRL, conjunctiva and lids normal, sclera nonicteric  Neck supple, free range of motion,  Resp Effort And Auscultation: Normal respiratory effort without distress  Skin: Skin color, texture, turgor normal.  Psych/Affect:Normal mood and affect ,         LABORATORY:   Reviewed     ASSESSMENT AND PLAN:  NK/T-cell lymphoma, nasal type.  No evidence of disease on today's clinical evaluation now 6 years from diagnosis. His last \"systemic\" therapy was a nonmyeloablative double cord transplant on 07/31/2015 and \"local\" therapy, radiation to right ankle and wrist lesions ending 01/2016 for a documented recurrence.  Subsequently, all " disease has slowly resolved without additional therapy.      No concerns at this time of NK/TC Lymphoma  recurrence  I reviewed his CMP, CBC from 8/2020 - mild stable lymphocytosis over the past 3 years  Advised to receive COVID vaccine  S/p DUCB - 07/2015 - No evidence of GVHD. Advised routine colonoscopy when indicated, control of cardiac risk factors - BP, Glucose, Lipids, annual eye exam for cataracts  I will f/v in 1 year       Guido Lyons MD

## 2021-01-15 ENCOUNTER — HEALTH MAINTENANCE LETTER (OUTPATIENT)
Age: 66
End: 2021-01-15

## 2021-05-05 ENCOUNTER — TRANSFERRED RECORDS (OUTPATIENT)
Dept: HEALTH INFORMATION MANAGEMENT | Facility: CLINIC | Age: 66
End: 2021-05-05

## 2021-05-20 ENCOUNTER — OFFICE VISIT (OUTPATIENT)
Dept: DERMATOLOGY | Facility: CLINIC | Age: 66
End: 2021-05-20
Payer: MEDICARE

## 2021-05-20 DIAGNOSIS — Z85.828 HISTORY OF NONMELANOMA SKIN CANCER: ICD-10-CM

## 2021-05-20 DIAGNOSIS — L82.0 SEBORRHEIC KERATOSES, INFLAMED: Primary | ICD-10-CM

## 2021-05-20 PROCEDURE — 99212 OFFICE O/P EST SF 10 MIN: CPT | Mod: 25 | Performed by: DERMATOLOGY

## 2021-05-20 PROCEDURE — 11200 RMVL SKIN TAGS UP TO&INC 15: CPT | Mod: XS | Performed by: DERMATOLOGY

## 2021-05-20 PROCEDURE — 17110 DESTRUCTION B9 LES UP TO 14: CPT | Performed by: DERMATOLOGY

## 2021-05-20 RX ORDER — CLOPIDOGREL BISULFATE 75 MG/1
TABLET ORAL
COMMUNITY
Start: 2021-02-01 | End: 2022-07-05

## 2021-05-20 ASSESSMENT — PAIN SCALES - GENERAL: PAINLEVEL: NO PAIN (0)

## 2021-05-20 NOTE — PATIENT INSTRUCTIONS
Cryotherapy    What is it?    Use of a very cold liquid, such as liquid nitrogen, to freeze and destroy abnormal skin cells that need to be removed    What should I expect?    Tenderness and redness    A small blister that might grow and fill with dark purple blood. There may be crusting.    More than one treatment may be needed if the lesions do not go away.    How do I care for the treated area?    Gently wash the area with your hands when bathing.    Use a thin layer of Vaseline to help with healing. You may use a Band-Aid.     The area should heal within 7-10 days and may leave behind a pink or lighter color.     Do not use an antibiotic or Neosporin ointment.     You may take acetaminophen (Tylenol) for pain.     Call your Doctor if you have:    Severe pain    Signs of infection (warmth, redness, cloudy yellow drainage, and or a bad smell)    Questions or concerns    Who should I call with questions?       [facility]: [phone]       [facility]: [phone]       For urgent needs outside of business hours call the [facility] [place] at [phone]        and ask for the dermatology resident on call

## 2021-05-20 NOTE — NURSING NOTE
Dermatology Rooming Note    Hi Smith's goals for this visit include:   Chief Complaint   Patient presents with     Derm Problem     susie is coming in today to discuss a spot of concern on the arm, states he noticed about a week ago     Darlene Brunner CMA on 5/20/2021 at 11:37 AM

## 2021-05-20 NOTE — PROGRESS NOTES
CHIEF COMPLAINT:  Inflamed spot on left arm.    SUBJECTIVE:  Colton is a very pleasant 66-year-old male with a history of extranodal NK/T cell lymphoma, which previously has included multiple skin involvement.  This lymphoma is currently in remission.  He also has a history of nonmelanoma skin cancers including a BCC of the left shoulder.  Today, he presents for a new onset red, inflamed spot on his left forearm.  He reports that a pink bump seems to have appeared out of nowhere and then became crusted over and has slowly improved over the last few weeks.  Today, it appears to be partially resolving.  Otherwise, he has several other irritated spots that bother him with glasses and masking, one is behind his left ear, one is on his anterior neck and three are on his shoulder and trunk.  He denies any other localized areas of itch, pain or bleeding and does not believe he has any new or changing moles.    REVIEW OF SYSTEMS:  No recent fevers.    PHYSICAL EXAMINATION:    GENERAL:  This is a well-appearing, well-nourished male with a normal mood and affect, who is oriented x3.  SKIN:  A cutaneous exam of the head, neck, chest, abdomen, back, and bilateral upper extremities was performed.  On the left volar forearm, there is a 3 mm flat topped light pink papule with fine scale, which on dermoscopy demonstrates dot like vessels and light tan peripheral pigment.  In the left postauricular area, there is a crusted 4 mm waxy brown papule.  On the anterior neck, there is a 1 mm filiform verrucoid pink papule.  On the left anterior shoulder, right flank and right hip, there are 3 crusted 4-5 mm pink brown papules consistent with inflamed seborrheic keratoses.    ASSESSMENT AND PLAN:       1. Likely resolving benign lichenoid keratosis or inflamed seborrheic keratosis of the left forearm.  He was reassured that this lesion appears benign on dermoscopy and physical exam today.  We will simply follow this lesion clinically over  time.  He understands to return to clinic if it develops new symptoms or changes significantly in size or appearance.       2. Inflamed seborrheic keratoses x4, in left postauricular area, left shoulder, right flank and right hip.  After informed verbal consent these were each treated with liquid nitrogen x10 seconds x2 cycles.  The patient tolerated this without complication.        3. Irritated skin tag of the anterior neck.  This was similarly treated with liquid nitrogen x10 seconds x2 cycles.       4. History of nonmelanoma skin cancer, including the left shoulder.  We would like him to follow up in our clinic for a repeat full body skin exam in approximately 6 months' time, as he had his last full skin check in 10 /2020.         Ab Perez MD  Dermatology Attending

## 2021-05-20 NOTE — LETTER
5/20/2021       RE: Hi Smith  [address] [facility] [place] [address]     Dear Colleague,    Thank you for referring your patient, Hi Smith, to the [facility] [place] [facility]. Please see a copy of my visit note below.    CHIEF COMPLAINT:  Inflamed spot on left arm.    SUBJECTIVE:  Colton is a very pleasant 66-year-old male with a history of extranodal NK/T cell lymphoma, which previously has included multiple skin involvement.  This lymphoma is currently in remission.  He also has a history of nonmelanoma skin cancers including a BCC of the left shoulder.  Today, he presents for a new onset red, inflamed spot on his left forearm.  He reports that a pink bump seems to have appeared out of nowhere and then became crusted over and has slowly improved over the last few weeks.  Today, it appears to be partially resolving.  Otherwise, he has several other irritated spots that bother him with glasses and masking, one is behind his left ear, one is on his anterior neck and three are on his shoulder and trunk.  He denies any other localized areas of itch, pain or bleeding and does not believe he has any new or changing moles.    REVIEW OF SYSTEMS:  No recent fevers.    PHYSICAL EXAMINATION:    GENERAL:  This is a well-appearing, well-nourished male with a normal mood and affect, who is oriented x3.  SKIN:  A cutaneous exam of the head, neck, chest, abdomen, back, and bilateral upper extremities was performed.  On the left volar forearm, there is a 3 mm flat topped light pink papule with fine scale, which on dermoscopy demonstrates dot like vessels and light tan peripheral pigment.  In the left postauricular area, there is a crusted 4 mm waxy brown papule.  On the anterior neck, there is a 1 mm filiform verrucoid pink papule.  On the left anterior shoulder, right flank and right hip, there are 3 crusted 4-5 mm pink brown papules  consistent with inflamed seborrheic keratoses.    ASSESSMENT AND PLAN:       1. Likely resolving benign lichenoid keratosis or inflamed seborrheic keratosis of the left forearm.  He was reassured that this lesion appears benign on dermoscopy and physical exam today.  We will simply follow this lesion clinically over time.  He understands to return to clinic if it develops new symptoms or changes significantly in size or appearance.       2. Inflamed seborrheic keratoses x4, in left postauricular area, left shoulder, right flank and right hip.  After informed verbal consent these were each treated with liquid nitrogen x10 seconds x2 cycles.  The patient tolerated this without complication.        3. Irritated skin tag of the anterior neck.  This was similarly treated with liquid nitrogen x10 seconds x2 cycles.       4. History of nonmelanoma skin cancer, including the left shoulder.  We would like him to follow up in our clinic for a repeat full body skin exam in approximately 6 months' time, as he had his last full skin check in 10 /2020.         Ab Perez MD  Dermatology Attending

## 2021-06-03 DIAGNOSIS — E83.39 HYPOPHOSPHATEMIA: ICD-10-CM

## 2021-06-03 NOTE — TELEPHONE ENCOUNTER
Pending Prescriptions:                       Disp   Refills    K Phos Andrew-Sod Phos Di & Mono (VIRT-PHOS*180 ta*1            Sig: Take 1 tablet by mouth 2 times daily    Last OV 1/13/2021, next follow-up not yet scheduled, per last OV note due back in one year    Per last OV notes: medication not discussed    Last filled: 1/13/21 for #180 and 1 refill by Dr. Lyons    Routed to provider for approval

## 2021-06-06 PROBLEM — L82.0 SEBORRHEIC KERATOSES, INFLAMED: Status: ACTIVE | Noted: 2021-06-06

## 2021-06-11 RX ORDER — SOD PHOS DI, MONO/K PHOS MONO 250 MG
1 TABLET ORAL 2 TIMES DAILY
Qty: 180 TABLET | Refills: 1 | Status: SHIPPED | OUTPATIENT
Start: 2021-06-11 | End: 2021-06-14

## 2021-06-14 ENCOUNTER — OFFICE VISIT (OUTPATIENT)
Dept: FAMILY MEDICINE | Facility: CLINIC | Age: 66
End: 2021-06-14
Payer: MEDICARE

## 2021-06-14 VITALS
SYSTOLIC BLOOD PRESSURE: 122 MMHG | HEART RATE: 57 BPM | WEIGHT: 200 LBS | DIASTOLIC BLOOD PRESSURE: 79 MMHG | HEIGHT: 72 IN | BODY MASS INDEX: 27.09 KG/M2 | TEMPERATURE: 97.7 F | OXYGEN SATURATION: 98 %

## 2021-06-14 DIAGNOSIS — Z00.00 ENCOUNTER FOR MEDICARE ANNUAL WELLNESS EXAM: Primary | ICD-10-CM

## 2021-06-14 DIAGNOSIS — Z12.5 SCREENING FOR PROSTATE CANCER: ICD-10-CM

## 2021-06-14 DIAGNOSIS — R39.15 URINARY URGENCY: ICD-10-CM

## 2021-06-14 LAB
ALBUMIN UR-MCNC: 30 MG/DL
ANION GAP SERPL CALCULATED.3IONS-SCNC: 6 MMOL/L (ref 3–14)
APPEARANCE UR: CLEAR
BILIRUB UR QL STRIP: NEGATIVE
BUN SERPL-MCNC: 17 MG/DL (ref 7–30)
CALCIUM SERPL-MCNC: 8.7 MG/DL (ref 8.5–10.1)
CHLORIDE SERPL-SCNC: 109 MMOL/L (ref 94–109)
CO2 SERPL-SCNC: 25 MMOL/L (ref 20–32)
COLOR UR AUTO: YELLOW
CREAT SERPL-MCNC: 1.26 MG/DL (ref 0.66–1.25)
GFR SERPL CREATININE-BSD FRML MDRD: 58 ML/MIN/{1.73_M2}
GLUCOSE SERPL-MCNC: 79 MG/DL (ref 70–99)
GLUCOSE UR STRIP-MCNC: 100 MG/DL
HGB UR QL STRIP: NEGATIVE
KETONES UR STRIP-MCNC: NEGATIVE MG/DL
LEUKOCYTE ESTERASE UR QL STRIP: NEGATIVE
NITRATE UR QL: NEGATIVE
PH UR STRIP: 7.5 PH (ref 5–7)
POTASSIUM SERPL-SCNC: 3.7 MMOL/L (ref 3.4–5.3)
PSA SERPL-ACNC: 2.64 UG/L (ref 0–4)
RBC #/AREA URNS AUTO: ABNORMAL /HPF
SODIUM SERPL-SCNC: 140 MMOL/L (ref 133–144)
SOURCE: ABNORMAL
SP GR UR STRIP: 1.02 (ref 1–1.03)
UROBILINOGEN UR STRIP-ACNC: 0.2 EU/DL (ref 0.2–1)
WBC #/AREA URNS AUTO: ABNORMAL /HPF

## 2021-06-14 PROCEDURE — 99213 OFFICE O/P EST LOW 20 MIN: CPT | Mod: 25 | Performed by: FAMILY MEDICINE

## 2021-06-14 PROCEDURE — G0103 PSA SCREENING: HCPCS | Performed by: FAMILY MEDICINE

## 2021-06-14 PROCEDURE — G0438 PPPS, INITIAL VISIT: HCPCS | Performed by: FAMILY MEDICINE

## 2021-06-14 PROCEDURE — 81001 URINALYSIS AUTO W/SCOPE: CPT | Performed by: FAMILY MEDICINE

## 2021-06-14 PROCEDURE — 36415 COLL VENOUS BLD VENIPUNCTURE: CPT | Performed by: FAMILY MEDICINE

## 2021-06-14 PROCEDURE — 80048 BASIC METABOLIC PNL TOTAL CA: CPT | Performed by: FAMILY MEDICINE

## 2021-06-14 RX ORDER — PRASUGREL 10 MG/1
10 TABLET, FILM COATED ORAL DAILY
COMMUNITY
Start: 2021-05-07 | End: 2022-01-10

## 2021-06-14 RX ORDER — METOPROLOL SUCCINATE 25 MG/1
25 TABLET, EXTENDED RELEASE ORAL DAILY
COMMUNITY
Start: 2021-04-10 | End: 2024-01-12

## 2021-06-14 RX ORDER — ATORVASTATIN CALCIUM 80 MG/1
80 TABLET, FILM COATED ORAL DAILY
Qty: 90 TABLET | Refills: 1
Start: 2021-06-14

## 2021-06-14 RX ORDER — NICOTINE POLACRILEX 2 MG
1000 GUM BUCCAL
COMMUNITY
End: 2024-01-12

## 2021-06-14 ASSESSMENT — ENCOUNTER SYMPTOMS
PALPITATIONS: 0
WEAKNESS: 0
DIZZINESS: 0
SHORTNESS OF BREATH: 0
ARTHRALGIAS: 0
HEMATOCHEZIA: 0
DYSURIA: 0
FEVER: 0
NERVOUS/ANXIOUS: 0
HEMATURIA: 0
EYE PAIN: 0
CHILLS: 0
SORE THROAT: 0
FREQUENCY: 0
DIARRHEA: 0
NAUSEA: 0
MYALGIAS: 0
ABDOMINAL PAIN: 0
HEARTBURN: 0
CONSTIPATION: 0
PARESTHESIAS: 0
HEADACHES: 0
JOINT SWELLING: 0
COUGH: 0

## 2021-06-14 ASSESSMENT — MIFFLIN-ST. JEOR: SCORE: 1716.22

## 2021-06-14 ASSESSMENT — ACTIVITIES OF DAILY LIVING (ADL): CURRENT_FUNCTION: NO ASSISTANCE NEEDED

## 2021-06-14 NOTE — PROGRESS NOTES
"SUBJECTIVE:   Hi Smith is a 66 year old male who presents for Preventive Visit.  Patient has been advised of split billing requirements and indicates understanding: Yes   Are you in the first 12 months of your Medicare coverage?  No    Healthy Habits:     In general, how would you rate your overall health?  Good    Frequency of exercise:  2-3 days/week    Duration of exercise:  45-60 minutes    Do you usually eat at least 4 servings of fruit and vegetables a day, include whole grains    & fiber and avoid regularly eating high fat or \"junk\" foods?  No    Taking medications regularly:  Yes    Medication side effects:  None    Ability to successfully perform activities of daily living:  No assistance needed    Home Safety:  No safety concerns identified    Hearing Impairment:  Difficulty following a conversation in a noisy restaurant or crowded room, need to ask people to speak up or repeat themselves and find that men's voices are easier to understand than woman's    In the past 6 months, have you been bothered by leaking of urine? Yes    In general, how would you rate your overall mental or emotional health?  Excellent      PHQ-2 Total Score: 0    Additional concerns today:  No    Do you feel safe in your environment? Yes    Have you ever done Advance Care Planning? (For example, a Health Directive, POLST, or a discussion with a medical provider or your loved ones about your wishes): Yes, advance care planning is on file.  Interim history        CAD s/p  CABG  CABG 4/15/05: TAYLOR-LAD, SVG-LCX, SVG-RCAPrevious bypass with and TAYLOR to LAD, vein to the circumflex, and vein to the right   On aspirin, Effient , Toprol 25 mg dailt   05/05/2021:was admitted for unstable angina. Chest pain was similar and worse to when he has had MIs in the past. He was taken to the Cath Lab emergently. He underwent MEDINA to left main and 2nd OM. Chest pain  resolved. t Plavixchanged to Effient. 40 mg of Lipitor increased to 2 full dose at " 80 mg daily.  Follow up with cardiology  Currently doing cardiac rehab      Hypertension:   Currently normotensive.  --Metoprolol XL 25 mg  --Norvasc 5 mg     Hyperlipidemia:  --LDL suboptimally controlled based on lipid panel 1/2021.   --Currently taking 80 mg.     extranodal NK/T-cell lymphoma (nasal type)   diagnosed 07/17/2014  involved multiple cutaneous/subcutaneous sites at diagnosis.  He received chemotherapy, initially with SMILE (3 cycles), which did not control the disease, followed in 01/2015 by ICE (2 cycles) with a very good response and then autologous stem cell transplant (03/04/2015) with BEAM conditioning. 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  currnetly in remission   Last appouitnmen with hematology  in 01/2021     Erectile dysfunction:    Was on cialis had headache   Stopped taking it       Urinary urgency  Urgency sometimes with occasional  Leak ,   If he does not find bathroom urge goes away   No hematuria   Offered referral to urology he declined at this time       SH:    Marital status:   Kids: 3 kids  Employment: retired [employer]   Exercise: yes cardiac rehab 3 times per week   Tobacco: no- quit 28 years ago -  Etoh: no  Recreational drugs: no  Caffeine: yes coffee morning       Preventive -     Immunization History   Administered Date(s) Administered     COVID-19,PF,Pfizer 03/18/2021, 04/08/2021     DTaP / Hep B / IPV 11/16/2017     FLUAD -HD 65+ QUAD ([facility] ONLY) 12/09/2020     Hib (PRP-T) 11/16/2017     Influenza (High Dose) 3 valent vaccine 10/22/2019     Influenza (IIV3) PF 11/08/2006     Influenza Vaccine IM > 6 months Valent IIV4 10/23/2014, 09/30/2015, 10/17/2016, 11/16/2017, 11/05/2018     MMR 01/17/2020     Pneumo Conj 13-V (2010&after) 11/16/2017     Pneumococcal 23 valent 10/22/2019     Tdap (Adacel,Boostrix) 04/16/2008     Zoster vaccine recombinant adjuvanted  (SHINGRIX) 01/17/2020       - Colon CA screen: Colonoscopy 3 months ago   03/2021    Findings:       The digital rectal exam was normal.       Multiple small-mouthed diverticula were found in the sigmoid colon,        descending colon and transverse colon. There was evidence of        diverticular spasm.       The exam was otherwise without abnormality on direct and retroflexion        views.    Impressions/Post-Op Diagnosis:       - Moderate diverticulosis in the sigmoid colon, in the descending colon        and in the transverse colon. There was evidence of diverticular spasm.       - The examination was otherwise normal on direct and retroflexion views.       - No specimens collected.    Recommendation:       - Discharge patient to home (ambulatory).       - High fiber diet indefinitely.       - Repeat colonoscopy in 10 years for screening purposes.  - Prostate CA screen: Discussed controversy about screening.   PSA   Date Value Ref Range Status   07/02/2019 2.16 0 - 4 ug/L Final     Comment:     Assay Method:  Chemiluminescence using Siemens Vista analyzer       - AAA screen: completed 09/2016       - Lung cancer screen:nor smoker     -lipids screen: follow up with cardiology     Diabetes screen:     Fall risk  Fallen 2 or more times in the past year?: No  Any fall with injury in the past year?: No    Cognitive Screening   1) Repeat 3 items (Leader, Season, Table)    2) Clock draw: NORMAL  3) 3 item recall: Recalls 3 objects  Results: 3 items recalled: COGNITIVE IMPAIRMENT LESS LIKELY    Mini-CogTM Copyright S Nik[employer]. Licensed by the author for use in [facility]; reprinted with permission (ubaldo@.[facility]). All rights reserved.      Do you have sleep apnea, excessive snoring or daytime drowsiness?: no    Reviewed and updated as needed this visit by clinical staff  Tobacco  Allergies  Meds   Med Hx  Surg Hx  Fam Hx  Soc Hx        Reviewed and updated as needed this visit by Provider    Meds              Social History     Tobacco Use     Smoking status: Former Smoker     Packs/day: 1.00     Years: 20.00     Pack years: 20.00     Types: Cigarettes     Quit date: 1991     Years since quittin.3     Smokeless tobacco: Never Used   Substance Use Topics     Alcohol use: No         Alcohol Use 2021   Prescreen: >3 drinks/day or >7 drinks/week? Not Applicable           Current providers sharing in care for this patient include:   Patient Care Team:  Michelet Carter MD as PCP - General (Internal Medicine)  Ramakrishna Cameron MD as MD (Infectious Diseases)  Orville Boyle MD as MD (Urology)  Ab Perez MD as MD (Dermapathology)  Amie Car, RN as Registered Nurse (Urology)  Ruth Bejarano MD as MD (Radiation Oncology)  Delroy Ratliff DPM (Podiatry)  Jesika Eaton MD as MD (Plastic Surgery)  Zofia Collado RN as Specialty Care Coordinator (Hematology & Oncology)  Ab Perez MD as Assigned Surgical Provider  Guido Lyons MD as Assigned Cancer Care Provider  Michelet Carter MD as Assigned PCP    The following health maintenance items are reviewed in Epic and correct as of today:  Health Maintenance Due   Topic Date Due     ANNUAL REVIEW OF HM ORDERS  Never done     ZOSTER IMMUNIZATION (2 of 2) 2020     FALL RISK ASSESSMENT  2020     Lab work is in process  BP Readings from Last 3 Encounters:   21 122/79   20 118/72   11/15/19 124/81    Wt Readings from Last 3 Encounters:   21 90.7 kg (200 lb)   20 90.3 kg (199 lb)   11/15/19 91.7 kg (202 lb 3.2 oz)                  Patient Active Problem List   Diagnosis     Extranodal NK/T-cell lymphoma, nasal type (H)     Nausea with vomiting     Anemia in neoplastic disease     Bacteremia     NHL (non-Hodgkin's lymphoma) (H)     Lymphoma (H)     Fever     Lymphoma, non-Hodgkin's (H)     Neoplasm of uncertain behavior of skin     T-cell or NK-cell neoplasm (H)     Other specified  prophylactic or treatment measure     Skin cancer screening     Status post cord blood transplantation     Physical deconditioning     History of peripheral stem cell transplant (H)     Dysuria     Hypogammaglobulinemia (H)     Status post bone marrow transplant (H)     Complications of bone marrow transplant (H)     Cellulitis     Hypophosphatemia     CAP (community acquired pneumonia)     History of nonmelanoma skin cancer     Hypertension     Hyperlipidemia     Coronary atherosclerosis     History of deep vein thrombosis     Basal cell carcinoma of truncal skin     AK (actinic keratosis)     Telogen effluvium     Seborrheic keratoses, inflamed     Past Surgical History:   Procedure Laterality Date     BYPASS GRAFT ARTERY CORONARY           CYSTOSCOPY, TRANSURETHRAL RESECTION (TUR) TUMOR BLADDER, COMBINED N/A 2015    Procedure: COMBINED CYSTOSCOPY, TRANSURETHRAL RESECTION (TUR) TUMOR BLADDER;  Surgeon: Orville Boyle MD;  Location: [facility] OR     MOHS MICROGRAPHIC PROCEDURE  2000    nose     PICC INSERTION Left 2014    5fr DL Power PICC, 43cm (1cm external) in the L basilic vein w/ tip in the low SVC.     PICC INSERTION Left 10/28/2014    5fr DL Power PICC, 41cm (1cm external) in the L basilic vein w/ tip in the SVC RA junction.     PICC INSERTION Right 2014    5fr DL Power PICC, 39cm (1cm external) in the R medial brachial vein w/ tip in the low SVC.       Social History     Tobacco Use     Smoking status: Former Smoker     Packs/day: 1.00     Years: 20.00     Pack years: 20.00     Types: Cigarettes     Quit date: 1991     Years since quittin.3     Smokeless tobacco: Never Used   Substance Use Topics     Alcohol use: No     Family History   Problem Relation Age of Onset     Cancer Mother         esophageal     Cancer Father         prostate cancer, lung fibrosis     Heart Disease Father         Heart Disease     Hypertension Father      C.A.D. Sister      ALS Sister       "Melanoma No family hx of      Skin Cancer No family hx of          Current Outpatient Medications   Medication Sig Dispense Refill     amLODIPine (NORVASC) 5 MG tablet Take 1 tablet (5 mg) by mouth daily 30 tablet 0     ASPIRIN PO Take 81 mg by mouth daily       atorvastatin (LIPITOR) 80 MG tablet Take 1 tablet (80 mg) by mouth daily 90 tablet 1     Biotin 1 MG CAPS Take 1,000 mcg by mouth       metoprolol succinate ER (TOPROL-XL) 25 MG 24 hr tablet Take 25 mg by mouth daily       nitroGLYcerin (NITROSTAT) 0.4 MG sublingual tablet Place 1 tablet (0.4 mg) under the tongue every 5 minutes as needed for chest pain 12 tablet 0     phosphorus tablet 250 mg (PHOSPHA 250 NEUTRAL) 250 MG per tablet Take 2 tablets by mouth       prasugrel (EFFIENT) 10 MG TABS tablet Take 10 mg by mouth daily       clopidogrel (PLAVIX) 75 MG tablet            Review of Systems   Constitutional: Negative for chills and fever.   HENT: Positive for hearing loss. Negative for congestion, ear pain and sore throat.    Eyes: Negative for pain and visual disturbance.   Respiratory: Negative for cough and shortness of breath.    Cardiovascular: Negative for chest pain, palpitations and peripheral edema.   Gastrointestinal: Negative for abdominal pain, constipation, diarrhea, heartburn, hematochezia and nausea.   Genitourinary: Positive for impotence and urgency. Negative for discharge, dysuria, frequency, genital sores and hematuria.   Musculoskeletal: Negative for arthralgias, joint swelling and myalgias.   Skin: Negative for rash.   Neurological: Negative for dizziness, weakness, headaches and paresthesias.   Psychiatric/Behavioral: Negative for mood changes. The patient is not nervous/anxious.        OBJECTIVE:   /79   Pulse 57   Temp 97.7  F (36.5  C) (Tympanic)   Ht 1.822 m (5' 11.75\")   Wt 90.7 kg (200 lb)   SpO2 98%   BMI 27.31 kg/m   Estimated body mass index is 27.31 kg/m  as calculated from the following:    Height as of this " "encounter: 1.822 m (5' 11.75\").    Weight as of this encounter: 90.7 kg (200 lb).  Physical Exam  GENERAL: healthy, alert and no distress  EYES: Eyes grossly normal to inspection, PERRL and conjunctivae and sclerae normal  HENT: ear canals and TM's normal, nose and mouth without ulcers or lesions  NECK: no adenopathy, no asymmetry, masses, or scars and thyroid normal to palpation  RESP: lungs clear to auscultation - no rales, rhonchi or wheezes  CV: regular rate and rhythm, normal S1 S2, no S3 or S4, no murmur, click or rub, no peripheral edema and peripheral pulses strong  ABDOMEN: soft, nontender, no hepatosplenomegaly, no masses and bowel sounds normal  MS: no gross musculoskeletal defects noted, no edema  SKIN: no suspicious lesions or rashes  NEURO: Normal strength and tone, mentation intact and speech normal  PSYCH: mentation appears normal, affect normal/bright        ASSESSMENT / PLAN:       ICD-10-CM    1. Encounter for Medicare annual wellness exam  Z00.00 Basic metabolic panel  (Ca, Cl, CO2, Creat, Gluc, K, Na, BUN)   2. Screening for prostate cancer  Z12.5 PSA, screen   3. Urinary urgency  R39.15 UA with Microscopic reflex to Culture       Patient has been advised of split billing requirements and indicates understanding: Yes  COUNSELING:  Reviewed preventive health counseling, as reflected in patient instructions       Regular exercise       Healthy diet/nutrition    Estimated body mass index is 27.31 kg/m  as calculated from the following:    Height as of this encounter: 1.822 m (5' 11.75\").    Weight as of this encounter: 90.7 kg (200 lb).        He reports that he quit smoking about 30 years ago. His smoking use included cigarettes. He has a 20.00 pack-year smoking history. He has never used smokeless tobacco.      Appropriate preventive services were discussed with this patient, including applicable screening as appropriate for cardiovascular disease, diabetes, osteopenia/osteoporosis, and glaucoma. "  As appropriate for age/gender, discussed screening for colorectal cancer, prostate cancer, breast cancer, and cervical cancer. Checklist reviewing preventive services available has been given to the patient.    Reviewed patients plan of care and provided an AVS. The Basic Care Plan (routine screening as documented in Health Maintenance) for Hi meets the Care Plan requirement. This Care Plan has been established and reviewed with the Patient.    Counseling Resources:  ATP IV Guidelines  Pooled Cohorts Equation Calculator  Breast Cancer Risk Calculator  Breast Cancer: Medication to Reduce Risk  FRAX Risk Assessment  ICSI Preventive Guidelines  Dietary Guidelines for Americans, 2010  USDA's MyPlate  ASA Prophylaxis  Lung CA Screening    Tiffany Estrada MD  [facility]    Identified Health Risks:

## 2021-06-14 NOTE — PATIENT INSTRUCTIONS
Patient Education   Personalized Prevention Plan  You are due for the preventive services outlined below.  Your care team is available to assist you in scheduling these services.  If you have already completed any of these items, please share that information with your care team to update in your medical record.  Health Maintenance Due   Topic Date Due     ANNUAL REVIEW OF HM ORDERS  Never done     Zoster (Shingles) Vaccine (2 of 2) 03/13/2020     Annual Wellness Visit  06/26/2020     FALL RISK ASSESSMENT  06/26/2020

## 2021-07-03 DIAGNOSIS — C86.00 EXTRANODAL NK/T-CELL LYMPHOMA, NASAL TYPE: ICD-10-CM

## 2021-07-05 RX ORDER — AMLODIPINE BESYLATE 5 MG/1
TABLET ORAL
Qty: 30 TABLET | Refills: 0 | Status: SHIPPED | OUTPATIENT
Start: 2021-07-05 | End: 2021-08-03

## 2021-07-05 NOTE — TELEPHONE ENCOUNTER
Dr Carter is on extended medical leave.  Patient had annual done with Dr Estrada on 6-14-21.  Will send refill request to his team to advise.

## 2021-08-03 DIAGNOSIS — C86.00 EXTRANODAL NK/T-CELL LYMPHOMA, NASAL TYPE: ICD-10-CM

## 2021-08-04 RX ORDER — AMLODIPINE BESYLATE 5 MG/1
5 TABLET ORAL DAILY
Qty: 30 TABLET | Refills: 0 | Status: SHIPPED | OUTPATIENT
Start: 2021-08-04 | End: 2021-09-04

## 2021-08-25 ENCOUNTER — TRANSFERRED RECORDS (OUTPATIENT)
Dept: HEALTH INFORMATION MANAGEMENT | Facility: CLINIC | Age: 66
End: 2021-08-25

## 2021-09-04 ENCOUNTER — MYC REFILL (OUTPATIENT)
Dept: FAMILY MEDICINE | Facility: CLINIC | Age: 66
End: 2021-09-04

## 2021-09-04 DIAGNOSIS — C86.00 EXTRANODAL NK/T-CELL LYMPHOMA, NASAL TYPE: ICD-10-CM

## 2021-09-07 RX ORDER — AMLODIPINE BESYLATE 5 MG/1
5 TABLET ORAL DAILY
Qty: 30 TABLET | Refills: 0 | Status: SHIPPED | OUTPATIENT
Start: 2021-09-07 | End: 2021-10-04

## 2021-09-07 NOTE — TELEPHONE ENCOUNTER
Routing refill request to provider for review/approval because:  Labs out of range:  Creatinine    Kelin Gordon BSN, RN

## 2021-09-14 NOTE — PROGRESS NOTES
[facility]  [address] [address]  Phone: [phone]  Primary Provider: Michelet Carter  Pre-op Performing Provider: FRANCISCO YOUNG      PREOPERATIVE EVALUATION:  Today's date: 9/16/2021    Hi Smith is a 66 year old male who presents for a preoperative evaluation.    Surgical Information:  Surgery/Procedure: Angiogram  Surgery Location: [facility]  Surgeon: Dr. Gavin  Surgery Date: 9/22/2021  Time of Surgery: 11:30  Where patient plans to recover: At home with family  Fax number for surgical facility: [phone]    Type of Anesthesia Anticipated: to be determined    Assessment & Plan     The proposed surgical procedure is considered INTERMEDIATE risk.    Preop general physical exam  There is no contraindication for the surgery  - CBC with platelets and differential; Future  - Basic metabolic panel  (Ca, Cl, CO2, Creat, Gluc, K, Na, BUN); Future  - CBC with platelets and differential  - Basic metabolic panel  (Ca, Cl, CO2, Creat, Gluc, K, Na, BUN)    Atherosclerosis of coronary artery of native heart, angina presence unspecified, unspecified vessel or lesion type  Patient is going for angiogram.    Status post bone marrow transplant (H)  No current issues.  Follow-up with hematology  Hypogammaglobulinemia (H)  Stable  Follow-up with hematology           Risks and Recommendations:  The patient has the following additional risks and recommendations for perioperative complications:   - No identified additional risk factors other than previously addressed    Medication Instructions:  Patient is to take all scheduled medications on the day of surgery    RECOMMENDATION:  APPROVAL GIVEN to proceed with proposed procedure, without further diagnostic evaluation.            Subjective     HPI related to upcoming procedure:     Patient is here for preop evaluation for upcoming procedure.  He is scheduled for angiogram after having positive stress test.  He denies  any current symptoms.    Preop Questions 9/16/2021   1. Have you ever had a heart attack or stroke? No   2. Have you ever had surgery on your heart or blood vessels, such as a stent placement, a coronary artery bypass, or surgery on an artery in your head, neck, heart, or legs? YES -    3. Do you have chest pain with activity? YES -    4. Do you have a history of  heart failure? No   5. Do you currently have a cold, bronchitis or symptoms of other infection? No   6. Do you have a cough, shortness of breath, or wheezing? YES -    7. Do you or anyone in your family have previous history of blood clots? No   8. Do you or does anyone in your family have a serious bleeding problem such as prolonged bleeding following surgeries or cuts? No   9. Have you ever had problems with anemia or been told to take iron pills? No   10. Have you had any abnormal blood loss such as black, tarry or bloody stools? No   11. Have you ever had a blood transfusion? No   12. Are you willing to have a blood transfusion if it is medically needed before, during, or after your surgery? Yes   13. Have you or any of your relatives ever had problems with anesthesia? No   14. Do you have sleep apnea, excessive snoring or daytime drowsiness? No   15. Do you have any artifical heart valves or other implanted medical devices like a pacemaker, defibrillator, or continuous glucose monitor? No   16. Do you have artificial joints? No   17. Are you allergic to latex? No     Health Care Directive:  Patient has a Health Care Directive on file      Preoperative Review of :   reviewed - no record of controlled substances prescribed.      Status of Chronic Conditions:  See problem list for active medical problems.  Problems all longstanding and stable, except as noted/documented.  See ROS for pertinent symptoms related to these conditions.    CAD s/p  CABG  CABG 4/15/05: TAYLOR-LAD, SVG-LCX, SVG-RCAPrevious bypass with and TAYLOR to LAD, vein to the circumflex,  and vein to the right   On aspirin, Effient , Toprol 25 mg dailt   05/05/2021:was admitted for unstable angina. Chest pain was similar and worse to when he has had MIs in the past. He was taken to the Cath Lab emergently. He underwent MEDINA to left main and 2nd OM. Chest pain  resolved. t Plavixchanged to Effient. 40 mg of Lipitor increased to 2 full dose at 80 mg daily.  Follow up with cardiology  Currently doing cardiac rehab       Hypertension:   Currently normotensive.  --Metoprolol XL 25 mg  --Norvasc 5 mg      Hyperlipidemia:  --LDL suboptimally controlled based on lipid panel 1/2021.   --Currently taking 80 mg.      extranodal NK/T-cell lymphoma (nasal type)   diagnosed 07/17/2014  involved multiple cutaneous/subcutaneous sites at diagnosis.  He received chemotherapy, initially with SMILE (3 cycles), which did not control the disease, followed in 01/2015 by ICE (2 cycles) with a very good response and then autologous stem cell transplant (03/04/2015) with BEAM conditioning. 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  currnetly in remission   Last appouitnmen with hematology  in 01/2021      Erectile dysfunction:     Was on cialis had headache   Stopped taking it         Urinary urgency  Urgency sometimes with occasional  Leak ,   If he does not find bathroom urge goes away   No hematuria   Offered referral to urology he declined at this time         Review of Systems  CONSTITUTIONAL: NEGATIVE for fever, chills, change in weight  INTEGUMENTARY/SKIN: NEGATIVE for worrisome rashes, moles or lesions  EYES: NEGATIVE for vision changes or irritation  ENT/MOUTH: NEGATIVE for ear, mouth and throat problems  RESP: NEGATIVE for significant cough or SOB  CV: NEGATIVE for chest pain, palpitations or peripheral edema  GI: NEGATIVE for nausea, abdominal pain, heartburn, or change in bowel habits  : NEGATIVE for frequency, dysuria, or  hematuria  MUSCULOSKELETAL: NEGATIVE for significant arthralgias or myalgia  NEURO: NEGATIVE for weakness, dizziness or paresthesias  ENDOCRINE: NEGATIVE for temperature intolerance, skin/hair changes  HEME: NEGATIVE for bleeding problems  PSYCHIATRIC: NEGATIVE for changes in mood or affect    Patient Active Problem List    Diagnosis Date Noted     Seborrheic keratoses, inflamed 06/06/2021     Priority: Medium     AK (actinic keratosis) 10/23/2020     Priority: Medium     Telogen effluvium 10/23/2020     Priority: Medium     Basal cell carcinoma of truncal skin 03/22/2020     Priority: Medium     Hyperlipidemia 07/17/2019     Priority: Medium     Coronary atherosclerosis 07/17/2019     Priority: Medium     Overview:    - s/p CABG x 3 (LIMA to LAD, VG to OM2, and VG to PDA)   - 9/12/11 bare metal stent to the SVG to the OM2       - 5/24/12 -- stents       History of nonmelanoma skin cancer 10/22/2017     Priority: Medium     CAP (community acquired pneumonia) 04/17/2017     Priority: Medium     Hypophosphatemia 10/14/2016     Priority: Medium     Cellulitis 08/18/2016     Priority: Medium     Hypogammaglobulinemia (H) 09/24/2015     Priority: Medium     Status post bone marrow transplant (H) 09/24/2015     Priority: Medium     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  Document previously scanned on 6-18-15.  Validation form completed and sent to be scanned.  Code Status reflects  choices in most recent ACP document.  Confirmed/documented designated decision maker(s).  Added by Rosemarie Nielson RN Advance Care Planning Liaison with [employer]             Other specified prophylactic or treatment measure 06/24/2015     Priority: Medium     T-cell or NK-cell neoplasm (H) 06/12/2015     Priority: Medium     Neoplasm of uncertain behavior of skin 06/11/2015     Priority: Medium     History of deep vein thrombosis 05/04/2015     Priority: Medium     Lymphoma, non-Hodgkin's (H) 02/26/2015     Priority: Medium     Fever 01/10/2015     Priority: Medium     Lymphoma (H) 10/28/2014     Priority: Medium     NHL (non-Hodgkin's lymphoma) (H) 09/26/2014     Priority: Medium     Bacteremia 09/09/2014     Priority: Medium     Diagnosis updated by automated process. Provider to review and confirm.       Anemia in neoplastic disease 08/18/2014     Priority: Medium     Nausea with vomiting 08/14/2014     Priority: Medium     Extranodal NK/T-cell lymphoma, nasal type (H) 07/23/2014     Priority: Medium     Hypertension 02/21/2008     Priority: Medium     Formatting of this note might be different from the original.  Goal BP < 140/90;  CABG  Future meds: lisinopril  On toprol XL 25 mg daily  Last 1 Encounter BP Readings:   Date BP   7/10/2009 124/76     Lab Results   Component Value Date/Time     CREATININE 0.85 7/10/09 10:34 AM     POTASSIUM 4.5 7/10/09 10:34 AM     SODIUM 138 7/10/09 10:34 AM     Susana Lemus NP ....................  7/13/2009   12:42 PM        Past Medical History:   Diagnosis Date     Alcohol abuse     in remission x 20 yrs     CAD (coronary artery disease) of artery bypass graft     CABG 2005, hx of stents     Depression      DVT (deep venous thrombosis) (H) November, 2014    upper left arm after PICC line     Extranodal NK/T-cell lymphoma, nasal type (H) 7/23/2014     GERD (gastroesophageal reflux disease)      Hearing loss      HTN (hypertension)      Lymphoma (H)      Natural  killer (NK) cell lymphoblastic lymphoma (H) 8/15/2014     S/P autologous bone marrow transplantation (H) 3/4/2015     Skin cancer     15 y ago.? basal cell     Tinnitus      VRE (vancomycin resistant enterococcus) culture positive      Past Surgical History:   Procedure Laterality Date     BYPASS GRAFT ARTERY CORONARY      2005     CYSTOSCOPY, TRANSURETHRAL RESECTION (TUR) TUMOR BLADDER, COMBINED N/A 12/29/2015    Procedure: COMBINED CYSTOSCOPY, TRANSURETHRAL RESECTION (TUR) TUMOR BLADDER;  Surgeon: Orville Boyle MD;  Location: [facility] OR     MOHS MICROGRAPHIC PROCEDURE  2000    nose     PICC INSERTION Left 9/26/2014    5fr DL Power PICC, 43cm (1cm external) in the L basilic vein w/ tip in the low SVC.     PICC INSERTION Left 10/28/2014    5fr DL Power PICC, 41cm (1cm external) in the L basilic vein w/ tip in the SVC RA junction.     PICC INSERTION Right 12/31/2014    5fr DL Power PICC, 39cm (1cm external) in the R medial brachial vein w/ tip in the low SVC.     Current Outpatient Medications   Medication Sig Dispense Refill     amLODIPine (NORVASC) 5 MG tablet Take 1 tablet (5 mg) by mouth daily 30 tablet 0     ASPIRIN PO Take 81 mg by mouth daily       atorvastatin (LIPITOR) 80 MG tablet Take 1 tablet (80 mg) by mouth daily 90 tablet 1     Biotin 1 MG CAPS Take 1,000 mcg by mouth       metoprolol succinate ER (TOPROL-XL) 25 MG 24 hr tablet Take 25 mg by mouth daily       nitroGLYcerin (NITROSTAT) 0.4 MG sublingual tablet Place 1 tablet (0.4 mg) under the tongue every 5 minutes as needed for chest pain 12 tablet 0     phosphorus tablet 250 mg (PHOSPHA 250 NEUTRAL) 250 MG per tablet Take 2 tablets by mouth       prasugrel (EFFIENT) 10 MG TABS tablet Take 10 mg by mouth daily       clopidogrel (PLAVIX) 75 MG tablet  (Patient not taking: Reported on 9/16/2021)         Allergies   Allergen Reactions     Nka [No Known Allergies]         Social History     Tobacco Use     Smoking status: Former Smoker      "Packs/day: 1.00     Years: 20.00     Pack years: 20.00     Types: Cigarettes     Quit date: 1991     Years since quittin.6     Smokeless tobacco: Never Used   Substance Use Topics     Alcohol use: No     Family History   Problem Relation Age of Onset     Cancer Mother         esophageal     Cancer Father         prostate cancer, lung fibrosis     Heart Disease Father         Heart Disease     Hypertension Father      C.A.D. Sister      ALS Sister      Melanoma No family hx of      Skin Cancer No family hx of      History   Drug Use No         Objective     /73   Pulse 59   Temp 97.6  F (36.4  C) (Tympanic)   Ht 1.822 m (5' 11.75\")   Wt 91.6 kg (202 lb)   SpO2 96%   BMI 27.59 kg/m      Physical Exam  Vitals and nursing note reviewed.   Constitutional:       General: He is not in acute distress.     Appearance: Normal appearance. He is not ill-appearing, toxic-appearing or diaphoretic.   HENT:      Head: Normocephalic and atraumatic.   Cardiovascular:      Rate and Rhythm: Normal rate and regular rhythm.      Pulses: Normal pulses.      Heart sounds: Normal heart sounds. No murmur heard.   No friction rub. No gallop.    Pulmonary:      Effort: Pulmonary effort is normal. No respiratory distress.      Breath sounds: Normal breath sounds. No stridor. No wheezing, rhonchi or rales.   Musculoskeletal:      Cervical back: Normal range of motion and neck supple.   Skin:     Capillary Refill: Capillary refill takes less than 2 seconds.   Neurological:      General: No focal deficit present.      Mental Status: He is alert and oriented to person, place, and time.           Recent Labs   Lab Test 21  1522 11/15/19  1116   HGB  --  15.2   PLT  --  225    138   POTASSIUM 3.7 4.0   CR 1.26* 1.12        Diagnostics:  Labs pending at this time.  Results will be reviewed when available.   No EKG this visit, completed in the last 90 days.    Revised Cardiac Risk Index (RCRI):  The patient has the " following serious cardiovascular risks for perioperative complications:   - Coronary Artery Disease (MI, positive stress test, angina, Qs on EKG) = 1 point     RCRI Interpretation: 1 point: Class II (low risk - 0.9% complication rate)           Signed Electronically by: Tiffany Estrada MD  Copy of this evaluation report is provided to requesting physician.

## 2021-09-16 ENCOUNTER — OFFICE VISIT (OUTPATIENT)
Dept: FAMILY MEDICINE | Facility: CLINIC | Age: 66
End: 2021-09-16
Payer: MEDICARE

## 2021-09-16 VITALS
BODY MASS INDEX: 27.36 KG/M2 | HEART RATE: 59 BPM | DIASTOLIC BLOOD PRESSURE: 73 MMHG | TEMPERATURE: 97.6 F | OXYGEN SATURATION: 96 % | WEIGHT: 202 LBS | HEIGHT: 72 IN | SYSTOLIC BLOOD PRESSURE: 112 MMHG

## 2021-09-16 DIAGNOSIS — Z01.818 PREOP GENERAL PHYSICAL EXAM: Primary | ICD-10-CM

## 2021-09-16 DIAGNOSIS — Z94.81 STATUS POST BONE MARROW TRANSPLANT (H): ICD-10-CM

## 2021-09-16 DIAGNOSIS — D80.1 HYPOGAMMAGLOBULINEMIA (H): ICD-10-CM

## 2021-09-16 DIAGNOSIS — I25.10 ATHEROSCLEROSIS OF CORONARY ARTERY OF NATIVE HEART, ANGINA PRESENCE UNSPECIFIED, UNSPECIFIED VESSEL OR LESION TYPE: ICD-10-CM

## 2021-09-16 LAB
BASOPHILS # BLD AUTO: 0 10E3/UL (ref 0–0.2)
BASOPHILS NFR BLD AUTO: 0 %
EOSINOPHIL # BLD AUTO: 0.1 10E3/UL (ref 0–0.7)
EOSINOPHIL NFR BLD AUTO: 1 %
ERYTHROCYTE [DISTWIDTH] IN BLOOD BY AUTOMATED COUNT: 13.8 % (ref 10–15)
HCT VFR BLD AUTO: 43.1 % (ref 40–53)
HGB BLD-MCNC: 14.1 G/DL (ref 13.3–17.7)
LYMPHOCYTES # BLD AUTO: 3.1 10E3/UL (ref 0.8–5.3)
LYMPHOCYTES NFR BLD AUTO: 32 %
MCH RBC QN AUTO: 29 PG (ref 26.5–33)
MCHC RBC AUTO-ENTMCNC: 32.7 G/DL (ref 31.5–36.5)
MCV RBC AUTO: 89 FL (ref 78–100)
MONOCYTES # BLD AUTO: 1.1 10E3/UL (ref 0–1.3)
MONOCYTES NFR BLD AUTO: 11 %
NEUTROPHILS # BLD AUTO: 5.5 10E3/UL (ref 1.6–8.3)
NEUTROPHILS NFR BLD AUTO: 55 %
PLATELET # BLD AUTO: 213 10E3/UL (ref 150–450)
RBC # BLD AUTO: 4.86 10E6/UL (ref 4.4–5.9)
WBC # BLD AUTO: 9.9 10E3/UL (ref 4–11)

## 2021-09-16 PROCEDURE — 80048 BASIC METABOLIC PNL TOTAL CA: CPT | Performed by: FAMILY MEDICINE

## 2021-09-16 PROCEDURE — 85025 COMPLETE CBC W/AUTO DIFF WBC: CPT | Performed by: FAMILY MEDICINE

## 2021-09-16 PROCEDURE — 36415 COLL VENOUS BLD VENIPUNCTURE: CPT | Performed by: FAMILY MEDICINE

## 2021-09-16 PROCEDURE — 99214 OFFICE O/P EST MOD 30 MIN: CPT | Performed by: FAMILY MEDICINE

## 2021-09-16 ASSESSMENT — MIFFLIN-ST. JEOR: SCORE: 1725.3

## 2021-09-17 LAB
ANION GAP SERPL CALCULATED.3IONS-SCNC: 6 MMOL/L (ref 3–14)
BUN SERPL-MCNC: 20 MG/DL (ref 7–30)
CALCIUM SERPL-MCNC: 8.8 MG/DL (ref 8.5–10.1)
CHLORIDE BLD-SCNC: 114 MMOL/L (ref 94–109)
CO2 SERPL-SCNC: 24 MMOL/L (ref 20–32)
CREAT SERPL-MCNC: 1.31 MG/DL (ref 0.66–1.25)
GFR SERPL CREATININE-BSD FRML MDRD: 56 ML/MIN/1.73M2
GLUCOSE BLD-MCNC: 102 MG/DL (ref 70–99)
POTASSIUM BLD-SCNC: 3.8 MMOL/L (ref 3.4–5.3)
SODIUM SERPL-SCNC: 144 MMOL/L (ref 133–144)

## 2021-09-22 ENCOUNTER — TRANSFERRED RECORDS (OUTPATIENT)
Dept: HEALTH INFORMATION MANAGEMENT | Facility: CLINIC | Age: 66
End: 2021-09-22

## 2021-10-04 DIAGNOSIS — C86.00 EXTRANODAL NK/T-CELL LYMPHOMA, NASAL TYPE: ICD-10-CM

## 2021-10-04 RX ORDER — AMLODIPINE BESYLATE 5 MG/1
5 TABLET ORAL DAILY
Qty: 30 TABLET | Refills: 0 | Status: SHIPPED | OUTPATIENT
Start: 2021-10-04 | End: 2024-03-21 | Stop reason: DRUGHIGH

## 2021-10-04 NOTE — TELEPHONE ENCOUNTER
Routing refill request to provider for review/approval because:  Labs out of range        Rerun Protocol (10/4/2021 4:54 PM)      Normal serum creatinine on file in past 12 months        Recent Labs   Lab Test 09/16/21  1401   CR 1.31*     Alla Shelton RN, BSN   [facility]

## 2021-10-13 DIAGNOSIS — E83.39 HYPOPHOSPHATEMIA: Primary | ICD-10-CM

## 2021-10-13 NOTE — TELEPHONE ENCOUNTER
I did receive verbal consent from Colton to speak with Natalya about his needs today.     Provider:  Are you willing to refill the requested medication? I called the pharmacy and verified that prompted Prescription(s) was actually what was last filled on 6/11/2021 #180 from the previous provider.  Thank you. Jenni Hoang is stating that Dr. Estrada told him that he would be willing to refill his Prescription(s) including his phosphorus tablet 250 mg (PHOSPHA 250 NEUTRAL) 250 MG per tablet. They are needing a refill now.     Last filled at [facility] in [place]. He takes I tablet twice a day. Filled 6/11/2021 #180    Pharmacy Contact    Telephone Fax   [phone]       Pharmacy Address and Hours    Address Hours   [address] [address]

## 2021-10-13 NOTE — TELEPHONE ENCOUNTER
Routing refill request to provider for review/approval because:  Drug not on the [facility] refill protocol     Kelin MYERSN, RN

## 2021-10-14 RX ORDER — SOD PHOS DI, MONO/K PHOS MONO 250 MG
1 TABLET ORAL
Qty: 180 TABLET | Refills: 1 | OUTPATIENT
Start: 2021-10-14

## 2021-10-24 ENCOUNTER — HEALTH MAINTENANCE LETTER (OUTPATIENT)
Age: 66
End: 2021-10-24

## 2021-11-03 DIAGNOSIS — C86.00 EXTRANODAL NK/T-CELL LYMPHOMA, NASAL TYPE: ICD-10-CM

## 2021-11-03 NOTE — TELEPHONE ENCOUNTER
Routing refill request to provider for review/approval because:  Labs out of range:  cr  Ryann Trujillo BSN, RN

## 2021-11-04 RX ORDER — AMLODIPINE BESYLATE 5 MG/1
5 TABLET ORAL DAILY
Qty: 30 TABLET | Refills: 0 | OUTPATIENT
Start: 2021-11-04

## 2021-12-13 ENCOUNTER — TELEPHONE (OUTPATIENT)
Dept: FAMILY MEDICINE | Facility: CLINIC | Age: 66
End: 2021-12-13
Payer: MEDICARE

## 2021-12-13 DIAGNOSIS — R13.10 DYSPHAGIA, UNSPECIFIED TYPE: Primary | ICD-10-CM

## 2021-12-13 NOTE — TELEPHONE ENCOUNTER
Reason for Call: Request for an order or referral:    Order or referral being requested: Order    Date needed: as soon as possible    Has the patient been seen by the PCP for this problem? YES    Additional comments: Patient would like to see an ENT at this time due to having issues swallowing. Can he get a referral to see an ENT? Thank you     Phone number Patient can be reached at:  Cell number on file:    Telephone Information:   Mobile [phone]       Best Time:  anytime    Can we leave a detailed message on this number?  YES    Call taken on 12/13/2021 at 8:13 AM by Susana Nielson

## 2021-12-13 NOTE — TELEPHONE ENCOUNTER
Called and spoke to patient, he said he already made an appointment somewhere else.Terra Jackson MA/ANA

## 2021-12-16 ENCOUNTER — TRANSFERRED RECORDS (OUTPATIENT)
Dept: HEALTH INFORMATION MANAGEMENT | Facility: CLINIC | Age: 66
End: 2021-12-16
Payer: MEDICARE

## 2022-01-10 ENCOUNTER — OFFICE VISIT (OUTPATIENT)
Dept: FAMILY MEDICINE | Facility: CLINIC | Age: 67
End: 2022-01-10
Payer: MEDICARE

## 2022-01-10 VITALS
WEIGHT: 206 LBS | HEART RATE: 51 BPM | HEIGHT: 73 IN | TEMPERATURE: 96.6 F | SYSTOLIC BLOOD PRESSURE: 135 MMHG | BODY MASS INDEX: 27.3 KG/M2 | DIASTOLIC BLOOD PRESSURE: 84 MMHG | OXYGEN SATURATION: 100 %

## 2022-01-10 DIAGNOSIS — Z01.818 PREOP GENERAL PHYSICAL EXAM: Primary | ICD-10-CM

## 2022-01-10 DIAGNOSIS — E83.39 HYPOPHOSPHATEMIA: ICD-10-CM

## 2022-01-10 DIAGNOSIS — R13.10 DYSPHAGIA, UNSPECIFIED TYPE: ICD-10-CM

## 2022-01-10 DIAGNOSIS — I25.10 ATHEROSCLEROSIS OF CORONARY ARTERY OF NATIVE HEART, UNSPECIFIED VESSEL OR LESION TYPE, UNSPECIFIED WHETHER ANGINA PRESENT: ICD-10-CM

## 2022-01-10 DIAGNOSIS — C96.9 T-CELL OR NK-CELL NEOPLASM (H): ICD-10-CM

## 2022-01-10 PROCEDURE — 93000 ELECTROCARDIOGRAM COMPLETE: CPT | Performed by: FAMILY MEDICINE

## 2022-01-10 PROCEDURE — 99214 OFFICE O/P EST MOD 30 MIN: CPT | Performed by: FAMILY MEDICINE

## 2022-01-10 ASSESSMENT — MIFFLIN-ST. JEOR: SCORE: 1755.35

## 2022-01-10 ASSESSMENT — PAIN SCALES - GENERAL: PAINLEVEL: NO PAIN (0)

## 2022-01-10 NOTE — NURSING NOTE
"Chief Complaint   Patient presents with     Pre-Op Exam       Initial /84   Pulse 51   Temp (!) 96.6  F (35.9  C) (Tympanic)   Ht 1.842 m (6' 0.5\")   Wt 93.4 kg (206 lb)   SpO2 100%   BMI 27.55 kg/m   Estimated body mass index is 27.55 kg/m  as calculated from the following:    Height as of this encounter: 1.842 m (6' 0.5\").    Weight as of this encounter: 93.4 kg (206 lb).  Medication Reconciliation: complete    FABY Naranjo MA    "

## 2022-01-10 NOTE — PATIENT INSTRUCTIONS
Preparing for Your Surgery  Getting started  A nurse will call you to review your health history and instructions. They will give you an arrival time based on your scheduled surgery time. Please be ready to share:    Your doctor's clinic name and phone number    Your medical, surgical and anesthesia history    A list of allergies and sensitivities    A list of medicines, including herbal treatments and over-the-counter drugs    Whether the patient has a legal guardian (ask how to send us the papers in advance)  Please tell us if you're pregnant--or if there's any chance you might be pregnant. Some surgeries may injure a fetus (unborn baby), so they require a pregnancy test. Surgeries that are safe for a fetus don't always need a test, and you can choose whether to have one.   If you have a child who's having surgery, please ask for a copy of Preparing for Your Child's Surgery.    Preparing for surgery    Within 30 days of surgery: Have a pre-op exam (sometimes called an H&P, or History and Physical). This can be done at a clinic or pre-operative center.  ? If you're having a , you may not need this exam. Talk to your care team.    At your pre-op exam, talk to your care team about all medicines you take. If you need to stop any medicines before surgery, ask when to start taking them again.  ? We do this for your safety. Many medicines can make you bleed too much during surgery. Some change how well surgery (anesthesia) drugs work.    Call your insurance company to let them know you're having surgery. (If you don't have insurance, call [phone].)    Call your clinic if there's any change in your health. This includes signs of a cold or flu (sore throat, runny nose, cough, rash, fever). It also includes a scrape or scratch near the surgery site.    If you have questions on the day of surgery, call your hospital or surgery center.  COVID testing  You may need to be tested for COVID-19 before having  surgery. If so, we will give you instructions.  Eating and drinking guidelines  For your safety: Unless your surgeon tells you otherwise, follow the guidelines below.    Eat and drink as usual until 8 hours before surgery. After that, no food or milk.    Drink clear liquids until 2 hours before surgery. These are liquids you can see through, like water, Gatorade and Propel Water. You may also have black coffee and tea (no cream or milk).    Nothing by mouth within 2 hours of surgery. This includes gum, candy and breath mints.    If you drink alcohol: Stop drinking it the night before surgery.    If your care team tells you to take medicine on the morning of surgery, it's okay to take it with a sip of water.  Preventing infection    Shower or bathe the night before and morning of your surgery. Follow the instructions your clinic gave you. (If no instructions, use regular soap.)    Don't shave or clip hair near your surgery site. We'll remove the hair if needed.    Don't smoke or vape the morning of surgery. You may chew nicotine gum up to 2 hours before surgery. A nicotine patch is okay.  ? Note: Some surgeries require you to completely quit smoking and nicotine. Check with your surgeon.    Your care team will make every effort to keep you safe from infection. We will:  ? Clean our hands often with soap and water (or an alcohol-based hand rub).  ? Clean the skin at your surgery site with a special soap that kills germs.  ? Give you a special gown to keep you warm. (Cold raises the risk of infection.)  ? Wear special hair covers, masks, gowns and gloves during surgery.  ? Give antibiotic medicine, if prescribed. Not all surgeries need antibiotics.  What to bring on the day of surgery    Photo ID and insurance card    Copy of your health care directive, if you have one    Glasses and hearing aides (bring cases)  ? You can't wear contacts during surgery    Inhaler and eye drops, if you use them (tell us about these when  you arrive)    CPAP machine or breathing device, if you use them    A few personal items, if spending the night    If you have . . .  ? A pacemaker, ICD (cardiac defibrillator) or other implant: Bring the ID card.  ? An implanted stimulator: Bring the remote control.  ? A legal guardian: Bring a copy of the certified (court-stamped) guardianship papers.  Please remove any jewelry, including body piercings. Leave jewelry and other valuables at home.  If you're going home the day of surgery    You must have a responsible adult drive you home. They should stay with you overnight as well.    If you don't have someone to stay with you, and you aren't safe to go home alone, we may keep you overnight. Insurance often won't pay for this.  After surgery  If it's hard to control your pain or you need more pain medicine, please call your surgeon's office.  Questions?   If you have any questions for your care team, list them here: _________________________________________________________________________________________________________________________________________________________________________ ____________________________________ ____________________________________ ____________________________________  For informational purposes only. Not to replace the advice of your health care provider. Copyright   2003, 2019 [facility]. All rights reserved. Clinically reviewed by Marialuisa Cruz MD. [employer] 139038 - REV 07/21.

## 2022-01-10 NOTE — PROGRESS NOTES
[facility]  [address] [facility] [address]  Phone: [phone]  Primary Provider: Francisco Estrada  Pre-op Performing Provider: FRANCISCO ESTRADA      PREOPERATIVE EVALUATION:  Today's date: 1/10/2022    Hi Smith is a 66 year old male who presents for a preoperative evaluation.    Surgical Information:  Surgery/Procedure: endoscopy  Surgery Location: [facility]  Surgeon: unsure  Surgery Date: 01/13/22  Time of Surgery: be there by 1-1:30pm  Where patient plans to recover: At home with family  Fax number for surgical facility:     Type of Anesthesia Anticipated: to be determined    Assessment & Plan     The proposed surgical procedure is considered LOW risk.    Preop general physical exam  There is no contraindication for the surgery.  - EKG 12-lead complete w/read - Clinics    Dysphagia, unspecified type  Patient is going for diagnostic EGD to rule out malignancy    Atherosclerosis of coronary artery of native heart, unspecified vessel or lesion type, unspecified whether angina present  No current symptoms.  Follows with cardiology    T-cell or NK-cell neoplasm (H)  In remission  Follows with oncology/hematology    Hypophosphatemia  Patient requested refill on phosphorus pills  - phosphorus tablet 250 mg (PHOSPHA 250 NEUTRAL) 250 MG per tablet; Take 1 tablet (250 mg) by mouth 2 times daily         Risks and Recommendations:  The patient has the following additional risks and recommendations for perioperative complications:   - No identified additional risk factors other than previously addressed    Medication Instructions:   - aspirin: Discontinue aspirin 7-10 days prior to procedure to reduce bleeding risk. It should be resumed postoperatively.    - clopidrogel (Plavix), prasugrel (Effient), ticagrelor (Brilinta): Patient has a cardiac stent.  His cardiologist recommend stopping Plavix 7 to 10 days prior to the procedure.  - Beta Blockers: Continue taking on the day of  surgery.   - Statins: Continue taking on the day of surgery.     RECOMMENDATION:  APPROVAL GIVEN to proceed with proposed procedure, without further diagnostic evaluation.                      Subjective     HPI related to upcoming procedure:  Patient is here for preop evaluation for upcoming procedure.  Patient is scheduled for endoscopy.  He was seen by his gastroenterologist for evaluation of dysphagia for the past few months.  He reports trouble swallowing solids where food is taking longer time to go down.   Mother has esophageal cancer at age 73.   Patient denies any chest pain, shortness of breath, palpitations, dizziness.  He also denies any melena, hematochezia. No unintentional weight loss. Denies any history of GERD  He is on Plavix and asprin   Stopped both meds 12 days before the procedure pill box was out,  Patient is a scheduled for endoscopy EGD mainly to rule out esophageal malignancy per gastroenterology recommendation  Today he states he is feeling fairly well.  He continues to walk 2 miles per day without symptoms.  He did call his cardiologist to ask about Plavix and he was advised to stop Plavix 7 to 10 days prior to the procedure.    Preop Questions 1/10/2022   1. Have you ever had a heart attack or stroke? No   2. Have you ever had surgery on your heart or blood vessels, such as a stent placement, a coronary artery bypass, or surgery on an artery in your head, neck, heart, or legs? YES - bypass   3. Do you have chest pain with activity? No   4. Do you have a history of  heart failure? No   5. Do you currently have a cold, bronchitis or symptoms of other infection? No   6. Do you have a cough, shortness of breath, or wheezing? No   7. Do you or anyone in your family have previous history of blood clots? No   8. Do you or does anyone in your family have a serious bleeding problem such as prolonged bleeding following surgeries or cuts? No   9. Have you ever had problems with anemia or been told  to take iron pills? No   10. Have you had any abnormal blood loss such as black, tarry or bloody stools? No   11. Have you ever had a blood transfusion? No   12. Are you willing to have a blood transfusion if it is medically needed before, during, or after your surgery? Yes   13. Have you or any of your relatives ever had problems with anesthesia? No   14. Do you have sleep apnea, excessive snoring or daytime drowsiness? No   15. Do you have any artifical heart valves or other implanted medical devices like a pacemaker, defibrillator, or continuous glucose monitor? No   16. Do you have artificial joints? No   17. Are you allergic to latex? No       Health Care Directive:  Patient has a Health Care Directive on file      Preoperative Review of :   reviewed - controlled substances prescribed by other outside provider(s).      Status of Chronic Conditions:  See problem list for active medical problems.  Problems all longstanding and stable, except as noted/documented.  See ROS for pertinent symptoms related to these conditions.    CAD s/p  CABG  Patient with longstanding history of coronary artery disease s/p CABG in 2005.  He follows with cardiology at [facility].  He has been doing fairly well.  No current concerns.  He denies any chest pain shortness of breath or palpitation.  Per his cardiologist recommendation it is safe to stop Plavix 7 to 10 days prior to the procedure as he did not have stent in his last angiogram.  He had coronary procedure with balloon angioplasty alone without stenting in September 2021. He did not get a new stent.    Hypertension:   Currently normotensive.  --Metoprolol XL 25 mg  --Norvasc 5 mg      Hyperlipidemia:  Chronic stable  --Currently taking Lipitor 80 mg.      extranodal NK/T-cell lymphoma (nasal type)   diagnosed 07/17/2014  involved multiple cutaneous/subcutaneous sites at diagnosis.  He received chemotherapy, initially with SMILE (3 cycles), which did not control the  disease, followed in 01/2015 by ICE (2 cycles) with a very good response and then autologous stem cell transplant (03/04/2015) with BEAM conditioning. 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  currnetly in remission   Last appouitnmen with hematology  in 01/2021       Review of Systems  CONSTITUTIONAL: NEGATIVE for fever, chills, change in weight  INTEGUMENTARY/SKIN: NEGATIVE for worrisome rashes, moles or lesions  EYES: NEGATIVE for vision changes or irritation  ENT/MOUTH: NEGATIVE for ear, mouth and throat problems  RESP: NEGATIVE for significant cough or SOB  CV: NEGATIVE for chest pain, palpitations or peripheral edema  GI: NEGATIVE for nausea, abdominal pain, heartburn, or change in bowel habits  : NEGATIVE for frequency, dysuria, or hematuria  MUSCULOSKELETAL: NEGATIVE for significant arthralgias or myalgia  NEURO: NEGATIVE for weakness, dizziness or paresthesias  ENDOCRINE: NEGATIVE for temperature intolerance, skin/hair changes  HEME: NEGATIVE for bleeding problems  PSYCHIATRIC: NEGATIVE for changes in mood or affect    Patient Active Problem List    Diagnosis Date Noted     Seborrheic keratoses, inflamed 06/06/2021     Priority: Medium     AK (actinic keratosis) 10/23/2020     Priority: Medium     Telogen effluvium 10/23/2020     Priority: Medium     Basal cell carcinoma of truncal skin 03/22/2020     Priority: Medium     Hyperlipidemia 07/17/2019     Priority: Medium     Coronary atherosclerosis 07/17/2019     Priority: Medium     Overview:    - s/p CABG x 3 (LIMA to LAD, VG to OM2, and VG to PDA)   - 9/12/11 bare metal stent to the SVG to the OM2       - 5/24/12 -- stents       History of nonmelanoma skin cancer 10/22/2017     Priority: Medium     CAP (community acquired pneumonia) 04/17/2017     Priority: Medium     Hypophosphatemia 10/14/2016     Priority: Medium     Cellulitis 08/18/2016     Priority: Medium      Hypogammaglobulinemia (H) 09/24/2015     Priority: Medium     Status post bone marrow transplant (H) 09/24/2015     Priority: Medium     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  Document previously scanned on 6-18-15.  Validation form completed and sent to be scanned.  Code Status reflects choices in most recent ACP document.  Confirmed/documented designated decision maker(s).  Added by Rosemarie Nielson RN Advance Care Planning Liaison with [employer]             Other specified prophylactic or treatment measure 06/24/2015     Priority: Medium     T-cell or NK-cell neoplasm (H) 06/12/2015     Priority: Medium     Neoplasm of uncertain behavior of skin 06/11/2015     Priority: Medium     History of deep vein thrombosis 05/04/2015     Priority: Medium     Lymphoma, non-Hodgkin's (H) 02/26/2015     Priority: Medium     Fever 01/10/2015     Priority: Medium     Lymphoma (H) 10/28/2014     Priority: Medium     NHL (non-Hodgkin's lymphoma) (H) 09/26/2014     Priority: Medium     Bacteremia 09/09/2014     Priority: Medium     Diagnosis updated by automated process. Provider to review and confirm.       Anemia in neoplastic disease 08/18/2014     Priority: Medium     Nausea with vomiting 08/14/2014     Priority: Medium     Extranodal NK/T-cell lymphoma, nasal type (H) 07/23/2014     Priority: Medium     Hypertension 02/21/2008     Priority: Medium     Formatting of this note might be  different from the original.  Goal BP < 140/90;  CABG  Future meds: lisinopril  On toprol XL 25 mg daily  Last 1 Encounter BP Readings:   Date BP   7/10/2009 124/76     Lab Results   Component Value Date/Time     CREATININE 0.85 7/10/09 10:34 AM     POTASSIUM 4.5 7/10/09 10:34 AM     SODIUM 138 7/10/09 10:34 AM     Susana Lemus NP ....................  7/13/2009   12:42 PM        Past Medical History:   Diagnosis Date     Alcohol abuse     in remission x 20 yrs     CAD (coronary artery disease) of artery bypass graft     CABG 2005, hx of stents     Depression      DVT (deep venous thrombosis) (H) November, 2014    upper left arm after PICC line     Extranodal NK/T-cell lymphoma, nasal type (H) 7/23/2014     GERD (gastroesophageal reflux disease)      Hearing loss      HTN (hypertension)      Lymphoma (H)      Natural killer (NK) cell lymphoblastic lymphoma (H) 8/15/2014     S/P autologous bone marrow transplantation (H) 3/4/2015     Skin cancer     15 y ago.? basal cell     Tinnitus      VRE (vancomycin resistant enterococcus) culture positive      Past Surgical History:   Procedure Laterality Date     BYPASS GRAFT ARTERY CORONARY      2005     CYSTOSCOPY, TRANSURETHRAL RESECTION (TUR) TUMOR BLADDER, COMBINED N/A 12/29/2015    Procedure: COMBINED CYSTOSCOPY, TRANSURETHRAL RESECTION (TUR) TUMOR BLADDER;  Surgeon: Orville Boyle MD;  Location: [facility] OR     MOHS MICROGRAPHIC PROCEDURE  2000    nose     PICC INSERTION Left 9/26/2014    5fr DL Power PICC, 43cm (1cm external) in the L basilic vein w/ tip in the low SVC.     PICC INSERTION Left 10/28/2014    5fr DL Power PICC, 41cm (1cm external) in the L basilic vein w/ tip in the SVC RA junction.     PICC INSERTION Right 12/31/2014    5fr DL Power PICC, 39cm (1cm external) in the R medial brachial vein w/ tip in the low SVC.     Current Outpatient Medications   Medication Sig Dispense Refill     amLODIPine (NORVASC) 5 MG tablet Take 1 tablet (5 mg) by mouth  "daily 30 tablet 0     ASPIRIN PO Take 81 mg by mouth daily       atorvastatin (LIPITOR) 80 MG tablet Take 1 tablet (80 mg) by mouth daily 90 tablet 1     Biotin 1 MG CAPS Take 1,000 mcg by mouth       metoprolol succinate ER (TOPROL-XL) 25 MG 24 hr tablet Take 25 mg by mouth daily       nitroGLYcerin (NITROSTAT) 0.4 MG sublingual tablet Place 1 tablet (0.4 mg) under the tongue every 5 minutes as needed for chest pain 12 tablet 0     phosphorus tablet 250 mg (PHOSPHA 250 NEUTRAL) 250 MG per tablet Take 1 tablet (250 mg) by mouth 2 times daily 180 tablet 0     prasugrel (EFFIENT) 10 MG TABS tablet Take 10 mg by mouth daily       clopidogrel (PLAVIX) 75 MG tablet  (Patient not taking: Reported on 2021)         Allergies   Allergen Reactions     Nka [No Known Allergies]         Social History     Tobacco Use     Smoking status: Former Smoker     Packs/day: 1.00     Years: 20.00     Pack years: 20.00     Types: Cigarettes     Quit date: 1991     Years since quittin.9     Smokeless tobacco: Never Used   Substance Use Topics     Alcohol use: No     Family History   Problem Relation Age of Onset     Cancer Mother         esophageal     Cancer Father         prostate cancer, lung fibrosis     Heart Disease Father         Heart Disease     Hypertension Father      C.A.D. Sister      ALS Sister      Melanoma No family hx of      Skin Cancer No family hx of      History   Drug Use No         Objective     /84   Pulse 51   Temp (!) 96.6  F (35.9  C) (Tympanic)   Ht 1.842 m (6' 0.5\")   Wt 93.4 kg (206 lb)   SpO2 100%   BMI 27.55 kg/m      Physical Exam    GENERAL APPEARANCE: healthy, alert and no distress     EYES: EOMI,  PERRL     HENT: ear canals and TM's normal and nose and mouth without ulcers or lesions     NECK: no adenopathy, no asymmetry, masses, or scars and thyroid normal to palpation     RESP: lungs clear to auscultation - no rales, rhonchi or wheezes     CV: regular rates and rhythm, normal " S1 S2, no S3 or S4 and no murmur, click or rub     ABDOMEN:  soft, nontender, no HSM or masses and bowel sounds normal     MS: extremities normal- no gross deformities noted, no evidence of inflammation in joints, FROM in all extremities.     SKIN: no suspicious lesions or rashes     NEURO: Normal strength and tone, sensory exam grossly normal, mentation intact and speech normal     PSYCH: mentation appears normal. and affect normal/bright     LYMPHATICS: No cervical adenopathy    Recent Labs   Lab Test 09/16/21  1401 06/14/21  1522   HGB 14.1  --      --     140   POTASSIUM 3.8 3.7   CR 1.31* 1.26*      Wt Readings from Last 4 Encounters:   01/10/22 93.4 kg (206 lb)   09/16/21 91.6 kg (202 lb)   06/14/21 90.7 kg (200 lb)   01/17/20 90.3 kg (199 lb)       Diagnostics:  No labs were ordered during this visit.   EKG: sinus bradycardia, normal axis, normal intervals, no acute ST/T changes c/w ischemia, no LVH by voltage criteria    Revised Cardiac Risk Index (RCRI):  The patient has the following serious cardiovascular risks for perioperative complications:   - Coronary Artery Disease (MI, positive stress test, angina, Qs on EKG) = 1 point     RCRI Interpretation: 1 point: Class II (low risk - 0.9% complication rate)           Signed Electronically by: Tiffany Estrada MD  Copy of this evaluation report is provided to requesting physician.

## 2022-01-13 DIAGNOSIS — C86.00 EXTRANODAL NK/T-CELL LYMPHOMA, NASAL TYPE: ICD-10-CM

## 2022-01-13 DIAGNOSIS — D80.1 HYPOGAMMAGLOBULINEMIA (H): ICD-10-CM

## 2022-01-13 RX ORDER — NITROGLYCERIN 0.4 MG/1
0.4 TABLET SUBLINGUAL EVERY 5 MIN PRN
Qty: 12 TABLET | Refills: 0 | Status: SHIPPED | OUTPATIENT
Start: 2022-01-13

## 2022-04-18 ENCOUNTER — TRANSFERRED RECORDS (OUTPATIENT)
Dept: HEALTH INFORMATION MANAGEMENT | Facility: CLINIC | Age: 67
End: 2022-04-18
Payer: MEDICARE

## 2022-04-25 DIAGNOSIS — E83.39 HYPOPHOSPHATEMIA: ICD-10-CM

## 2022-04-25 NOTE — TELEPHONE ENCOUNTER
Routing refill request to provider for review/approval because:  Drug not on the [facility] refill protocol   Ryann Trujillo BSN, RN

## 2022-07-05 ENCOUNTER — VIRTUAL VISIT (OUTPATIENT)
Dept: FAMILY MEDICINE | Facility: CLINIC | Age: 67
End: 2022-07-05
Payer: MEDICARE

## 2022-07-05 DIAGNOSIS — M48.02 CERVICAL STENOSIS OF SPINAL CANAL: ICD-10-CM

## 2022-07-05 DIAGNOSIS — D47.Z9 OTHER SPECIFIED NEOPLASMS OF UNCERTAIN BEHAVIOR OF LYMPHOID, HEMATOPOIETIC AND RELATED TISSUE (H): ICD-10-CM

## 2022-07-05 DIAGNOSIS — R29.898 BILATERAL ARM WEAKNESS: Primary | ICD-10-CM

## 2022-07-05 DIAGNOSIS — G61.82 MULTIFOCAL MOTOR NEUROPATHY (H): ICD-10-CM

## 2022-07-05 DIAGNOSIS — D80.1 HYPOGAMMAGLOBULINEMIA (H): ICD-10-CM

## 2022-07-05 DIAGNOSIS — I25.118 CORONARY ARTERY DISEASE INVOLVING NATIVE HEART WITH OTHER FORM OF ANGINA PECTORIS, UNSPECIFIED VESSEL OR LESION TYPE (H): ICD-10-CM

## 2022-07-05 DIAGNOSIS — Z94.81 STATUS POST BONE MARROW TRANSPLANT (H): ICD-10-CM

## 2022-07-05 PROCEDURE — 99214 OFFICE O/P EST MOD 30 MIN: CPT | Mod: 95 | Performed by: NURSE PRACTITIONER

## 2022-07-05 NOTE — PROGRESS NOTES
Colton is a 67 year old who is being evaluated via a billable video visit.      How would you like to obtain your AVS? MyChart  If the video visit is dropped, the invitation should be resent by: Text to cell phone: [phone]   Will anyone else be joining your video visit? Yes: Wife- Ashley. How would they like to receive their invitation? Text to cell phone: [phone]          Assessment & Plan     Bilateral arm weakness  We did discuss concern for stroke and that I cannot rule out without imaging, could also be suspicious for TIA, though I would suspect his symptoms to have improved by this time.  Given that his symptoms are bilateral and no other neurological symptoms, concerning for autoimmune disease vs aseptic meningitis.  Given that he had no associated fevers or prior illness, would be more suspicious of autoimmune/neurodegenerative disorders, especially given strong family history.  Could also consider cervical spine impingement, but he had no associated trauma and onset was at rest.  He has been instructed to present to ED if symptoms return to rule out CVA, especially with history of CAD.  Will order labs and MRI imaging.  We discussed possible lyme's testing, though he has no known exposures or high risk activity.    - MR Brain w/o & w Contrast; Future  - MR Cervical Spine w/o Contrast; Future  - Anti Nuclear Ashley IgG by IFA with Reflex; Future  - Rheumatoid factor; Future  - Lupus Anticoagulant Panel; Future  - Erythrocyte sedimentation rate auto; Future  - CRP inflammation; Future  - CBC with platelets; Future  - Comprehensive metabolic panel; Future  - UA reflex to Microscopic and Culture; Future  - EMG; Future  - TSH with free T4 reflex; Future  - Vitamin B12; Future    Multifocal motor neuropathy (H)   - TSH with free T4 reflex; Future    Status post bone marrow transplant (H)      Hypogammaglobulinemia (H)      Coronary artery disease involving native heart with other form of angina pectoris,  "unspecified vessel or lesion type (H)      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    Weight as of 1/10/22: 93.4 kg (206 lb).           Return for Follow up, in person with PCP to evaluate symptoms.    MALENA Lyn CNP  [facility]    Sumit Segura is a 67 year old, presenting for the following health issues:  Musculoskeletal Problem (Arm pain, loss of strength, and small motor in hands)      HPI     Concern - Arm pain and loss of strength   Onset: Friday Evening   Description: Loss of strength is for fine motor skills, the right arm worst the left arm, Intensity: moderate  Progression of Symptoms:  improving  Accompanying Signs & Symptoms: Pt mention that the skin stomach is numb going along with loss of strength. The skin the forearm feel like is sun burnt, but is not.   Previous history of similar problem: No  Precipitating factors:        Worsened by: No   Alleviating factors:        Improved by: No   Therapies tried and outcome: None    Friday night watching TV- felt like arm went to sleep, hand got numb.  Saturday morning, right arm was numb and decreased fine motor skills, couldn't  sandwiches.  Later, left hand started to act up and gotten weak. Still not 100%. Forearms now feel sunburnt.    Stomach, skin feels really numb, noticed yesterday.    No fevers, wife is getting over COVID- Tests have been negative.    No skin changes or rashes.  No recent travel    NO changes in vision, speech, swallowing.      Sister passed away a few years from ALS    Daughter has Lupus-     Review of Systems   Constitutional, HEENT, cardiovascular, pulmonary, gi and gu systems are negative, except as otherwise noted.      Objective           Vitals:  No " vitals were obtained today due to virtual visit.    Physical Exam   GENERAL: Healthy, alert and no distress  EYES: Eyes grossly normal to inspection.  No discharge or erythema, or obvious scleral/conjunctival abnormalities.  RESP: No audible wheeze, cough, or visible cyanosis.  No visible retractions or increased work of breathing.    SKIN: Visible skin clear. No significant rash, abnormal pigmentation or lesions.  NEURO: Cranial nerves grossly intact.  Mentation and speech appropriate for age.  PSYCH: Mentation appears normal, affect normal/bright, judgement and insight intact, normal speech and appearance well-groomed.                Video-Visit Details    Video Start Time: 1708    Type of service:  Video Visit    Video End Time:1722    Originating Location (pt. Location): Home    Distant Location (provider location):  [facility]     Platform used for Video Visit: [employer]  ..

## 2022-07-06 ENCOUNTER — LAB (OUTPATIENT)
Dept: LAB | Facility: CLINIC | Age: 67
End: 2022-07-06
Payer: MEDICARE

## 2022-07-06 DIAGNOSIS — D47.Z9 OTHER SPECIFIED NEOPLASMS OF UNCERTAIN BEHAVIOR OF LYMPHOID, HEMATOPOIETIC AND RELATED TISSUE (H): ICD-10-CM

## 2022-07-06 DIAGNOSIS — G61.82 MULTIFOCAL MOTOR NEUROPATHY (H): ICD-10-CM

## 2022-07-06 DIAGNOSIS — R29.898 BILATERAL ARM WEAKNESS: ICD-10-CM

## 2022-07-06 LAB
ALBUMIN SERPL-MCNC: 4.1 G/DL (ref 3.4–5)
ALBUMIN UR-MCNC: 20 MG/DL
ALP SERPL-CCNC: 142 U/L (ref 40–150)
ALT SERPL W P-5'-P-CCNC: 42 U/L (ref 0–70)
ANION GAP SERPL CALCULATED.3IONS-SCNC: 7 MMOL/L (ref 3–14)
APPEARANCE UR: CLEAR
AST SERPL W P-5'-P-CCNC: 27 U/L (ref 0–45)
BACTERIA #/AREA URNS HPF: ABNORMAL /HPF
BILIRUB SERPL-MCNC: 0.7 MG/DL (ref 0.2–1.3)
BILIRUB UR QL STRIP: NEGATIVE
BUN SERPL-MCNC: 20 MG/DL (ref 7–30)
CALCIUM SERPL-MCNC: 9.2 MG/DL (ref 8.5–10.1)
CHLORIDE BLD-SCNC: 109 MMOL/L (ref 94–109)
CO2 SERPL-SCNC: 24 MMOL/L (ref 20–32)
COLOR UR AUTO: YELLOW
CREAT SERPL-MCNC: 1.16 MG/DL (ref 0.66–1.25)
CRP SERPL-MCNC: <2.9 MG/L (ref 0–8)
ERYTHROCYTE [DISTWIDTH] IN BLOOD BY AUTOMATED COUNT: 13.8 % (ref 10–15)
ERYTHROCYTE [SEDIMENTATION RATE] IN BLOOD BY WESTERGREN METHOD: 5 MM/HR (ref 0–20)
GFR SERPL CREATININE-BSD FRML MDRD: 69 ML/MIN/1.73M2
GLUCOSE BLD-MCNC: 105 MG/DL (ref 70–99)
GLUCOSE UR STRIP-MCNC: 150 MG/DL
HCT VFR BLD AUTO: 45.6 % (ref 40–53)
HGB BLD-MCNC: 14.8 G/DL (ref 13.3–17.7)
HGB UR QL STRIP: NEGATIVE
KETONES UR STRIP-MCNC: NEGATIVE MG/DL
LEUKOCYTE ESTERASE UR QL STRIP: NEGATIVE
MCH RBC QN AUTO: 29 PG (ref 26.5–33)
MCHC RBC AUTO-ENTMCNC: 32.5 G/DL (ref 31.5–36.5)
MCV RBC AUTO: 89 FL (ref 78–100)
NITRATE UR QL: NEGATIVE
PH UR STRIP: 5.5 [PH] (ref 5–7)
PLATELET # BLD AUTO: 223 10E3/UL (ref 150–450)
POTASSIUM BLD-SCNC: 3.7 MMOL/L (ref 3.4–5.3)
PROT SERPL-MCNC: 7.5 G/DL (ref 6.8–8.8)
RBC # BLD AUTO: 5.1 10E6/UL (ref 4.4–5.9)
RBC #/AREA URNS AUTO: ABNORMAL /HPF
SKIP: ABNORMAL
SODIUM SERPL-SCNC: 140 MMOL/L (ref 133–144)
SP GR UR STRIP: 1.02 (ref 1–1.03)
SQUAMOUS #/AREA URNS AUTO: ABNORMAL /LPF
TSH SERPL DL<=0.005 MIU/L-ACNC: 3.35 MU/L (ref 0.4–4)
UROBILINOGEN UR STRIP-MCNC: NORMAL MG/DL
VIT B12 SERPL-MCNC: 534 PG/ML (ref 232–1245)
WBC # BLD AUTO: 11.5 10E3/UL (ref 4–11)
WBC #/AREA URNS AUTO: ABNORMAL /HPF

## 2022-07-06 PROCEDURE — 86038 ANTINUCLEAR ANTIBODIES: CPT

## 2022-07-06 PROCEDURE — 85652 RBC SED RATE AUTOMATED: CPT

## 2022-07-06 PROCEDURE — 85613 RUSSELL VIPER VENOM DILUTED: CPT

## 2022-07-06 PROCEDURE — 85730 THROMBOPLASTIN TIME PARTIAL: CPT

## 2022-07-06 PROCEDURE — 36415 COLL VENOUS BLD VENIPUNCTURE: CPT

## 2022-07-06 PROCEDURE — 85027 COMPLETE CBC AUTOMATED: CPT

## 2022-07-06 PROCEDURE — 86431 RHEUMATOID FACTOR QUANT: CPT

## 2022-07-06 PROCEDURE — 84443 ASSAY THYROID STIM HORMONE: CPT

## 2022-07-06 PROCEDURE — 82607 VITAMIN B-12: CPT

## 2022-07-06 PROCEDURE — 85390 FIBRINOLYSINS SCREEN I&R: CPT | Performed by: PATHOLOGY

## 2022-07-06 PROCEDURE — 80053 COMPREHEN METABOLIC PANEL: CPT

## 2022-07-06 PROCEDURE — 81001 URINALYSIS AUTO W/SCOPE: CPT

## 2022-07-06 PROCEDURE — 86140 C-REACTIVE PROTEIN: CPT

## 2022-07-07 ENCOUNTER — HOSPITAL ENCOUNTER (OUTPATIENT)
Dept: MRI IMAGING | Facility: CLINIC | Age: 67
Discharge: HOME | End: 2022-07-07
Attending: NURSE PRACTITIONER
Payer: MEDICARE

## 2022-07-07 DIAGNOSIS — R29.898 BILATERAL ARM WEAKNESS: ICD-10-CM

## 2022-07-07 LAB
ANA SER QL IF: NEGATIVE
DRVVT SCREEN RATIO: 0.93
INR PPP: 0.97 (ref 0.85–1.15)
LA PPP-IMP: NEGATIVE
LUPUS INTERPRETATION: NORMAL
PTT RATIO: 1.08
RHEUMATOID FACT SER NEPH-ACNC: <6 IU/ML
THROMBIN TIME: 18 SECONDS (ref 13–19)

## 2022-07-07 PROCEDURE — G1010 CDSM STANSON: HCPCS

## 2022-07-07 PROCEDURE — 72141 MRI NECK SPINE W/O DYE: CPT | Mod: MF

## 2022-07-07 PROCEDURE — A9585 GADOBUTROL INJECTION: HCPCS | Performed by: NURSE PRACTITIONER

## 2022-07-07 PROCEDURE — 255N000002 HC RX 255 OP 636: Performed by: NURSE PRACTITIONER

## 2022-07-07 RX ORDER — GADOBUTROL 604.72 MG/ML
9 INJECTION INTRAVENOUS ONCE
Status: COMPLETED | OUTPATIENT
Start: 2022-07-07 | End: 2022-07-07

## 2022-07-07 RX ADMIN — GADOBUTROL 9 ML: 604.72 INJECTION INTRAVENOUS at 15:50

## 2022-07-08 ENCOUNTER — MYC MEDICAL ADVICE (OUTPATIENT)
Dept: FAMILY MEDICINE | Facility: CLINIC | Age: 67
End: 2022-07-08

## 2022-07-08 DIAGNOSIS — R29.898 BILATERAL ARM WEAKNESS: ICD-10-CM

## 2022-07-08 DIAGNOSIS — M48.02 CERVICAL STENOSIS OF SPINAL CANAL: Primary | ICD-10-CM

## 2022-07-08 RX ORDER — METHYLPREDNISOLONE 4 MG
TABLET, DOSE PACK ORAL
Qty: 21 TABLET | Refills: 0 | Status: SHIPPED | OUTPATIENT
Start: 2022-07-08 | End: 2022-07-21

## 2022-07-11 ENCOUNTER — OFFICE VISIT (OUTPATIENT)
Dept: NEUROSURGERY | Facility: CLINIC | Age: 67
End: 2022-07-11
Attending: NURSE PRACTITIONER
Payer: MEDICARE

## 2022-07-11 VITALS
SYSTOLIC BLOOD PRESSURE: 153 MMHG | DIASTOLIC BLOOD PRESSURE: 91 MMHG | WEIGHT: 198 LBS | HEART RATE: 52 BPM | BODY MASS INDEX: 26.24 KG/M2 | OXYGEN SATURATION: 99 % | HEIGHT: 73 IN

## 2022-07-11 DIAGNOSIS — M48.02 CERVICAL STENOSIS OF SPINAL CANAL: Primary | ICD-10-CM

## 2022-07-11 PROCEDURE — 99204 OFFICE O/P NEW MOD 45 MIN: CPT | Performed by: NEUROLOGICAL SURGERY

## 2022-07-11 ASSESSMENT — PAIN SCALES - GENERAL: PAINLEVEL: NO PAIN (1)

## 2022-07-11 NOTE — LETTER
7/11/2022         RE: Hi Smith  [address] [facility] [id number]        Dear Colleague,    Thank you for referring your patient, Hi Smith, to the [facility]. Please see a copy of my visit note below.    I was asked by Dr. Valdez to see this patient in consultation    67 year old male with C4-6 stenosis.  2 weeks ago, had new onset throbbing pain in the forearms and hands, with marked weakness and clumsiness in the hands.  Has also noted numbness in the left distal leg.  Left shoulder pain.  Symptoms markedly improved in the last week, now ~80% resolved.  MR Cervical with C4-6 moderate stenosis and severe foraminal stenosis.       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 basal cell     Tinnitus      VRE (vancomycin resistant enterococcus) culture positive      Past Surgical History:   Procedure Laterality Date     BYPASS GRAFT ARTERY CORONARY      2005     CYSTOSCOPY, TRANSURETHRAL RESECTION (TUR) TUMOR BLADDER, COMBINED N/A 12/29/2015    Procedure: COMBINED CYSTOSCOPY, TRANSURETHRAL RESECTION (TUR) TUMOR BLADDER;  Surgeon: Orville Boyle MD;  Location: [facility] OR     MOHS MICROGRAPHIC PROCEDURE  2000    nose     PICC INSERTION Left 9/26/2014    5fr DL Power PICC, 43cm (1cm external) in the L basilic vein w/ tip in the low SVC.     PICC INSERTION Left 10/28/2014    5fr DL Power PICC, 41cm (1cm external) in the L  "basilic vein w/ tip in the SVC RA junction.     PICC INSERTION Right 2014    5fr DL Power PICC, 39cm (1cm external) in the R medial brachial vein w/ tip in the low SVC.     Social History     Socioeconomic History     Marital status:      Spouse name: Not on file     Number of children: Not on file     Years of education: Not on file     Highest education level: Not on file   Occupational History     Not on file   Tobacco Use     Smoking status: Former Smoker     Packs/day: 1.00     Years: 20.00     Pack years: 20.00     Types: Cigarettes     Quit date: 1991     Years since quittin.4     Smokeless tobacco: Never Used   Vaping Use     Vaping Use: Never used   Substance and Sexual Activity     Alcohol use: No     Drug use: No     Sexual activity: Not on file   Other Topics Concern     Parent/sibling w/ CABG, MI or angioplasty before 65F 55M? Not Asked   Social History Narrative    Lives in [place] with wife    3 adult children    5 grandkids    Retired from [employer]     Social Determinants of Health     Financial Resource Strain: Not on file   Food Insecurity: Not on file   Transportation Needs: Not on file   Physical Activity: Not on file   Stress: Not on file   Social Connections: Not on file   Intimate Partner Violence: Not on file   Housing Stability: Not on file     Family History   Problem Relation Age of Onset     Cancer Mother         esophageal     Cancer Father         prostate cancer, lung fibrosis     Heart Disease Father         Heart Disease     Hypertension Father      C.A.D. Sister      ALS Sister      Melanoma No family hx of      Skin Cancer No family hx of         ROS: 10 point ROS neg other than the symptoms noted above in the HPI.    Physical Exam  BP (!) 153/91   Pulse 52   Ht 6' 0.5\" (1.842 m)   Wt 198 lb (89.8 kg)   SpO2 99%   BMI 26.48 kg/m    HEENT:  Normocephalic, atraumatic.  PERRLA.  EOM s intact.  Visual fields full to gross exam  Neck:  " Supple, non-tender, without lymphadenopathy.  Heart:  No peripheral edema  Lungs:  No SOB  Abdomen:  Non-distended.   Skin:  Warm and dry.  Extremities:  No edema, cyanosis or clubbing.  Psychiatric:  No apparent distress  Musculoskeletal:  Normal bulk and tone    NEUROLOGICAL EXAMINATION:     Mental status:  Alert and Oriented x 3, speech is fluent.  Cranial nerves:  II-XII intact.   Motor:    Shoulder Abduction:  Right:  5/5   Left:  5/5  Biceps:                      Right:  5/5   Left:  5/5  Triceps:                     Right:  5/5   Left:  5/5  Wrist Extensors:       Right:  5/5   Left:  5/5  Wrist Flexors:           Right:  5/5   Left:  5/5  interosseus :            Right:  5/5   Left:  5/5  Hip Flexion:                Right: 5/5  Left:  5/5  Quadriceps:             Right:  5/5  Left:  5/5  Hamstrings:             Right:  5/5  Left:  5/5  Gastroc Soleus:        Right:  5/5  Left:  5/5  Tib/Ant:                      Right:  5/5  Left:  5/5  EHL:                     Right:  5/5  Left:  5/5  Sensation:  Intact  Reflexes:  Negative Babinski.  Negative Clonus.  Negative Gallo's.  Coordination:  Smooth finger to nose testing.   Negative pronator drift.  Smooth tandem walking.    A/P:  67 year old male with C4-6 stenosis    I had a discussion with the patient, reviewing the history, symptoms, and imaging  Will start course of PT/traction  Cervical KIRIT  If symptoms persist or worsen, he may need to consider C4-6 ACDF         Again, thank you for allowing me to participate in the care of your patient.        Sincerely,        Juni Garza MD

## 2022-07-11 NOTE — TELEPHONE ENCOUNTER
Patient is being seen in Neuro Surgery Clinic 7/11/22  Has an EMG on 7/26/22 and then sees Dr Mora on 12/2/22.  They are wondering if the Neurologist Clinic can wait until 12/2.  Please advise.  Tamie Canales RN  [facility]

## 2022-07-11 NOTE — PROGRESS NOTES
I was asked by Dr. Valdez to see this patient in consultation    67 year old male with C4-6 stenosis.  2 weeks ago, had new onset throbbing pain in the forearms and hands, with marked weakness and clumsiness in the hands.  Has also noted numbness in the left distal leg.  Left shoulder pain.  Symptoms markedly improved in the last week, now ~80% resolved.  MR Cervical with C4-6 moderate stenosis and severe foraminal stenosis.       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 basal cell     Tinnitus      VRE (vancomycin resistant enterococcus) culture positive      Past Surgical History:   Procedure Laterality Date     BYPASS GRAFT ARTERY CORONARY      2005     CYSTOSCOPY, TRANSURETHRAL RESECTION (TUR) TUMOR BLADDER, COMBINED N/A 12/29/2015    Procedure: COMBINED CYSTOSCOPY, TRANSURETHRAL RESECTION (TUR) TUMOR BLADDER;  Surgeon: Orville Boyle MD;  Location: [facility] OR     MOHS MICROGRAPHIC PROCEDURE  2000    nose     PICC INSERTION Left 9/26/2014    5fr DL Power PICC, 43cm (1cm external) in the L basilic vein w/ tip in the low SVC.     PICC INSERTION Left 10/28/2014    5fr DL Power PICC, 41cm (1cm external) in the L basilic vein w/ tip in the SVC RA junction.     PICC INSERTION Right 12/31/2014    5fr DL Power PICC, 39cm (1cm external) in the R medial brachial vein w/ tip in the low SVC.     Social History     Socioeconomic History     Marital status:      Spouse  "name: Not on file     Number of children: Not on file     Years of education: Not on file     Highest education level: Not on file   Occupational History     Not on file   Tobacco Use     Smoking status: Former Smoker     Packs/day: 1.00     Years: 20.00     Pack years: 20.00     Types: Cigarettes     Quit date: 1991     Years since quittin.4     Smokeless tobacco: Never Used   Vaping Use     Vaping Use: Never used   Substance and Sexual Activity     Alcohol use: No     Drug use: No     Sexual activity: Not on file   Other Topics Concern     Parent/sibling w/ CABG, MI or angioplasty before 65F 55M? Not Asked   Social History Narrative    Lives in [place] with wife    3 adult children    5 grandkids    Retired from [employer]     Social Determinants of Health     Financial Resource Strain: Not on file   Food Insecurity: Not on file   Transportation Needs: Not on file   Physical Activity: Not on file   Stress: Not on file   Social Connections: Not on file   Intimate Partner Violence: Not on file   Housing Stability: Not on file     Family History   Problem Relation Age of Onset     Cancer Mother         esophageal     Cancer Father         prostate cancer, lung fibrosis     Heart Disease Father         Heart Disease     Hypertension Father      C.A.D. Sister      ALS Sister      Melanoma No family hx of      Skin Cancer No family hx of         ROS: 10 point ROS neg other than the symptoms noted above in the HPI.    Physical Exam  BP (!) 153/91   Pulse 52   Ht 6' 0.5\" (1.842 m)   Wt 198 lb (89.8 kg)   SpO2 99%   BMI 26.48 kg/m    HEENT:  Normocephalic, atraumatic.  PERRLA.  EOM s intact.  Visual fields full to gross exam  Neck:  Supple, non-tender, without lymphadenopathy.  Heart:  No peripheral edema  Lungs:  No SOB  Abdomen:  Non-distended.   Skin:  Warm and dry.  Extremities:  No edema, cyanosis or clubbing.  Psychiatric:  No apparent distress  Musculoskeletal:  Normal bulk and " tone    NEUROLOGICAL EXAMINATION:     Mental status:  Alert and Oriented x 3, speech is fluent.  Cranial nerves:  II-XII intact.   Motor:    Shoulder Abduction:  Right:  5/5   Left:  5/5  Biceps:                      Right:  5/5   Left:  5/5  Triceps:                     Right:  5/5   Left:  5/5  Wrist Extensors:       Right:  5/5   Left:  5/5  Wrist Flexors:           Right:  5/5   Left:  5/5  interosseus :            Right:  5/5   Left:  5/5  Hip Flexion:                Right: 5/5  Left:  5/5  Quadriceps:             Right:  5/5  Left:  5/5  Hamstrings:             Right:  5/5  Left:  5/5  Gastroc Soleus:        Right:  5/5  Left:  5/5  Tib/Ant:                      Right:  5/5  Left:  5/5  EHL:                     Right:  5/5  Left:  5/5  Sensation:  Intact  Reflexes:  Negative Babinski.  Negative Clonus.  Negative Gallo's.  Coordination:  Smooth finger to nose testing.   Negative pronator drift.  Smooth tandem walking.    A/P:  67 year old male with C4-6 stenosis    I had a discussion with the patient, reviewing the history, symptoms, and imaging  Will start course of PT/traction  Cervical KIRIT  If symptoms persist or worsen, he may need to consider C4-6 ACDF

## 2022-07-11 NOTE — PATIENT INSTRUCTIONS
Patient Next Steps:    Order placed for epidural steroid injection  The steroid can take 10-14 days to reach max effect  Please call our clinic if symptoms persist after this timeframe.  You can call [facility] to schedule your injection: [phone]      Please call us if you have any further questions or concerns.    [facility]   Phone: [phone]  Fax: [phone]

## 2022-07-11 NOTE — NURSING NOTE
"Hi Smith is a 67 year old male who presents for:  Chief Complaint   Patient presents with     Consult     Cervical        Initial Vitals:  BP (!) 153/91   Pulse 52   Ht 6' 0.5\" (1.842 m)   Wt 198 lb (89.8 kg)   SpO2 99%   BMI 26.48 kg/m   Estimated body mass index is 26.48 kg/m  as calculated from the following:    Height as of this encounter: 6' 0.5\" (1.842 m).    Weight as of this encounter: 198 lb (89.8 kg).. Body surface area is 2.14 meters squared. BP completed using cuff size: large  No Pain (1)    Nursing Comments:     Dm Gutierrez    "

## 2022-07-18 DIAGNOSIS — E83.39 HYPOPHOSPHATEMIA: ICD-10-CM

## 2022-07-18 RX ORDER — DIBASIC SODIUM PHOSPHATE, MONOBASIC POTASSIUM PHOSPHATE AND MONOBASIC SODIUM PHOSPHATE 852; 155; 130 MG/1; MG/1; MG/1
TABLET ORAL
Qty: 180 TABLET | Refills: 0 | OUTPATIENT
Start: 2022-07-18

## 2022-07-21 RX ORDER — METHYLPREDNISOLONE 4 MG
TABLET, DOSE PACK ORAL
Qty: 21 TABLET | Refills: 0 | Status: SHIPPED | OUTPATIENT
Start: 2022-07-21 | End: 2022-10-24

## 2022-07-21 RX ORDER — METHOCARBAMOL 500 MG/1
500 TABLET, FILM COATED ORAL 4 TIMES DAILY PRN
Qty: 60 TABLET | Refills: 0 | Status: SHIPPED | OUTPATIENT
Start: 2022-07-21 | End: 2022-10-24

## 2022-07-21 NOTE — TELEPHONE ENCOUNTER
"Estephania-please review update from patient and may close encounter.    \"Donis Best,  I met with Dr. Juni Garza from the Neurology Clinic and he went over the recent MRI.  He  explained the symptoms I was experiencing were classic examples of my cervical stenosis.  Numbness and weakness of arms and hands. He assured me I did not have ALS but was only experiencing the effects of the stenosis.  This was obvious from the MRI.  Since the visit  the numbness and weakness of my hands have improved greatly. Right hand 100% and the left close to it.  I feel confident Dr. Garza's evaluation of my symptoms are the results of the stenosis.  For this reason I am going to cancel the EMG  but still partake in the PT. and the cervical KIRIT procedure.  \"    Thank you!  TANISHA hSane, RN  [facility]    "

## 2022-07-21 NOTE — TELEPHONE ENCOUNTER
Estephania- see pts latest message.    1. Requesting pain med rx to get him to his steroid injection on 8/9.     2. Asking for steroid pack refill    LOUIS TalbertN RN  [facility]

## 2022-07-21 NOTE — TELEPHONE ENCOUNTER
Writer responded via [employer].    LOUIS ShaneN, RN  [facility]

## 2022-07-22 ENCOUNTER — MYC REFILL (OUTPATIENT)
Dept: FAMILY MEDICINE | Facility: CLINIC | Age: 67
End: 2022-07-22

## 2022-07-22 DIAGNOSIS — E83.39 HYPOPHOSPHATEMIA: ICD-10-CM

## 2022-07-31 ENCOUNTER — HEALTH MAINTENANCE LETTER (OUTPATIENT)
Age: 67
End: 2022-07-31

## 2022-08-02 ENCOUNTER — THERAPY VISIT (OUTPATIENT)
Dept: PHYSICAL THERAPY | Facility: CLINIC | Age: 67
End: 2022-08-02
Attending: NURSE PRACTITIONER
Payer: MEDICARE

## 2022-08-02 DIAGNOSIS — M48.02 CERVICAL STENOSIS OF SPINAL CANAL: ICD-10-CM

## 2022-08-02 DIAGNOSIS — R29.898 BILATERAL ARM WEAKNESS: ICD-10-CM

## 2022-08-02 PROCEDURE — 97140 MANUAL THERAPY 1/> REGIONS: CPT | Mod: GP | Performed by: PHYSICAL THERAPIST

## 2022-08-02 PROCEDURE — 97161 PT EVAL LOW COMPLEX 20 MIN: CPT | Mod: GP | Performed by: PHYSICAL THERAPIST

## 2022-08-02 PROCEDURE — 97110 THERAPEUTIC EXERCISES: CPT | Mod: GP | Performed by: PHYSICAL THERAPIST

## 2022-08-02 NOTE — PROGRESS NOTES
[facility]    OUTPATIENT Physical Therapy ORTHOPEDIC EVALUATION  PLAN OF TREATMENT FOR OUTPATIENT REHABILITATION  (COMPLETE FOR INITIAL CLAIMS ONLY)  Patient's Last Name, First Name, M.I.  YOB: 1955  Hi Smith    Provider s Name:  SHANNON [facility]   Medical Record No.  [id number]   Start of Care Date:  08/02/22   Onset Date:    (Mid July,2022)   Type:     _X__PT   ___OT Medical Diagnosis:    Encounter Diagnoses   Name Primary?    Bilateral arm weakness     Cervical stenosis of spinal canal         Treatment Diagnosis:  Cervical stenosis of spinal canal        Goals:     08/02/22 0500   Body Part   Goals listed below are for Neck   Goal #1   Goal #1 posture/body mechanics   Previous Functional Level Patient reports fair posture and proper body mechanics   Current Functional Level Poor posture/body mechanics   STG Target Performance Patient able to demonstrate good posture and body mechanics when prompted   Rationale to prevent neck/back pain and avoid injury when lifting/carrying and performing tasks requiring bending   Due Date 08/23/22   LTG Target Performance Patient able to demonstrate good posture and body mechanics without prompting   Rationale to prevent neck/back pain and avoid injury when lifting/carrying and performing tasks requiring bending   Due Date 09/27/22   Goal #2   Goal #2 headaches   Previous Functional Level No headaches   Current Functional Level Headache frequency per week is   Performance Level 1   STG Target Performance Return to previous frequency of headaches which is   Performance Level none   Due Date 09/27/22       Therapy Frequency:  1x/week  Predicted Duration of Therapy Intervention:  8 weeks    Lamberto Gilbert, PT                 I CERTIFY THE NEED FOR THESE SERVICES FURNISHED UNDER        THIS PLAN OF TREATMENT AND WHILE UNDER MY CARE      (Physician co-signature of this document indicates review and certification of the therapy plan).                     Certification Date From:  08/02/22   Certification Date To:  09/27/22    Referring Provider:  Estephania Valdez    Initial Assessment        See Epic Evaluation SOC Date: 08/02/22

## 2022-08-02 NOTE — PROGRESS NOTES
Physical Therapy Initial Evaluation  Subjective:  The history is provided by the patient. No  was used.   Patient Health History  Hi Smith being seen for Weakness and ache in arm at times.     Problem began: 7/1/2022.   Problem occurred: Just cameon. No injury orreason i am aware of   Pain is reported as 1/10 on pain scale.  General health as reported by patient is good.  Pertinent medical history includes: other, cancer and heart problems. Other medical history details: Weakness in arm.   Red flags:  None as reported by patient.  Medical allergies: none.   Surgeries include:  Heart surgery.    Current medications:  Cardiac medication and muscle relaxants.    Current occupation is retired.   Primary job tasks include:  Other.   Other job/home tasks details: Normal activities.                Therapist Generated HPI Evaluation  Problem details: Pt comes to therapy today for neck pain and bilateral arm and hand weakness starting in early July with rapid progression initially. Since then symptoms have been slowly improving and thankfully no longer having any pain but some ongoing weakness in his hands left>right. Patient is right hand dominant. Denies any N/T into arms or hands and really just weakness at this point. Goals are to prevent it from happening again and to improve hand strength/dexterity..         Type of problem:  Cervical spine.    This is a new condition.  Condition occurred with:  Insidious onset.  Where condition occurred: at home.    Pain is described as aching and is intermittent.  Pain radiates to:  Hand left and hand right. Pain is worse during the night.  Since onset symptoms are gradually worsening.  Associated symptoms:  Tingling and numbness. Symptoms are exacerbated by lying down and rotating head  and relieved by activity/movement.  Special tests included:  MRI (central stenosis C4-5, C5-6, right foraminal stenosis C4-5, C5-6, Left foraminal stenosis C4-5,  C5-6).    Work activity restrictions: retired.  Barriers include:  None as reported by patient.                         Objective:  Standing Alignment:    Cervical/Thoracic:  Forward head  Shoulder/UE:  Rounded shoulders                                  Cervical/Thoracic Evaluation    AROM:  AROM Cervical:    Flexion:            44  Extension:       35  Rotation:         Left: 55     Right: 55  Side Bend:      Left: 25     Right:  25    Strength: 5/5 all planes, pain free with AROM to above degrees.   Headaches: none  Cervical Myotomes:    C1-2 (Neck Flex): Left:  5    Right: 5  C3 (neck side bend): Left: 5    Right: 5  C4 (shrug):  Left: 5    Right: 5  C5 (Deltoid):  Left: 5    Right: 5  C6 (Biceps):  Left: 5    Right: 5  C7 (Triceps):  Left: 5    Right: 5  C8 (Thumb Ext): Left: 5    Right: 5  T1 (Intrinsics): Left: 5    Right: 5    Neural Tension:    Left side:  Ulnar and Median positive.  Right side:  Ulnar and Median positive.  Cervical Dermatomes:  normal                    Cervical Palpation:  normal      Functional Tests:  Core strength and proprioception spine wnl: spurling negative on R, maybe mild symptoms with testing on left, not conclusive today.      Spinal Segmental Conclusions:    Level:  Hypo at C4, C5 and C7      Cord Sign:  not assessed         Shoulder Evaluation:  ROM:  AROM:  normal                                  Strength:  normal                          Palpation:  normal               Hand/Finger Evaluation  ROM:  Strength: WNL right (gross  strength moarkedly decreased on Left hand compared to Right)                                                    General     ROS    Assessment/Plan:    Patient is a 67 year old male with cervical complaints.    Patient has the following significant findings with corresponding treatment plan.                Diagnosis 1:  Cervical stenosis of spinal canal  Decreased ROM/flexibility - manual therapy and therapeutic exercise  Decreased joint mobility -  manual therapy and therapeutic exercise  Decreased strength - therapeutic exercise and therapeutic activities  Impaired muscle performance - neuro re-education  Impaired posture - neuro re-education    Therapy Evaluation Codes:   1) History comprised of:   Personal factors that impact the plan of care:      None.    Comorbidity factors that impact the plan of care are:      Cancer and Heart problems.     Medications impacting care: Cardiac.  2) Examination of Body Systems comprised of:   Body structures and functions that impact the plan of care:      Cervical spine.   Activity limitations that impact the plan of care are:      Grasping and Reading/Computer work.  3) Clinical presentation characteristics are:   Stable/Uncomplicated.  4) Decision-Making    Low complexity using standardized patient assessment instrument and/or measureable assessment of functional outcome.  Cumulative Therapy Evaluation is: Low complexity.    Previous and current functional limitations:  (See Goal Flow Sheet for this information)    Short term and Long term goals: (See Goal Flow Sheet for this information)     Communication ability:  Patient appears to be able to clearly communicate and understand verbal and written communication and follow directions correctly.  Treatment Explanation - The following has been discussed with the patient:   RX ordered/plan of care  Anticipated outcomes  Possible risks and side effects  This patient would benefit from PT intervention to resume normal activities.   Rehab potential is good.    Frequency:  1 X week, once daily  Duration:  for 8 weeks  Discharge Plan:  Achieve all LTG.  Independent in home treatment program.  Reach maximal therapeutic benefit.    Please refer to the daily flowsheet for treatment today, total treatment time and time spent performing 1:1 timed codes.

## 2022-09-22 NOTE — PROGRESS NOTES
Subjective:    Patient Health History  Hi Smith being seen for Weakness and ache in arm at times.     Problem began: 7/1/2022.   Problem occurred: Just cameon. No injury orreason i am aware of   Pain is reported as 1/10 on pain scale.  General health as reported by patient is good.  Pertinent medical history includes: other. Other medical history details: Weakness in arm.     Medical allergies: none.   Surgeries include:  Heart surgery.    Current medications:  Cardiac medication and muscle relaxants.       Primary job tasks include:  Other.   Other job/home tasks details: Normal activities.                Physical Exam                    Objective:  System    Physical Exam    General     ROS    Assessment/Plan:    DISCHARGE REPORT    Progress reporting period is from 8/2/22 to 8/2/22.       SUBJECTIVE   Subjective: see IE     Current Pain level: 0/10.     Changes in function:  unknown  Adverse reaction to treatment or activity: None    OBJECTIVE  Changes noted in objective findings:  Patient has failed to return to therapy so current objective findings are unknown.  Objective: see IE     ASSESSMENT/PLAN  Updated problem list and treatment plan: Diagnosis 1:  Cervical stenosis of spinal canal  STG/LTGs have been met or progress has been made towards goals:  unknown  Assessment of Progress: The patient has not returned to therapy. Current status is unknown.  Self Management Plans:  Patient has been instructed in a home treatment program.  Patient  has been instructed in self management of symptoms.  Hi continues to require the following intervention to meet STG and LTG's:  unknown    Recommendations:  This patient is considered to be discharged from therapy and continue their home treatment program.    Please refer to the daily flowsheet for treatment today, total treatment time and time spent performing 1:1 timed codes.

## 2022-10-15 ENCOUNTER — HEALTH MAINTENANCE LETTER (OUTPATIENT)
Age: 67
End: 2022-10-15

## 2022-10-24 ENCOUNTER — MYC REFILL (OUTPATIENT)
Dept: FAMILY MEDICINE | Facility: CLINIC | Age: 67
End: 2022-10-24

## 2022-10-24 DIAGNOSIS — E83.39 HYPOPHOSPHATEMIA: ICD-10-CM

## 2022-12-13 ENCOUNTER — OFFICE VISIT (OUTPATIENT)
Dept: DERMATOLOGY | Facility: CLINIC | Age: 67
End: 2022-12-13
Payer: MEDICARE

## 2022-12-13 DIAGNOSIS — L57.0 ACTINIC KERATOSIS: ICD-10-CM

## 2022-12-13 DIAGNOSIS — L81.9 HYPERPIGMENTATION: Primary | ICD-10-CM

## 2022-12-13 DIAGNOSIS — L82.0 SEBORRHEIC KERATOSES, INFLAMED: ICD-10-CM

## 2022-12-13 PROCEDURE — 17000 DESTRUCT PREMALG LESION: CPT | Mod: XS | Performed by: DERMATOLOGY

## 2022-12-13 PROCEDURE — 99213 OFFICE O/P EST LOW 20 MIN: CPT | Mod: 25 | Performed by: DERMATOLOGY

## 2022-12-13 PROCEDURE — 17110 DESTRUCTION B9 LES UP TO 14: CPT | Mod: GC | Performed by: DERMATOLOGY

## 2022-12-13 RX ORDER — CLOPIDOGREL BISULFATE 75 MG/1
75 TABLET ORAL DAILY
COMMUNITY
Start: 2022-10-24 | End: 2023-07-30

## 2022-12-13 RX ORDER — HYDROQUINONE 40 MG/G
CREAM TOPICAL
Qty: 30 G | Refills: 1 | Status: SHIPPED | OUTPATIENT
Start: 2022-12-13 | End: 2023-07-30

## 2022-12-13 ASSESSMENT — PAIN SCALES - GENERAL: PAINLEVEL: NO PAIN (0)

## 2022-12-13 NOTE — LETTER
12/13/2022       RE: Hi Smith  [id number] [facility] [id number]     Dear Colleague,    Thank you for referring your patient, Hi Smith, to the [facility] [place] at [facility]. Please see a copy of my visit note below.    Formerly [facility] Dermatology Note  Encounter Date: Dec 13, 2022  Office Visit     Dermatology Problem List:  1. 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              - declared in remission April 2017 by Onc  2. NMSC (BCC), s/p MMS in 2000.  3. Non-healing ulcers, bilateral LEs and left forearm, resolved              - Bacterial culture 3/2016 with normal skin giuliana.              - Bx obtained 3/31/2016 for H&E, sterile culture, flow cytometry, and donor chimerism studies, and if possible TCR gene rearrangement studies, suggested NK-Tcell lymphoma  4. BCC, left anterior shoulder, s/p bx 11/15/2019 with excision shortly after  5. Telogen effluvium - minoxidil 5% foam  6. AK, s/p cryo 12/2022  7. Hyperpigmented patch, L dorsal hand  Current tx: hydroquinone cream 4%, Rutin/Vitamin C supplements for bruising     ____________________________________________    Assessment & Plan:     #Seborrheic Keratoses, Cherry angiomas, solar lentigo, and benign nevi    - Reassured of benign etiology  - Lesions of concern on his scalp and neck consistent with seborrheic keratoses    # Hyperpigmented patch on the left dorsal hand     Most likely 2/2 hemosiderin deposition as he bruises frequently on this hand.    - Plan to try hydroquinone 4% cream daily to bid - patient understands this may be incompletely effective  - Discussed using OTC vitamin supplements to decrease bruising: Rutin supplement and vitamin C or multivitamin    - Discussed other treatment options including using lasers     #Actinic Keratosis    - One lesion treated with LN2 on upper central chest (see procedure note below)    #Inflammed seborrheic keratosis    - One lesion treated with LN2 on upper central chest (see procedure note below)    Procedures Performed:   - Cryotherapy procedure note, location: upper central chest (1) and right temporal scalp (1). After verbal consent and discussion of risks and benefits including, but not limited to, dyspigmentation/scar, blister, and pain, 2 lesions were treated with 1-2 mm freeze border for 2 cycles with liquid nitrogen. Post cryotherapy instructions were provided.    Follow-up: one year    Staff and Resident:     MD Dr. Chris Mccloud    I have seen and examined this patient and agree with the assessment and plan as documented in the resident's note, and was present for all procedures.    Ab Perez MD  Dermatology Attending    ____________________________________________    CC: Skin Check (Colton is here for a skin check and has spots of concern on his scalp and side of abdomen.)    HPI:  Mr. Hi Smith is a(n) 67 year old male who presents today as a return patient for FBSE. He is overall doing very well. He has a spot on his scalp and his neck that he is wanting to have checked out. He has a brown patch on his left hand. Has discussed with a dermatologist in the past who told him this could be treated cosmetically.    He is no longer needing to follow up with oncology.       Patient is otherwise feeling well, without additional skin concerns.    Labs Reviewed:  N/A    Physical Exam:  Vitals: There were no vitals taken for this visit.  SKIN: Total skin excluding the undergarment areas was performed. The exam included the head/face, neck, both arms, chest, back, abdomen, both legs, digits and/or nails.     - Brown patch on the left dorsal hand  - Scaly erythematous macule on upper central chest  - There is  an erythematous waxy stuck on tan to brown papule on the right temporal scalp.   - There are waxy stuck on tan to brown papules on the abdomen and neck.   - No other lesions of concern on areas examined.     Medications:  Current Outpatient Medications   Medication     amLODIPine (NORVASC) 5 MG tablet     ASPIRIN PO     atorvastatin (LIPITOR) 80 MG tablet     Biotin 1 MG CAPS     clopidogrel (PLAVIX) 75 MG tablet     metoprolol succinate ER (TOPROL-XL) 25 MG 24 hr tablet     nitroGLYcerin (NITROSTAT) 0.4 MG sublingual tablet     phosphorus tablet 250 mg (PHOSPHA 250 NEUTRAL) 250 MG per tablet     No current facility-administered medications for this visit.      Past Medical History:   Patient Active Problem List   Diagnosis     Extranodal NK/T-cell lymphoma, nasal type (H)     Nausea with vomiting     Anemia in neoplastic disease     Bacteremia     NHL (non-Hodgkin's lymphoma) (H)     Lymphoma (H)     Fever     Lymphoma, non-Hodgkin's (H)     Neoplasm of uncertain behavior of skin     T-cell or NK-cell neoplasm (H)     Other specified prophylactic or treatment measure     Skin cancer screening     Status post cord blood transplantation     Physical deconditioning     History of peripheral stem cell transplant (H)     Dysuria     Hypogammaglobulinemia (H)     Status post bone marrow transplant (H)     Complications of bone marrow transplant (H)     Cellulitis     Hypophosphatemia     CAP (community acquired pneumonia)     History of nonmelanoma skin cancer     Hypertension     Hyperlipidemia     Coronary artery disease involving native heart with other form of angina pectoris, unspecified vessel or lesion type (H)     History of deep vein thrombosis     Basal cell carcinoma of truncal skin     AK (actinic keratosis)     Telogen effluvium     Seborrheic keratoses, inflamed     Past Medical History:   Diagnosis Date     Alcohol abuse     in remission x 20 yrs     CAD (coronary artery disease) of artery bypass graft      CABG 2005, hx of stents     Depression      DVT (deep venous thrombosis) (H) November, 2014    upper left arm after PICC line     Extranodal NK/T-cell lymphoma, nasal type (H) 7/23/2014     GERD (gastroesophageal reflux disease)      Hearing loss      HTN (hypertension)      Lymphoma (H)      Natural killer (NK) cell lymphoblastic lymphoma (H) 8/15/2014     S/P autologous bone marrow transplantation (H) 3/4/2015     Skin cancer     15 y ago.? basal cell     Tinnitus      VRE (vancomycin resistant enterococcus) culture positive        CC Referred Self, MD  No address on file on close of this encounter.

## 2022-12-13 NOTE — PATIENT INSTRUCTIONS
Rutoside (Rutin) is a supplement.   Can take with a vitamin C supplement or a multivitamin.   Can try hydroquinone for one month.     Cryotherapy Instructions    For the areas treated with liquid nitrogen (cryotherapy or freezing) today, they are expected to get pink, puffy, and perhaps even blister. The area should then crust up and fall off and the goal is to have nice new skin underneath. There is nothing special that you need to do for these areas. You can wash them as you do normal skin.     Sometimes a blister develops; if a blister does develop do NOT pop it. However, if it breaks open on its own, be sure to wash it with soap and water daily and put plain vasaline or petroleum jelly and a bandaid on it until the skin is healed.     Please call the clinic if you have any questions or concerns.

## 2022-12-13 NOTE — PROGRESS NOTES
Formerly [facility] Dermatology Note  Encounter Date: Dec 13, 2022  Office Visit     Dermatology Problem List:  1. 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              - declared in remission April 2017 by Onc  2. NMSC (BCC), s/p MMS in 2000.  3. Non-healing ulcers, bilateral LEs and left forearm, resolved              - Bacterial culture 3/2016 with normal skin giuliana.              - Bx obtained 3/31/2016 for H&E, sterile culture, flow cytometry, and donor chimerism studies, and if possible TCR gene rearrangement studies, suggested NK-Tcell lymphoma  4. BCC, left anterior shoulder, s/p bx 11/15/2019 with excision shortly after  5. Telogen effluvium - minoxidil 5% foam  6. AK, s/p cryo 12/2022  7. Hyperpigmented patch, L dorsal hand  Current tx: hydroquinone cream 4%, Rutin/Vitamin C supplements for bruising     ____________________________________________    Assessment & Plan:     #Seborrheic Keratoses, Cherry angiomas, solar lentigo, and benign nevi    - Reassured of benign etiology  - Lesions of concern on his scalp and neck consistent with seborrheic keratoses    # Hyperpigmented patch on the left dorsal hand     Most likely 2/2 hemosiderin deposition as he bruises frequently on this hand.    - Plan to try hydroquinone 4% cream daily to bid - patient understands this may be incompletely effective  - Discussed using OTC vitamin supplements to decrease bruising: Rutin supplement and vitamin C or multivitamin   - Discussed other treatment options including using lasers     #Actinic Keratosis    - One lesion treated with LN2 on upper central chest (see procedure note below)    #Inflammed seborrheic keratosis    - One lesion treated with LN2 on upper central chest (see procedure note below)    Procedures Performed:   -  Cryotherapy procedure note, location: upper central chest (1) and right temporal scalp (1). After verbal consent and discussion of risks and benefits including, but not limited to, dyspigmentation/scar, blister, and pain, 2 lesions were treated with 1-2 mm freeze border for 2 cycles with liquid nitrogen. Post cryotherapy instructions were provided.    Follow-up: one year    Staff and Resident:     MD Dr. Chris Mccloud    I have seen and examined this patient and agree with the assessment and plan as documented in the resident's note, and was present for all procedures.    Ab Perez MD  Dermatology Attending    ____________________________________________    CC: Skin Check (Colton is here for a skin check and has spots of concern on his scalp and side of abdomen.)    HPI:  Mr. Hi Smith is a(n) 67 year old male who presents today as a return patient for FBSE. He is overall doing very well. He has a spot on his scalp and his neck that he is wanting to have checked out. He has a brown patch on his left hand. Has discussed with a dermatologist in the past who told him this could be treated cosmetically.    He is no longer needing to follow up with oncology.       Patient is otherwise feeling well, without additional skin concerns.    Labs Reviewed:  N/A    Physical Exam:  Vitals: There were no vitals taken for this visit.  SKIN: Total skin excluding the undergarment areas was performed. The exam included the head/face, neck, both arms, chest, back, abdomen, both legs, digits and/or nails.     - Brown patch on the left dorsal hand  - Scaly erythematous macule on upper central chest  - There is an erythematous waxy stuck on tan to brown papule on the right temporal scalp.   - There are waxy stuck on tan to brown papules on the abdomen and neck.   - No other lesions of concern on areas examined.     Medications:  Current Outpatient Medications   Medication     amLODIPine (NORVASC) 5 MG tablet     ASPIRIN  PO     atorvastatin (LIPITOR) 80 MG tablet     Biotin 1 MG CAPS     clopidogrel (PLAVIX) 75 MG tablet     metoprolol succinate ER (TOPROL-XL) 25 MG 24 hr tablet     nitroGLYcerin (NITROSTAT) 0.4 MG sublingual tablet     phosphorus tablet 250 mg (PHOSPHA 250 NEUTRAL) 250 MG per tablet     No current facility-administered medications for this visit.      Past Medical History:   Patient Active Problem List   Diagnosis     Extranodal NK/T-cell lymphoma, nasal type (H)     Nausea with vomiting     Anemia in neoplastic disease     Bacteremia     NHL (non-Hodgkin's lymphoma) (H)     Lymphoma (H)     Fever     Lymphoma, non-Hodgkin's (H)     Neoplasm of uncertain behavior of skin     T-cell or NK-cell neoplasm (H)     Other specified prophylactic or treatment measure     Skin cancer screening     Status post cord blood transplantation     Physical deconditioning     History of peripheral stem cell transplant (H)     Dysuria     Hypogammaglobulinemia (H)     Status post bone marrow transplant (H)     Complications of bone marrow transplant (H)     Cellulitis     Hypophosphatemia     CAP (community acquired pneumonia)     History of nonmelanoma skin cancer     Hypertension     Hyperlipidemia     Coronary artery disease involving native heart with other form of angina pectoris, unspecified vessel or lesion type (H)     History of deep vein thrombosis     Basal cell carcinoma of truncal skin     AK (actinic keratosis)     Telogen effluvium     Seborrheic keratoses, inflamed     Past Medical History:   Diagnosis Date     Alcohol abuse     in remission x 20 yrs     CAD (coronary artery disease) of artery bypass graft     CABG 2005, hx of stents     Depression      DVT (deep venous thrombosis) (H) November, 2014    upper left arm after PICC line     Extranodal NK/T-cell lymphoma, nasal type (H) 7/23/2014     GERD (gastroesophageal reflux disease)      Hearing loss      HTN (hypertension)      Lymphoma (H)      Natural killer (NK)  cell lymphoblastic lymphoma (H) 8/15/2014     S/P autologous bone marrow transplantation (H) 3/4/2015     Skin cancer     15 y ago.? basal cell     Tinnitus      VRE (vancomycin resistant enterococcus) culture positive        CC Referred Self, MD  No address on file on close of this encounter.

## 2022-12-13 NOTE — NURSING NOTE
Dermatology Rooming Note    Hi Smith's goals for this visit include:   Chief Complaint   Patient presents with     Skin Check     Colton is here for a skin check and has spots of concern on his scalp and side of abdomen.     Javier Mcwilliams, EMT

## 2022-12-15 ASSESSMENT — ENCOUNTER SYMPTOMS
MYALGIAS: 0
HEMATURIA: 0
COUGH: 0
PALPITATIONS: 0
DIARRHEA: 0
ARTHRALGIAS: 0
FEVER: 0
SHORTNESS OF BREATH: 0
FREQUENCY: 0
DIZZINESS: 0
DYSURIA: 0
WEAKNESS: 0
CONSTIPATION: 0
HEARTBURN: 0
PARESTHESIAS: 0
NAUSEA: 0
HEMATOCHEZIA: 0
SORE THROAT: 0
JOINT SWELLING: 0
HEADACHES: 0
CHILLS: 0
ABDOMINAL PAIN: 0
NERVOUS/ANXIOUS: 0
EYE PAIN: 0

## 2022-12-15 ASSESSMENT — ACTIVITIES OF DAILY LIVING (ADL): CURRENT_FUNCTION: NO ASSISTANCE NEEDED

## 2022-12-22 ENCOUNTER — OFFICE VISIT (OUTPATIENT)
Dept: FAMILY MEDICINE | Facility: CLINIC | Age: 67
End: 2022-12-22
Payer: MEDICARE

## 2022-12-22 VITALS
DIASTOLIC BLOOD PRESSURE: 82 MMHG | HEART RATE: 56 BPM | SYSTOLIC BLOOD PRESSURE: 132 MMHG | OXYGEN SATURATION: 99 % | HEIGHT: 73 IN | BODY MASS INDEX: 26.11 KG/M2 | RESPIRATION RATE: 16 BRPM | WEIGHT: 197 LBS | TEMPERATURE: 98.2 F

## 2022-12-22 DIAGNOSIS — Z00.00 ENCOUNTER FOR MEDICARE ANNUAL WELLNESS EXAM: Primary | ICD-10-CM

## 2022-12-22 DIAGNOSIS — R32 URINARY INCONTINENCE, UNSPECIFIED TYPE: ICD-10-CM

## 2022-12-22 DIAGNOSIS — I25.118 CORONARY ARTERY DISEASE INVOLVING NATIVE HEART WITH OTHER FORM OF ANGINA PECTORIS, UNSPECIFIED VESSEL OR LESION TYPE (H): ICD-10-CM

## 2022-12-22 DIAGNOSIS — Z12.5 SCREENING FOR PROSTATE CANCER: ICD-10-CM

## 2022-12-22 LAB
ANION GAP SERPL CALCULATED.3IONS-SCNC: 10 MMOL/L (ref 3–14)
BUN SERPL-MCNC: 18 MG/DL (ref 7–30)
CALCIUM SERPL-MCNC: 9.6 MG/DL (ref 8.5–10.1)
CHLORIDE BLD-SCNC: 104 MMOL/L (ref 94–109)
CO2 SERPL-SCNC: 25 MMOL/L (ref 20–32)
CREAT SERPL-MCNC: 1.15 MG/DL (ref 0.66–1.25)
GFR SERPL CREATININE-BSD FRML MDRD: 69 ML/MIN/1.73M2
GLUCOSE BLD-MCNC: 108 MG/DL (ref 70–99)
PHOSPHATE SERPL-MCNC: 3.2 MG/DL (ref 2.5–4.5)
POTASSIUM BLD-SCNC: 3.5 MMOL/L (ref 3.4–5.3)
PSA SERPL-MCNC: 2.39 UG/L (ref 0–4)
SODIUM SERPL-SCNC: 139 MMOL/L (ref 133–144)

## 2022-12-22 PROCEDURE — G0103 PSA SCREENING: HCPCS | Performed by: FAMILY MEDICINE

## 2022-12-22 PROCEDURE — 80048 BASIC METABOLIC PNL TOTAL CA: CPT | Performed by: FAMILY MEDICINE

## 2022-12-22 PROCEDURE — 99213 OFFICE O/P EST LOW 20 MIN: CPT | Mod: 25 | Performed by: FAMILY MEDICINE

## 2022-12-22 PROCEDURE — 36415 COLL VENOUS BLD VENIPUNCTURE: CPT | Performed by: FAMILY MEDICINE

## 2022-12-22 PROCEDURE — G0439 PPPS, SUBSEQ VISIT: HCPCS | Performed by: FAMILY MEDICINE

## 2022-12-22 PROCEDURE — 84100 ASSAY OF PHOSPHORUS: CPT | Performed by: FAMILY MEDICINE

## 2022-12-22 RX ORDER — SILDENAFIL 100 MG/1
TABLET, FILM COATED ORAL
COMMUNITY
Start: 2022-10-03 | End: 2023-11-24

## 2022-12-22 ASSESSMENT — ENCOUNTER SYMPTOMS
PARESTHESIAS: 0
SORE THROAT: 0
ARTHRALGIAS: 0
HEARTBURN: 0
HEMATOCHEZIA: 0
CHILLS: 0
FEVER: 0
CONSTIPATION: 0
COUGH: 0
MYALGIAS: 0
FREQUENCY: 0
DIZZINESS: 0
SHORTNESS OF BREATH: 0
HEMATURIA: 0
NERVOUS/ANXIOUS: 0
PALPITATIONS: 0
ABDOMINAL PAIN: 0
DYSURIA: 0
NAUSEA: 0
EYE PAIN: 0
JOINT SWELLING: 0
DIARRHEA: 0
WEAKNESS: 0
HEADACHES: 0

## 2022-12-22 ASSESSMENT — ACTIVITIES OF DAILY LIVING (ADL): CURRENT_FUNCTION: NO ASSISTANCE NEEDED

## 2022-12-22 ASSESSMENT — PAIN SCALES - GENERAL: PAINLEVEL: NO PAIN (0)

## 2022-12-22 NOTE — PATIENT INSTRUCTIONS
Patient Education   Personalized Prevention Plan  You are due for the preventive services outlined below.  Your care team is available to assist you in scheduling these services.  If you have already completed any of these items, please share that information with your care team to update in your medical record.  Health Maintenance Due   Topic Date Due     ANNUAL REVIEW OF HM ORDERS  Never done     Zoster (Shingles) Vaccine (2 of 2) 03/13/2020     Annual Wellness Visit  06/14/2022

## 2022-12-22 NOTE — PROGRESS NOTES
"SUBJECTIVE:   Colton is a 67 year old who presents for Preventive Visit.  Patient has been advised of split billing requirements and indicates understanding: Yes  Are you in the first 12 months of your Medicare coverage?  No    Healthy Habits:     In general, how would you rate your overall health?  Good    Duration of exercise:  30-45 minutes    Do you usually eat at least 4 servings of fruit and vegetables a day, include whole grains    & fiber and avoid regularly eating high fat or \"junk\" foods?  No    Taking medications regularly:  Yes    Ability to successfully perform activities of daily living:  No assistance needed    Home Safety:  No safety concerns identified    Hearing Impairment:  Difficulty following a conversation in a noisy restaurant or crowded room, need to ask people to speak up or repeat themselves, find that men's voices are easier to understand than woman's and difficulty understanding soft or whispered speech    In the past 6 months, have you been bothered by leaking of urine? Yes    In general, how would you rate your overall mental or emotional health?  Excellent      PHQ-2 Total Score: 0    Additional concerns today:  No    Preventive -     Immunization History   Administered Date(s) Administered     COVID-19 Vaccine 12+ (Pfizer) 03/18/2021, 04/08/2021, 12/02/2021     COVID-19 Vaccine Bivalent Booster 12+ (Pfizer) 10/19/2022     DTaP / Hep B / IPV 11/16/2017     FLUAD(HD)65+ QUAD 12/09/2020     Hib (PRP-T) 11/16/2017     Influenza (High Dose) 3 valent vaccine 10/22/2019     Influenza (IIV3) PF 11/08/2006     Influenza Vaccine 65+ (Fluzone HD) 11/08/2021, 10/19/2022     Influenza Vaccine >6 months (Alfuria,Fluzone) 10/23/2014, 09/30/2015, 10/17/2016, 11/16/2017, 11/05/2018     MMR 01/17/2020     Pneumo Conj 13-V (2010&after) 11/16/2017     Pneumococcal 23 valent 10/22/2019     Tdap (Adacel,Boostrix) 04/16/2008     Zoster vaccine recombinant adjuvanted (SHINGRIX) 01/17/2020       Started after " TURB in 2015 , sometimes urge is so strong with some leak     - Colon CA screen: Colonoscopy, age 45-75 every 10 years or FIT every year or Cologuard every 3 years   Last colonoscopy    Next      - Prostate CA screen: Discussed controversy about screening.   PSA   Date Value Ref Range Status   2021 2.64 0 - 4 ug/L Final     Comment:     Assay Method:  Chemiluminescence using Siemens Vista analyzer       Have you ever done Advance Care Planning? (For example, a Health Directive, POLST, or a discussion with a medical provider or your loved ones about your wishes): Yes, advance care planning is on file.       Fall risk  Fallen 2 or more times in the past year?: No  Any fall with injury in the past year?: No    Cognitive Screening   1) Repeat 3 items (Leader, Season, Table)    2) Clock draw: NORMAL  3) 3 item recall: Recalls 3 objects  Results: 3 items recalled: COGNITIVE IMPAIRMENT LESS LIKELY    Mini-CogTM Copyright S Niko. Licensed by the author for use in [facility]; reprinted with permission (ubaldo@[facility]). All rights reserved.      Do you have sleep apnea, excessive snoring or daytime drowsiness?: no    Reviewed and updated as needed this visit by clinical staff    Allergies  Meds              Reviewed and updated as needed this visit by Provider                 Social History     Tobacco Use     Smoking status: Former     Packs/day: 0.00     Years: 29.00     Pack years: 0.00     Types: Cigarettes     Start date: 1972     Quit date: 3/2/1993     Years since quittin.8     Smokeless tobacco: Never   Substance Use Topics     Alcohol use: No     If you drink alcohol do you typically have >3 drinks per day or >7 drinks per week? No    Alcohol Use 12/15/2022   Prescreen: >3 drinks/day or >7 drinks/week? Not Applicable   No flowsheet data found.                Current providers sharing in care for this patient include:   Patient Care Team:  Tiffany Estrada MD as PCP - General  (Family Medicine)  Ramakrishna Cameron MD as MD (Infectious Diseases)  Orville Boyle MD as MD (Urology)  Ab Perez MD as MD (Dermapathology)  Amie Car, RN as Registered Nurse (Urology)  Ruth Bejarano MD as MD (Radiation Oncology)  Delroy Ratliff DPM (Podiatry)  Jesika Eaton MD as MD (Plastic Surgery)  Tiffany Estrada MD as Assigned PCP  Lopez Ellington MD as MD (Neurology)  Juni Garza MD as Assigned Neuroscience Provider    The following health maintenance items are reviewed in Epic and correct as of today:  Health Maintenance   Topic Date Due     ANNUAL REVIEW OF HM ORDERS  Never done     ZOSTER IMMUNIZATION (2 of 2) 03/13/2020     MEDICARE ANNUAL WELLNESS VISIT  06/14/2022     FALL RISK ASSESSMENT  12/22/2023     LIPID  07/02/2024     ADVANCE CARE PLANNING  06/14/2026     DTAP/TDAP/TD IMMUNIZATION (3 - Td or Tdap) 11/16/2027     COLORECTAL CANCER SCREENING  03/12/2031     HEPATITIS C SCREENING  Completed     PHQ-2 (once per calendar year)  Completed     INFLUENZA VACCINE  Completed     Pneumococcal Vaccine: 65+ Years  Completed     AORTIC ANEURYSM SCREENING (SYSTEM ASSIGNED)  Completed     COVID-19 Vaccine  Completed     IPV IMMUNIZATION  Aged Out     MENINGITIS IMMUNIZATION  Aged Out     Lab work is in process  Labs reviewed in EPIC  BP Readings from Last 3 Encounters:   12/22/22 132/82   07/11/22 (!) 153/91   01/10/22 135/84    Wt Readings from Last 3 Encounters:   12/22/22 89.4 kg (197 lb)   07/11/22 89.8 kg (198 lb)   01/10/22 93.4 kg (206 lb)                  Patient Active Problem List   Diagnosis     Extranodal NK/T-cell lymphoma, nasal type (H)     Nausea with vomiting     Anemia in neoplastic disease     Bacteremia     NHL (non-Hodgkin's lymphoma) (H)     Lymphoma (H)     Fever     Lymphoma, non-Hodgkin's (H)     Neoplasm of uncertain behavior of skin     T-cell or NK-cell neoplasm (H)     Other specified prophylactic or treatment  measure     Skin cancer screening     Status post cord blood transplantation     Physical deconditioning     History of peripheral stem cell transplant (H)     Dysuria     Hypogammaglobulinemia (H)     Status post bone marrow transplant (H)     Complications of bone marrow transplant (H)     Cellulitis     Hypophosphatemia     CAP (community acquired pneumonia)     History of nonmelanoma skin cancer     Hypertension     Hyperlipidemia     Coronary artery disease involving native heart with other form of angina pectoris, unspecified vessel or lesion type (H)     History of deep vein thrombosis     Basal cell carcinoma of truncal skin     AK (actinic keratosis)     Telogen effluvium     Seborrheic keratoses, inflamed     Past Surgical History:   Procedure Laterality Date     BYPASS GRAFT ARTERY CORONARY           COLONOSCOPY       CYSTOSCOPY, TRANSURETHRAL RESECTION (TUR) TUMOR BLADDER, COMBINED N/A 2015    Procedure: COMBINED CYSTOSCOPY, TRANSURETHRAL RESECTION (TUR) TUMOR BLADDER;  Surgeon: Orville Boyle MD;  Location: [facility] OR     MOHS MICROGRAPHIC PROCEDURE  2000    nose     PICC INSERTION Left 2014    5fr DL Power PICC, 43cm (1cm external) in the L basilic vein w/ tip in the low SVC.     PICC INSERTION Left 10/28/2014    5fr DL Power PICC, 41cm (1cm external) in the L basilic vein w/ tip in the SVC RA junction.     PICC INSERTION Right 2014    5fr DL Power PICC, 39cm (1cm external) in the R medial brachial vein w/ tip in the low SVC.       Social History     Tobacco Use     Smoking status: Former     Packs/day: 0.00     Years: 29.00     Pack years: 0.00     Types: Cigarettes     Start date: 1972     Quit date: 3/2/1993     Years since quittin.8     Smokeless tobacco: Never   Substance Use Topics     Alcohol use: No     Family History   Problem Relation Age of Onset     Cancer Mother         esophageal     Cancer Father         prostate cancer, lung fibrosis      Heart Disease Father         Heart Disease     Hypertension Father      Prostate Cancer Father      C.A.D. Sister      ALS Sister      Melanoma No family hx of      Skin Cancer No family hx of          Current Outpatient Medications   Medication Sig Dispense Refill     amLODIPine (NORVASC) 5 MG tablet Take 1 tablet (5 mg) by mouth daily 30 tablet 0     ASPIRIN PO Take 81 mg by mouth daily       atorvastatin (LIPITOR) 80 MG tablet Take 1 tablet (80 mg) by mouth daily 90 tablet 1     Biotin 1 MG CAPS Take 1,000 mcg by mouth       clopidogrel (PLAVIX) 75 MG tablet Take 75 mg by mouth daily       hydroquinone (TYRESE) 4 % external cream Apply to hands daily for 6 weeks. 30 g 1     metoprolol succinate ER (TOPROL-XL) 25 MG 24 hr tablet Take 25 mg by mouth daily       nitroGLYcerin (NITROSTAT) 0.4 MG sublingual tablet Place 1 tablet (0.4 mg) under the tongue every 5 minutes as needed for chest pain 12 tablet 0     phosphorus tablet 250 mg (PHOSPHA 250 NEUTRAL) 250 MG per tablet Take 1 tablet (250 mg) by mouth 2 times daily 180 tablet 0     sildenafil (VIAGRA) 100 MG tablet TAKE 1 TABLET BY MOUTH ONCE DAILY IF NEEDED FOR ERECTILE DYSFUNCTION , TAKE 30 MINUTES TO 4 HOURS BEFORE SEXUAL ACTIVITY , MAX OF 100MG PER 24 HOURS       Allergies   Allergen Reactions     Nka [No Known Allergies]      Recent Labs   Lab Test 07/06/22  1046 09/16/21  1401 06/14/21  1522 11/15/19  1116 07/02/19  0817 05/23/19  1504 09/01/15  0345 08/31/15  2242 08/25/15  0315 08/24/15  0300   LDL  --   --   --   --  87  --   --   --   --  122   HDL  --   --   --   --  54  --   --   --   --  42   TRIG  --   --   --   --  115  --   --  143  --  245*   ALT 42  --   --  32  --  30   < >  --    < > 72*   CR 1.16 1.31* 1.26* 1.12  --  1.12   < >  --    < > 0.62*   GFRESTIMATED 69 56* 58* 68  --  68   < >  --    < > >90  Non  GFR Calc     GFRESTBLACK  --   --  68 79  --  79   < >  --    < > >90   GFR Calc     POTASSIUM 3.7 3.8  "3.7 4.0  --  3.7   < >  --    < > 3.9   TSH 3.35  --   --  3.99  --   --   --   --   --   --     < > = values in this interval not displayed.              Review of Systems   Constitutional: Negative for chills and fever.   HENT: Positive for hearing loss. Negative for congestion, ear pain and sore throat.    Eyes: Negative for pain and visual disturbance.   Respiratory: Negative for cough and shortness of breath.    Cardiovascular: Negative for chest pain, palpitations and peripheral edema.   Gastrointestinal: Negative for abdominal pain, constipation, diarrhea, heartburn, hematochezia and nausea.   Genitourinary: Positive for impotence and urgency. Negative for dysuria, frequency, genital sores, hematuria and penile discharge.   Musculoskeletal: Negative for arthralgias, joint swelling and myalgias.   Skin: Negative for rash.   Neurological: Negative for dizziness, weakness, headaches and paresthesias.   Psychiatric/Behavioral: Negative for mood changes. The patient is not nervous/anxious.          OBJECTIVE:   /82   Pulse 56   Temp 98.2  F (36.8  C) (Tympanic)   Resp 16   Ht 1.842 m (6' 0.5\")   Wt 89.4 kg (197 lb)   SpO2 99%   BMI 26.35 kg/m   Estimated body mass index is 26.35 kg/m  as calculated from the following:    Height as of this encounter: 1.842 m (6' 0.5\").    Weight as of this encounter: 89.4 kg (197 lb).  Physical Exam  GENERAL: healthy, alert and no distress  EYES: Eyes grossly normal to inspection, PERRL and conjunctivae and sclerae normal  HENT: ear canals and TM's normal, nose and mouth without ulcers or lesions  NECK: no adenopathy, no asymmetry, masses, or scars and thyroid normal to palpation  RESP: lungs clear to auscultation - no rales, rhonchi or wheezes  CV: regular rate and rhythm, normal S1 S2, no S3 or S4, no murmur, click or rub, no peripheral edema and peripheral pulses strong  ABDOMEN: soft, nontender, no hepatosplenomegaly, no masses and bowel sounds normal  MS: no gross " "musculoskeletal defects noted, no edema  SKIN: no suspicious lesions or rashes  NEURO: Normal strength and tone, mentation intact and speech normal  PSYCH: mentation appears normal, affect normal/bright        ASSESSMENT / PLAN:   (Z00.00) Encounter for Medicare annual wellness exam  (primary encounter diagnosis)  Comment: Preventive care reviewed and updated.    Plan: Phosphorus, Basic metabolic panel  (Ca, Cl,         CO2, Creat, Gluc, K, Na, BUN)       (R32) Urinary incontinence, unspecified type  Comment:Started after TURB in 2015 , sometimes urge is so strong with some leak   Patient would like to see if there is something can be done   Plan: Adult Urology  Referral     (Z12.5) Screening for prostate cancer    Plan: PSA, screen  (I25.118) Coronary artery disease involving native heart with other form of angina pectoris, unspecified vessel or lesion type (H)  coronary artery disease S/P CABG in 2005 and multiple subsequent percutaneous coronary interventions  Patient was admitted on 08/2022 with PW-[id number] myocardial infarction. Was found to have occlusion of saphenous vein graft to RPDA and underwent intervention  Reports some minimal  chest discomfort- relieved with nitroglycerine - could be related to chronic angina recommended to call his cardiology clinic today for further recommendation   Patient has been advised of split billing requirements and indicates understanding: Yes      COUNSELING:  Reviewed preventive health counseling, as reflected in patient instructions       Healthy diet/nutrition      BMI:   Estimated body mass index is 26.35 kg/m  as calculated from the following:    Height as of this encounter: 1.842 m (6' 0.5\").    Weight as of this encounter: 89.4 kg (197 lb).         He reports that he quit smoking about 29 years ago. His smoking use included cigarettes. He started smoking about 51 years ago. He has never used smokeless tobacco.      Appropriate preventive services " were discussed with this patient, including applicable screening as appropriate for cardiovascular disease, diabetes, osteopenia/osteoporosis, and glaucoma.  As appropriate for age/gender, discussed screening for colorectal cancer, prostate cancer, breast cancer, and cervical cancer. Checklist reviewing preventive services available has been given to the patient.    Reviewed patients plan of care and provided an AVS. The Basic Care Plan (routine screening as documented in Health Maintenance) for Hi meets the Care Plan requirement. This Care Plan has been established and reviewed with the Patient.      Tiffany Estrada MD  [facility]    Identified Health Risks:

## 2023-01-06 DIAGNOSIS — E83.39 HYPOPHOSPHATEMIA: ICD-10-CM

## 2023-01-06 RX ORDER — DIBASIC SODIUM PHOSPHATE, MONOBASIC POTASSIUM PHOSPHATE AND MONOBASIC SODIUM PHOSPHATE 852; 155; 130 MG/1; MG/1; MG/1
TABLET ORAL
Qty: 180 TABLET | Refills: 0 | Status: SHIPPED | OUTPATIENT
Start: 2023-01-06 | End: 2023-05-16

## 2023-01-11 PROBLEM — L57.0 ACTINIC KERATOSIS: Status: ACTIVE | Noted: 2023-01-11

## 2023-02-08 NOTE — PROGRESS NOTES
ONCOLOGY OUTPATIENT NOTE      ONCOLOGY SUMMARY (updated): Hi Smith is 63 years of age with a history of extranodal NK/T-cell lymphoma (nasal type) (diagnosed 07/17/2014) that involved multiple cutaneous/subcutaneous sites at diagnosis. Disease appeared limited to the skin and subcutaneous tissue. He received chemotherapy, initially with SMILE (3 cycles), which did not control the disease, followed in 01/2015 by ICE (2 cycles) with a very good response and then autologous stem cell transplant (03/04/2015) with BEAM conditioning. 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        By 12/2015, he again had evidence for recurrence on the right lower extremity (biopsy proven).  Recurrence appeared to be localized to the right ankle and possibly, although undocumented, near the right wrist.  He was given involved field radiation to these two areas ending on 01/08 2016. The irradiation was the last directed anti-lymphoma therapy he received.    Subsequently, he had the appearance of multiple subcutaneous nodules on his legs, especially, but also on his arms and trunk, that arose and often resolved spontaneously, but some ulcerated.  Some showed inflammation and cutaneous crusting with erythema and subsequent regression. Biopsies demonstrated lymphoma. Because of the clinical behavior it was thought he was likely experiencing a graft vs lymphoma effect. He continued to have new lesions, predominantly on his lower legs, that appeared and resolved. Those on his legs that had ulcerated resolved with wound care provided by Dr. Eaton. Over time, the frequency of new lesions appearing and the number progressing to the point of ulceration decreased.      Mr. Smith developed a few new scattered lower extremity nodules in 2017.  He was referred to Dermatology for evaluation and biopsy, but the lesions had largely spontaneously resolved by the  "time a biopsy could be done.  Biopsy of one of the resolving lesions was considered consistent with dermal hypersensitivity reaction with the differential including arthropod bite and, possibly, drug eruption.  These lesions totally cleared.       At a clinic visit on 04/12/2018 he had 1 lesion near each popliteal space, which subsequently resolved. Subsequent similar lesions have not been identified.    Post-transplant he had BK viruria (resolved) and persistent modest HHV6 viremia since 08/2015.     In late 01/2018 he developed zoster followed by post herpetic neuralgia.    INTERVAL HISTORY:  I last saw Mr. Smith in my clinic on 10/11/2018; he returns today in scheduled followup.  At the last visit he had no significant new lesions.  He had a few small red papules around his ankles that may have been \"bug bites.\"  There was 1 lesion on the anterior upper right leg that might have been lymphoma-related lesion that was approximately 1 cm in size.  It has since cleared.      Overall, Mr. Smith is feeling quite well.  No interval infections, fevers, night sweats or weight loss.  He has had no new skin lesions or persistent older skin lesions that he is concerned about relative to his lymphoma.  Stable small lesion on right forearm. Generally feeling well.     Patient was considering coming off of phosphorus.  He ran out and stayed off for a brief period of time.  He developed symptoms that his wife checked on the Internet, and found they might be related to low phosphorus.  They resumed the phosphorus and symptoms have disappeared and not returned. Phosphorus was not measured.       REVIEW OF SYSTEMS:  A 10-point review of systems is negative.      The patient has received the influenza vaccine.      MEDICATIONS:   1.  Amlodipine.   2.  Aspirin.   3.  Atorvastatin.   4.  Potassium phosphate.     5.  Acyclovir.   6.  Gabapentin.   7.  Nitroglycerin p.r.n.   8.  Venlafaxine.      ALLERGIES:  None reported.      PHYSICAL " "EXAMINATION:   VITAL SIGNS:  Weight 90.3 kg (stable), blood pressure 128/80, pulse 75 and regular, respirations 16, temperature 98.7.  O2 saturation 98% on room air.   HEENT:  No icterus.  Oropharynx no masses.  No sinus tenderness.  Mucosa moist without lesion.   NECK:  No cervical or supraclavicular adenopathy.   CHEST:  Clear to auscultation.   AXILLAE:  Negative.   HEART:  Regular rhythm without murmur or gallop.   ABDOMEN:  Soft and nontender.  Bowel sounds active.  No detectable organomegaly or mass.  No inguinal nodes.   EXTREMITIES:  No lower extremity edema.   SKIN:  No evident skin or subcutaneous lesions suggestive of recurrent or persistent lymphoma.  There is a 1 cm lesion on the ulnar side of the left forearm that is most likely a cyst or a small lipoma.  It is not changing.  No other lesions on total skin examination.      LABORATORY:   Hemoglobin 14.6 grams percent with 10,100 WBCs (51% neutrophils, 34% lymphocytes) and 251,000 platelets.   Electrolytes, calcium, creatinine (1.11) normal.  Uric acid 2.7.  Phosphorus - 2.6 (borderline).   Liver enzymes, including serum LDH, normal.   Serum protein electrophoresis:  Albumin 4.2, gamma fraction 0.8, no monoclonal peak.   EBV DNA - not detected  HHV6 - 34,966 copies, log 4.5.     ASSESSMENT AND PLAN:  NK/T-cell lymphoma, nasal type.  No evidence of disease on today's clinical evaluation now 4-1/2 years from diagnosis. His last \"systemic\" therapy was a nonmyeloablative double cord transplant on 07/31/2015 and \"local\" therapy, radiation to right ankle and wrist lesions ending 01/2016 for a documented recurrence.  Subsequently, all disease has slowly resolved without additional therapy.  None was detected in 07/2018 and none is detectable today.      Continue to monitor at 3- to 4-month intervals with laboratory studies.  Additional imaging as necessary.      Hypophosphatemia: Recent symptoms possible related off replacement briefly. Phosphorus today is at " lower normal range. Continue on phosphorus replacement.      Has received influenza vaccine.     Mr. Smith is aware of my planned departure from the [place] this spring. We discussed that I will remain his Oncologist through the end of March 2019, as well as our plan for the subsequent transition of his care to another provider.     Yuri Hill MD  Professor of Medicine  Oncology  [facility]  Office: [phone]  Clinic Fax: [phone]    cc:   MD Ab Leone MD Bailey Lee, MD Roger Weenig, MD Nathan Norquist, MD Marie Claire Buckley, MD Drew Rosielle, MD             Xray Chest 1 View- PORTABLE-Urgent

## 2023-02-28 NOTE — TELEPHONE ENCOUNTER
Routing refill request to provider for review/approval because:  Labs not current:  Creatinine    Kelin Gordon BSN, RN           Never smoker

## 2023-05-16 DIAGNOSIS — E83.39 HYPOPHOSPHATEMIA: ICD-10-CM

## 2023-05-17 NOTE — TELEPHONE ENCOUNTER
Natalya is calling back to check the status on this.  Patient is out of this medication.  Alyssa DE LOS SANTOS  Patient Representative  [phone]

## 2023-07-28 ENCOUNTER — OFFICE VISIT (OUTPATIENT)
Dept: UROLOGY | Facility: CLINIC | Age: 68
End: 2023-07-28
Attending: FAMILY MEDICINE
Payer: MEDICARE

## 2023-07-28 ENCOUNTER — MYC MEDICAL ADVICE (OUTPATIENT)
Dept: UROLOGY | Facility: CLINIC | Age: 68
End: 2023-07-28

## 2023-07-28 DIAGNOSIS — N52.9 ERECTILE DYSFUNCTION, UNSPECIFIED ERECTILE DYSFUNCTION TYPE: ICD-10-CM

## 2023-07-28 DIAGNOSIS — N39.41 URGE INCONTINENCE: Primary | ICD-10-CM

## 2023-07-28 DIAGNOSIS — R82.89 ABNORMAL URINE CYTOLOGY: ICD-10-CM

## 2023-07-28 LAB
ALBUMIN UR-MCNC: NEGATIVE MG/DL
APPEARANCE UR: CLEAR
BILIRUB UR QL STRIP: NEGATIVE
COLOR UR AUTO: ABNORMAL
GLUCOSE UR STRIP-MCNC: 150 MG/DL
HGB UR QL STRIP: NEGATIVE
KETONES UR STRIP-MCNC: NEGATIVE MG/DL
LEUKOCYTE ESTERASE UR QL STRIP: NEGATIVE
NITRATE UR QL: NEGATIVE
PH UR STRIP: 5.5 [PH] (ref 5–7)
SKIP: ABNORMAL
SP GR UR STRIP: 1.01 (ref 1–1.03)
UROBILINOGEN UR STRIP-MCNC: NORMAL MG/DL

## 2023-07-28 PROCEDURE — 51798 US URINE CAPACITY MEASURE: CPT | Performed by: PHYSICIAN ASSISTANT

## 2023-07-28 PROCEDURE — 88112 CYTOPATH CELL ENHANCE TECH: CPT | Performed by: PATHOLOGY

## 2023-07-28 PROCEDURE — 99204 OFFICE O/P NEW MOD 45 MIN: CPT | Mod: 25 | Performed by: PHYSICIAN ASSISTANT

## 2023-07-28 PROCEDURE — 81003 URINALYSIS AUTO W/O SCOPE: CPT | Performed by: PHYSICIAN ASSISTANT

## 2023-07-28 RX ORDER — MIRABEGRON 25 MG/1
25 TABLET, FILM COATED, EXTENDED RELEASE ORAL DAILY
Qty: 30 TABLET | Refills: 11 | Status: SHIPPED | OUTPATIENT
Start: 2023-07-28 | End: 2023-08-01

## 2023-07-28 NOTE — PROGRESS NOTES
Name: Hi Smith    MRN: [id number]   YOB: 1955                 Chief Complaint:   Urinary incontinence         Assessment and Plan:   68 year old male with PMH significant for CAD, non-Hodgkins lymphoma s/p BMT years ago, ED, BPH, and LUTS, primarily urgency and urge incontinence. LUTS started after white light cystoscopy and random bladder biopsies, performed by Dr. Boyle in 2015 due to positive urine cytology and thickened bladder wall, and have worsened since then. His bladder biopsies were negative for carcinoma and BK virus, but did show very scant lymphoid infiltrate without significant atypia. He was recommended to have repeat urine cytology with concurrent UroVysion FISH which was not performed. He denies any gross hematuria and UA last year did not demonstrate any microhematuria. However, we discussed that with his worsening irritative LUTS, would recommend repeat UA, urine cytology with FISH, and possible cystoscopy pending results of the urine tests. He is amenable. For treatment of his urgency symptoms, discussed lifestyle modification, pharmacotherapy for OAB vs BPH (cystoscopy in 2015 demonstrated moderate prostatic enlargement), or possible third line OAB treatments vs BPH procedure down the road. At this time, he would like to try a medication for OAB which is reasonable given that PVR is low at 16 mL. Rx for mirabegron 25 mg daily provided. Lastly, for his ED symptoms, he has a prescription for Viagra which works well but causes side effect of headache. We discussed that with his prescription and intermittent use of nitrates for chest pain, he is not a good candidate for PDE5 inhibitors. Instead discussed ICI vs IPP, but he declines for now.     Plan:  -UA and urine cytology with FISH today.  -Trial of mirabegron 25 mg daily.  -Follow up in 4 months to recheck.   -Indications for cystoscopy: microhematuria on today's UA, abnormal/positive urine cytology, irritative LUTS  "refractory to treatment, or development of gross hematuria.     Babs Fernandez PA-C  July 28, 2023          History of Present Illness:   Hi Smith is a 68 year old male with PMH significant for CAD s/p CABG in 2005 and multiple subsequent PCIs, non-Hodgkin's lymphoma s/p BMT years ago seen in consultation from Dr. Estrada for evaluation of urinary incontinence. Colton reports sudden urges and occasional urge incontinence. He is not terribly bothered by urinary frequency. Reports that his stream is \"fine\" and feels to empty his bladder. He denies any dysuria or gross hematuria.     AUA Symptom Score: 0+2+1+5+2+0+1 = 11  QOL score: 5 (unhappy)    Symptoms started after TURBT in 2015 but have progressively worsened in the last year. Per chart review, he was referred to urology in 10/2015 for incidental finding of thickened bladder wall on PET scan for his lymphoma. Urine cytology was positive which led to office cystoscopy on 11/19/2015. This revealed several flat erythematous lesions on the bladder mucosa. He subsequently underwent white light cystoscopy with random bladder biopsies with Dr. Boyle on 12/29/2015.   Findings:  \"The urethra was normal, the prostate was unremarkable with moderate lateral lobe hypertrophy. The ureteral orifices were in their orthotopic positions bilaterally. There were no intravesical stones or diverticuli and the bladder was mildly trabeculated. We used a cold-cup flexible biopsy forceps to biopsy the left and right lateral walls, posterior wall and bladder dome and passed these off for pathology. A Bugby was used to fulgurate arterial bleeders and the biopsied areas. The bladder was re-examined under low pressure and hemostasis was confirmed. At this point, the bladder was drained and the cystoscope withdrawn.\"    Pathology:   ORIGINAL REPORT:    SPECIMEN(S):  A: Bladder mucosa biopsy  B: Right bladder wall biopsy  C: Bladder left lateral wall biopsy  D: Bladder posterior wall " "biopsy  E: Bladder dome wall biopsy    FINAL DIAGNOSIS:  A: Bladder, \"mucosa\", biopsy:  -Proliferative cystitis  -No evidence of carcinoma  -See comment    B: Bladder, right \"bladder wall\", biopsy:  -Urothelial mucosa with minimal chronic inflammation  -No evidence of carcinoma    C: Bladder, left lateral \"wall\", biopsy:  -Urothelial mucosa with minimal chronic inflammation  -No evidence of carcinoma  -See comment    D: Bladder, posterior \"wall\", biopsy:  -Urothelial mucosa with minimal chronic inflammation  -No evidence of carcinoma  -See comment    E: Bladder, dome \"wall\", biopsy:  -Urothelial mucosa with minimal chronic inflammation  -No evidence of carcinoma    COMMENT:  BK virus in situ hybridization was performed on specimens A, C and D  (examined with appropriate controls), and is negative.    Selected slides from the patient's 7/2/15 left thigh skin punch biopsy  material ([id number], specimen B, atypical lymphoid infiltrate consistent  with NK/T-cell lymphoma) were reviewed in comparison to the current  case.  The current bladder biopsy material shows a very scant lymphoid  infiltrate, without significant atypia.  Morphologic features of  lymphoma are not seen.    We recommend that urine cytology be repeated, with concurrent UroVysion  FISH.    INTERPRETATION:  Dr. Lazo discussed the above results with Dr. Boyle on 12/31/15,  including the recommendation for follow-up urine cytology with UroVysion  FISH.       Patient was scheduled to follow up with Dr. Boyle after the procedure, but the visit was cancelled. He never had follow up cytology/FISH.    Colton also asks about alternative treatments for ED. He has a prescription for Viagra which works relatively well, but causes significant headache as a side effect. Notably, he also has a prescription for nitrates for chest pain. He has not had to use nitrates recently, but has used them in the past.          Past Medical History:     Past Medical History: "   Diagnosis Date    Alcohol abuse     in remission x 20 yrs    CAD (coronary artery disease) of artery bypass graft     CABG , hx of stents    Depression     DVT (deep venous thrombosis) (H) 2014    upper left arm after PICC line    Extranodal NK/T-cell lymphoma, nasal type (H) 2014    GERD (gastroesophageal reflux disease)     Hearing loss     HTN (hypertension)     Lymphoma (H)     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 basal cell    Tinnitus     VRE (vancomycin resistant enterococcus) culture positive             Past Surgical History:     Past Surgical History:   Procedure Laterality Date    BYPASS GRAFT ARTERY CORONARY          COLONOSCOPY      CYSTOSCOPY, TRANSURETHRAL RESECTION (TUR) TUMOR BLADDER, COMBINED N/A 2015    Procedure: COMBINED CYSTOSCOPY, TRANSURETHRAL RESECTION (TUR) TUMOR BLADDER;  Surgeon: Orville Boyle MD;  Location: [facility] OR    MOHS MICROGRAPHIC PROCEDURE  2000    nose    PICC INSERTION Left 2014    5fr DL Power PICC, 43cm (1cm external) in the L basilic vein w/ tip in the low SVC.    PICC INSERTION Left 10/28/2014    5fr DL Power PICC, 41cm (1cm external) in the L basilic vein w/ tip in the SVC RA junction.    PICC INSERTION Right 2014    5fr DL Power PICC, 39cm (1cm external) in the R medial brachial vein w/ tip in the low SVC.            Social History:     Social History     Tobacco Use    Smoking status: Former     Packs/day: 0.00     Years: 29.00     Pack years: 0.00     Types: Cigarettes     Start date: 1972     Quit date: 3/2/1993     Years since quittin.4    Smokeless tobacco: Never   Substance Use Topics    Alcohol use: No            Family History:     Family History   Problem Relation Age of Onset    Cancer Mother         esophageal    Cancer Father         prostate cancer, lung fibrosis    Heart Disease  Father         Heart Disease    Hypertension Father     Prostate Cancer Father     C.A.D. Sister     ALS Sister     Melanoma No family hx of     Skin Cancer No family hx of             Allergies:     Allergies   Allergen Reactions    Nka [No Known Allergies]             Medications:     Current Outpatient Medications   Medication Sig    amLODIPine (NORVASC) 5 MG tablet Take 1 tablet (5 mg) by mouth daily    ASPIRIN PO Take 81 mg by mouth daily    atorvastatin (LIPITOR) 80 MG tablet Take 1 tablet (80 mg) by mouth daily    Biotin 1 MG CAPS Take 1,000 mcg by mouth    metoprolol succinate ER (TOPROL-XL) 25 MG 24 hr tablet Take 25 mg by mouth daily    nitroGLYcerin (NITROSTAT) 0.4 MG sublingual tablet Place 1 tablet (0.4 mg) under the tongue every 5 minutes as needed for chest pain    phosphorus tablet 250 mg 250 MG per tablet Take 1 tablet (250 mg) by mouth 2 times daily    sildenafil (VIAGRA) 100 MG tablet TAKE 1 TABLET BY MOUTH ONCE DAILY IF NEEDED FOR ERECTILE DYSFUNCTION , TAKE 30 MINUTES TO 4 HOURS BEFORE SEXUAL ACTIVITY , MAX OF 100MG PER 24 HOURS    clopidogrel (PLAVIX) 75 MG tablet Take 75 mg by mouth daily    hydroquinone (TYRSEE) 4 % external cream Apply to hands daily for 6 weeks. (Patient not taking: Reported on 7/28/2023)     No current facility-administered medications for this visit.             Review of Systems:    ROS: 14 point ROS neg other than the symptoms noted above in the HPI and PMH.          Physical Exam:   There were no vitals taken for this visit.  General: age-appropriate appearing male in NAD.   HEENT: Head AT/NC, EOMI, CN Grossly intact  Resp: no respiratory distress.  Rectal exam: deferred  Neuro: grossly intact  Motor: excellent strength throughout  Skin: clear of rashes or ecchymoses.     PVR: 16 mL by bladder scan       Labs:      PSA   Date Value Ref Range Status   06/14/2021 2.64 0 - 4 ug/L Final     Comment:     Assay Method:  Chemiluminescence using Siemens Vista analyzer      Prostate Specific Antigen Screen   Date Value Ref Range Status   12/22/2022 2.39 0.00 - 4.00 ug/L Final       Creatinine   Date Value Ref Range Status   12/22/2022 1.15 0.66 - 1.25 mg/dL Final   06/14/2021 1.26 (H) 0.66 - 1.25 mg/dL Final       7/6/2022 - UA: 150 glucose, 20 protein, 0-2 RBC, 0-5 WBC, few squamous epithelial cells, few bacteria      Imaging:    No recent         45 minutes spent on the date of the encounter doing chart review, review of test results, interpretation of tests, patient visit, and documentation

## 2023-07-28 NOTE — PATIENT INSTRUCTIONS
UROLOGY CLINIC VISIT PATIENT INSTRUCTIONS    Follow up with Babs Fernandez PA-C in 4 months to recheck.    Lab today to provide a urine sample.    Start taking mirabegron (Myrbetriq) 25 mg once daily in the morning to treat your urinary symptoms. Let us know if not covered by insurance or expensive.     If you have any issues, questions or concerns in the meantime, do not hesitate to contact us at [phone] or via [email or web address].     It was a pleasure meeting with you today.  Thank you for allowing me and my team the privilege of caring for you today.  YOU are the reason we are here, and I truly hope we provided you with the excellent service you deserve.  Please let us know if there is anything else we can do for you so that we can be sure you are leaving completely satisfied with your care experience.       Moderate: Comprehensive teaching

## 2023-07-28 NOTE — NURSING NOTE
Hi Smith's goals for this visit include:   Chief Complaint   Patient presents with    New Patient     Incontinence        He requests these members of his care team be copied on today's visit information:         PCP: Tiffany Estrada    Referring Provider:  Tiffany Estrada MD  [id number]   [place] [address]    post void residual: 16 ml    Do you need any medication refills at today's visit?     Gavi Naik LPN on 7/28/2023 at 10:37 AM

## 2023-07-31 LAB
PATH REPORT.COMMENTS IMP SPEC: NORMAL
PATH REPORT.FINAL DX SPEC: NORMAL
PATH REPORT.GROSS SPEC: NORMAL
PATH REPORT.RELEVANT HX SPEC: NORMAL

## 2023-08-01 ENCOUNTER — TELEPHONE (OUTPATIENT)
Dept: UROLOGY | Facility: CLINIC | Age: 68
End: 2023-08-01
Payer: MEDICARE

## 2023-08-01 RX ORDER — TROSPIUM CHLORIDE ER 60 MG/1
60 CAPSULE ORAL EVERY MORNING
Qty: 30 CAPSULE | Refills: 11 | Status: SHIPPED | OUTPATIENT
Start: 2023-08-01 | End: 2023-11-24

## 2023-08-01 NOTE — TELEPHONE ENCOUNTER
"Per Babs Fernandez PA-C   \"I sent trospium in place of Myrbetriq to [facility] in [place]. Hopefully it will be less expensive. If we can get a list of preferred alternatives from the pharmacy or patient's insurance, that will help me to ensure we prescribe something that is covered.  Thanks,  Babs Fernandez PA-C\"    Pt updated through [employer] message.    Caryl Lowery LPN on 8/1/2023 at 9:34 AM       "

## 2023-08-01 NOTE — CONFIDENTIAL NOTE
Patient reviewed my chart message on 8/1/23. Will close encounter at this time.    Argelia Mora RN, BSN

## 2023-08-01 NOTE — TELEPHONE ENCOUNTER
Prior Authorization Specialty Medication Request    Medication/Dose: trospium (SANCTURA XR) 60 MG CP24 24 hr capsule  ICD code (if different than what is on RX):    Previously Tried and Failed:      Important Lab Values:   Rationale:     Insurance Name:   Insurance ID:   Insurance Phone Number:     Pharmacy Information (if different than what is on RX)  Name:    Phone:

## 2023-08-03 NOTE — TELEPHONE ENCOUNTER
Call placed to Dr. Perdue's office, awaiting weightbearing order at this time. Will attempt PT evaluation when appropriate.    Prior Authorization Approval    Medication: TROSPIUM CHLORIDE ER 60 MG PO CP24  Authorization Effective Date: 5/5/2023  Authorization Expiration Date: 8/3/2024  Approved Dose/Quantity:   Reference #:     Insurance Company: [email or web address] Clinical Review - Phone [phone] Fax [phone]  Expected CoPay:       CoPay Card Available:      Financial Assistance Needed:   Which Pharmacy is filling the prescription: [facility] - [place], [place] - [address] [place] [address]  Pharmacy Notified: Yes  Patient Notified: Yes **Instructed pharmacy to notify patient when script is ready to /ship.**

## 2023-08-03 NOTE — TELEPHONE ENCOUNTER
PA Initiation    Medication: TROSPIUM CHLORIDE ER 60 MG PO CP24  Insurance Company: [employer] Clinical Review - Phone [phone] Fax [phone]  Pharmacy Filling the Rx: [facility] [place] [facility] [address]   Filling Pharmacy Phone: [phone]  Filling Pharmacy Fax: [phone]  Start Date: 8/3/2023

## 2023-08-09 ENCOUNTER — MYC MEDICAL ADVICE (OUTPATIENT)
Dept: UROLOGY | Facility: CLINIC | Age: 68
End: 2023-08-09
Payer: MEDICARE

## 2023-08-09 DIAGNOSIS — R82.89 ABNORMAL URINE CYTOLOGY: Primary | ICD-10-CM

## 2023-08-09 NOTE — CONFIDENTIAL NOTE
Babs Fernandez PA-C  [facility] [place]  Patient's UroVysion FISH test was unsatisfactory for evaluation given insufficient cells.  He will need repeat UroVysion FISH. I'm not sure if this can be ordered on it's own, or if it has to be done in conjunction with a urine cytology (that's typically how I order it, but the cytology was negative so I don't need another cytology).  Please place orders as appropriate and notify patient.  Thanks!  Babs Fernandez PA-C    Received the 7/28/23 urine cytology results and the above result note from Babs Fernandez PA-C. Patient reviewed results via my chart on 8/9/23.    Called and spoke to the lab and the order for just the UroVysion FISH test for bladder cancer is [id number]. Lab confirmed that order [id number] does not include urine cytology. [id number] ordered per Babs Fernandez PA-C. My chart message sent to patient.    Argelia Mora RN, BSN

## 2023-08-11 DIAGNOSIS — E83.39 HYPOPHOSPHATEMIA: ICD-10-CM

## 2023-08-14 ENCOUNTER — LAB (OUTPATIENT)
Dept: LAB | Facility: CLINIC | Age: 68
End: 2023-08-14
Payer: MEDICARE

## 2023-08-14 DIAGNOSIS — R82.89 ABNORMAL URINE CYTOLOGY: ICD-10-CM

## 2023-08-14 PROCEDURE — 88120 CYTP URNE 3-5 PROBES EA SPEC: CPT | Performed by: PATHOLOGY

## 2023-09-21 ENCOUNTER — TRANSFERRED RECORDS (OUTPATIENT)
Dept: HEALTH INFORMATION MANAGEMENT | Facility: CLINIC | Age: 68
End: 2023-09-21
Payer: MEDICARE

## 2023-09-26 ENCOUNTER — MYC MEDICAL ADVICE (OUTPATIENT)
Dept: UROLOGY | Facility: CLINIC | Age: 68
End: 2023-09-26
Payer: MEDICARE

## 2023-10-18 ENCOUNTER — TRANSFERRED RECORDS (OUTPATIENT)
Dept: HEALTH INFORMATION MANAGEMENT | Facility: CLINIC | Age: 68
End: 2023-10-18
Payer: MEDICARE

## 2023-11-08 DIAGNOSIS — E83.39 HYPOPHOSPHATEMIA: ICD-10-CM

## 2023-11-22 ENCOUNTER — PATIENT OUTREACH (OUTPATIENT)
Dept: CARE COORDINATION | Facility: CLINIC | Age: 68
End: 2023-11-22
Payer: MEDICARE

## 2023-11-24 ENCOUNTER — VIRTUAL VISIT (OUTPATIENT)
Dept: URGENT CARE | Facility: CLINIC | Age: 68
End: 2023-11-24
Payer: MEDICARE

## 2023-11-24 DIAGNOSIS — U07.1 INFECTION DUE TO 2019 NOVEL CORONAVIRUS: Primary | ICD-10-CM

## 2023-11-24 PROCEDURE — 99441 PR PHYSICIAN TELEPHONE EVALUATION 5-10 MIN: CPT | Mod: 95

## 2023-11-24 NOTE — PROGRESS NOTES
"Colton is a 68 year old who is being evaluated via a billable telephone visit.      How would you like to obtain your AVS? MyChart  If the video visit is dropped, the invitation should be resent by:   Will anyone else be joining your video visit? No          Assessment & Plan     Infection due to 2019 novel coronavirus    - nirmatrelvir and ritonavir (PAXLOVID) 150 mg/100 mg therapy pack; Take 2 tablets by mouth 2 times daily for 5 days (Take one tablet of Nirmatelvir and 1 tablet of Ritonavir twice daily for 5 days)       BMI:   Estimated body mass index is 26.35 kg/m  as calculated from the following:    Height as of 12/22/22: 1.842 m (6' 0.5\").    Weight as of 12/22/22: 89.4 kg (197 lb).       COVID-19 positive patient.  Encounter for consideration of medication intervention. Patient does qualify for a prescription. Full discussion with patient including medication options, risks and benefits. Potential drug interactions reviewed with patient.     Treatment Planned  Paxlovid renal dose sent to [facility] in [place]    Temporary change to home medications: holding atorvastatin for total of 10 days      Estimated body mass index is 26.35 kg/m  as calculated from the following:    Height as of 12/22/22: 1.842 m (6' 0.5\").    Weight as of 12/22/22: 89.4 kg (197 lb).  GFR Estimate   Date Value Ref Range Status   12/22/2022 68 >60 mL/min/1.73m2 Final     Comment:     Effective December 21, 2021 eGFRcr in adults is calculated using the 2021 CKD-EPI creatinine equation which includes age and gender (Aly et al., NEJM, DOI: 10.1056/REUMgz7562409)   06/14/2021 58 (L) >60 mL/min/[1.73_m2] Final     Comment:     Non  GFR Calc  Starting 12/18/2018, serum creatinine based estimated GFR (eGFR) will be   calculated using the Chronic Kidney Disease Epidemiology Collaboration   (CKD-EPI) equation.       No results found for: \"[id number]\"    Return in about 1 week (around 12/1/2023), or if symptoms worsen or fail to " improve.    Virtual Urgent Care  [facility]    Subjective   Colton is a 68 year old, presenting for the following health issues:  No chief complaint on file.      HPI       COVID-19 Symptom Review  How many days ago did these symptoms start? Last night, tested + at home today    Are any of the following symptoms significant for you?  New or worsening difficulty breathing? No  Worsening cough? Yes, it's a dry cough.   Fever or chills? No  Headache: YES  Sore throat: YES  Chest pain: No  Diarrhea: No  Body aches? No    What treatments has patient tried? None at this time   Does patient live in a nursing home, group home, or shelter? No  Does patient have a way to get food/medications during quarantined? Yes, I have a friend or family member who can help me.      Review of Systems   Constitutional, HEENT, cardiovascular, pulmonary, gi and gu systems are negative, except as otherwise noted.      Objective           Vitals:  No vitals were obtained today due to virtual visit.    Physical Exam   GENERAL: Healthy, alert and no distress  RESP: No audible wheeze, cough.  No increased work of breathing.          Telephone visit lasted 5 minutes

## 2023-11-24 NOTE — PATIENT INSTRUCTIONS
For informational purposes only. Not to replace the advice of your health care provider. Copyright   2022 [facility]. All rights reserved. Clinically reviewed by Joanie Cook, PharmD, BCACP. utoopia [id number] - REV 06/23.  COVID-19 Outpatient Treatments  Your care team can help you find the best treatments for COVID-19. Talk to a health care provider or refer to the FDA medicine fact sheets below.  Paxlovid (nirmatrelvir and ritonavir): https://www.paxlovid.[employer]/resources  Molnupiravir (Lagevrio): https://www.fda.gov/media/313712/download  Important: We can only prescribe Paxlovid or Molnupiravir when it can be started within 5 days of first having symptoms.  Paxlovid (nimatrelvir and ritonavir)  How it works  Two medicines (nirmatrelvir and ritonavir) are taken together. They stop the virus from growing. Less amount of virus is easier for your body to fight.  Benefits  Lowers risk of a hospital stay or death from COVID-19.  How to take  Medicine comes in a daily container with both medicine tablets. Take by mouth twice daily (once in the morning, once at night) for 5 days.  The number of tablets to take varies by patient.  Don't chew or break capsules. Swallow whole.  When to take  Take it as soon as possible and within 5 days of your first symptoms.  Who can take it  Patients must be 12 years or older weigh at least 88 pounds. Paxlovid is the preferred treatment for pregnant patients.  Possible side effects  Can cause altered sense of taste, diarrhea (loose, watery stools), high blood pressure, muscle aches.  Medicine conflicts  Some medicines may conflict with Paxlovid and may cause serious side effects.  Tell your care team about all the medicines you take, including prescription and over-the-counter medicines, vitamins, and herbal supplements.  Your care team will review your medicines to make sure that you can safely take Paxlovid.  Cautions  Paxlovid is not advised for patients with severe  kidney or liver disease. If you have kidney or liver problems, the dose may need to be changed.  If you're pregnant or breastfeeding, talk to your care team about your options.  If you take hormonal birth control (such as the Pill), then you or your partner should also use a non-hormonal form of birth control (such as a condom). Keep doing this for 1 menstrual cycle after your last dose of Paxlovid.  Molnupiravir (lagevrio)  How it works  Stops the virus from growing. Less amount of virus is easier for your body to fight.  Benefits  Lowers risk of a hospital stay or death from COVID-19.  How to take  Take 4 capsules by mouth every 12 hours (4 in the morning and 4 at night) for 5 days. Don't chew or break capsules. Swallow whole.  When to take  Take as soon as possible and within 5 days of your first symptoms.  Who can take it  Patients must be 18 years or older.   Possible side effects  Diarrhea (loose, watery stools), nausea (feeling sick to your stomach), dizziness, headaches.  Medicine conflicts  Right now, there are no known conflicts with other drugs. But tell your care team about all medicines you take.  Cautions  This medicine is not advised for patients who are pregnant.  If you are someone who could become pregnant, use trusted birth control until 4 days after your last dose of molnupiravir.  If your partner could become pregnant, you should use trusted birth control until 3 months after your last dose of molnupiravir.      Coping with Life After COVID-19  Being in the hospital because of COVID-19 is scary. Going home can be scary, too. You may face changes to your life, the way you work or what you can eat. It s hard to adjust to change, and it s normal to feel afraid, frustrated or even angry. These feelings usually go away over time. If your feelings don t start to get better, it s called  adjustment disorder.      What signs should I look out for?  Adjustment disorder can happen to anyone in a time of  stress. It makes it hard to cope with daily life. You may feel lonely or fight with loved ones, even if you re glad to be home. Watch for these signs:  Fear or worry  Hard time focusing  Sadness or anger  Trouble talking to family or friends  Feeling like you don t fit in or isolating yourself  Problems with sleep   Drinking alcohol or taking drugs to cope    What can I do?  You can help yourself get better. Feeling you have control helps you move forward. You may wonder if you ll be able to do things you did before. Be patient. Do your best to make the most of every day. Try to build relationships, be as active as you can, eat right and keep a sense of humor. Avoid smoking and drinking too much alcohol. Call your family doctor or clinic if you re not sure what to do. They can guide you to care or other services.    When should I get help?  Think about getting counseling if your sadness or frustration gets worse. Together with a trained counselor, you can talk about your problems adjusting to life after your hospital stay. You can come up with new ways to handle changes that give you more control. Your family doctor or care team can help you find a counselor.     Get help if you re thinking about hurting yourself. If you need help right away, call 911 or go to the nearest emergency room. You can also try the Crisis Text Line.    Crisis Text Line: 692-223 (http://www.crisistextline.org)  The Crisis Text Line serves anyone, in any crisis. It gives free, 24/7 support. Here's how it works:  Text HOME to 375417 from anywhere in the [place], anytime, about any type of crisis.  A live, trained Crisis Counselor will text you back quickly.  The volunteer Crisis Counselor can help you move from a  hot moment  to a  cool moment.  They can also help you work out a safety plan.

## 2023-12-06 ENCOUNTER — PATIENT OUTREACH (OUTPATIENT)
Dept: CARE COORDINATION | Facility: CLINIC | Age: 68
End: 2023-12-06
Payer: MEDICARE

## 2023-12-14 ENCOUNTER — OFFICE VISIT (OUTPATIENT)
Dept: DERMATOLOGY | Facility: CLINIC | Age: 68
End: 2023-12-14
Payer: MEDICARE

## 2023-12-14 DIAGNOSIS — L82.0 SEBORRHEIC KERATOSES, INFLAMED: ICD-10-CM

## 2023-12-14 DIAGNOSIS — L82.1 SEBORRHEIC KERATOSIS: ICD-10-CM

## 2023-12-14 DIAGNOSIS — Z12.83 SKIN CANCER SCREENING: Primary | ICD-10-CM

## 2023-12-14 PROCEDURE — 99213 OFFICE O/P EST LOW 20 MIN: CPT | Mod: 25 | Performed by: DERMATOLOGY

## 2023-12-14 PROCEDURE — 17110 DESTRUCTION B9 LES UP TO 14: CPT | Performed by: DERMATOLOGY

## 2023-12-14 ASSESSMENT — PAIN SCALES - GENERAL: PAINLEVEL: NO PAIN (0)

## 2023-12-14 NOTE — LETTER
12/14/2023       RE: Hi Smith  [address] [facility] [place] [address]     Dear Colleague,    Thank you for referring your patient, Hi Smith, to the [facility]ERMAT[facility] [place] at [facility]. Please see a copy of my visit note below.    [facility] Dermatology Note  Encounter Date: Dec 14, 2023  Office Visit      Dermatology Problem List:  1. 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              - declared in remission April 2017 by Onc  2. NMSC (BCC), s/p MMS in 2000.  3. Non-healing ulcers, bilateral LEs and left forearm, resolved              - Bacterial culture 3/2016 with normal skin giuliana.              - Bx obtained 3/31/2016 for H&E, sterile culture, flow cytometry, and donor chimerism studies, and if possible TCR gene rearrangement studies, suggested NK-Tcell lymphoma  4. BCC, left anterior shoulder, s/p bx 11/15/2019 with excision shortly after  5. Telogen effluvium - minoxidil 5% foam  6. AK, s/p cryo 12/2022  7. Hyperpigmented patch, L dorsal hand  Current tx: hydroquinone cream 4%, Rutin/Vitamin C supplements for bruising      ____________________________________________     Assessment & Plan:      #Seborrheic Keratoses, Cherry angiomas, solar lentigo, and benign nevi     - Reassured of benign etiology  - Lesions of concern on his scalp and neck consistent with seborrheic keratoses     #Inflamed seborrheic keratosis x6  - LN2 performed today (see procedure note below)     Procedures Performed:   - Cryotherapy procedure note, location: left postauricular, bilat flanks (x4), right inguinal. After verbal consent and discussion of risks and benefits including, but not limited to, dyspigmentation/scar, blister, and pain,6   lesions were treated with 1-2 mm freeze border for 2 cycles with liquid nitrogen. Post cryotherapy instructions were provided.     Follow-up: one year     Ab Perez MD  Dermatology Attending    ____________________    CC: Skin Check (Colton is here for a skin check)     HPI:  Mr. Hi Smith is a(n) 68 year old male who presents today as a return patient for FBSE. He is overall doing very well. No nonhealing sores, no new subcutaneous papules or nodules.  He has several irritated spots on abdomen and head/neck area.     He is no longer needing to follow up with oncology.        Patient is otherwise feeling well, without additional skin concerns.        Physical Exam:  Vitals: There were no vitals taken for this visit.  SKIN: Total skin excluding the undergarment areas was performed. The exam included the head/face, neck, both arms, chest, back, abdomen, both legs, digits and/or nails.   - left postauricular, bilat flanks (x4), right inguinal - 6 crusted waxy stuck on pink-tan papules  - There is an erythematous waxy stuck on tan to brown papule on the right temporal scalp.   - There are waxy stuck on tan to brown papules on the abdomen and neck.

## 2023-12-14 NOTE — PROGRESS NOTES
[facility] Dermatology Note  Encounter Date: Dec 14, 2023  Office Visit      Dermatology Problem List:  1. 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              - declared in remission April 2017 by Onc  2. NMSC (BCC), s/p MMS in 2000.  3. Non-healing ulcers, bilateral LEs and left forearm, resolved              - Bacterial culture 3/2016 with normal skin giuliana.              - Bx obtained 3/31/2016 for H&E, sterile culture, flow cytometry, and donor chimerism studies, and if possible TCR gene rearrangement studies, suggested NK-Tcell lymphoma  4. BCC, left anterior shoulder, s/p bx 11/15/2019 with excision shortly after  5. Telogen effluvium - minoxidil 5% foam  6. AK, s/p cryo 12/2022  7. Hyperpigmented patch, L dorsal hand  Current tx: hydroquinone cream 4%, Rutin/Vitamin C supplements for bruising      ____________________________________________     Assessment & Plan:      #Seborrheic Keratoses, Cherry angiomas, solar lentigo, and benign nevi     - Reassured of benign etiology  - Lesions of concern on his scalp and neck consistent with seborrheic keratoses     #Inflamed seborrheic keratosis x6  - LN2 performed today (see procedure note below)     Procedures Performed:   - Cryotherapy procedure note, location: left postauricular, bilat flanks (x4), right inguinal. After verbal consent and discussion of risks and benefits including, but not limited to, dyspigmentation/scar, blister, and pain,6  lesions were treated with 1-2 mm freeze border for 2 cycles with liquid nitrogen. Post cryotherapy instructions were provided.     Follow-up: one year     Ab Perez MD  Dermatology Attending    ____________________    CC: Skin Check (Colton is here for a skin check)     HPI:  Mr. Hi Smith is a(n) 68 year old  male who presents today as a return patient for FBSE. He is overall doing very well. No nonhealing sores, no new subcutaneous papules or nodules.  He has several irritated spots on abdomen and head/neck area.     He is no longer needing to follow up with oncology.        Patient is otherwise feeling well, without additional skin concerns.        Physical Exam:  Vitals: There were no vitals taken for this visit.  SKIN: Total skin excluding the undergarment areas was performed. The exam included the head/face, neck, both arms, chest, back, abdomen, both legs, digits and/or nails.   - left postauricular, bilat flanks (x4), right inguinal - 6 crusted waxy stuck on pink-tan papules  - There is an erythematous waxy stuck on tan to brown papule on the right temporal scalp.   - There are waxy stuck on tan to brown papules on the abdomen and neck.

## 2023-12-14 NOTE — NURSING NOTE
Dermatology Rooming Note    Hi Smith's goals for this visit include:   Chief Complaint   Patient presents with    Derm Problem     FBSE- couple of spots on chest     Lexie Fabienne, EMT

## 2023-12-15 ENCOUNTER — TELEPHONE (OUTPATIENT)
Dept: DERMATOLOGY | Facility: CLINIC | Age: 68
End: 2023-12-15
Payer: MEDICARE

## 2023-12-15 NOTE — TELEPHONE ENCOUNTER
Via phone patient was scheduled for the following    Appointment type: Return  Provider: Dr. Perez  Return date: 12-19-23  Specialty phone number: [phone]

## 2024-01-11 PROBLEM — L82.1 SEBORRHEIC KERATOSIS: Status: ACTIVE | Noted: 2024-01-11

## 2024-01-12 ENCOUNTER — VIRTUAL VISIT (OUTPATIENT)
Dept: FAMILY MEDICINE | Facility: CLINIC | Age: 69
End: 2024-01-12
Payer: MEDICARE

## 2024-01-12 DIAGNOSIS — Z94.81 STATUS POST BONE MARROW TRANSPLANT (H): ICD-10-CM

## 2024-01-12 DIAGNOSIS — Z09 HOSPITAL DISCHARGE FOLLOW-UP: Primary | ICD-10-CM

## 2024-01-12 DIAGNOSIS — I25.118 CORONARY ARTERY DISEASE INVOLVING NATIVE HEART WITH OTHER FORM OF ANGINA PECTORIS, UNSPECIFIED VESSEL OR LESION TYPE (H): ICD-10-CM

## 2024-01-12 DIAGNOSIS — C96.9 T-CELL OR NK-CELL NEOPLASM (H): ICD-10-CM

## 2024-01-12 DIAGNOSIS — D47.Z9 OTHER SPECIFIED NEOPLASMS OF UNCERTAIN BEHAVIOR OF LYMPHOID, HEMATOPOIETIC AND RELATED TISSUE (H): ICD-10-CM

## 2024-01-12 DIAGNOSIS — D80.1 HYPOGAMMAGLOBULINEMIA (H): ICD-10-CM

## 2024-01-12 DIAGNOSIS — C86.00 EXTRANODAL NK/T-CELL LYMPHOMA, NASAL TYPE: ICD-10-CM

## 2024-01-12 DIAGNOSIS — G61.82 MULTIFOCAL MOTOR NEUROPATHY (H): ICD-10-CM

## 2024-01-12 PROCEDURE — 99214 OFFICE O/P EST MOD 30 MIN: CPT | Mod: 95 | Performed by: FAMILY MEDICINE

## 2024-01-12 RX ORDER — METOPROLOL SUCCINATE 25 MG/1
12.5 TABLET, EXTENDED RELEASE ORAL DAILY
Start: 2024-01-12

## 2024-01-12 NOTE — PROGRESS NOTES
"    Instructions Relayed to Patient by Virtual Roomer:     Patient is active on [employer]:   Relayed following to patient: \"It looks like you are active on [employer], are you able to join the visit this way? If not, do you need us to send you a link now or would you like your provider to send a link via text or email when they are ready to initiate the visit?\"    Reminded patient to ensure they were logged on to virtual visit by arrival time listed. Documented in appointment notes if patient had flexibility to initiate visit sooner than arrival time. If pediatric virtual visit, ensured pediatric patient along with parent/guardian will be present for video visit.     Patient offered the website [email or web address] and/or phone number to [employer] Help line: [phone]    Colton is a 68 year old who is being evaluated via a billable video visit.      How would you like to obtain your AVS? [employer]  If the video visit is dropped, the invitation should be resent by: Text to cell phone: [phone]  Will anyone else be joining your video visit? No          Assessment & Plan     Hospital discharge follow-up  Hi Smith is a 68 year old male who presents today for follow-up after recent discharge from the hospital.  He was admitted for evaluation of chest pain.  Initially he was seen in the cardiology clinic and was sent to the ED for angiogram.  He underwent angiogram which was reassuring, no stent placed  Patient was discharged home.  Toprol-XL was decreased to 12.5 due to bradycardia.  Today he reports he is feeling very well.  He denies any chest pain or shortness of breath.  He is planning to travel to [place] in the next few days.    Coronary artery disease involving native heart with other form of angina pectoris, unspecified vessel or lesion type (H24)  Chronic stable problem.  No current symptoms.  Continue current medication  - metoprolol succinate ER (TOPROL XL) 25 MG 24 hr tablet; Take 0.5 tablets " (12.5 mg) by mouth daily    Multifocal motor neuropathy (H)  Stable, chronic problem.  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  Continue to monitor    Extranodal NK/T-cell lymphoma, nasal type (H)  No current symptoms.  Continue to monitor no evidence of recurrence    Status post bone marrow transplant (H)  Stable, no current concern, continue to monitor    T-cell or NK-cell neoplasm (H)  Stable, no evidence of recurrence.  Continue to monitor    Hypogammaglobulinemia (H24)  Stable, no current symptoms.  Continue to monitor           MED REC REQUIRED  Post Medication Reconciliation Status: discharge medications reconciled, continue medications without change  Regular exercise    Tiffany Estrada MD  [facility] PJ Segura is a 68 year old, presenting for the following health issues:  No chief complaint on file.        1/12/2024    10:11 AM   Additional Questions   Roomed by gemma   Accompanied by self       HPI       Hospital Follow-up Visit:    Hospital/Nursing Home/ Rehab Facility:  [facility]  Date of Admission: 1/09/2024  Date of Discharge: 1/10/2024  Reason(s) for Admission: chest pain    Was your hospitalization related to COVID-19? No   Problems taking medications regularly:  None  Medication changes since discharge: Toprol med was cut in half in dosage  Problems adhering to non-medication therapy:  None    Summary of hospitalization:  CareEverywhere information obtained and reviewed  Diagnostic Tests/Treatments reviewed.  Follow up needed: none  Other Healthcare Providers Involved in Patient s Care:         None  Update since discharge: improved.         Plan of care communicated with patient         HOSPITALIST DISCHARGE SUMMARY [facility]    Admission Date: 1/9/2024  Discharge Date: 1/10/2024    Discharge Plan: Hi Smith was  discharged to home.    Principal Diagnosis    CAD with Angina    Hospital Problem List  Principal Problem:  Coronary artery disease involving native coronary artery of native heart with angina pectoris (HC)  Active Problems:  Coronary artery disease  Hypertension    ADDITIONAL COMMENTS REGARDING DIAGNOSIS SPECIFICITY  Additional Diagnosis Information          Hospital Course    69 y/o male with known CAD, adm with 3 days intermittent L dull chest pain assoc with shortness of breath and rel'd by NTG. EKG without acute ischemic changes and Troponins negative.  Cardiology consulted, and recommended coronary angiogram=>no signif obstructive CAD, see report below.  Medical mgmt recommended.  Toprol dose decreased due to bradycardia. Blood pressure stable    Recommendations for Outpatient Provider PCP: Tiffany Estrada MD    Admission: 1/9/2024 @ [facility]    Specific recommendations to be addressed at the follow up visit:  CAD: assess for any angina recurrence    Medication changes: decr toprol    Follow up labs/imaging: none    Other specialty follow-up not included in DC orders: none                Review of Systems   Constitutional, HEENT, cardiovascular, pulmonary, gi and gu systems are negative, except as otherwise noted.      Objective           Vitals:  No vitals were obtained today due to virtual visit.    Physical Exam   GENERAL: Healthy, alert and no distress  EYES: Eyes grossly normal to inspection.  No discharge or erythema, or obvious scleral/conjunctival abnormalities.  RESP: No audible wheeze, cough, or visible cyanosis.  No visible retractions or increased work of breathing.    SKIN: Visible skin clear. No significant rash, abnormal pigmentation or lesions.  NEURO: Cranial nerves grossly intact.  Mentation and speech appropriate for age.  PSYCH: Mentation appears normal, affect normal/bright, judgement and insight intact, normal speech and appearance well-groomed.                Video-Visit  Details    Type of service:  Video Visit   Video Start Time: 10:55 AM  Video End Time:11:02 AM    Originating Location (pt. Location): Home    Distant Location (provider location):  On-site  Platform used for Video Visit: Dmitri

## 2024-02-16 ENCOUNTER — MYC REFILL (OUTPATIENT)
Dept: FAMILY MEDICINE | Facility: CLINIC | Age: 69
End: 2024-02-16
Payer: MEDICARE

## 2024-02-16 DIAGNOSIS — E83.39 HYPOPHOSPHATEMIA: ICD-10-CM

## 2024-03-14 SDOH — HEALTH STABILITY: PHYSICAL HEALTH: ON AVERAGE, HOW MANY DAYS PER WEEK DO YOU ENGAGE IN MODERATE TO STRENUOUS EXERCISE (LIKE A BRISK WALK)?: 2 DAYS

## 2024-03-14 SDOH — HEALTH STABILITY: PHYSICAL HEALTH: ON AVERAGE, HOW MANY MINUTES DO YOU ENGAGE IN EXERCISE AT THIS LEVEL?: 20 MIN

## 2024-03-14 ASSESSMENT — SOCIAL DETERMINANTS OF HEALTH (SDOH): HOW OFTEN DO YOU GET TOGETHER WITH FRIENDS OR RELATIVES?: ONCE A WEEK

## 2024-03-17 ENCOUNTER — HEALTH MAINTENANCE LETTER (OUTPATIENT)
Age: 69
End: 2024-03-17

## 2024-03-21 ENCOUNTER — OFFICE VISIT (OUTPATIENT)
Dept: FAMILY MEDICINE | Facility: CLINIC | Age: 69
End: 2024-03-21
Payer: MEDICARE

## 2024-03-21 VITALS
HEIGHT: 73 IN | RESPIRATION RATE: 16 BRPM | DIASTOLIC BLOOD PRESSURE: 76 MMHG | TEMPERATURE: 97.4 F | HEART RATE: 58 BPM | WEIGHT: 208 LBS | OXYGEN SATURATION: 99 % | BODY MASS INDEX: 27.57 KG/M2 | SYSTOLIC BLOOD PRESSURE: 136 MMHG

## 2024-03-21 DIAGNOSIS — E83.39 HYPOPHOSPHATEMIA: ICD-10-CM

## 2024-03-21 DIAGNOSIS — Z12.5 PROSTATE CANCER SCREENING: ICD-10-CM

## 2024-03-21 DIAGNOSIS — Z00.00 ENCOUNTER FOR MEDICARE ANNUAL WELLNESS EXAM: Primary | ICD-10-CM

## 2024-03-21 LAB
BASOPHILS # BLD AUTO: 0 10E3/UL (ref 0–0.2)
BASOPHILS NFR BLD AUTO: 0 %
EOSINOPHIL # BLD AUTO: 0.1 10E3/UL (ref 0–0.7)
EOSINOPHIL NFR BLD AUTO: 2 %
ERYTHROCYTE [DISTWIDTH] IN BLOOD BY AUTOMATED COUNT: 13 % (ref 10–15)
HCT VFR BLD AUTO: 46.8 % (ref 40–53)
HGB BLD-MCNC: 15 G/DL (ref 13.3–17.7)
IMM GRANULOCYTES # BLD: 0 10E3/UL
IMM GRANULOCYTES NFR BLD: 0 %
LYMPHOCYTES # BLD AUTO: 2.8 10E3/UL (ref 0.8–5.3)
LYMPHOCYTES NFR BLD AUTO: 32 %
MCH RBC QN AUTO: 28.5 PG (ref 26.5–33)
MCHC RBC AUTO-ENTMCNC: 32.1 G/DL (ref 31.5–36.5)
MCV RBC AUTO: 89 FL (ref 78–100)
MONOCYTES # BLD AUTO: 1 10E3/UL (ref 0–1.3)
MONOCYTES NFR BLD AUTO: 11 %
NEUTROPHILS # BLD AUTO: 4.7 10E3/UL (ref 1.6–8.3)
NEUTROPHILS NFR BLD AUTO: 55 %
PLATELET # BLD AUTO: 202 10E3/UL (ref 150–450)
RBC # BLD AUTO: 5.27 10E6/UL (ref 4.4–5.9)
WBC # BLD AUTO: 8.7 10E3/UL (ref 4–11)

## 2024-03-21 PROCEDURE — 80061 LIPID PANEL: CPT | Performed by: FAMILY MEDICINE

## 2024-03-21 PROCEDURE — 36415 COLL VENOUS BLD VENIPUNCTURE: CPT | Performed by: FAMILY MEDICINE

## 2024-03-21 PROCEDURE — G0439 PPPS, SUBSEQ VISIT: HCPCS | Performed by: FAMILY MEDICINE

## 2024-03-21 PROCEDURE — G0103 PSA SCREENING: HCPCS | Performed by: FAMILY MEDICINE

## 2024-03-21 PROCEDURE — 80053 COMPREHEN METABOLIC PANEL: CPT | Performed by: FAMILY MEDICINE

## 2024-03-21 PROCEDURE — 84100 ASSAY OF PHOSPHORUS: CPT | Performed by: FAMILY MEDICINE

## 2024-03-21 PROCEDURE — 85025 COMPLETE CBC W/AUTO DIFF WBC: CPT | Performed by: FAMILY MEDICINE

## 2024-03-21 RX ORDER — NICOTINE POLACRILEX 2 MG
GUM BUCCAL
COMMUNITY

## 2024-03-21 RX ORDER — AMLODIPINE BESYLATE 2.5 MG/1
1 TABLET ORAL
COMMUNITY
Start: 2024-03-11

## 2024-03-21 ASSESSMENT — PAIN SCALES - GENERAL: PAINLEVEL: NO PAIN (0)

## 2024-03-21 NOTE — PATIENT INSTRUCTIONS
Preventive Care Advice   This is general advice given by our system to help you stay healthy. However, your care team may have specific advice just for you. Please talk to your care team about your preventive care needs.  Nutrition  Eat 5 or more servings of fruits and vegetables each day.  Try wheat bread, brown rice and whole grain pasta (instead of white bread, rice, and pasta).  Get enough calcium and vitamin D. Check the label on foods and aim for 100% of the RDA (recommended daily allowance).  Lifestyle  Exercise at least 150 minutes each week   (30 minutes a day, 5 days a week).  Do muscle strengthening activities 2 days a week. These help control your weight and prevent disease.  No smoking.  Wear sunscreen to prevent skin cancer.  Have a dental exam and cleaning every 6 months.  Yearly exams  See your health care team every year to talk about:  Any changes in your health.  Any medicines your care team has prescribed.  Preventive care, family planning, and ways to prevent chronic diseases.  Shots (vaccines)   HPV shots (up to age 26), if you've never had them before.  Hepatitis B shots (up to age 59), if you've never had them before.  COVID-19 shot: Get this shot when it's due.  Flu shot: Get a flu shot every year.  Tetanus shot: Get a tetanus shot every 10 years.  Pneumococcal, hepatitis A, and RSV shots: Ask your care team if you need these based on your risk.  Shingles shot (for age 50 and up).  General health tests  Diabetes screening:  Starting at age 35, Get screened for diabetes at least every 3 years.  If you are younger than age 35, ask your care team if you should be screened for diabetes.  Cholesterol test: At age 39, start having a cholesterol test every 5 years, or more often if advised.  Bone density scan (DEXA): At age 50, ask your care team if you should have this scan for osteoporosis (brittle bones).  Hepatitis C: Get tested at least once in your life.  STIs (sexually transmitted  infections)  Before age 24: Ask your care team if you should be screened for STIs.  After age 24: Get screened for STIs if you're at risk. You are at risk for STIs (including HIV) if:  You are sexually active with more than one person.  You don't use condoms every time.  You or a partner was diagnosed with a sexually transmitted infection.  If you are at risk for HIV, ask about PrEP medicine to prevent HIV.  Get tested for HIV at least once in your life, whether you are at risk for HIV or not.  Cancer screening tests  Cervical cancer screening: If you have a cervix, begin getting regular cervical cancer screening tests at age 21. Most people who have regular screenings with normal results can stop after age 65. Talk about this with your provider.  Breast cancer scan (mammogram): If you've ever had breasts, begin having regular mammograms starting at age 40. This is a scan to check for breast cancer.  Colon cancer screening: It is important to start screening for colon cancer at age 45.  Have a colonoscopy test every 10 years (or more often if you're at risk) Or, ask your provider about stool tests like a FIT test every year or Cologuard test every 3 years.  To learn more about your testing options, visit: https://[email or web address]/[email or web address].pdf.  For help making a decision, visit: https://bit.ly/eq18502.  Prostate cancer screening test: If you have a prostate and are age 55 to 69, ask your provider if you would benefit from a yearly prostate cancer screening test.  Lung cancer screening: If you are a current or former smoker age 50 to 80, ask your care team if ongoing lung cancer screenings are right for you.  For informational purposes only. Not to replace the advice of your health care provider. Copyright   2023 [employer]. All rights reserved. Clinically reviewed by the [facility]. [employer] [id number] - REV 01/24.    Hearing Loss: Care Instructions  Overview     Hearing loss is a  sudden or slow decrease in how well you hear. It can range from slight to profound. Permanent hearing loss can occur with aging. It also can happen when you are exposed long-term to loud noise. Examples include listening to loud music, riding motorcycles, or being around other loud machines.  Hearing loss can affect your work and home life. It can make you feel lonely or depressed. You may feel that you have lost your independence. But hearing aids and other devices can help you hear better and feel connected to others.  Follow-up care is a key part of your treatment and safety. Be sure to make and go to all appointments, and call your doctor if you are having problems. It's also a good idea to know your test results and keep a list of the medicines you take.  How can you care for yourself at home?  Avoid loud noises whenever possible. This helps keep your hearing from getting worse.  Always wear hearing protection around loud noises.  Wear a hearing aid as directed.  A professional can help you pick a hearing aid that will work best for you.  You can also get hearing aids over the counter for mild to moderate hearing loss.  Have hearing tests as your doctor suggests. They can show whether your hearing has changed. Your hearing aid may need to be adjusted.  Use other devices as needed. These may include:  Telephone amplifiers and hearing aids that can connect to a television, stereo, radio, or microphone.  Devices that use lights or vibrations. These alert you to the doorbell, a ringing telephone, or a baby monitor.  Television closed-captioning. This shows the words at the bottom of the screen. Most new TVs can do this.  TTY (text telephone). This lets you type messages back and forth on the telephone instead of talking or listening. These devices are also called TDD. When messages are typed on the keyboard, they are sent over the phone line to a receiving TTY. The message is shown on a monitor.  Use text  "messaging, social media, and email if it is hard for you to communicate by telephone.  Try to learn a listening technique called speechreading. It is not lipreading. You pay attention to people's gestures, expressions, posture, and tone of voice. These clues can help you understand what a person is saying. Face the person you are talking to, and have them face you. Make sure the lighting is good. You need to see the other person's face clearly.  Think about counseling if you need help to adjust to your hearing loss.  When should you call for help?  Watch closely for changes in your health, and be sure to contact your doctor if:    You think your hearing is getting worse.     You have new symptoms, such as dizziness or nausea.   Where can you learn more?  Go to https://www.[employer].net/patiented  Enter R798 in the search box to learn more about \"Hearing Loss: Care Instructions.\"  Current as of: September 27, 2023               Content Version: 14.0    8138-6204 [employer].   Care instructions adapted under license by your healthcare professional. If you have questions about a medical condition or this instruction, always ask your healthcare professional. [employer] disclaims any warranty or liability for your use of this information.      Learning About Stress  What is stress?     Stress is your body's response to a hard situation. Your body can have a physical, emotional, or mental response. Stress is a fact of life for most people, and it affects everyone differently. What causes stress for you may not be stressful for someone else.  A lot of things can cause stress. You may feel stress when you go on a job interview, take a test, or run a race. This kind of short-term stress is normal and even useful. It can help you if you need to work hard or react quickly. For example, stress can help you finish an important job on time.  Long-term stress is caused by ongoing stressful situations " or events. Examples of long-term stress include long-term health problems, ongoing problems at work, or conflicts in your family. Long-term stress can harm your health.  How does stress affect your health?  When you are stressed, your body responds as though you are in danger. It makes hormones that speed up your heart, make you breathe faster, and give you a burst of energy. This is called the fight-or-flight stress response. If the stress is over quickly, your body goes back to normal and no harm is done.  But if stress happens too often or lasts too long, it can have bad effects. Long-term stress can make you more likely to get sick, and it can make symptoms of some diseases worse. If you tense up when you are stressed, you may develop neck, shoulder, or low back pain. Stress is linked to high blood pressure and heart disease.  Stress also harms your emotional health. It can make you arellano, tense, or depressed. Your relationships may suffer, and you may not do well at work or school.  What can you do to manage stress?  You can try these things to help manage stress:   Do something active. Exercise or activity can help reduce stress. Walking is a great way to get started. Even everyday activities such as housecleaning or yard work can help.  Try yoga or oracio chi. These techniques combine exercise and meditation. You may need some training at first to learn them.  Do something you enjoy. For example, listen to music or go to a movie. Practice your hobby or do volunteer work.  Meditate. This can help you relax, because you are not worrying about what happened before or what may happen in the future.  Do guided imagery. Imagine yourself in any setting that helps you feel calm. You can use online videos, books, or a teacher to guide you.  Do breathing exercises. For example:  From a standing position, bend forward from the waist with your knees slightly bent. Let your arms dangle close to the floor.  Breathe in slowly  "and deeply as you return to a standing position. Roll up slowly and lift your head last.  Hold your breath for just a few seconds in the standing position.  Breathe out slowly and bend forward from the waist.  Let your feelings out. Talk, laugh, cry, and express anger when you need to. Talking with supportive friends or family, a counselor, or a jennifer leader about your feelings is a healthy way to relieve stress. Avoid discussing your feelings with people who make you feel worse.  Write. It may help to write about things that are bothering you. This helps you find out how much stress you feel and what is causing it. When you know this, you can find better ways to cope.  What can you do to prevent stress?  You might try some of these things to help prevent stress:  Manage your time. This helps you find time to do the things you want and need to do.  Get enough sleep. Your body recovers from the stresses of the day while you are sleeping.  Get support. Your family, friends, and community can make a difference in how you experience stress.  Limit your news feed. Avoid or limit time on social media or news that may make you feel stressed.  Do something active. Exercise or activity can help reduce stress. Walking is a great way to get started.  Where can you learn more?  Go to https://www.[employer].net/patiented  Enter N032 in the search box to learn more about \"Learning About Stress.\"  Current as of: October 24, 2023               Content Version: 14.0    4789-2438 [employer].   Care instructions adapted under license by your healthcare professional. If you have questions about a medical condition or this instruction, always ask your healthcare professional. [employer] disclaims any warranty or liability for your use of this information.      Bladder Training: Care Instructions  Your Care Instructions     Bladder training is used to treat urge incontinence and stress incontinence. Urge " incontinence means that the need to urinate comes on so fast that you can't get to a toilet in time. Stress incontinence means that you leak urine because of pressure on your bladder. For example, it may happen when you laugh, cough, or lift something heavy.  Bladder training can increase how long you can wait before you have to urinate. It can also help your bladder hold more urine. And it can give you better control over the urge to urinate.  It is important to remember that bladder training takes a few weeks to a few months to make a difference. You may not see results right away, but don't give up.  Follow-up care is a key part of your treatment and safety. Be sure to make and go to all appointments, and call your doctor if you are having problems. It's also a good idea to know your test results and keep a list of the medicines you take.  How can you care for yourself at home?  Work with your doctor to come up with a bladder training program that is right for you. You may use one or more of the following methods.  Delayed urination  In the beginning, try to keep from urinating for 5 minutes after you first feel the need to go.  While you wait, take deep, slow breaths to relax. Kegel exercises can also help you delay the need to go to the bathroom.  After some practice, when you can easily wait 5 minutes to urinate, try to wait 10 minutes before you urinate.  Slowly increase the waiting period until you are able to control when you have to urinate.  Scheduled urination  Empty your bladder when you first wake up in the morning.  Schedule times throughout the day when you will urinate.  Start by going to the bathroom every hour, even if you don't need to go.  Slowly increase the time between trips to the bathroom.  When you have found a schedule that works well for you, keep doing it.  If you wake up during the night and have to urinate, do it. Apply your schedule to waking hours only.  Kegel exercises  These  "tighten and strengthen pelvic muscles, which can help you control the flow of urine. (If doing these exercises causes pain, stop doing them and talk with your doctor.) To do Kegel exercises:  Squeeze your muscles as if you were trying not to pass gas. Or squeeze your muscles as if you were stopping the flow of urine. Your belly, legs, and buttocks shouldn't move.  Hold the squeeze for 3 seconds, then relax for 5 to 10 seconds.  Start with 3 seconds, then add 1 second each week until you are able to squeeze for 10 seconds.  Repeat the exercise 10 times a session. Do 3 to 8 sessions a day.  When should you call for help?  Watch closely for changes in your health, and be sure to contact your doctor if:    Your incontinence is getting worse.     You do not get better as expected.   Where can you learn more?  Go to [email or web address]://[email or web address]/patiented  Enter V684 in the search box to learn more about \"Bladder Training: Care Instructions.\"  Current as of: November 15, 2023               Content Version: 14.0    5206-3899 [email or web address].   Care instructions adapted under license by your healthcare professional. If you have questions about a medical condition or this instruction, always ask your healthcare professional. [email or web address] disclaims any warranty or liability for your use of this information.      "

## 2024-03-21 NOTE — PROGRESS NOTES
"Preventive Care Visit  [facility]  Tiffany Estrada MD, Family Medicine  Mar 21, 2024      Assessment & Plan     Encounter for Medicare annual wellness exam  Preventive care reviewed and updated.    - Lipid panel reflex to direct LDL Non-fasting; Future  - Comprehensive metabolic panel; Future  - CBC with Platelets & Differential; Future  - Lipid panel reflex to direct LDL Non-fasting  - Comprehensive metabolic panel  - CBC with Platelets & Differential    Hypophosphatemia    - Phosphorus; Future  - Phosphorus    Prostate cancer screening    - Prostate Specific Antigen Screen; Future  - Prostate Specific Antigen Screen    Patient has been advised of split billing requirements and indicates understanding: Yes          BMI  Estimated body mass index is 27.82 kg/m  as calculated from the following:    Height as of this encounter: 1.842 m (6' 0.5\").    Weight as of this encounter: 94.3 kg (208 lb).   Weight management plan: Discussed healthy diet and exercise guidelines    Counseling  Appropriate preventive services were discussed with this patient, including applicable screening as appropriate for fall prevention, nutrition, physical activity, Tobacco-use cessation, weight loss and cognition.  Checklist reviewing preventive services available has been given to the patient.  Reviewed patient's diet, addressing concerns and/or questions.   He is at risk for lack of exercise and has been provided with information to increase physical activity for the benefit of his well-being.   The patient was provided with written information regarding signs of hearing loss.   Information on urinary incontinence and treatment options given to patient.       Work on weight loss  Regular exercise    Subjective   Colton is a 69 year old, presenting for the following:  Medicare Visit        3/21/2024    10:37 AM   Additional Questions   Roomed by Ligia PEARL   Accompanied by Self         Health Care Directive  Patient has a " Health Care Directive on file  Advance care planning document is on file and is current.    HPI    Preventive - advised to get Shingrix from our pharmacy.     Immunization History   Administered Date(s) Administered    COVID-19 Bivalent 12+ (Pfizer) 10/19/2022    COVID-19 MONOVALENT 12+ (Pfizer) 03/18/2021, 04/08/2021, 12/02/2021    DTaP / Hep B / IPV 11/16/2017    HIB (PRP-T) 11/16/2017    Influenza (High Dose) 3 valent vaccine 10/22/2019    Influenza (IIV3) PF 11/08/2006    Influenza Vaccine 65+ (FLUAD) 12/09/2020, 01/12/2024    Influenza Vaccine 65+ (Fluzone HD) 11/08/2021, 10/19/2022    Influenza Vaccine >6 months,quad, PF 10/23/2014, 09/30/2015, 10/17/2016, 11/16/2017, 11/05/2018    MMR 01/17/2020    Pneumo Conj 13-V (2010&after) 11/16/2017    Pneumococcal 23 valent 10/22/2019    TDAP (Adacel,Boostrix) 04/16/2008    Zoster recombinant adjuvanted (SHINGRIX) 01/17/2020       - Colon CA screen: Colonoscopy, age 45-75 every 10 years or FIT every year or Cologuard every 3 years     Had colonoscopy in 2021   Next 2031     - Prostate CA screen: Discussed controversy about screening.   PSA   Date Value Ref Range Status   06/14/2021 2.64 0 - 4 ug/L Final     Comment:     Assay Method:  Chemiluminescence using Siemens Vista analyzer     Prostate Specific Antigen Screen   Date Value Ref Range Status   12/22/2022 2.39 0.00 - 4.00 ug/L Final         -lipids screen:ordered     Diabetes screen: ordered             3/14/2024   General Health   How would you rate your overall physical health? Good   Feel stress (tense, anxious, or unable to sleep) To some extent   (!) STRESS CONCERN      3/14/2024   Nutrition   Diet: Regular (no restrictions)         3/14/2024   Exercise   Days per week of moderate/strenous exercise 2 days   Average minutes spent exercising at this level 20 min   (!) EXERCISE CONCERN      3/14/2024   Social Factors   Frequency of gathering with friends or relatives Once a week   Worry food won't last until get  money to buy more No   Food not last or not have enough money for food? No   Do you have housing?  Yes   Are you worried about losing your housing? No   Lack of transportation? No   Unable to get utilities (heat,electricity)? No         3/14/2024   Fall Risk   Fallen 2 or more times in the past year? No   Trouble with walking or balance? No          3/14/2024   Activities of Daily Living- Home Safety   Needs help with the following daily activites None of the above   Safety concerns in the home None of the above         3/14/2024   Dental   Dentist two times every year? Yes         3/14/2024   Hearing Screening   Hearing concerns? (!) I NEED TO ASK PEOPLE TO SPEAK UP OR REPEAT THEMSELVES.    (!) IT'S HARDER TO UNDERSTAND WOMEN'S VOICES THAN MEN'S VOICES.    (!) IT'S HARD TO FOLLOW A CONVERSATION IN A NOISY RESTAURANT OR CROWDED ROOM.    (!) TROUBLE UNDERSTANDING SOFT OR WHISPERED SPEECH.         3/14/2024   Driving Risk Screening   Patient/family members have concerns about driving No         3/14/2024   General Alertness/Fatigue Screening   Have you been more tired than usual lately? No         3/14/2024   Urinary Incontinence Screening   Bothered by leaking urine in past 6 months Yes            Today's PHQ-2 Score:       3/20/2024    11:42 AM   PHQ-2 ( 1999 Pfizer)   Q1: Little interest or pleasure in doing things 0   Q2: Feeling down, depressed or hopeless 0   PHQ-2 Score 0   Q1: Little interest or pleasure in doing things Not at all   Q2: Feeling down, depressed or hopeless Not at all   PHQ-2 Score 0           3/14/2024   Substance Use   Alcohol more than 3/day or more than 7/wk Not Applicable   Do you have a current opioid prescription? No   How severe/bad is pain from 1 to 10? 0/10 (No Pain)   Do you use any other substances recreationally? No     Social History     Tobacco Use    Smoking status: Former     Packs/day: 0.00     Years: 29.00     Additional pack years: 0.00     Total pack years: 0.00     Types:  Cigarettes     Start date: 1972     Quit date: 3/2/1993     Years since quittin.0    Smokeless tobacco: Never   Vaping Use    Vaping Use: Never used   Substance Use Topics    Alcohol use: No    Drug use: No       ASCVD Risk   The 10-year ASCVD risk score (Jodi ELLIOTT, et al., 2019) is: 23.4%    Values used to calculate the score:      Age: 69 years      Sex: Male      Is Non- : No      Diabetic: No      Tobacco smoker: No      Systolic Blood Pressure: 149 mmHg      Is BP treated: Yes      HDL Cholesterol: 54 mg/dL      Total Cholesterol: 164 mg/dL            Reviewed and updated as needed this visit by Provider                    Past Medical History:   Diagnosis Date    Alcohol abuse     in remission x 20 yrs    CAD (coronary artery disease) of artery bypass graft     CABG , hx of stents    Depression     DVT (deep venous thrombosis) (H) 2014    upper left arm after PICC line    Extranodal NK/T-cell lymphoma, nasal type (H) 2014    GERD (gastroesophageal reflux disease)     Hearing loss     HTN (hypertension)     Lymphoma (H)     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 basal cell    Tinnitus     VRE (vancomycin resistant enterococcus) culture positive      Past Surgical History:   Procedure Laterality Date    BYPASS GRAFT ARTERY CORONARY          COLONOSCOPY      CYSTOSCOPY, TRANSURETHRAL RESECTION (TUR) TUMOR BLADDER, COMBINED N/A 2015    Procedure: COMBINED CYSTOSCOPY, TRANSURETHRAL RESECTION (TUR) TUMOR BLADDER;  Surgeon: Orville Boyle MD;  Location: [facility] OR    MOHS MICROGRAPHIC PROCEDURE  2000    nose    PICC INSERTION Left 2014    5fr DL Power PICC, 43cm (1cm external) in the L basilic vein w/ tip in the low SVC.    PICC INSERTION Left 10/28/2014    5fr DL Power PICC, 41cm (1cm external) in the L basilic vein  w/ tip in the SVC RA junction.    PICC INSERTION Right 12/31/2014    5fr DL Power PICC, 39cm (1cm external) in the R medial brachial vein w/ tip in the low SVC.     Lab work is in process  BP Readings from Last 3 Encounters:   03/21/24 136/76   12/22/22 132/82   07/11/22 (!) 153/91    Wt Readings from Last 3 Encounters:   03/21/24 94.3 kg (208 lb)   12/22/22 89.4 kg (197 lb)   07/11/22 89.8 kg (198 lb)                  Patient Active Problem List   Diagnosis    Extranodal NK/T-cell lymphoma, nasal type (H)    Nausea with vomiting    Anemia in neoplastic disease    Bacteremia    NHL (non-Hodgkin's lymphoma) (H)    Lymphoma (H)    Fever    Lymphoma, non-Hodgkin's (H)    Neoplasm of uncertain behavior of skin    T-cell or NK-cell neoplasm (H)    Other specified prophylactic or treatment measure    Skin cancer screening    Status post cord blood transplantation    Physical deconditioning    History of peripheral stem cell transplant (H)    Dysuria    Hypogammaglobulinemia (H24)    Status post bone marrow transplant (H)    Complications of bone marrow transplant (H)    Cellulitis    Hypophosphatemia    CAP (community acquired pneumonia)    History of nonmelanoma skin cancer    Hypertension    Hyperlipidemia    Coronary artery disease involving native heart with other form of angina pectoris, unspecified vessel or lesion type (H24)    History of deep vein thrombosis    Basal cell carcinoma of truncal skin    AK (actinic keratosis)    Telogen effluvium    Seborrheic keratoses, inflamed    Actinic keratosis    Seborrheic keratosis    Multifocal motor neuropathy (H)    Other specified neoplasms of uncertain behavior of lymphoid, hematopoietic and related tissue (H)     Past Surgical History:   Procedure Laterality Date    BYPASS GRAFT ARTERY CORONARY      2005    COLONOSCOPY  2021    CYSTOSCOPY, TRANSURETHRAL RESECTION (TUR) TUMOR BLADDER, COMBINED N/A 12/29/2015    Procedure: COMBINED CYSTOSCOPY, TRANSURETHRAL RESECTION  (TUR) TUMOR BLADDER;  Surgeon: Orville Boyle MD;  Location: [facility] OR    MOHS MICROGRAPHIC PROCEDURE  2000    nose    PICC INSERTION Left 2014    5fr DL Power PICC, 43cm (1cm external) in the L basilic vein w/ tip in the low SVC.    PICC INSERTION Left 10/28/2014    5fr DL Power PICC, 41cm (1cm external) in the L basilic vein w/ tip in the SVC RA junction.    PICC INSERTION Right 2014    5fr DL Power PICC, 39cm (1cm external) in the R medial brachial vein w/ tip in the low SVC.       Social History     Tobacco Use    Smoking status: Former     Packs/day: 0.00     Years: 29.00     Additional pack years: 0.00     Total pack years: 0.00     Types: Cigarettes     Start date: 1972     Quit date: 3/2/1993     Years since quittin.0    Smokeless tobacco: Never   Substance Use Topics    Alcohol use: No     Family History   Problem Relation Age of Onset    Cancer Mother         esophageal    Cancer Father         prostate cancer, lung fibrosis    Heart Disease Father         Heart Disease    Hypertension Father     Prostate Cancer Father     C.A.D. Sister     ALS Sister     Melanoma No family hx of     Skin Cancer No family hx of          Current Outpatient Medications   Medication Sig Dispense Refill    amLODIPine (NORVASC) 2.5 MG tablet Take 1 tablet by mouth daily at 2 pm      ASPIRIN PO Take 81 mg by mouth daily      atorvastatin (LIPITOR) 80 MG tablet Take 1 tablet (80 mg) by mouth daily 90 tablet 1    Biotin 1 MG CAPS       metoprolol succinate ER (TOPROL XL) 25 MG 24 hr tablet Take 0.5 tablets (12.5 mg) by mouth daily      nitroGLYcerin (NITROSTAT) 0.4 MG sublingual tablet Place 1 tablet (0.4 mg) under the tongue every 5 minutes as needed for chest pain 12 tablet 0    omeprazole (PRILOSEC) 20 MG DR capsule Take 20 mg by mouth daily      phosphorus tablet 250 mg 250 MG per tablet Take 1 tablet (250 mg) by mouth 2 times daily 180 tablet 0     Allergies   Allergen Reactions    Nka [No  Known Allergies]      Recent Labs   Lab Test 12/22/22  1510 07/06/22  1046 09/16/21  1401 06/14/21  1522 11/15/19  1116 07/02/19  0817 05/23/19  1504   LDL  --   --   --   --   --  87  --    HDL  --   --   --   --   --  54  --    TRIG  --   --   --   --   --  115  --    ALT  --  42  --   --  32  --  30   CR 1.15 1.16   < > 1.26* 1.12  --  1.12   GFRESTIMATED 69 69   < > 58* 68  --  68   GFRESTBLACK  --   --   --  68 79  --  79   POTASSIUM 3.5 3.7   < > 3.7 4.0  --  3.7   TSH  --  3.35  --   --  3.99  --   --     < > = values in this interval not displayed.      Current providers sharing in care for this patient include:  Patient Care Team:  Tiffany Estrada MD as PCP - General (Family Medicine)  Ramakrishna Cameron MD as MD (Infectious Diseases)  [place], Orville Tafoya MD as MD (Urology)  Ab Perez MD as MD (Dermapathology)  Ruth Bejarano MD as MD (Radiation Oncology)  Delroy Ratliff DPM (Podiatry)  Jesika Eaton MD as MD (Plastic Surgery)  Tiffany Estrada MD as Assigned PCP  Lopez Ellington MD as MD (Neurology)  Nunez, Dav Pierce MD as MD (Urology)  Babs Fernandez PA-C as Physician Assistant (Urology)  Ab Perez MD as Assigned Surgical Provider    The following health maintenance items are reviewed in Epic and correct as of today:  Health Maintenance   Topic Date Due    ANNUAL REVIEW OF  ORDERS  Never done    RSV VACCINE (Pregnancy & 60+) (1 - 1-dose 60+ series) Never done    IPV IMMUNIZATION (2 of 3 - Adult catch-up series) 12/14/2017    ZOSTER IMMUNIZATION (2 of 2) 03/13/2020    LIPID  07/02/2020    COVID-19 Vaccine (5 - 2023-24 season) 09/01/2023    MEDICARE ANNUAL WELLNESS VISIT  03/21/2025    FALL RISK ASSESSMENT  03/21/2025    GLUCOSE  12/22/2025    DTAP/TDAP/TD IMMUNIZATION (3 - Td or Tdap) 11/16/2027    ADVANCE CARE PLANNING  12/22/2027    COLORECTAL CANCER SCREENING  03/12/2031    HEPATITIS C SCREENING  Completed    PHQ-2 (once per  "calendar year)  Completed    INFLUENZA VACCINE  Completed    Pneumococcal Vaccine: 65+ Years  Completed    AORTIC ANEURYSM SCREENING (SYSTEM ASSIGNED)  Completed    HPV IMMUNIZATION  Aged Out    MENINGITIS IMMUNIZATION  Aged Out    RSV MONOCLONAL ANTIBODY  Aged Out         Review of Systems  Constitutional, HEENT, cardiovascular, pulmonary, gi and gu systems are negative, except as otherwise noted.     Objective    Exam  BP (!) 149/82   Pulse 58   Temp 97.4  F (36.3  C) (Oral)   Resp 16   Ht 1.842 m (6' 0.5\")   Wt 94.3 kg (208 lb)   SpO2 99%   BMI 27.82 kg/m     Estimated body mass index is 27.82 kg/m  as calculated from the following:    Height as of this encounter: 1.842 m (6' 0.5\").    Weight as of this encounter: 94.3 kg (208 lb).    Physical Exam  GENERAL: alert and no distress  EYES: Eyes grossly normal to inspection, PERRL and conjunctivae and sclerae normal  HENT: ear canals and TM's normal, nose and mouth without ulcers or lesions  NECK: no adenopathy, no asymmetry, masses, or scars  RESP: lungs clear to auscultation - no rales, rhonchi or wheezes  CV: regular rate and rhythm, normal S1 S2, no S3 or S4, no murmur, click or rub, no peripheral edema  ABDOMEN: soft, nontender, no hepatosplenomegaly, no masses and bowel sounds normal  MS: no gross musculoskeletal defects noted, no edema  SKIN: no suspicious lesions or rashes  NEURO: Normal strength and tone, mentation intact and speech normal  PSYCH: mentation appears normal, affect normal/bright         No data to display                       Signed Electronically by: Tiffany Estrada MD    "

## 2024-03-22 LAB
ALBUMIN SERPL BCG-MCNC: 4.3 G/DL (ref 3.5–5.2)
ALP SERPL-CCNC: 116 U/L (ref 40–150)
ALT SERPL W P-5'-P-CCNC: 29 U/L (ref 0–70)
ANION GAP SERPL CALCULATED.3IONS-SCNC: 10 MMOL/L (ref 7–15)
AST SERPL W P-5'-P-CCNC: 26 U/L (ref 0–45)
BILIRUB SERPL-MCNC: 0.5 MG/DL
BUN SERPL-MCNC: 16.4 MG/DL (ref 8–23)
CALCIUM SERPL-MCNC: 9.4 MG/DL (ref 8.8–10.2)
CHLORIDE SERPL-SCNC: 106 MMOL/L (ref 98–107)
CHOLEST SERPL-MCNC: 138 MG/DL
CREAT SERPL-MCNC: 1.24 MG/DL (ref 0.67–1.17)
DEPRECATED HCO3 PLAS-SCNC: 23 MMOL/L (ref 22–29)
EGFRCR SERPLBLD CKD-EPI 2021: 63 ML/MIN/1.73M2
FASTING STATUS PATIENT QL REPORTED: NO
GLUCOSE SERPL-MCNC: 87 MG/DL (ref 70–99)
HDLC SERPL-MCNC: 48 MG/DL
LDLC SERPL CALC-MCNC: 66 MG/DL
NONHDLC SERPL-MCNC: 90 MG/DL
PHOSPHATE SERPL-MCNC: 2.4 MG/DL (ref 2.5–4.5)
POTASSIUM SERPL-SCNC: 4 MMOL/L (ref 3.4–5.3)
PROT SERPL-MCNC: 6.7 G/DL (ref 6.4–8.3)
PSA SERPL DL<=0.01 NG/ML-MCNC: 2.12 NG/ML (ref 0–6.5)
SODIUM SERPL-SCNC: 139 MMOL/L (ref 135–145)
TRIGL SERPL-MCNC: 119 MG/DL

## 2024-04-18 ENCOUNTER — MYC REFILL (OUTPATIENT)
Dept: FAMILY MEDICINE | Facility: CLINIC | Age: 69
End: 2024-04-18
Payer: MEDICARE

## 2024-04-18 DIAGNOSIS — E83.39 HYPOPHOSPHATEMIA: ICD-10-CM

## 2024-06-20 NOTE — TELEPHONE ENCOUNTER
Message from [employer]:  Original authorizing provider: MD Hi Scales would like a refill of the following medications:  venlafaxine (EFFEXOR-XR) 37.5 MG 24 hr capsule [Yuri Hill MD]    Preferred pharmacy: [facility] [address] [place] [address]    Comment:     [facility]    Transplant Infectious Diseases Inpatient Progress note      Kecia Blue MRN# [id number]   YOB: 1963 Age: 60 year old   Date of Admission: 6/18/2024  1:47 PM  Transplant: 3/1/2018 (Lung), Postoperative day: 2303            Recommendations:   Continue renally dosed IV Zosyn  Start IV minocycline 100 mg q12 hrs for Stenotrophomonas coverage  Follow up pending cultures (blood, sputum, urine, BAL)  Continue home posaconazole 300 mg daily  Pending posaconazole level (noted late dose given 6/18)  Continue home Bactrim 1 single strength three times weekly ppx for history of PJP  Azithromycin per transplant pulmonology        Basics of Transplant and Current Presentation:   Transplants:  3/1/2018 (Lung), Postoperative day:  2303     This patient is a 60 year old female with PMHx significant for ILD, 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         Active Problems and Infectious Diseases Issues:     Acute hypercapnic respiratory failure, intubated 6/18/24  Multilobar pneumonia, right  Right mainstem bronchial stenosis requiring dilations (last 2/15/24)  History of Stenotrophomonas colonization (ceftazidime R, levo mixed susceptibilities; susceptible to Bactrim and minocycline)  On chronic 3 L oxygen at home. Opacities in lung since Oct 2023 (along with PFTs) in the setting of RMB stenosis and post obstructive pneumonia. Completed 2 months amoxicillin (03-05/2024) for the actinomyces that grew in Oct BAL without any  significant clinical change. Recent sinus congestion suggests likely recent viral illness +  also sick, followed by increased secretions, weak cough, and hypercapnic respiratory failure concerning for pneumonia (likely bacterial vs post-obstructive) vs mucus plug. Viral workup (RVP, covid/flu/RSV) negative here. CT chest 6/18/24 with known right mainstem anastomosis and bronchus intermedius stenosis (last dilation 2/15/24) with new narrowing of R BI and RML occlusion, narrowing of distal RLL bronchus, and RLL consolidations. On vancomycin, zosyn, and azithromycin in ED. Vancomycin stopped 6/19 with negative MRSA nares. Lower suspicion for fungal or PJP etiology as she remains on home posaconazole and Bactrim ppx. Negative Fungitell and AGM. Remains on pressor support x1. Bronchoscopy with BAL 6/19 notable for stenosis of R mainstem and proximal BI with RLL mucoid plugs removed, cultures pending. Had IP bronchoscopy intervention 6/20 - laser tissue debulking, dilation x2 (RMB and BI) and 1 stent placed. Sputum culture with Stenotrophomonas, known colonizer for her, however will cover given ongoing pressor requirements + purulent secretions.  - Continue Zosyn for now, pending sputum and bronchoscopy for further culture data.   - Agree with adding minocycline for Stenotrophomonas coverage  - Follow up pending workup: blood culture x2, sputum and BAL cultures     History of left aspergillus empyema:  S/p drainage and vori (S to both vori and posa) in 2019, Calcified mass in left pleural cavity s/p biopsy 2020 with aspergillus on cx and path (S to both vori and posa). Vori changed to posaconazole. S/p left pleural effusion drainage April 2021 - exudative, fungal elements seen on KOH but fungal cx neg. Posa levels good and no recurrence of pleural effusion on Sep 2021 CT chest. Suspect that current left pleural effusion has been reactive to the calcified aspergillus elements there along with fluid disturbance  related to dialysis. Do not think this is failure of anti-fungal therapy or recurrence of Aspergillus infection. It seems Dr. Layton has had discussions with surgery for source control (removal of calcified mass), however, there is was concern for high morbidity with surgery and therefore it was decided to pursue long term posaconazole, which she remains on since 2020.   - Continue posaconazole 300 mg daily. Level pending.         Old Problems and Infectious Diseases Issues:   CMV viremia: to 2k in 10/2023 in the setting of post obstructive pnuemonia. After treatment and stopping of valcyte, recurrence of CMV viremia to 1k in 02/2024, resumed valcyte ppx (renally dosed) and negative since 2/28/24. Valcyte again held in March 2024, with repeats negative to date. CMV negative 6/19/24.     EBV viremia: elevated to 960k in 10/2021 - due to recent hospitalization, health stress at that time. Decreased to 135k 2/2022. Neg 6/27/22.   Increased to 1 million in 11/2023 s/p 1 dose of rituximab and decreasing/undetected in 05/2024. Negative EBV 6/19/24.    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 BAL showed septate hyphae. 7/2020 had hypodense mass in left lung with biopsy showing fungal hyphae, cx aspergillus. Started on voriconazole in 2019, changed to posaconazole in 2020, she remains on 300 mg daily.  - 1/2021 BAL cx with steno: tx ceftazidime for 2 weeks  - 1/2021-2/2021 - ARDS due to PJP pneumonia (had stopped TMP-SMX due to side effects). Recovery from this required tracheostomy.  - 2019 - She had also grown Actinomyces from multiple BAL    Other Infectious Disease issues include:  - QTc interval:  450 msec on 6/18/24  - Bacterial prophylaxis:  azithromycin per Tx pulm  - Pneumocystis prophylaxis:  Bactrim for life (hx PJP)  - Viral serostatus & prophylaxis: CMV D+/R+, EBV D+/R+   - Fungal prophylaxis:  posaconazole  - Immunization status:  Seasonal influenza, RSV, and COVID  vaccine UTD  - Gamma globulin status:  979 on 10/27/23  - Isolation status: contact      Attestation:  I interviewed the patient and obtained history from the patient, patient's , bedside RN, and by reviewing the patient's chart including outside records, microbiological data, and radiological data. All data are summarized in this notes.  DAVIDA RAMAN PA-C  [facility]  Contact information available via [facility] Paging/Directory or [employer]    06/20/2024    50 MINUTES SPENT BY ME on the date of service doing chart review, history, exam, documentation & further activities per the note.      Interim History and Events:   Remains intubated, sedated in ICU today after IP bronchoscopy for dilation and stent placement.   CRP uptrending, WBC 6, afebrile. Remains on norepi x1, up to 0.18. Multiple lines placed, planning for CRRT due to lines/instability.     6/19 bronchoscopy + BAL notable for narrowing of R mainstem bronchus and BI, loose R anastamosis without dehiscence, multiple mucus plugs in RLL s/p BAL. Cultures pending - aerobic, fungal, AFB from BAL on 6/19.    EBV, CMV, and urine strep/legionella negative.   Sputum with GNR. Ucx with GNB.    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      History obtained from chart review, patient's , and patient. Reported increased lethargy reported for 2-3 days PTA, poor PO intake, more tired, and  weak cough. Unable to bring up sputum. Denied fever, chills, urinary symptoms (on HD), diarrhea, abdominal pain at home. Her and  both reported sinus congestion recently,  improved quickly and Kecia declined. Denies sore throat, headache. Afebrile and without leukocytosis on admission. WBC 4.0, ESR 10, lactic 0.6, and CRP ~8. Put on BIPAP given respiratory acidosis with some improvement, however unable to tolerate off BIPAP and intubated for failure to improve hypercapnia. Started on azithromycin, vancomycin, and zosyn. Negative: RVP, flu/covid/rsv, MRSA nares, Cryptococcus antigen. Pending: EBV, CMV, blood culture, fungitell, Histo Ag, AGM, and sputum culture     CT chest-hi res on 6/18/24 notable for slight decreased confluent consolidative opacities in right lower lobe with more micronodular component of possible infection in the right lower lobe. Other abnormalities throughout the lungs similar to February of the transplanted lungs - pleural effusions, anastomoses intact with narrowing of R mainstem anastomosis. New narrowing of right bronchus intermedius with occlusion of RML bronchus (mucus plug per pulm). Febrile 100.7F following intubation, WBC 3.2, CRP up to 19, procal 0.24 in ESRD, nl lactic. Remains intubated, though on PS. On pressor x1 norepi, low dose 0.05. Nursing reports lots of secretion burden and weak cough. Planning for bronchoscopy today.     Has previously scheduled stent placement on 6/26/24 as outpatient.     ROS:  As mentioned in the interim history otherwise negative by reviewing constitutional symptoms, central and peripheral neurological systems, respiratory system, cardiac system, GI system,  system, musculoskeletal, skin, allergy, and lymphatics.                 Physical Examination:  Temp: 98.4  F (36.9  C) Temp src: Oral BP: (!) 91/36 Pulse: 95   Resp: 25 SpO2: 95 % O2 Device: Mechanical Ventilator    Vitals:    06/18/24 1343 06/18/24 1700 06/19/24 0400 06/20/24 0430    Weight: 50.8 kg (112 lb) 50.8 kg (112 lb 1.6 oz) 52.8 kg (116 lb 6.4 oz) 55.6 kg (122 lb 9.2 oz)     Constitutional: Pleasant adult female seen laying in bed. Intubated. Sedated.  HEENT: NCAT, anicteric sclerae, conjunctiva clear. Moist mucous membranes.  Respiratory: Non-labored breathing, on vent PEEP 5, 40%.  Lungs are coarse bilaterally with R > L, with expiratory wheezing new today.  Cardiovascular: Regular rate and rhythm with no murmur, rub or gallop.  GI: Abdomen is soft, non-distended, and non-tender to palpation.  Skin: Warm and dry. Dusky toes bilaterally.  Musculoskeletal: Extremities grossly normal. No tenderness or edema present.   Neurologic: sedated  VAD: Multiple central lines placed, all are c/d/i with no erythema, drainage, or tenderness.      Medications:  Medications that Require Transfusion:   Current Facility-Administered Medications   Medication Dose Route Frequency Provider Last Rate Last Admin    dextrose 10% infusion   Intravenous Continuous PRN Awa Watt MD        dialysate for CVVHD & CVVHDF (Phoxillum BK4/2.5)  12.5 mL/kg/hr CRRT Continuous Hardy Tran APRN  mL/hr at 06/20/24 1314 12.5 mL/kg/hr at 06/20/24 1314    fentaNYL (SUBLIMAZE) infusion   mcg/hr Intravenous Continuous Chio Cortez MD 0.5 mL/hr at 06/20/24 1400 25 mcg/hr at 06/20/24 1400    propofol (DIPRIVAN) infusion  5-75 mcg/kg/min (Dosing Weight) Intravenous Continuous Chio Cortez MD 3 mL/hr at 06/20/24 1400 10 mcg/kg/min at 06/20/24 1400    And    Medication Instruction   Does not apply Continuous PRN Chio Cortez MD        No heparin required   Does not apply Continuous PRN Hardy Tran APRN CNS        norepinephrine (LEVOPHED) 16 mg in  mL infusion MAX CONC CENTRAL LINE  0.01-0.6 mcg/kg/min (Dosing Weight) Intravenous Continuous Kajal Chong APRN CNP 8.6 mL/hr at 06/20/24 1405 0.18 mcg/kg/min at 06/20/24 1405    POST-filter replacement solution  for CVVHD & CVVHDF (Phoxillum BK4/2.5)   CRRT Continuous Hardy Tran APRN  mL/hr at 06/20/24 1314 New Bag at 06/20/24 1314    PRE-filter replacement solution for CVVHD & CVVHDF (Phoxillum BK4/2.5)  12.5 mL/kg/hr CRRT Continuous Hardy Tran APRN  mL/hr at 06/20/24 1314 12.5 mL/kg/hr at 06/20/24 1314     Scheduled Medications:   Current Facility-Administered Medications   Medication Dose Route Frequency Provider Last Rate Last Admin    acetylcysteine (MUCOMYST) 10 % nebulizer solution 4 mL  4 mL Inhalation 4x Daily Kajal Chong APRN CNP   4 mL at 06/20/24 0720    albuterol (PROVENTIL) neb solution 2.5 mg  2.5 mg Nebulization 4x Daily Kajal Chong APRN CNP   2.5 mg at 06/20/24 0720    azithromycin (ZITHROMAX) 500 mg in sodium chloride 0.9 % 250 mL intermittent infusion  500 mg Intravenous Q24H Velez Reyes, German,  mL/hr at 06/19/24 1826 500 mg at 06/19/24 1826    B and C vitamin Complex with folic acid (NEPHRONEX) liquid 5 mL  5 mL Per Feeding Tube Daily Awa Watt MD   5 mL at 06/20/24 0754    calcium carbonate (TUMS) chewable tablet 500 mg  500 mg Oral Once per day on Sunday Tuesday Thursday Saturday Josesito Pratt MD   500 mg at 06/20/24 0800    chlorhexidine (PERIDEX) 0.12 % solution 15 mL  15 mL Mouth/Throat Q12H Chio Cortez MD   15 mL at 06/20/24 0753    fluticasone-vilanterol (BREO ELLIPTA) 100-25 MCG/ACT inhaler 1 puff  1 puff Inhalation Daily Josesito Pratt MD        [Held by provider] heparin ANTICOAGULANT injection 5,000 Units  5,000 Units Subcutaneous Q8H Velez Reyes, German, MD   5,000 Units at 06/20/24 0420    [Held by provider] magnesium chloride CR tablet 1,070 mg  1,070 mg Oral Once per day on Sunday Tuesday Thursday Saturday Josesito Pratt MD        [Held by provider] metoprolol tartrate (LOPRESSOR) tablet 25 mg  25 mg Oral BID Velez Reyes, German, MD        minocycline (MINOCIN) 100 mg in sodium chloride 0.9 % 100 mL intermittent  "infusion  100 mg Intravenous Q12H Kajal Chong APRN  mL/hr at 06/20/24 1423 100 mg at 06/20/24 1423    montelukast (SINGULAIR) tablet 10 mg  10 mg Oral At Bedtime Velez Reyes, German, MD   10 mg at 06/19/24 2209    pantoprazole (PROTONIX) IV push injection 40 mg  40 mg Intravenous Daily with breakfast Chio Cortez MD   40 mg at 06/20/24 0753    piperacillin-tazobactam (ZOSYN) 4.5 g vial to attach to  mL bag  4.5 g Intravenous Q6H Josesito Pratt MD        posaconazole (NOXAFIL) DR tablet TBEC 300 mg  300 mg Per Feeding Tube Daily Josesito Pratt MD   300 mg at 06/20/24 1349    predniSONE (DELTASONE) tablet 5 mg  5 mg Oral Daily Velez Reyes, German, MD   5 mg at 06/20/24 0754    protein modular (PROSOURCE TF20) packet 1 packet  1 packet Per Feeding Tube Daily Awa Watt MD   1 packet at 06/20/24 0754    sodium chloride (PF) 0.9% PF flush 10 mL  10 mL Intracatheter Q8H Kajal Chong APRN CNP        sodium chloride (PF) 0.9% PF flush 10 mL  10 mL Intracatheter Q8H Kajal Chong APRN CNP   10 mL at 06/20/24 0753    sodium chloride 0.9 % neb solution 3 mL  3 mL Nebulization BID Hayden Lou MD        sulfamethoxazole-trimethoprim (BACTRIM) 400-80 MG per tablet 1 tablet  1 tablet Oral or Feeding Tube Daily Josesito Pratt MD        tacrolimus (GENERIC) suspension 0.6 mg  0.6 mg Oral QAM Velez Reyes, German, MD   0.6 mg at 06/20/24 0754    And    tacrolimus (GENERIC) suspension 0.7 mg  0.7 mg Oral QPM Velez Reyes, German, MD   0.7 mg at 06/19/24 1753    Vitamin D3 (CHOLECALCIFEROL) tablet 50 mcg  50 mcg Oral Once per day on Monday Wednesday Friday Velez Reyes, German, MD   50 mcg at 06/19/24 0828         Laboratory Data:   No results found for: \"ACD4\"    Inflammatory Markers    Recent Labs   Lab Test 06/18/24  1418 10/29/23  0642 10/28/23  0725 10/07/19  1221 10/06/19  1444 09/13/19  0934 09/11/19  2325 08/22/19  0820   SED 10 66* 58* 93*  --  111* 102*  --    CRP  --   --   --  "  --  25.0* 58.0* 66.0* 47.0*       Immune Globulin Studies     Recent Labs   Lab Test 06/19/24  0839 10/27/23  0701 10/24/23  1033 09/15/21  0915 01/31/21  0441 01/25/21  0406 10/27/19  0621 03/01/18  0356 02/19/18  0759    979  --  1,198 763  --  936 698 1,790*   IGM  --   --   --   --   --   --   --   --  502*   IGE  --   --  <2  --   --  <2  --   --  <2   IGA  --   --   --   --   --   --   --   --  425*   IGG1  --   --   --   --   --   --   --   --  1,300*   IGG2  --   --   --   --   --   --   --   --  131*   IGG3  --   --   --   --   --   --   --   --  101   IGG4  --   --   --   --   --   --   --   --  1*       Metabolic Studies       Recent Labs   Lab Test 06/20/24  0733 06/20/24  0420 06/19/24  2359 06/19/24 2014 06/19/24  1130 06/19/24  0841 06/19/24  0839 06/19/24  0613 06/18/24  1942 06/18/24  1803 06/18/24  1422 06/18/24  1418 06/17/24  0925 03/13/24  0853 03/05/24  0925 10/28/23  0725 10/27/23  0701 06/27/22  1013 05/26/22  0750   NA  --  133* 134*  --   --   --   --  134*  --  137  --  135  --   --  140   < > 133*   < > 137   POTASSIUM  --  3.7 3.6  --   --   --   --  3.5  --  4.1  --  4.6  --   --  4.5   < > 3.5   < > 3.5   CHLORIDE  --  99 100  --   --   --   --  98  --  100  --  101 101   < > 101   < > 95*   < > 96   CO2  --  24 24  --   --   --   --  22  --  27  --  26  --   --  29   < > 26   < > 32   ANIONGAP  --  10 10  --   --   --   --  14  --  10  --  8  --   --  10   < > 12   < > 9   BUN  --  34.5* 33.0*  --   --   --   --  21.8  --  17.8  --  16.4  --   --  25.3*   < > 16.0   < > 42*   CR  --  4.27* 4.31*  --   --   --   --  3.74*  --  3.33*  --  3.13*  --   --  4.09*   < > 3.00*   < > 3.98*   GFRESTIMATED  --  11* 11*  --   --   --   --  13*  --  15*  --  16*  --   --  12*   < > 17*   < > 12*   * 131* 130* 134* 81 82  --  121*   < > 100*   < > 107*  --   --  93   < > 100*   < > 110*   A1C  --   --   --   --   --   --   --   --   --   --   --   --   --   --   --   --   --   --   5.2   CHELSEY  --  7.9* 7.7*  --   --   --   --  8.0*  --  8.5*  --  8.7*  --   --  8.1*   < > 8.9   < > 9.5   PHOS  --  1.9*  --   --   --   --   --   --   --  4.1  --   --   --   --   --    < > 2.3*   < >  --    MAG  --  1.5*  --   --   --   --   --   --   --  2.0  --   --   --   --  1.9   < >  --    < > 1.8   LACT  --   --  1.3  --   --   --  1.2  --   --  0.6*   < >  --   --   --   --    < >  --    < >  --    CKT  --   --   --   --   --   --   --   --   --  107  --   --   --   --   --   --  69  --   --     < > = values in this interval not displayed.       Hepatic Studies    Recent Labs   Lab Test 06/20/24  0420 06/19/24  0613 06/18/24  1803 06/18/24  1418 11/21/23  0752 11/04/23  0653 11/03/23  0556 09/15/21  0915 05/01/21  0654 01/27/21  0414 01/26/21  0425   BILITOTAL 0.9 0.8 0.6 0.6 0.8 0.5 0.5   < >  --    < > 0.8   ALKPHOS 153* 175* 235* 237* 208* 179* 140*   < >  --    < > 184*   ALBUMIN 2.7* 2.7* 3.6 3.6 3.8 3.2* 3.1*   < >  --    < > 2.0*   AST 26 31 39  --  27 22 14   < >  --    < > 11   ALT 23 28 39 39 23 24 20   < >  --    < > 30   LDH  --   --   --   --   --   --   --   --  164  --  187   GGT  --   --  147*  --   --   --   --   --   --   --   --     < > = values in this interval not displayed.       Pancreatitis testing    Recent Labs   Lab Test 06/18/24  1803 10/25/23  1356 05/26/22  0750 09/15/21  0915 01/24/21  0000 02/19/20  0713 12/31/19  1033 10/24/19  2032 10/21/19  1451 10/06/19  1444 09/11/19  2325 03/04/18  0353 02/19/18  0759   AMYLASE  --   --   --   --   --   --   --   --   --   --   --   --  58   LIPASE 40 24  --   --   --   --   --  212 119 172 127  --   --    TRIG  --   --  155* 68 242* 77 98  --   --   --   --  191* 115       Hematology Studies      Recent Labs   Lab Test 06/20/24  0420 06/19/24  2359 06/19/24  0613 06/18/24  1834 06/18/24  1803 06/18/24  1418 03/05/24  0925 11/21/23  0752 11/04/23  0653 11/03/23  0556 11/02/23  0734   WBC 6.0 5.5 3.2* 4.0 4.7 4.5 1.0*   < > 11.9* 8.8  "5.7   ANEU 5.3 4.8  --   --   --   --  0.2*  --  10.7* 7.7 5.0   ALYM 0.1* 0.1*  --   --   --   --  0.6*  --  0.6* 0.4* 0.4*   SHERRI 0.5 0.4  --   --   --   --  0.2  --  0.2 0.3 0.2   AEOS 0.1 0.1  --   --   --   --  0.0  --  0.1 0.2 0.0   HGB 10.4* 10.1* 9.6* 11.2* 12.1 12.1 10.5*   < > 8.5* 7.9* 7.8*   HCT 32.2* 31.1* 30.9* 36.9 39.5 39.7 33.3*   < > 26.8* 24.8* 24.8*   * 102* 106* 147* 123* 147* 96*   < > 256 204 153    < > = values in this interval not displayed.       Arterial Blood Gas Testing    Recent Labs   Lab Test 06/19/24  2359 06/19/24  0614 06/18/24  2348 06/18/24  2107 10/17/23  1035 02/10/21  2000 02/10/21  1555 02/09/21  1225 02/09/21  0403 02/08/21  1200   PH  --   --   --   --   --  7.41 7.36 7.38 7.42 7.40   PCO2  --   --   --   --   --  42 46* 49* 44 47*   PO2  --   --   --   --   --  90 105 74* 116* 72*   HCO3  --   --   --   --   --  26 26 29* 28 29*   O2PER 30 45 40 40   < > 50 50 60 60.0 60    < > = values in this interval not displayed.        Urine Studies     Recent Labs   Lab Test 06/19/24  1214 01/24/21  1729 10/21/19  2240 09/12/19  0125 08/07/19  1512   URINEPH 6.5 5.0 5.0 7.5* 7.0   NITRITE Negative Negative Negative Negative Negative   LEUKEST Large* Moderate* Large* Negative Trace*   WBCU >182* 34* 115* 3 19*       Vancomycin Levels     Recent Labs   Lab Test 06/19/24  0613 10/26/23  0606 09/21/21  1911 01/28/21  0412 01/26/21  0425 01/25/21  1259   VANCOMYCIN 15.6 18.6 18.8 21.6 31.4* 15.1       Tobramycin levels     No lab results found.    Gentamicin levels    No lab results found.    Tacrolimus levels    Invalid input(s): \"TACROLIMUS\", \"TAC\", \"TACR\"      Latest Ref Rng & Units 6/20/2024     4:20 AM 6/19/2024    11:59 PM 6/19/2024     6:13 AM 6/18/2024     6:34 PM 6/18/2024     6:03 PM   Transplant Immunosuppression Labs   Creat 0.51 - 0.95 mg/dL 4.27  4.31  3.74   3.33    Urea Nitrogen 8.0 - 23.0 mg/dL 34.5  33.0  21.8   17.8    WBC 4.0 - 11.0 10e3/uL 6.0  5.5  3.2  4.0  " "4.7    Neutrophil % 89  88  84  71  72    ANEU 1.6 - 8.3 10e3/uL 5.3  4.8           Cyclosporine levels    Invalid input(s): \"CYCLOSPORINE\", \"CYC\"    Mycophenolate levels    Invalid input(s): \"MYPA\", \"MYP\"    Sirolimus levels    Invalid input(s): \"SIROLIMUS\", \"SIR\", \"RAPA\"    CSF testing   No lab results found.      Microbiology:  6/19  CrAg - negative  Urine Strep and Legionella antigens - negative  Urine culture - GNB  BAL - negative KOH, pending aerobic + fungal + AFB cultures    6/18  Negative RVP and COVID/flu/RSV  Histoplasma antigen (blood) - pending  Aspergillus galactomannan - negative  Fungitell - negative  Blood cultures x2 - NGTD    Sputum culture - Stenotrophomonas maltophilia      Fungal testing  Recent Labs   Lab Test 06/18/24  1803 10/26/23  1318 10/17/23  1105 08/17/23  1144 10/07/19  1544 09/14/19  1625   FGTL <31   < >  --   --    < > 122   FGTLI Negative   < >  --   --    < > Positive*   PJRDFA  --   --  Not Detected Not Detected   < >  --    ASPGAI 0.03   < >  --  0.05   < > 1.08   ASPAG  --   --   --  Negative   < >  --    ASPGAA Negative   < >  --   --    < > Positive*   CIABB  --   --   --   --   --  <1:2    < > = values in this interval not displayed.       Last Culture results   S Pneumoniae Antigen   Date Value Ref Range Status   01/24/2021   Final    Negative, no Streptococcus pneumoniae antigen detected by immunochromatographic membrane   assay. A negative Streptococcus pneumoniae antigen result does not rule out infection with   Streptococcus pneumoniae.       Streptococcus pneumoniae antigen   Date Value Ref Range Status   06/19/2024 Negative Negative Final     Comment:     A negative Streptococcus pneumoniae antigen result does not rule out infection with Streptococcus pneumoniae.     P. jirovecii By PCR   Date Value Ref Range Status   10/17/2023 Not Detected  Final     Comment:     NOT DETECTED - A negative result does not rule out the   presence of PCR inhibitors in the patient " specimen or   assay specific nucleic acid in concentrations below the   level of detection by the assay.    This test was developed and its performance characteristics   determined by [employer]. It has not been cleared or   approved by the US Food and Drug Administration. This test   was performed in a CLIA certified laboratory and is   intended for clinical purposes.  Performed By: [facility] [employer]  [address] [place] [address]  : Rogelio Llanos MD, PhD  CLIA Number: [id number]   08/17/2023 Not Detected  Final     Comment:     NOT DETECTED - A negative result does not rule out the   presence of PCR inhibitors in the patient specimen or   assay specific nucleic acid in concentrations below the   level of detection by the assay.    This test was developed and its performance characteristics   determined by [employer]. It has not been cleared or   approved by the US Food and Drug Administration. This test   was performed in a CLIA certified laboratory and is   intended for clinical purposes.  Performed By: [facility]  [address] [place] [address]  : Rogelio Llanos MD, PhD  CLIA Number: [id number]   08/17/2023 Not Detected  Final     Comment:     NOT DETECTED - A negative result does not rule out the   presence of PCR inhibitors in the patient specimen or   assay specific nucleic acid in concentrations below the   level of detection by the assay.    This test was developed and its performance characteristics   determined by [employer]. It has not been cleared or   approved by the US Food and Drug Administration. This test   was performed in a CLIA certified laboratory and is   intended for clinical purposes.  Performed By: [facility]  [address] [place] [address]  : Rogelio Llanos MD, PhD  CLIA Number: [id number]   01/24/2021 Detected (A)  Final     Comment:      (Note)  DETECTED - P. jirovecii DNA detected in this specimen.  Results should be used to aid in the diagnosis of PCP  pneumonia and must be interpreted in the context of host  risk factors, clinical presentation, and radiographic  imaging.  TEST INFORMATION: Pneumocystis jirovecii Detection by PCR  Test developed and characteristics determined by [facility]. See Compliance Statement B: [facility]  Performed by [facility],  [address] [place] [id number] [phone]  [email or web address], Carri Red MD, Lab. Director       Culture   Date Value Ref Range Status   06/19/2024 Culture in progress  Preliminary   06/19/2024 Culture in progress  Preliminary   06/19/2024 (A)  Preliminary    >100,000 CFU/mL Non lactose fermenting gram negative bacilli   06/18/2024 Culture in progress  Preliminary   06/18/2024 3+ Normal alo  Preliminary   06/18/2024 2+ Stenotrophomonas maltophilia (A)  Preliminary     Comment:     Identification is preliminary, confirmation in progress   06/18/2024 No growth after 1 day  Preliminary   06/18/2024 No growth after 1 day  Preliminary   02/15/2024 No Actinomyces like species isolated  Final   02/15/2024 No Growth  Final   02/15/2024 No Growth  Final   02/15/2024 1+ Normal alo  Final   11/01/2023 No Growth  Final   11/01/2023 Aspergillus ochraceus (A)  Final   11/01/2023 1+ Normal alo  Final   11/01/2023 1+ Stenotrophomonas maltophilia (A)  Final   10/26/2023   Final    >10 Squamous epithelial cells/low power field indicates oral contamination. Please recollect.   10/25/2023 No Growth  Final   10/25/2023 No Growth  Final   10/17/2023 1+ Actinomyces species (A)  Final     Comment:     Susceptibilities not routinely done, refer to antibiogram to view typical susceptibility profiles  This organism is part of normal alo, but on occasion may be a true pathogen. Clinical correlation must be applied to interpreting this result.   10/17/2023 1+ Normal alo  Final   10/17/2023 No  Growth  Final   10/17/2023 No Growth  Final     GS Culture   Date Value Ref Range Status   06/19/2024 See corresponding culture for results  Final     Culture Micro   Date Value Ref Range Status   05/01/2021   Final    Quantity not sufficient  Called to Maxim Norman at 1236 5/1/21.    mlb     05/01/2021 Culture negative after 30 days  Final   04/29/2021 No anaerobes isolated  Final   04/29/2021 No growth  Final   02/19/2021 Culture negative for acid fast bacilli  Final   02/19/2021   Final    Assayed at [facility] [address] [place] [id number] [phone]   02/19/2021 Culture negative after 4 weeks  Final   02/19/2021 No growth after 4 weeks  Final   02/19/2021 Moderate growth  Staphylococcus epidermidis   (A)  Final   02/09/2021 No growth  Final         Last check of C difficile  C Diff Toxin B PCR   Date Value Ref Range Status   02/13/2021 Negative NEG^Negative Final     Comment:     Negative: C. difficile target DNA sequences NOT detected, presumed negative   for C.difficile toxin B or the number of bacteria present may be below the   limit of detection for the test.  FDA approved assay performed using [employer] Gene[employer]pert real-time PCR.  A negative result does not exclude actual disease due to C. difficile and may   be due to improper collection, handling and storage of the specimen or the   number of organisms in the specimen is below the detection limit of the assay.       C Difficile Toxin B by PCR   Date Value Ref Range Status   10/31/2023 Negative Negative Final     Comment:     A negative result does not exclude actual disease due to C. difficile and may be due to improper collection, handling and storage of the specimen or the number of organisms in the specimen is below the detection limit of the assay.       Virology:  Coronavirus-19 testing    Recent Labs   Lab Test 06/18/24  1729 06/18/24  1433 06/17/24  1337 07/11/23  1132 10/12/22  1528 09/26/22  0956 05/23/22  1013 04/01/22  1257  02/07/22  1301 01/31/22  1309 01/24/22  1324 09/20/21  1859 06/07/21  1310 05/02/21  0715 04/26/21  1151 04/08/21  0723   [id number]  --   --   --   --   --   --   --   --   --   --   --   --   --  Negative  --   --    [id number]  --   --   --   --   --   --   --   --   --   --   --   --   --  Not Applicable  --   --    [id number] Negative Negative  --  Negative  --   --   --   --   --   --   --  Negative   < >  --   --  Test received-See reflex to IDDL test SARS CoV2 (COVID-19) Virus RT-PCR  NEGATIVE   [id number]  --   --   --   --   --   --   --   --   --   --   --   --   --   --   --  Nasopharyngeal   [id number]  --   --   --   --   --   --   --   --   --   --   --   --   --   --   --  Nasopharyngeal   COVIDPCREXT  --   --  Negative  --  Negative Negative Undetected   < > Undetected Undetected   < >  --    < >  --   --   --    SOUREXT  --   --   --   --   --   --  Nasopharynx  --  Nasopharynx Nasopharynx   < >  --    < >  --    < >  --    [id number]  --   --   --   --   --   --  Undetected  --  Undetected Undetected   < >  --    < >  --    < >  --     < > = values in this interval not displayed.       Respiratory virus (non-coronavirus-19) testing    Recent Labs   Lab Test 06/18/24  1729 06/18/24  1433 11/01/23  0920 10/27/23  0449 01/24/21  1822 01/24/21  1629 12/23/20  1102 02/27/18  1016 02/22/18  0900   RVSPEC  --   --   --   --   --  Bronchial Bronchial   < >  --    AFLU  --   --   --   --   --   --   --   --  Duplicate request*   IFLUA Not Detected  --  Not Detected Not Detected   < > Negative Negative   < >  --    INFZA Negative   < >  --   --   --   --   --   --  Negative   FLUAH1 Not Detected  --  Not Detected Not Detected   < > Negative Negative   < >  --    [id number] Not Detected  --  Not Detected Not Detected   < > Negative Negative   < >  --    FLUAH3 Not Detected  --  Not Detected Not Detected   < > Negative Negative   < >  --    BFLU  --   --   --   --   --   --   --   --  Duplicate request*   IFLUB  Not Detected  --  Not Detected Not Detected   < > Negative Negative   < >  --    INFZB Negative   < >  --   --   --   --   --   --  Negative   PIV1 Not Detected  --  Not Detected Not Detected   < > Negative Negative   < >  --    PIV2 Not Detected  --  Not Detected Not Detected   < > Negative Negative   < >  --    PIV3 Not Detected  --  Not Detected Not Detected   < > Negative Negative   < >  --    PIV4 Not Detected  --  Not Detected Not Detected   < >  --   --    < >  --    IRSV Negative   < >  --   --   --   --   --   --  Negative   HRVS  --   --   --   --   --  Negative Negative   < >  --    RSVA Not Detected  --  Not Detected Not Detected   < > Negative Negative   < >  --    RSVB Not Detected  --  Not Detected Not Detected   < > Negative Negative   < >  --    HMPV Not Detected  --  Not Detected Not Detected   < > Negative Negative   < >  --    ADVBE  --   --   --   --   --  Negative Negative   < >  --    ADVC  --   --   --   --   --  Negative Negative   < >  --    ADENOV Not Detected  --  Not Detected Not Detected   < >  --   --    < >  --    CORONA Not Detected  --  Not Detected Not Detected   < >  --   --    < >  --     < > = values in this interval not displayed.       CMV viral loads    Recent Labs   Lab Test 06/18/24  1803 06/12/24  0828 03/13/24  0853 03/05/24  0925 02/21/24  0838 02/14/24  1050 12/27/23  0834 12/19/23  1138 11/29/23  0826 11/21/23  0752 11/08/23  0846 11/01/23  0920 10/31/23  0606 10/24/23  1033 10/17/23  1011 10/04/23  0810 09/07/23  0710 08/17/23  1144 08/17/23  1137 08/08/23  0828 08/08/23  0828 07/11/23  0952 05/26/23  0828 04/06/23  0725 03/08/23  0848 02/09/23  1034 11/18/22  0928 09/27/22  1012 03/15/21  0904 02/25/21  0542   CMVQNT <35*  --   --  Not Detected  --   --   --  Not Detected  --  Not Detected  --   --  <35*  --   --   --   --   --   --   --   --  Not Detected  --  Not Detected  --  <137*  --  Not Detected   < > CMV DNA Not Detected   CMVRESINST  --   --   --   --   --   "350*  --   --   --   --   --   --   --  103*  80* 75*  --  521*  --   --   --  46*  --   --   --   --   --   --   --   --   --    CSPEC  --   --   --   --   --   --   --   --   --   --   --   --   --   --   --   --   --   --   --   --   --   --   --   --   --   --   --   --   --  EDTA PLASMA   CMVLOG <1.5  --   --   --   --  2.5  --   --   --   --   --   --  <1.5 2.0  1.9 1.9  --  2.7  --   --    < > 1.7  --   --   --   --  <2.1  --   --    < > Not Calculated   80290  --  46*   < >  --    < >  --    < >  --    < >  --    < >  --   --   --   --    < >  --   --   --   --   --   --    < >  --    < >  --    < >  --    < >  --    CMVQAL  --   --   --   --   --   --   --   --   --   --   --  Not Detected  --   --   --   --   --  Not Detected Not Detected  --   --   --   --   --   --   --   --   --   --   --     < > = values in this interval not displayed.       No results found for: \"H6RES\"    EBV DNA Copies/mL   Date Value Ref Range Status   05/01/2021 38,186 (A) EBVNEG^EBV DNA Not Detected [Copies]/mL Final   02/22/2021 75,709 (A) EBVNEG^EBV DNA Not Detected [Copies]/mL Final   01/25/2021 5,619 (A) EBVNEG^EBV DNA Not Detected [Copies]/mL Final   12/09/2020 10,686 (A) EBVNEG^EBV DNA Not Detected [Copies]/mL Final   09/09/2020 2,935 (A) EBVNEG^EBV DNA Not Detected [Copies]/mL Final   03/09/2020 8,918 (A) EBVNEG^EBV DNA Not Detected [Copies]/mL Final   02/19/2020 38,419 (A) EBVNEG^EBV DNA Not Detected [Copies]/mL Final   12/18/2019 11,933 (A) EBVNEG^EBV DNA Not Detected [Copies]/mL Final   11/11/2019 9,669 (A) EBVNEG^EBV DNA Not Detected [Copies]/mL Final   10/24/2019 13,391 (A) EBVNEG^EBV DNA Not Detected [Copies]/mL Final   08/01/2018 EBV DNA Not Detected EBVNEG^EBV DNA Not Detected [Copies]/mL Final       BK viral loads   Recent Labs   Lab Test 08/07/19  0959   BKSPEC Plasma   BKRES BK Virus DNA Not Detected         Imaging:  XR Chest Port 1 View  Result Date: 6/20/2024  Impression: Bilateral pulmonary opacities, " mildly increased in the upper lobes suggesting increased edema and underlying infection. The endotracheal tube projects 2.3 cm above the gee.    XR Chest Port 1 View  Result Date: 6/19/2024  IMPRESSION: ET tube tip projects 1.6 cm above the gee. Small bilateral pleural effusions with bibasilar, right greater than left airspace opacities suspicious for infection versus atelectasis.     CT Chest Hi-Resolution wo Contrast  Result Date: 6/18/2024  IMPRESSION: Bilateral transplanted lungs. Slight decreased confluent consolidative opacities in right lower lobe with more micronodular component of possible infection in the right lower lobe. Other abnormalities throughout the lungs similar to February of the transplanted lungs. Pleural effusions again noted. Narrowing of the bronchus intermedius on the right there appears to be occlusion of the right middle lobe bronchus with narrowing of the distal aspect of the right lower lobe bronchus with short segment occlusions of the medial and posterior basilar bronchial segments to the right lower lobe. No such abnormality on the left.. No ectopic air. Anastomoses appear grossly intact bilaterally with just under approximate 50% narrowing distal to the right mainstem anastomosis proximal to the upper lobe bronchus takeoff. This right middle lobe bronchial occlusion is new from February. Mitral annular calcifications with left atrial prominence, please correlate for arrhythmia. Borderline pulmonary enlargement concerning for possible pulmonary hypertension. Borderline mid ascending aortic ectasia.       ECHO:  Echo Complete  Result Date: 6/19/2024  Interpretation Summary Global and regional left ventricular function is normal with an EF of 55-60%. Right ventricular function, chamber size, wall motion, and thickness are normal. The inferior vena cava cannot be assessed. No pericardial effusion is present. No significant valvular abnormalities present.     DAVIDA RAMAN,  JONI STERN [facility]  Contact information available via [facility] Paging/Directory

## 2024-08-23 DIAGNOSIS — E83.39 HYPOPHOSPHATEMIA: ICD-10-CM

## 2024-08-23 RX ORDER — SOD PHOS DI, MONO/K PHOS MONO 250 MG
1 TABLET ORAL 2 TIMES DAILY
Qty: 180 TABLET | Refills: 0 | Status: SHIPPED | OUTPATIENT
Start: 2024-08-23

## 2024-11-23 DIAGNOSIS — E83.39 HYPOPHOSPHATEMIA: ICD-10-CM

## 2024-11-23 RX ORDER — SOD PHOS DI, MONO/K PHOS MONO 250 MG
1 TABLET ORAL 2 TIMES DAILY
Qty: 180 TABLET | Refills: 0 | Status: SHIPPED | OUTPATIENT
Start: 2024-11-23

## 2024-12-19 ENCOUNTER — OFFICE VISIT (OUTPATIENT)
Dept: DERMATOLOGY | Facility: CLINIC | Age: 69
End: 2024-12-19
Attending: DERMATOLOGY
Payer: MEDICARE

## 2024-12-19 DIAGNOSIS — Z12.83 SKIN CANCER SCREENING: ICD-10-CM

## 2024-12-19 DIAGNOSIS — L64.9 ANDROGENETIC ALOPECIA: Primary | ICD-10-CM

## 2024-12-19 DIAGNOSIS — L82.0 SEBORRHEIC KERATOSES, INFLAMED: ICD-10-CM

## 2024-12-19 DIAGNOSIS — D48.5 NEOPLASM OF UNCERTAIN BEHAVIOR OF SKIN: ICD-10-CM

## 2024-12-19 PROCEDURE — 88304 TISSUE EXAM BY PATHOLOGIST: CPT | Mod: TC | Performed by: DERMATOLOGY

## 2024-12-19 RX ORDER — MINOXIDIL 2.5 MG/1
2.5 TABLET ORAL DAILY
Qty: 60 TABLET | Refills: 3 | Status: SHIPPED | OUTPATIENT
Start: 2024-12-19

## 2024-12-19 ASSESSMENT — PAIN SCALES - GENERAL: PAINLEVEL_OUTOF10: NO PAIN (0)

## 2024-12-19 NOTE — PROGRESS NOTES
Lidocaine-epinephrine 1-1:808394 % injection   1mL once for one use, starting 12/19/2024 ending 12/19/2024,  2mL disp, R-0, injection  Injected by Coleman Peterson CMA

## 2024-12-19 NOTE — NURSING NOTE
Dermatology Rooming Note    Hi Smith's goals for this visit include:   Chief Complaint   Patient presents with    Skin Check     Colton is here for a skin check and has no spots of concern.     Javier Mcwilliams, EMT

## 2024-12-19 NOTE — PROGRESS NOTES
[facility] Dermatology Note  Encounter Date: Dec 19, 2024  Office Visit     Dermatology Problem List:  Last FBSE on Dec 19, 2024.  1. 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  2. Non-healing ulcers, bilateral LEs and left forearm, resolved  - Bacterial culture 3/2016 with normal skin giuliana.  - Bx obtained 3/31/2016 for H&E, sterile culture, flow cytometry, and donor chimerism studies, and if possible TCR gene rearrangement studies, suggested NK-Tcell lymphoma  3. NMSC   - BCC, left anterior shoulder, s/p bx 11/15/2019 with excision shortly after  - BCC, s/p MMS in 2000.  4. Telogen effluvium   - New treatment started on visit on Dec 19, 2024: oral Minoxidil 2,5 mg today. Follow-up in 6 months for possible dose increase to 5 mg  - Discussed earlier tx: Minoxidil 5% foam  5. AK  - s/p cryo 12/2022  6. Hyperpigmented patch, L dorsal hand  - New treatment started on visit on Dec 19, 2024: OTC Vitamin E 1 g/day together with Routen 1500 mg/day   - Earlier tx: hydroquinone cream 4%, Rutin/Vitamin C supplements for bruising  7. Inflamed SK  - Cryotherapy on visit on Dec 19, 2024 - right and left scalp.  - Cryotherapy on visit on 14 Dec, 2023  8. Cyst - right frontal neck  - Punch biopsy on visit on Dec 19, 2024.  ____________________________________________    Assessment & Plan:    # Seborrheic keratosis, symptomatic and inflamed  Cryotherapy performed today (see procedure note(s) below) for two scalp lesions.    # Androgenetic alopecia   New treatment started on visit on Dec 19, 2024: oral Minoxidil 2,5 mg today. Follow-up in 6 months for possible dose increase to 5 mg.  Possible side effects including hypotension, orthostasis, arrhythmias reviewed with patient today.    #  Hyperpigmented patch, L dorsal hand  New treatment started on visit on Dec 19, 2024: OTC Vitamin C 1 g/day together with Rutin 500 mg/day.     # Cyst on right frontal neck, symptomatic.  Punch biopsy performed today (see procedure note(s) below)    # Benign nevus - right sole  Benign appearance and size <7mm why no need to examine further.    Procedures Performed:   - Cryotherapy procedure note, location(s): right and left scalp superior to right and left ear. After verbal consent and discussion of risks and benefits including, but not limited to, dyspigmentation/scar, blister, and pain, 2 lesion(s) was(were) treated with 1-2 mm freeze border for 1-2 cycles with liquid nitrogen. Post cryotherapy instructions were provided.  - Procedure(s) performed by faculty.     - Punch Biopsy note, location: right frontal neck. The risks and benefits of the procedure were described to the patient. These include but are not limited to bleeding, infection, scar, incomplete removal, and non-diagnostic biopsy. Written informed consent was obtained. The area was cleansed with an alcohol pad and injected with lidocaine with epinephrine (<1mL used). Once anesthesia was obtained, a 4 mm punch biopsy was performed. The tissue was placed in a labeled container with formalin and sent to pathology. The site was closed using 4-0 Prolene sutures. Vaseline and a bandage were applied to the wound. The patient tolerated the procedure well and was given post biopsy care instructions.  - Procedure(s) performed by faculty.     Follow-up: 6 month(s) in-person, or earlier for new or changing lesions    Staff and Medical Student:   Shawna Burnham, Med student  Dr Perez    I was present with the medical student who participated in the service and in the documentation.  I have verified the history and personally performed the physical exam and medical decision making.  I agree with the assessment and plan of care as documented in the note.  I personally  performed all procedures.    Ab Perez MD  Dermatology Attending  ____________________________________________    CC: Skin Check (Colton is here for a skin check and has no spots of concern.)    HPI:  Mr. Hi Smith is a(n) 69 year old male who presents today as a return patient for a FBSE. Colton has been feeling well in the period leading up to the visit. He has no family history of skin cancer, has had accentuated sun exposure due to working outdoors earlier, and is a non-smoker. Colton is only concerned about a spot on his right scalp that he has noted and that has been a bit itchy, possible triggered by his glasses touching this area. The lesion has not been bleeding. He has a similar spot that he is aware of but that has not otherwise bothered him, on the left scalp. Regarding the hair, Colton never treated with the minoxidil foam. He still experiences important general shedding, with no specific area of concern. Colton no longer treats the hyperpigmentation on his left dorsal hand despite being bothered by this. Patient is otherwise feeling well, without additional skin concerns.    Labs:  None reviewed.    Physical Exam:  Vitals: There were no vitals taken for this visit.  SKIN: Total skin excluding the undergarment areas was performed. The exam included the head/face, neck, both arms, chest, back, abdomen, both legs, digits and/or nails.   - On the right scalp superior to the ear there is a waxy yellow papules on dermascope consisting of several smaller papules with milia like dots inside.  - On the left scalp superior to the ear there is a waxy papule.  - On the right mid sole there is a 0,4 cm large homogenously light brown colored macule.  - New hair noted without signs of breakage and no area with less hair density on examination of the scalp.   - On the left dorsal hand there is a homogenous light brown color.  - On the right frontal neck there is a 0,5 mm skin-colored palpable nodule.  - No other lesions of  concern on areas examined.     Medications:  Current Outpatient Medications   Medication Sig Dispense Refill    amLODIPine (NORVASC) 2.5 MG tablet Take 1 tablet by mouth daily at 2 pm      ASPIRIN PO Take 81 mg by mouth daily      atorvastatin (LIPITOR) 80 MG tablet Take 1 tablet (80 mg) by mouth daily 90 tablet 1    K Phos Berrien-Sod Phos Di & Mono (DAVID-PHOS 250 NEUTRAL) 155-852-130 MG TABS Take 1 tablet by mouth twice daily 180 tablet 0    metoprolol succinate ER (TOPROL XL) 25 MG 24 hr tablet Take 0.5 tablets (12.5 mg) by mouth daily      Multiple Vitamin (MULTIVITAMIN ADULT PO) Take by mouth.      nitroGLYcerin (NITROSTAT) 0.4 MG sublingual tablet Place 1 tablet (0.4 mg) under the tongue every 5 minutes as needed for chest pain 12 tablet 0    omeprazole (PRILOSEC) 20 MG DR capsule Take 20 mg by mouth daily      Biotin 1 MG CAPS        No current facility-administered medications for this visit.      Past Medical History:   Patient Active Problem List   Diagnosis    Extranodal NK/T-cell lymphoma, nasal type    Nausea with vomiting    Anemia in neoplastic disease    Bacteremia    NHL (non-Hodgkin's lymphoma) (H)    Lymphoma (H)    Fever    Lymphoma, non-Hodgkin's (H)    Neoplasm of uncertain behavior of skin    T-cell or NK-cell neoplasm (H)    Other specified prophylactic or treatment measure    Skin cancer screening    Status post cord blood transplantation    Physical deconditioning    History of peripheral stem cell transplant (H)    Dysuria    Hypogammaglobulinemia (H)    Status post bone marrow transplant (H)    Complications of bone marrow transplant (H)    Cellulitis    Hypophosphatemia    CAP (community acquired pneumonia)    History of nonmelanoma skin cancer    Hypertension    Hyperlipidemia    Coronary artery disease involving native heart with other form of angina pectoris, unspecified vessel or lesion type (H)    History of deep vein thrombosis    Basal cell carcinoma of truncal skin    AK (actinic  keratosis)    Telogen effluvium    Seborrheic keratoses, inflamed    Actinic keratosis    Seborrheic keratosis    Multifocal motor neuropathy (H)    Other specified neoplasms of uncertain behavior of lymphoid, hematopoietic and related tissue (H)     Past Medical History:   Diagnosis Date    Alcohol abuse     in remission x 20 yrs    CAD (coronary artery disease) of artery bypass graft     CABG 2005, hx of stents    Depression     DVT (deep venous thrombosis) (H) 11/2014    upper left arm after PICC line    Extranodal NK/T-cell lymphoma, nasal type 07/23/2014    GERD (gastroesophageal reflux disease)     Hearing loss     HTN (hypertension)     Lymphoma (H)     Natural killer (NK) cell lymphoblastic lymphoma (H) 08/15/2014    S/P autologous bone marrow transplantation (H) 03/04/2015    Skin cancer     15 y ago.? basal cell    Tinnitus     VRE (vancomycin resistant enterococcus) culture positive         CC Ab Perez MD  [address] [place] [address] [address]  [place] [address] on close of this encounter.

## 2024-12-19 NOTE — LETTER
12/19/2024       RE: Hi Smith  [id number] [facility] [id number]     Dear Colleague,    Thank you for referring your patient, Hi Smith, to the [facility] [place] at [facility]. Please see a copy of my visit note below.    [facility] Dermatology Note  Encounter Date: Dec 19, 2024  Office Visit     Dermatology Problem List:  Last FBSE on Dec 19, 2024.  1. 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  2. Non-healing ulcers, bilateral LEs and left forearm, resolved  - Bacterial culture 3/2016 with normal skin giuliana.  - Bx obtained 3/31/2016 for H&E, sterile culture, flow cytometry, and donor chimerism studies, and if possible TCR gene rearrangement studies, suggested NK-Tcell lymphoma  3. NMSC   - BCC, left anterior shoulder, s/p bx 11/15/2019 with excision shortly after  - BCC, s/p MMS in 2000.  4. Telogen effluvium   - New treatment started on visit on Dec 19, 2024: oral Minoxidil 2,5 mg today. Follow-up in 6 months for possible dose increase to 5 mg  - Discussed earlier tx: Minoxidil 5% foam  5. AK  - s/p cryo 12/2022  6. Hyperpigmented patch, L dorsal hand  - New treatment started on visit on Dec 19, 2024: OTC Vitamin E 1 g/day together with Routen 1500 mg/day   - Earlier tx: hydroquinone cream 4%, Rutin/Vitamin C supplements for bruising  7. Inflamed SK  - Cryotherapy on visit on Dec 19, 2024 - right and left scalp.  - Cryotherapy on visit on 14 Dec, 2023  8. Cyst - right frontal neck  - Punch biopsy on visit on Dec 19, 2024.  ____________________________________________    Assessment & Plan:    # Seborrheic keratosis, symptomatic and inflamed  Cryotherapy performed today (see  procedure note(s) below) for two scalp lesions.    # Androgenetic alopecia   New treatment started on visit on Dec 19, 2024: oral Minoxidil 2,5 mg today. Follow-up in 6 months for possible dose increase to 5 mg.  Possible side effects including hypotension, orthostasis, arrhythmias reviewed with patient today.    # Hyperpigmented patch, L dorsal hand  New treatment started on visit on Dec 19, 2024: OTC Vitamin C 1 g/day together with Rutin 500 mg/day.     # Cyst on right frontal neck, symptomatic.  Punch biopsy performed today (see procedure note(s) below)    # Benign nevus - right sole  Benign appearance and size <7mm why no need to examine further.    Procedures Performed:   - Cryotherapy procedure note, location(s): right and left scalp superior to right and left ear. After verbal consent and discussion of risks and benefits including, but not limited to, dyspigmentation/scar, blister, and pain, 2 lesion(s) was(were) treated with 1-2 mm freeze border for 1-2 cycles with liquid nitrogen. Post cryotherapy instructions were provided.  - Procedure(s) performed by faculty.     - Punch Biopsy note, location: right frontal neck. The risks and benefits of the procedure were described to the patient. These include but are not limited to bleeding, infection, scar, incomplete removal, and non-diagnostic biopsy. Written informed consent was obtained. The area was cleansed with an alcohol pad and injected with lidocaine with epinephrine (<1mL used). Once anesthesia was obtained, a 4 mm punch biopsy was performed. The tissue was placed in a labeled container with formalin and sent to pathology. The site was closed using 4-0 Prolene sutures. Vaseline and a bandage were applied to the wound. The patient tolerated the procedure well and was given post biopsy care instructions.  - Procedure(s) performed by faculty.     Follow-up: 6 month(s) in-person, or earlier for new or changing lesions    Staff and Medical Student:   Shawna  [employer] Med student  Dr Perez    I was present with the medical student who participated in the service and in the documentation.  I have verified the history and personally performed the physical exam and medical decision making.  I agree with the assessment and plan of care as documented in the note.  I personally performed all procedures.    Ab Preez MD  Dermatology Attending  ____________________________________________    CC: Skin Check (Colton is here for a skin check and has no spots of concern.)    HPI:  Mr. Hi Smith is a(n) 69 year old male who presents today as a return patient for a FBSE. Colton has been feeling well in the period leading up to the visit. He has no family history of skin cancer, has had accentuated sun exposure due to working outdoors earlier, and is a non-smoker. Colton is only concerned about a spot on his right scalp that he has noted and that has been a bit itchy, possible triggered by his glasses touching this area. The lesion has not been bleeding. He has a similar spot that he is aware of but that has not otherwise bothered him, on the left scalp. Regarding the hair, Colton never treated with the minoxidil foam. He still experiences important general shedding, with no specific area of concern. Colton no longer treats the hyperpigmentation on his left dorsal hand despite being bothered by this. Patient is otherwise feeling well, without additional skin concerns.    Labs:  None reviewed.    Physical Exam:  Vitals: There were no vitals taken for this visit.  SKIN: Total skin excluding the undergarment areas was performed. The exam included the head/face, neck, both arms, chest, back, abdomen, both legs, digits and/or nails.   - On the right scalp superior to the ear there is a waxy yellow papules on dermascope consisting of several smaller papules with milia like dots inside.  - On the left scalp superior to the ear there is a waxy papule.  - On the right mid sole there is a 0,4 cm  large homogenously light brown colored macule.  - New hair noted without signs of breakage and no area with less hair density on examination of the scalp.   - On the left dorsal hand there is a homogenous light brown color.  - On the right frontal neck there is a 0,5 mm skin-colored palpable nodule.  - No other lesions of concern on areas examined.     Medications:  Current Outpatient Medications   Medication Sig Dispense Refill     amLODIPine (NORVASC) 2.5 MG tablet Take 1 tablet by mouth daily at 2 pm       ASPIRIN PO Take 81 mg by mouth daily       atorvastatin (LIPITOR) 80 MG tablet Take 1 tablet (80 mg) by mouth daily 90 tablet 1     K Phos [place]-Sod Phos Di & Mono (DAVID-PHOS 250 NEUTRAL) 155-852-130 MG TABS Take 1 tablet by mouth twice daily 180 tablet 0     metoprolol succinate ER (TOPROL XL) 25 MG 24 hr tablet Take 0.5 tablets (12.5 mg) by mouth daily       Multiple Vitamin (MULTIVITAMIN ADULT PO) Take by mouth.       nitroGLYcerin (NITROSTAT) 0.4 MG sublingual tablet Place 1 tablet (0.4 mg) under the tongue every 5 minutes as needed for chest pain 12 tablet 0     omeprazole (PRILOSEC) 20 MG DR capsule Take 20 mg by mouth daily       Biotin 1 MG CAPS        No current facility-administered medications for this visit.      Past Medical History:   Patient Active Problem List   Diagnosis     Extranodal NK/T-cell lymphoma, nasal type     Nausea with vomiting     Anemia in neoplastic disease     Bacteremia     NHL (non-Hodgkin's lymphoma) (H)     Lymphoma (H)     Fever     Lymphoma, non-Hodgkin's (H)     Neoplasm of uncertain behavior of skin     T-cell or NK-cell neoplasm (H)     Other specified prophylactic or treatment measure     Skin cancer screening     Status post cord blood transplantation     Physical deconditioning     History of peripheral stem cell transplant (H)     Dysuria     Hypogammaglobulinemia (H)     Status post bone marrow transplant (H)     Complications of bone marrow transplant (H)      Cellulitis     Hypophosphatemia     CAP (community acquired pneumonia)     History of nonmelanoma skin cancer     Hypertension     Hyperlipidemia     Coronary artery disease involving native heart with other form of angina pectoris, unspecified vessel or lesion type (H)     History of deep vein thrombosis     Basal cell carcinoma of truncal skin     AK (actinic keratosis)     Telogen effluvium     Seborrheic keratoses, inflamed     Actinic keratosis     Seborrheic keratosis     Multifocal motor neuropathy (H)     Other specified neoplasms of uncertain behavior of lymphoid, hematopoietic and related tissue (H)     Past Medical History:   Diagnosis Date     Alcohol abuse     in remission x 20 yrs     CAD (coronary artery disease) of artery bypass graft     CABG 2005, hx of stents     Depression      DVT (deep venous thrombosis) (H) 11/2014    upper left arm after PICC line     Extranodal NK/T-cell lymphoma, nasal type 07/23/2014     GERD (gastroesophageal reflux disease)      Hearing loss      HTN (hypertension)      Lymphoma (H)      Natural killer (NK) cell lymphoblastic lymphoma (H) 08/15/2014     S/P autologous bone marrow transplantation (H) 03/04/2015     Skin cancer     15 y ago.? basal cell     Tinnitus      VRE (vancomycin resistant enterococcus) culture positive         CC Ab Perez MD  [address] [facility] [address]  [place] [address] on close of this encounter.     Lidocaine-epinephrine 1-1:997852 % injection   1mL once for one use, starting 12/19/2024 ending 12/19/2024,  2mL disp, R-0, injection  Injected by Coleman Peterson CMA      Again, thank you for allowing me to participate in the care of your patient.      Sincerely,    Ab Perez MD

## 2024-12-22 PROBLEM — L64.9 ANDROGENETIC ALOPECIA: Status: ACTIVE | Noted: 2024-12-22

## 2024-12-24 LAB
PATH REPORT.COMMENTS IMP SPEC: NORMAL
PATH REPORT.COMMENTS IMP SPEC: NORMAL
PATH REPORT.FINAL DX SPEC: NORMAL
PATH REPORT.GROSS SPEC: NORMAL
PATH REPORT.MICROSCOPIC SPEC OTHER STN: NORMAL
PATH REPORT.RELEVANT HX SPEC: NORMAL

## 2025-02-19 ENCOUNTER — PATIENT OUTREACH (OUTPATIENT)
Dept: CARE COORDINATION | Facility: CLINIC | Age: 70
End: 2025-02-19
Payer: MEDICARE

## 2025-03-03 DIAGNOSIS — E83.39 HYPOPHOSPHATEMIA: ICD-10-CM

## 2025-03-03 RX ORDER — SOD PHOS DI, MONO/K PHOS MONO 250 MG
1 TABLET ORAL 2 TIMES DAILY
Qty: 180 TABLET | Refills: 0 | Status: SHIPPED | OUTPATIENT
Start: 2025-03-03

## 2025-03-05 ENCOUNTER — PATIENT OUTREACH (OUTPATIENT)
Dept: CARE COORDINATION | Facility: CLINIC | Age: 70
End: 2025-03-05
Payer: MEDICARE

## 2025-03-12 ENCOUNTER — MYC MEDICAL ADVICE (OUTPATIENT)
Dept: DERMATOLOGY | Facility: CLINIC | Age: 70
End: 2025-03-12
Payer: MEDICARE

## 2025-03-21 NOTE — PROGRESS NOTES
[facility] Dermatology Note  Encounter Date: Mar 24, 2025  Office Visit     Dermatology Problem List:  Last FBSE on Dec 19, 2024.  1. 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  2. Non-healing ulcers, bilateral LEs and left forearm, resolved  - Bacterial culture 3/2016 with normal skin giuliana.  - Bx obtained 3/31/2016 for H&E, sterile culture, flow cytometry, and donor chimerism studies, and if possible TCR gene rearrangement studies, suggested NK-Tcell lymphoma  3. NMSC   - BCC, left anterior shoulder, s/p bx 11/15/2019 with excision shortly after  - BCC, s/p MMS in 2000.  4. Telogen effluvium   - New treatment started on visit on Dec 19, 2024: oral Minoxidil 2,5 mg today. Follow-up in 6 months for possible dose increase to 5 mg  - Discussed earlier tx: Minoxidil 5% foam  5. AK  - s/p cryo 12/2022  6. Hyperpigmented patch, L dorsal hand  - New treatment started on visit on Dec 19, 2024: OTC Vitamin E 1 g/day together with Routen 1500 mg/day   - Earlier tx: hydroquinone cream 4%, Rutin/Vitamin C supplements for bruising  7. Inflamed SK  - Cryotherapy on visit on Dec 19, 2024 - right and left scalp.  - Cryotherapy on visit on 14 Dec, 2023  8. EIC - right frontal neck  - Punch biopsy on visit on Dec 19, 2024.  9. Inflamed skin tag   - s/p cryo 03/24/2025  ____________________________________________    Assessment & Plan:    # inflamed acrochordons. Neck x 4  - Cryotherapy performed today. See procedure note below.     # ISKs, L parietal scalp, L lower chest x2, L upper chest  - Cryotherapy performed today. See procedure note below.     Procedures Performed:   Cryotherapy procedure note: After verbal consent and discussion of risks and benefits including but no limited to  dyspigmentation/scar, blister, and pain, 4 skin tags, 4 ISKs was(were) treated with 1-2mm freeze border for 2 cycles with liquid nitrogen. Post cryotherapy instructions were provided.     Follow-up: 6 month(s) in-person, or earlier for new or changing lesions    Staff and Scribe:     Scribe Disclosure:   I, Marcela Nettles, am serving as a scribe; to document services personally performed by Caron Oliver PA-C -based on data collection and the provider's statements to me.     Provider Disclosure:  I agree with above History, Review of Systems, Physical exam and Plan.  I have reviewed the content of the documentation and have edited it as needed. I have personally performed the services documented here and the documentation accurately represents those services and the decisions I have made.      Electronically signed by:  All risks, benefits and alternatives were discussed with patient.  Patient is in agreement and understands the assessment and plan.  All questions were answered.  Sun Screen Education was given.   Return to Clinic in 6 months or sooner as needed.   Caron Oliver PA-C        ____________________________________________    CC: Derm Problem (PT has skin tags on neck)    HPI:  Mr. Hi Smith is a(n) 70 year old male who presents today as a return patient for a FBSE. Last seen in dermatology by Dr. Perez on 12/19/2024, at which time a punch bx was performed on the R neck, derm path for this area showing it as an EIC, and cryotherapy was performed on 2 Sks. He was also started on 2.5 mg of PO minoxidil for AA and OTC Vitamin C 1 g/day together with Rutin 500 mg/day for a hyperpigmented patch on the L hand.    The patient reports that he has some skin tags on his neck he would like looked at today.   Previously had a growth frozen, would like this frozen again today as it is bothersome.     Patient is otherwise feeling well, without additional skin concerns.    Labs:  Derm path from  12/19/2024  Final Diagnosis   Right neck:  - Infundibular cyst (epidermal inclusion cyst) - (see description)     Physical exam:  Vitals: There were no vitals taken for this visit.  GEN: This is a well developed, well-nourished male in no acute distress, in a pleasant mood.    SKIN: Focused examination of the scalp, neck, L chest was performed.  - inflamed acrochordons, neck x4   - There are tan to brown waxy stuck on papules with surrounding erythema on the L parietal scalp, L lower chest x2, L upper chest.   - No other lesions of concern on areas examined.     Medications:  Current Outpatient Medications   Medication Sig Dispense Refill    amLODIPine (NORVASC) 2.5 MG tablet Take 1 tablet by mouth daily at 2 pm      ASPIRIN PO Take 81 mg by mouth daily      atorvastatin (LIPITOR) 80 MG tablet Take 1 tablet (80 mg) by mouth daily 90 tablet 1    K Phos [place]-Sod Phos Di & Mono (DAVID-PHOS 250 NEUTRAL) 155-852-130 MG TABS Take 1 tablet by mouth twice daily 180 tablet 0    metoprolol succinate ER (TOPROL XL) 25 MG 24 hr tablet Take 0.5 tablets (12.5 mg) by mouth daily      Multiple Vitamin (MULTIVITAMIN ADULT PO) Take by mouth.      nitroGLYcerin (NITROSTAT) 0.4 MG sublingual tablet Place 1 tablet (0.4 mg) under the tongue every 5 minutes as needed for chest pain 12 tablet 0    omeprazole (PRILOSEC) 20 MG DR capsule Take 20 mg by mouth daily      minoxidil (LONITEN) 2.5 MG tablet Take 1 tablet (2.5 mg) by mouth daily. 60 tablet 3     No current facility-administered medications for this visit.      Past Medical History:   Patient Active Problem List   Diagnosis    Extranodal NK/T-cell lymphoma, nasal type    Nausea with vomiting    Anemia in neoplastic disease    Bacteremia    NHL (non-Hodgkin's lymphoma) (H)    Lymphoma (H)    Fever    Lymphoma, non-Hodgkin's (H)    Neoplasm of uncertain behavior of skin    T-cell or NK-cell neoplasm (H)    Other specified prophylactic or treatment measure    Skin cancer screening     Status post cord blood transplantation    Physical deconditioning    History of peripheral stem cell transplant (H)    Dysuria    Hypogammaglobulinemia    Status post bone marrow transplant (H)    Complications of bone marrow transplant (H)    Cellulitis    Hypophosphatemia    CAP (community acquired pneumonia)    History of nonmelanoma skin cancer    Hypertension    Hyperlipidemia    Coronary artery disease involving native heart with other form of angina pectoris, unspecified vessel or lesion type    History of deep vein thrombosis    Basal cell carcinoma of truncal skin    AK (actinic keratosis)    Telogen effluvium    Seborrheic keratoses, inflamed    Actinic keratosis    Seborrheic keratosis    Multifocal motor neuropathy (H)    Other specified neoplasms of uncertain behavior of lymphoid, hematopoietic and related tissue (H)    Androgenetic alopecia     Past Medical History:   Diagnosis Date    Alcohol abuse     in remission x 20 yrs    CAD (coronary artery disease) of artery bypass graft     CABG 2005, hx of stents    Depression     DVT (deep venous thrombosis) (H) 11/2014    upper left arm after PICC line    Extranodal NK/T-cell lymphoma, nasal type 07/23/2014    GERD (gastroesophageal reflux disease)     Hearing loss     HTN (hypertension)     Lymphoma (H)     Natural killer (NK) cell lymphoblastic lymphoma (H) 08/15/2014    S/P autologous bone marrow transplantation (H) 03/04/2015    Skin cancer     15 y ago.? basal cell    Tinnitus     VRE (vancomycin resistant enterococcus) culture positive         CC Ab Perez MD  [address] [place] [address] [address]  [place] [address] on close of this encounter.

## 2025-03-24 ENCOUNTER — OFFICE VISIT (OUTPATIENT)
Dept: DERMATOLOGY | Facility: CLINIC | Age: 70
End: 2025-03-24
Payer: MEDICARE

## 2025-03-24 DIAGNOSIS — L82.0 INFLAMED SEBORRHEIC KERATOSIS: ICD-10-CM

## 2025-03-24 DIAGNOSIS — L91.8 INFLAMED SKIN TAG: Primary | ICD-10-CM

## 2025-03-24 PROCEDURE — 17110 DESTRUCTION B9 LES UP TO 14: CPT | Performed by: PHYSICIAN ASSISTANT

## 2025-03-24 PROCEDURE — 1126F AMNT PAIN NOTED NONE PRSNT: CPT | Performed by: PHYSICIAN ASSISTANT

## 2025-03-24 ASSESSMENT — PAIN SCALES - GENERAL: PAINLEVEL_OUTOF10: NO PAIN (0)

## 2025-03-24 NOTE — LETTER
3/24/2025       RE: Hi Smith  [address] [place] [address]     Dear Colleague,    Thank you for referring your patient, Hi Smith, to the [facility] DERMATOLOGY [facility] [place] at [facility]. Please see a copy of my visit note below.    [facility] Dermatology Note  Encounter Date: Mar 24, 2025  Office Visit     Dermatology Problem List:  Last FBSE on Dec 19, 2024.  1. 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  2. Non-healing ulcers, bilateral LEs and left forearm, resolved  - Bacterial culture 3/2016 with normal skin giuliana.  - Bx obtained 3/31/2016 for H&E, sterile culture, flow cytometry, and donor chimerism studies, and if possible TCR gene rearrangement studies, suggested NK-Tcell lymphoma  3. NMSC   - BCC, left anterior shoulder, s/p bx 11/15/2019 with excision shortly after  - BCC, s/p MMS in 2000.  4. Telogen effluvium   - New treatment started on visit on Dec 19, 2024: oral Minoxidil 2,5 mg today. Follow-up in 6 months for possible dose increase to 5 mg  - Discussed earlier tx: Minoxidil 5% foam  5. AK  - s/p cryo 12/2022  6. Hyperpigmented patch, L dorsal hand  - New treatment started on visit on Dec 19, 2024: OTC Vitamin E 1 g/day together with Routen 1500 mg/day   - Earlier tx: hydroquinone cream 4%, Rutin/Vitamin C supplements for bruising  7. Inflamed SK  - Cryotherapy on visit on Dec 19, 2024 - right and left scalp.  - Cryotherapy on visit on 14 Dec, 2023  8. EIC - right frontal neck  - Punch biopsy on visit on Dec 19, 2024.  9. Inflamed skin tag   - s/p cryo 03/24/2025  ____________________________________________    Assessment & Plan:    # inflamed acrochordons. Neck x 4  -  Cryotherapy performed today. See procedure note below.     # ISKs, L parietal scalp, L lower chest x2, L upper chest  - Cryotherapy performed today. See procedure note below.     Procedures Performed:   Cryotherapy procedure note: After verbal consent and discussion of risks and benefits including but no limited to dyspigmentation/scar, blister, and pain, 4 skin tags, 4 ISKs was(were) treated with 1-2mm freeze border for 2 cycles with liquid nitrogen. Post cryotherapy instructions were provided.     Follow-up: 6 month(s) in-person, or earlier for new or changing lesions    Staff and Scribe:     Scribe Disclosure:   I, Marcela Nettles, am serving as a scribe; to document services personally performed by Caron Oliver PA-C -based on data collection and the provider's statements to me.     Provider Disclosure:  I agree with above History, Review of Systems, Physical exam and Plan.  I have reviewed the content of the documentation and have edited it as needed. I have personally performed the services documented here and the documentation accurately represents those services and the decisions I have made.      Electronically signed by:  All risks, benefits and alternatives were discussed with patient.  Patient is in agreement and understands the assessment and plan.  All questions were answered.  Sun Screen Education was given.   Return to Clinic in 6 months or sooner as needed.   Caron Oliver PA-C        ____________________________________________    CC: Derm Problem (PT has skin tags on neck)    HPI:  Mr. Hi Smith is a(n) 70 year old male who presents today as a return patient for a FBSE. Last seen in dermatology by Dr. Perez on 12/19/2024, at which time a punch bx was performed on the R neck, derm path for this area showing it as an EIC, and cryotherapy was performed on 2 Sks. He was also started on 2.5 mg of PO minoxidil for AA and OTC Vitamin C 1 g/day together with Rutin 500 mg/day for a  hyperpigmented patch on the L hand.    The patient reports that he has some skin tags on his neck he would like looked at today.   Previously had a growth frozen, would like this frozen again today as it is bothersome.     Patient is otherwise feeling well, without additional skin concerns.    Labs:  Derm path from 12/19/2024  Final Diagnosis   Right neck:  - Infundibular cyst (epidermal inclusion cyst) - (see description)     Physical exam:  Vitals: There were no vitals taken for this visit.  GEN: This is a well developed, well-nourished male in no acute distress, in a pleasant mood.    SKIN: Focused examination of the scalp, neck, L chest was performed.  - inflamed acrochordons, neck x4   - There are tan to brown waxy stuck on papules with surrounding erythema on the L parietal scalp, L lower chest x2, L upper chest.   - No other lesions of concern on areas examined.     Medications:  Current Outpatient Medications   Medication Sig Dispense Refill     amLODIPine (NORVASC) 2.5 MG tablet Take 1 tablet by mouth daily at 2 pm       ASPIRIN PO Take 81 mg by mouth daily       atorvastatin (LIPITOR) 80 MG tablet Take 1 tablet (80 mg) by mouth daily 90 tablet 1     K Phos [place]go-Sod Phos Di & Mono (DAVID-PHOS 250 NEUTRAL) [id number] MG TABS Take 1 tablet by mouth twice daily 180 tablet 0     metoprolol succinate ER (TOPROL XL) 25 MG 24 hr tablet Take 0.5 tablets (12.5 mg) by mouth daily       Multiple Vitamin (MULTIVITAMIN ADULT PO) Take by mouth.       nitroGLYcerin (NITROSTAT) 0.4 MG sublingual tablet Place 1 tablet (0.4 mg) under the tongue every 5 minutes as needed for chest pain 12 tablet 0     omeprazole (PRILOSEC) 20 MG DR capsule Take 20 mg by mouth daily       minoxidil (LONITEN) 2.5 MG tablet Take 1 tablet (2.5 mg) by mouth daily. 60 tablet 3     No current facility-administered medications for this visit.      Past Medical History:   Patient Active Problem List   Diagnosis     Extranodal NK/T-cell lymphoma, nasal  type     Nausea with vomiting     Anemia in neoplastic disease     Bacteremia     NHL (non-Hodgkin's lymphoma) (H)     Lymphoma (H)     Fever     Lymphoma, non-Hodgkin's (H)     Neoplasm of uncertain behavior of skin     T-cell or NK-cell neoplasm (H)     Other specified prophylactic or treatment measure     Skin cancer screening     Status post cord blood transplantation     Physical deconditioning     History of peripheral stem cell transplant (H)     Dysuria     Hypogammaglobulinemia     Status post bone marrow transplant (H)     Complications of bone marrow transplant (H)     Cellulitis     Hypophosphatemia     CAP (community acquired pneumonia)     History of nonmelanoma skin cancer     Hypertension     Hyperlipidemia     Coronary artery disease involving native heart with other form of angina pectoris, unspecified vessel or lesion type     History of deep vein thrombosis     Basal cell carcinoma of truncal skin     AK (actinic keratosis)     Telogen effluvium     Seborrheic keratoses, inflamed     Actinic keratosis     Seborrheic keratosis     Multifocal motor neuropathy (H)     Other specified neoplasms of uncertain behavior of lymphoid, hematopoietic and related tissue (H)     Androgenetic alopecia     Past Medical History:   Diagnosis Date     Alcohol abuse     in remission x 20 yrs     CAD (coronary artery disease) of artery bypass graft     CABG 2005, hx of stents     Depression      DVT (deep venous thrombosis) (H) 11/2014    upper left arm after PICC line     Extranodal NK/T-cell lymphoma, nasal type 07/23/2014     GERD (gastroesophageal reflux disease)      Hearing loss      HTN (hypertension)      Lymphoma (H)      Natural killer (NK) cell lymphoblastic lymphoma (H) 08/15/2014     S/P autologous bone marrow transplantation (H) 03/04/2015     Skin cancer     15 y ago.? basal cell     Tinnitus      VRE (vancomycin resistant enterococcus) culture positive         CC Ab Perez MD  [address] [place] [address]  [address] [id number] on close of this encounter.       Again, thank you for allowing me to participate in the care of your patient.      Sincerely,    Caron Oliver PA-C

## 2025-03-24 NOTE — NURSING NOTE
Dermatology Rooming Note    Hi Smith's goals for this visit include:   Chief Complaint   Patient presents with    Derm Problem     PT has skin tags on neck     Alice Headley - EMT

## 2025-03-24 NOTE — PATIENT INSTRUCTIONS
Cryotherapy    What is it?  Use of a very cold liquid, such as liquid nitrogen, to freeze and destroy abnormal skin cells that need to be removed    What should I expect?  Tenderness and redness  A small blister that might grow and fill with dark purple blood. There may be crusting.  More than one treatment may be needed if the lesions do not go away.    How do I care for the treated area?  Gently wash the area with your hands when bathing.  Use a thin layer of Vaseline to help with healing. You may use a Band-Aid.   The area should heal within 7-10 days and may leave behind a pink or lighter color.   Do not use an antibiotic or Neosporin ointment.   You may take acetaminophen (Tylenol) for pain.     Call your doctor if you have:  Severe pain  Signs of infection (warmth, redness, cloudy yellow drainage, and or a bad smell)  Questions or concerns    Who should I call with questions?      [facility]: [phone]      [facility]: [phone]      For urgent needs outside of business hours call the [facility] at [phone] and ask for the dermatology resident on call

## 2025-03-25 ENCOUNTER — PATIENT OUTREACH (OUTPATIENT)
Dept: FAMILY MEDICINE | Facility: CLINIC | Age: 70
End: 2025-03-25
Payer: MEDICARE

## 2025-03-25 NOTE — TELEPHONE ENCOUNTER
Patient Quality Outreach    Patient is due for the following:   Physical Annual Wellness Visit    Action(s) Taken:   Patient has upcoming appointment, these items will be addressed at that time.    Type of outreach:    Chart review performed, no outreach needed.    Questions for provider review:    None         Ligia Ramos, CMA

## 2025-04-21 SDOH — HEALTH STABILITY: PHYSICAL HEALTH: ON AVERAGE, HOW MANY DAYS PER WEEK DO YOU ENGAGE IN MODERATE TO STRENUOUS EXERCISE (LIKE A BRISK WALK)?: 0 DAYS

## 2025-04-21 SDOH — HEALTH STABILITY: PHYSICAL HEALTH: ON AVERAGE, HOW MANY MINUTES DO YOU ENGAGE IN EXERCISE AT THIS LEVEL?: 0 MIN

## 2025-04-21 ASSESSMENT — SOCIAL DETERMINANTS OF HEALTH (SDOH): HOW OFTEN DO YOU GET TOGETHER WITH FRIENDS OR RELATIVES?: TWICE A WEEK

## 2025-04-23 ENCOUNTER — OFFICE VISIT (OUTPATIENT)
Dept: FAMILY MEDICINE | Facility: CLINIC | Age: 70
End: 2025-04-23
Attending: FAMILY MEDICINE
Payer: MEDICARE

## 2025-04-23 VITALS
OXYGEN SATURATION: 99 % | BODY MASS INDEX: 27.63 KG/M2 | RESPIRATION RATE: 16 BRPM | HEART RATE: 54 BPM | WEIGHT: 204 LBS | TEMPERATURE: 97.7 F | HEIGHT: 72 IN | SYSTOLIC BLOOD PRESSURE: 131 MMHG | DIASTOLIC BLOOD PRESSURE: 81 MMHG

## 2025-04-23 DIAGNOSIS — Z94.84 HISTORY OF PERIPHERAL STEM CELL TRANSPLANT (H): ICD-10-CM

## 2025-04-23 DIAGNOSIS — Z12.5 SCREENING FOR PROSTATE CANCER: ICD-10-CM

## 2025-04-23 DIAGNOSIS — G61.82 MULTIFOCAL MOTOR NEUROPATHY (H): ICD-10-CM

## 2025-04-23 DIAGNOSIS — Z00.00 ENCOUNTER FOR MEDICARE ANNUAL WELLNESS EXAM: Primary | ICD-10-CM

## 2025-04-23 LAB
ALBUMIN SERPL BCG-MCNC: 4.4 G/DL (ref 3.5–5.2)
ALP SERPL-CCNC: 113 U/L (ref 40–150)
ALT SERPL W P-5'-P-CCNC: 23 U/L (ref 0–70)
ANION GAP SERPL CALCULATED.3IONS-SCNC: 11 MMOL/L (ref 7–15)
AST SERPL W P-5'-P-CCNC: 27 U/L (ref 0–45)
BILIRUB SERPL-MCNC: 0.7 MG/DL
BUN SERPL-MCNC: 14.7 MG/DL (ref 8–23)
CALCIUM SERPL-MCNC: 9.4 MG/DL (ref 8.8–10.4)
CHLORIDE SERPL-SCNC: 106 MMOL/L (ref 98–107)
CHOLEST SERPL-MCNC: 140 MG/DL
CREAT SERPL-MCNC: 1.24 MG/DL (ref 0.67–1.17)
EGFRCR SERPLBLD CKD-EPI 2021: 62 ML/MIN/1.73M2
EST. AVERAGE GLUCOSE BLD GHB EST-MCNC: 105 MG/DL
FASTING STATUS PATIENT QL REPORTED: NO
FASTING STATUS PATIENT QL REPORTED: NO
GLUCOSE SERPL-MCNC: 95 MG/DL (ref 70–99)
HBA1C MFR BLD: 5.3 % (ref 0–5.6)
HCO3 SERPL-SCNC: 23 MMOL/L (ref 22–29)
HDLC SERPL-MCNC: 48 MG/DL
LDLC SERPL CALC-MCNC: 72 MG/DL
NONHDLC SERPL-MCNC: 92 MG/DL
POTASSIUM SERPL-SCNC: 3.9 MMOL/L (ref 3.4–5.3)
PROT SERPL-MCNC: 6.9 G/DL (ref 6.4–8.3)
PSA SERPL DL<=0.01 NG/ML-MCNC: 2 NG/ML (ref 0–6.5)
SODIUM SERPL-SCNC: 140 MMOL/L (ref 135–145)
TRIGL SERPL-MCNC: 98 MG/DL

## 2025-04-23 PROCEDURE — 80053 COMPREHEN METABOLIC PANEL: CPT | Performed by: FAMILY MEDICINE

## 2025-04-23 PROCEDURE — 1126F AMNT PAIN NOTED NONE PRSNT: CPT | Performed by: FAMILY MEDICINE

## 2025-04-23 PROCEDURE — 83036 HEMOGLOBIN GLYCOSYLATED A1C: CPT | Mod: GZ | Performed by: FAMILY MEDICINE

## 2025-04-23 PROCEDURE — 3075F SYST BP GE 130 - 139MM HG: CPT | Performed by: FAMILY MEDICINE

## 2025-04-23 PROCEDURE — 3079F DIAST BP 80-89 MM HG: CPT | Performed by: FAMILY MEDICINE

## 2025-04-23 PROCEDURE — 80061 LIPID PANEL: CPT | Mod: GZ | Performed by: FAMILY MEDICINE

## 2025-04-23 PROCEDURE — G0439 PPPS, SUBSEQ VISIT: HCPCS | Performed by: FAMILY MEDICINE

## 2025-04-23 PROCEDURE — G0103 PSA SCREENING: HCPCS | Performed by: FAMILY MEDICINE

## 2025-04-23 PROCEDURE — 36415 COLL VENOUS BLD VENIPUNCTURE: CPT | Performed by: FAMILY MEDICINE

## 2025-04-23 ASSESSMENT — PAIN SCALES - GENERAL: PAINLEVEL_OUTOF10: NO PAIN (0)

## 2025-04-23 NOTE — PATIENT INSTRUCTIONS
Patient Education   Preventive Care Advice   This is general advice given by our system to help you stay healthy. However, your care team may have specific advice just for you. Please talk to your care team about your preventive care needs.  Nutrition  Eat 5 or more servings of fruits and vegetables each day.  Try wheat bread, brown rice and whole grain pasta (instead of white bread, rice, and pasta).  Get enough calcium and vitamin D. Check the label on foods and aim for 100% of the RDA (recommended daily allowance).  Lifestyle  Exercise at least 150 minutes each week  (30 minutes a day, 5 days a week).  Do muscle strengthening activities 2 days a week. These help control your weight and prevent disease.  No smoking.  Wear sunscreen to prevent skin cancer.  Have a dental exam and cleaning every 6 months.  Yearly exams  See your health care team every year to talk about:  Any changes in your health.  Any medicines your care team has prescribed.  Preventive care, family planning, and ways to prevent chronic diseases.  Shots (vaccines)   HPV shots (up to age 26), if you've never had them before.  Hepatitis B shots (up to age 59), if you've never had them before.  COVID-19 shot: Get this shot when it's due.  Flu shot: Get a flu shot every year.  Tetanus shot: Get a tetanus shot every 10 years.  Pneumococcal, hepatitis A, and RSV shots: Ask your care team if you need these based on your risk.  Shingles shot (for age 50 and up)  General health tests  Diabetes screening:  Starting at age 35, Get screened for diabetes at least every 3 years.  If you are younger than age 35, ask your care team if you should be screened for diabetes.  Cholesterol test: At age 39, start having a cholesterol test every 5 years, or more often if advised.  Bone density scan (DEXA): At age 50, ask your care team if you should have this scan for osteoporosis (brittle bones).  Hepatitis C: Get tested at least once in your life.  STIs (sexually  transmitted infections)  Before age 24: Ask your care team if you should be screened for STIs.  After age 24: Get screened for STIs if you're at risk. You are at risk for STIs (including HIV) if:  You are sexually active with more than one person.  You don't use condoms every time.  You or a partner was diagnosed with a sexually transmitted infection.  If you are at risk for HIV, ask about PrEP medicine to prevent HIV.  Get tested for HIV at least once in your life, whether you are at risk for HIV or not.  Cancer screening tests  Cervical cancer screening: If you have a cervix, begin getting regular cervical cancer screening tests starting at age 21.  Breast cancer scan (mammogram): If you've ever had breasts, begin having regular mammograms starting at age 40. This is a scan to check for breast cancer.  Colon cancer screening: It is important to start screening for colon cancer at age 45.  Have a colonoscopy test every 10 years (or more often if you're at risk) Or, ask your provider about stool tests like a FIT test every year or Cologuard test every 3 years.  To learn more about your testing options, visit:   .  For help making a decision, visit:   https://bit.ly/ax38470.  Prostate cancer screening test: If you have a prostate, ask your care team if a prostate cancer screening test (PSA) at age 55 is right for you.  Lung cancer screening: If you are a current or former smoker ages 50 to 80, ask your care team if ongoing lung cancer screenings are right for you.  For informational purposes only. Not to replace the advice of your health care provider. Copyright   2023 [facility] [employer]. All rights reserved. Clinically reviewed by the [facility] Transitions Program. [employer] [id number] - REV 01/24.  Hearing Loss: Care Instructions  Overview     Hearing loss is a sudden or slow decrease in how well you hear. It can range from slight to profound. Permanent hearing loss can occur with aging. It also can  happen when you are exposed long-term to loud noise. Examples include listening to loud music, riding motorcycles, or being around other loud machines.  Hearing loss can affect your work and home life. It can make you feel lonely or depressed. You may feel that you have lost your independence. But hearing aids and other devices can help you hear better and feel connected to others.  Follow-up care is a key part of your treatment and safety. Be sure to make and go to all appointments, and call your doctor if you are having problems. It's also a good idea to know your test results and keep a list of the medicines you take.  How can you care for yourself at home?  Avoid loud noises whenever possible. This helps keep your hearing from getting worse.  Always wear hearing protection around loud noises.  Wear a hearing aid as directed.  A professional can help you pick a hearing aid that will work best for you.  You can also get hearing aids over the counter for mild to moderate hearing loss.  Have hearing tests as your doctor suggests. They can show whether your hearing has changed. Your hearing aid may need to be adjusted.  Use other devices as needed. These may include:  Telephone amplifiers and hearing aids that can connect to a television, stereo, radio, or microphone.  Devices that use lights or vibrations. These alert you to the doorbell, a ringing telephone, or a baby monitor.  Television closed-captioning. This shows the words at the bottom of the screen. Most new TVs can do this.  TTY (text telephone). This lets you type messages back and forth on the telephone instead of talking or listening. These devices are also called TDD. When messages are typed on the keyboard, they are sent over the phone line to a receiving TTY. The message is shown on a monitor.  Use text messaging, social media, and email if it is hard for you to communicate by telephone.  Try to learn a listening technique called speechreading. It is  "not lipreading. You pay attention to people's gestures, expressions, posture, and tone of voice. These clues can help you understand what a person is saying. Face the person you are talking to, and have them face you. Make sure the lighting is good. You need to see the other person's face clearly.  Think about counseling if you need help to adjust to your hearing loss.  When should you call for help?  Watch closely for changes in your health, and be sure to contact your doctor if:    You think your hearing is getting worse.     You have new symptoms, such as dizziness or nausea.   Where can you learn more?  Go to https://www.[employer].net/patiented  Enter R798 in the search box to learn more about \"Hearing Loss: Care Instructions.\"  Current as of: October 27, 2024  Content Version: 14.4    7402-2409 [employer].   Care instructions adapted under license by your healthcare professional. If you have questions about a medical condition or this instruction, always ask your healthcare professional. [employer] disclaims any warranty or liability for your use of this information.    Bladder Training: Care Instructions  Your Care Instructions     Bladder training is used to treat urge incontinence and stress incontinence. Urge incontinence means that the need to urinate comes on so fast that you can't get to a toilet in time. Stress incontinence means that you leak urine because of pressure on your bladder. For example, it may happen when you laugh, cough, or lift something heavy.  Bladder training can increase how long you can wait before you have to urinate. It can also help your bladder hold more urine. And it can give you better control over the urge to urinate.  It is important to remember that bladder training takes a few weeks to a few months to make a difference. You may not see results right away, but don't give up.  Follow-up care is a key part of your treatment and safety. Be sure to make " and go to all appointments, and call your doctor if you are having problems. It's also a good idea to know your test results and keep a list of the medicines you take.  How can you care for yourself at home?  Work with your doctor to come up with a bladder training program that is right for you. You may use one or more of the following methods.  Delayed urination  In the beginning, try to keep from urinating for 5 minutes after you first feel the need to go.  While you wait, take deep, slow breaths to relax. Kegel exercises can also help you delay the need to go to the bathroom.  After some practice, when you can easily wait 5 minutes to urinate, try to wait 10 minutes before you urinate.  Slowly increase the waiting period until you are able to control when you have to urinate.  Scheduled urination  Empty your bladder when you first wake up in the morning.  Schedule times throughout the day when you will urinate.  Start by going to the bathroom every hour, even if you don't need to go.  Slowly increase the time between trips to the bathroom.  When you have found a schedule that works well for you, keep doing it.  If you wake up during the night and have to urinate, do it. Apply your schedule to waking hours only.  Kegel exercises  These tighten and strengthen pelvic muscles, which can help you control the flow of urine. (If doing these exercises causes pain, stop doing them and talk with your doctor.) To do Kegel exercises:  Squeeze your muscles as if you were trying not to pass gas. Or squeeze your muscles as if you were stopping the flow of urine. Your belly, legs, and buttocks shouldn't move.  Hold the squeeze for 3 seconds, then relax for 5 to 10 seconds.  Start with 3 seconds, then add 1 second each week until you are able to squeeze for 10 seconds.  Repeat the exercise 10 times a session. Do 3 to 8 sessions a day.  When should you call for help?  Watch closely for changes in your health, and be sure to  "contact your doctor if:    Your incontinence is getting worse.     You do not get better as expected.   Where can you learn more?  Go to https://www.[employer].net/patiented  Enter V684 in the search box to learn more about \"Bladder Training: Care Instructions.\"  Current as of: April 30, 2024  Content Version: 14.4    5480-3346 [employer].   Care instructions adapted under license by your healthcare professional. If you have questions about a medical condition or this instruction, always ask your healthcare professional. [employer] disclaims any warranty or liability for your use of this information.       "

## 2025-04-23 NOTE — PROGRESS NOTES
Preventive Care Visit  [facility]  Tiffany Estrada MD, Family Medicine  Apr 23, 2025      Assessment & Plan     Encounter for Medicare annual wellness exam  Preventive care reviewed and updated.    Health maintenance screening and immunizations reviewed with the patient.    We discussed healthy lifestyle, nutrition, cardiovascular risk reduction.  - Screen Labs ordered   - Continue great active lifestyle  - Follow up yearly for the annual physical and preventive care.    - Comprehensive metabolic panel; Future  - Hemoglobin A1c; Future  - Lipid panel reflex to direct LDL Fasting; Future  - Comprehensive metabolic panel  - Hemoglobin A1c  - Lipid panel reflex to direct LDL Fasting    Screening for prostate cancer    - Prostate Specific Antigen Screen; Future  - Prostate Specific Antigen Screen    Multifocal motor neuropathy (H)  Stable no current concern  Continue to monitor  History of peripheral stem cell transplant (H)  History of stem cell transplant.  Stable, continue to monitor            BMI  Estimated body mass index is 27.67 kg/m  as calculated from the following:    Height as of this encounter: 1.829 m (6').    Weight as of this encounter: 92.5 kg (204 lb).       Counseling  Appropriate preventive services were addressed with this patient via screening, questionnaire, or discussion as appropriate for fall prevention, nutrition, physical activity, Tobacco-use cessation, social engagement, weight loss and cognition.  Checklist reviewing preventive services available has been given to the patient.  Reviewed patient's diet, addressing concerns and/or questions.   The patient was provided with written information regarding signs of hearing loss.   Information on urinary incontinence and treatment options given to patient.       Follow-up    Follow-up Visit   Expected date:  Apr 30, 2026 (Approximate)      Follow Up Appointment Details:     Follow-up with whom?: PCP    Follow-Up for what?:  Medicare Wellness    Welcome or Annual?: Annual Wellness    How?: In Person                 Subjective   Colton is a 70 year old, presenting for the following:  Medicare Visit (Fasting )        4/23/2025     9:38 AM   Additional Questions   Roomed by Ligia PEARL   Accompanied by Self       HPI   Advance Care Planning    Discussed advance care planning with patient; however, patient declined at this time.        Preventive - advised to get Shingrix from our pharmacy.     Immunization History   Administered Date(s) Administered    COVID-19 12+ (Pfizer) 01/10/2025    COVID-19 Bivalent 12+ (Pfizer) 10/19/2022    COVID-19 MONOVALENT 12+ (Pfizer) 03/18/2021, 04/08/2021, 12/02/2021    DTaP/HepB/IPV 11/16/2017    Flu 65+ (Fluad) 01/10/2025    HIB (PRP-T) 11/16/2017    Influenza (High Dose) Trivalent,PF (Fluzone) 10/22/2019, 11/08/2021, 10/20/2022    Influenza (IIV3) PF 11/08/2006    Influenza Vaccine 65+ (FLUAD) 12/09/2020, 01/12/2024    Influenza Vaccine 65+ (Fluzone HD) 11/08/2021, 10/19/2022    Influenza Vaccine >6 months,quad, PF 10/23/2014, 09/30/2015, 10/17/2016, 11/16/2017, 11/05/2018    MMR (MMRII) 01/17/2020    Pneumo Conj 13-V (2010&after) 11/16/2017    Pneumococcal 23 valent 10/22/2019    TDAP (Adacel,Boostrix) 04/16/2008    Zoster recombinant adjuvanted (Shingrix) 01/17/2020         - Colon CA screen: Colonoscopy, age 45-75 every 10 years or FIT every year or Cologuard every 3 years   Had colonoscopy in 2021   Next 2031     - Prostate CA screen: Discussed controversy about screening.   PSA   Date Value Ref Range Status   06/14/2021 2.64 0 - 4 ug/L Final     Comment:     Assay Method:  Chemiluminescence using Siemens Vista analyzer     Prostate Specific Antigen Screen   Date Value Ref Range Status   03/21/2024 2.12 0.00 - 6.50 ng/mL Final   12/22/2022 2.39 0.00 - 4.00 ug/L Final           -lipids screen: done     Diabetes screen: done             4/21/2025   General Health   How would you rate your overall physical  health? (!) FAIR   Feel stress (tense, anxious, or unable to sleep) Only a little   (!) STRESS CONCERN      4/21/2025   Nutrition   Diet: Regular (no restrictions)         4/21/2025   Exercise   Days per week of moderate/strenous exercise 0 days   Average minutes spent exercising at this level 0 min   (!) EXERCISE CONCERN      4/21/2025   Social Factors   Frequency of gathering with friends or relatives Twice a week   Worry food won't last until get money to buy more No   Food not last or not have enough money for food? No   Do you have housing? (Housing is defined as stable permanent housing and does not include staying ouside in a car, in a tent, in an abandoned building, in an overnight shelter, or couch-surfing.) Yes   Are you worried about losing your housing? No   Lack of transportation? No   Unable to get utilities (heat,electricity)? No         4/21/2025   Fall Risk   Fallen 2 or more times in the past year? No   Trouble with walking or balance? No          4/21/2025   Activities of Daily Living- Home Safety   Needs help with the following daily activites None of the above   Safety concerns in the home None of the above         4/21/2025   Dental   Dentist two times every year? Yes         4/21/2025   Hearing Screening   Hearing concerns? (!) I NEED TO ASK PEOPLE TO SPEAK UP OR REPEAT THEMSELVES.    (!) IT'S HARDER TO UNDERSTAND WOMEN'S VOICES THAN MEN'S VOICES.    (!) IT'S HARD TO FOLLOW A CONVERSATION IN A NOISY RESTAURANT OR CROWDED ROOM.    (!) TROUBLE UNDERSTANDING SOFT OR WHISPERED SPEECH.       Multiple values from one day are sorted in reverse-chronological order         4/21/2025   Driving Risk Screening   Patient/family members have concerns about driving No         4/21/2025   General Alertness/Fatigue Screening   Have you been more tired than usual lately? No         4/21/2025   Urinary Incontinence Screening   Bothered by leaking urine in past 6 months Yes         Today's PHQ-2 Score:        2025    10:31 AM   PHQ-2 (  Pfizer)   Q1: Little interest or pleasure in doing things 0   Q2: Feeling down, depressed or hopeless 0   PHQ-2 Score 0    Q1: Little interest or pleasure in doing things Not at all   Q2: Feeling down, depressed or hopeless Not at all   PHQ-2 Score 0       Patient-reported           2025   Substance Use   Alcohol more than 3/day or more than 7/wk Not Applicable   Do you have a current opioid prescription? No   How severe/bad is pain from 1 to 10? 0/10 (No Pain)   Do you use any other substances recreationally? No     Social History     Tobacco Use    Smoking status: Former     Current packs/day: 0.00     Types: Cigarettes     Start date: 1972     Quit date: 3/2/1993     Years since quittin.1    Smokeless tobacco: Never   Vaping Use    Vaping status: Never Used   Substance Use Topics    Alcohol use: No    Drug use: No       ASCVD Risk   The 10-year ASCVD risk score (Jodi ELLIOTT, et al., 2019) is: 19.9%    Values used to calculate the score:      Age: 70 years      Sex: Male      Is Non- : No      Diabetic: No      Tobacco smoker: No      Systolic Blood Pressure: 131 mmHg      Is BP treated: Yes      HDL Cholesterol: 48 mg/dL      Total Cholesterol: 138 mg/dL            Reviewed and updated as needed this visit by Provider     Meds                Past Medical History:   Diagnosis Date    Alcohol abuse     in remission x 20 yrs    CAD (coronary artery disease) of artery bypass graft     CABG , hx of stents    Depression     DVT (deep venous thrombosis) (H) 2014    upper left arm after PICC line    Extranodal NK/T-cell lymphoma, nasal type 2014    GERD (gastroesophageal reflux disease)     Hearing loss     HTN (hypertension)     Lymphoma (H)     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 basal cell    Tinnitus      VRE (vancomycin resistant enterococcus) culture positive      Past Surgical History:   Procedure Laterality Date    BYPASS GRAFT ARTERY CORONARY      2005    COLONOSCOPY  2021    CYSTOSCOPY, TRANSURETHRAL RESECTION (TUR) TUMOR BLADDER, COMBINED N/A 12/29/2015    Procedure: COMBINED CYSTOSCOPY, TRANSURETHRAL RESECTION (TUR) TUMOR BLADDER;  Surgeon: Orville Boyle MD;  Location: [facility] OR    MOHS MICROGRAPHIC PROCEDURE  01/01/2000    nose    PICC INSERTION Left 09/26/2014    5fr DL Power PICC, 43cm (1cm external) in the L basilic vein w/ tip in the low SVC.    PICC INSERTION Left 10/28/2014    5fr DL Power PICC, 41cm (1cm external) in the L basilic vein w/ tip in the SVC RA junction.    PICC INSERTION Right 12/31/2014    5fr DL Power PICC, 39cm (1cm external) in the R medial brachial vein w/ tip in the low SVC.     Lab work is in process  BP Readings from Last 3 Encounters:   04/23/25 131/81   01/10/25 137/84   03/21/24 136/76    Wt Readings from Last 3 Encounters:   04/23/25 92.5 kg (204 lb)   01/10/25 92.3 kg (203 lb 6.4 oz)   03/21/24 94.3 kg (208 lb)                  Patient Active Problem List   Diagnosis    Extranodal NK/T-cell lymphoma, nasal type    Nausea with vomiting    Anemia in neoplastic disease    Bacteremia    NHL (non-Hodgkin's lymphoma) (H)    Lymphoma (H)    Fever    Lymphoma, non-Hodgkin's (H)    Neoplasm of uncertain behavior of skin    T-cell or NK-cell neoplasm (H)    Other specified prophylactic or treatment measure    Skin cancer screening    Status post cord blood transplantation    Physical deconditioning    History of peripheral stem cell transplant (H)    Dysuria    Hypogammaglobulinemia    Status post bone marrow transplant (H)    Complications of bone marrow transplant (H)    Cellulitis    Hypophosphatemia    CAP (community acquired pneumonia)    History of nonmelanoma skin cancer    Hypertension    Hyperlipidemia    Coronary artery disease involving native heart with other form  of angina pectoris, unspecified vessel or lesion type    History of deep vein thrombosis    Basal cell carcinoma of truncal skin    AK (actinic keratosis)    Telogen effluvium    Seborrheic keratoses, inflamed    Actinic keratosis    Seborrheic keratosis    Multifocal motor neuropathy (H)    Other specified neoplasms of uncertain behavior of lymphoid, hematopoietic and related tissue (H)    Androgenetic alopecia     Past Surgical History:   Procedure Laterality Date    BYPASS GRAFT ARTERY CORONARY          COLONOSCOPY      CYSTOSCOPY, TRANSURETHRAL RESECTION (TUR) TUMOR BLADDER, COMBINED N/A 2015    Procedure: COMBINED CYSTOSCOPY, TRANSURETHRAL RESECTION (TUR) TUMOR BLADDER;  Surgeon: Orville Boyle MD;  Location: [facility] OR    MOHS MICROGRAPHIC PROCEDURE  2000    nose    PICC INSERTION Left 2014    5fr DL Power PICC, 43cm (1cm external) in the L basilic vein w/ tip in the low SVC.    PICC INSERTION Left 10/28/2014    5fr DL Power PICC, 41cm (1cm external) in the L basilic vein w/ tip in the SVC RA junction.    PICC INSERTION Right 2014    5fr DL Power PICC, 39cm (1cm external) in the R medial brachial vein w/ tip in the low SVC.       Social History     Tobacco Use    Smoking status: Former     Current packs/day: 0.00     Types: Cigarettes     Start date: 1972     Quit date: 3/2/1993     Years since quittin.1    Smokeless tobacco: Never   Substance Use Topics    Alcohol use: No     Family History   Problem Relation Age of Onset    Cancer Mother         esophageal    Cancer Father         prostate cancer, lung fibrosis    Heart Disease Father         Heart Disease    Hypertension Father     Prostate Cancer Father     C.A.D. Sister     ALS Sister     Melanoma No family hx of     Skin Cancer No family hx of          Current Outpatient Medications   Medication Sig Dispense Refill    amLODIPine (NORVASC) 2.5 MG tablet Take 1 tablet by mouth daily at 2 pm      ASPIRIN PO  Take 81 mg by mouth daily      atorvastatin (LIPITOR) 80 MG tablet Take 1 tablet (80 mg) by mouth daily 90 tablet 1    K Phos Harlan-Sod Phos Di & Mono (DAVID-PHOS 250 NEUTRAL) 155-852-130 MG TABS Take 1 tablet by mouth twice daily 180 tablet 0    metoprolol succinate ER (TOPROL XL) 25 MG 24 hr tablet Take 0.5 tablets (12.5 mg) by mouth daily      Multiple Vitamin (MULTIVITAMIN ADULT PO) Take by mouth.      nitroGLYcerin (NITROSTAT) 0.4 MG sublingual tablet Place 1 tablet (0.4 mg) under the tongue every 5 minutes as needed for chest pain 12 tablet 0    omeprazole (PRILOSEC) 20 MG DR capsule Take 20 mg by mouth daily       Allergies   Allergen Reactions    Nka [No Known Allergies]      Recent Labs   Lab Test 04/23/25  1001 03/21/24  1102 12/22/22  1510 07/06/22  1046 09/16/21  1401 06/14/21  1522 11/15/19  1116 07/02/19  0817   A1C 5.3  --   --   --   --   --   --   --    LDL  --  66  --   --   --   --   --  87   HDL  --  48  --   --   --   --   --  54   TRIG  --  119  --   --   --   --   --  115   ALT  --  29  --  42  --   --  32  --    CR  --  1.24* 1.15 1.16   < > 1.26* 1.12  --    GFRESTIMATED  --  63 69 69   < > 58* 68  --    GFRESTBLACK  --   --   --   --   --  68 79  --    POTASSIUM  --  4.0 3.5 3.7   < > 3.7 4.0  --    TSH  --   --   --  3.35  --   --  3.99  --     < > = values in this interval not displayed.           Current providers sharing in care for this patient include:  Patient Care Team:  Tiffany Estrada MD as PCP - General (Family Medicine)  Ramakrishna Cameron MD as MD (Infectious Diseases)  Orville Boyle MD as MD (Urology)  Ab Perez MD as MD (Dermapathology)  Ruth Bejarano MD as MD (Radiation Oncology)  Delroy Ratliff DPM (Podiatry)  Jesika Eaton MD as MD (Plastic Surgery)  Tiffany Estrada MD as Assigned PCP  Lopez Ellington MD as MD (Neurology)  Dav Nunez MD as MD (Urology)  Jim, Babs East PA-C as Physician Assistant  (Urology)  Ab Perez MD as Assigned Dermatology Provider    The following health maintenance items are reviewed in Epic and correct as of today:  Health Maintenance   Topic Date Due    ANNUAL REVIEW OF HM ORDERS  Never done    RSV VACCINE (1 - Risk 60-74 years 1-dose series) Never done    ZOSTER IMMUNIZATION (2 of 2) 03/13/2020    BMP  03/21/2025    LIPID  03/21/2025    COVID-19 Vaccine (6 - Pfizer risk 2024-25 season) 07/10/2025    MEDICARE ANNUAL WELLNESS VISIT  04/23/2026    FALL RISK ASSESSMENT  04/23/2026    DTAP/TDAP/TD IMMUNIZATION (3 - Td or Tdap) 11/16/2027    DIABETES SCREENING  04/23/2028    ADVANCE CARE PLANNING  03/21/2029    COLORECTAL CANCER SCREENING  03/12/2031    HEPATITIS C SCREENING  Completed    PHQ-2 (once per calendar year)  Completed    INFLUENZA VACCINE  Completed    Pneumococcal Vaccine: 50+ Years  Completed    AORTIC ANEURYSM SCREENING (SYSTEM ASSIGNED)  Completed    HPV IMMUNIZATION  Aged Out    MENINGITIS IMMUNIZATION  Aged Out         Review of Systems  Constitutional, HEENT, cardiovascular, pulmonary, gi and gu systems are negative, except as otherwise noted.     Objective    Exam  /81   Pulse 54   Temp 97.7  F (36.5  C) (Oral)   Resp 16   Ht 1.829 m (6')   Wt 92.5 kg (204 lb)   SpO2 99%   BMI 27.67 kg/m     Estimated body mass index is 27.67 kg/m  as calculated from the following:    Height as of this encounter: 1.829 m (6').    Weight as of this encounter: 92.5 kg (204 lb).    Physical Exam  GENERAL: alert and no distress  NECK: no adenopathy, no asymmetry, masses, or scars  RESP: lungs clear to auscultation - no rales, rhonchi or wheezes  CV: regular rate and rhythm, normal S1 S2, no S3 or S4, no murmur, click or rub, no peripheral edema  ABDOMEN: soft, nontender, no hepatosplenomegaly, no masses and bowel sounds normal  MS: no gross musculoskeletal defects noted, no edema         4/23/2025   Mini Cog   Clock Draw Score 2 Normal   3 Item Recall 3 objects  recalled   Mini Cog Total Score 5              Signed Electronically by: Tiffany Estrada MD

## 2025-05-07 ENCOUNTER — RESULTS FOLLOW-UP (OUTPATIENT)
Dept: FAMILY MEDICINE | Facility: CLINIC | Age: 70
End: 2025-05-07

## 2025-05-19 ENCOUNTER — MYC MEDICAL ADVICE (OUTPATIENT)
Dept: DERMATOLOGY | Facility: CLINIC | Age: 70
End: 2025-05-19
Payer: MEDICARE

## 2025-06-10 ENCOUNTER — OFFICE VISIT (OUTPATIENT)
Dept: DERMATOLOGY | Facility: CLINIC | Age: 70
End: 2025-06-10
Payer: MEDICARE

## 2025-06-10 DIAGNOSIS — D48.9 NEOPLASM OF UNCERTAIN BEHAVIOR: ICD-10-CM

## 2025-06-10 DIAGNOSIS — L81.4 LENTIGINES: ICD-10-CM

## 2025-06-10 DIAGNOSIS — L57.8 ACTINIC SKIN DAMAGE: Primary | ICD-10-CM

## 2025-06-10 DIAGNOSIS — Z85.828 HISTORY OF BASAL CELL CARCINOMA: ICD-10-CM

## 2025-06-10 PROCEDURE — 88305 TISSUE EXAM BY PATHOLOGIST: CPT | Mod: 26 | Performed by: PATHOLOGY

## 2025-06-10 PROCEDURE — 88305 TISSUE EXAM BY PATHOLOGIST: CPT | Mod: TC | Performed by: STUDENT IN AN ORGANIZED HEALTH CARE EDUCATION/TRAINING PROGRAM

## 2025-06-10 ASSESSMENT — PAIN SCALES - GENERAL: PAINLEVEL_OUTOF10: NO PAIN (0)

## 2025-06-10 NOTE — PROGRESS NOTES
Lidocaine-epinephrine 1-1:924900 % injection   0.25mL once for one use, starting 6/10/2025 ending 6/10/2025,  2mL disp, R-0, injection  Injected by BRAYDEN Lyle

## 2025-06-10 NOTE — PATIENT INSTRUCTIONS
Wound Care After a Biopsy    What is a skin biopsy?  A skin biopsy allows the doctor to examine a very small piece of tissue under the microscope to determine the diagnosis and the best treatment for the skin condition. A local anesthetic (numbing medicine) is injected with a very small needle into the skin area to be tested. A small piece of skin is taken from the area. Sometimes a suture (stitch) is used.     What are the risks of a skin biopsy?  I will experience scar, bleeding, swelling, pain, crusting and redness. I may experience incomplete removal or recurrence. Risks of this procedure are excessive bleeding, bruising, infection, nerve damage, numbness, thick (hypertrophic or keloidal) scar and non-diagnostic biopsy.    How should I care for my wound for the first 24 hours?  Keep the wound dry and covered for 24 hours  If it bleeds, hold direct pressure on the area for 15 minutes. If bleeding does not stop, call us or go to the emergency room  Avoid strenuous exercise the first 1-2 days or as your doctor instructs you    How should I care for the wound after 24 hours?  After 24 hours, remove the bandage  You may bathe or shower as normal  If you had a scalp biopsy, you can shampoo as usual and can use shower water to clean the biopsy site daily  Clean the wound once a day with gentle soap and water  Do not scrub, be gentle  Apply white petroleum/Vaseline after cleaning the wound with a cotton swab or a clean finger, and keep the site covered with a Bandaid /bandage. Bandages are not necessary with a scalp biopsy  If you are unable to cover the site with a Bandaid /bandage, re-apply ointment 2-3 times a day to keep the site moist. Moisture will help with healing  Avoid strenuous activity for first 1-2 days  Avoid lakes, rivers, pools, and oceans until the stitches are removed or the site is healed    How do I clean my wound?  Wash hands thoroughly with soap or use hand  before all wound care  Clean  the wound with gentle soap and water  Apply white petroleum/Vaseline  to wound after it is clean  Replace the Bandaid /bandage to keep the wound covered for the first few days or as instructed by your doctor  If you had a scalp biopsy, warm shower water to the area on a daily basis should suffice    What should I use to clean my wound?   Cotton-tipped applicators (Qtips )  White petroleum jelly (Vaseline ). Use a clean new container and use Q-tips to apply.  Bandaids  as needed  Gentle soap     How should I care for my wound long term?  Do not get your wound dirty  Keep up with wound care for one week or until the area is healed.  A small scab will form and fall off by itself when the area is completely healed. The area will be red and will become pink in color as it heals. Sun protection is very important for how your scar will turn out. Sunscreen with an SPF 30 or greater is recommended once the area is healed.  You should have some soreness but it should be mild and slowly go away over several days. Talk to your doctor about using tylenol for pain,    When should I call my doctor?  If you have increased:   Pain or swelling  Pus or drainage (clear or slightly yellow drainage is ok)  Temperature over 100F  Spreading redness or warmth around wound    When will I hear about my results?  The biopsy results can take 2 weeks to come back.  Your results will automatically release to [employer] before your provider has even reviewed them.  The clinic will call you with the results, send you a [employer] message, or have you schedule a follow-up clinic or phone time to discuss the results.  Contact our clinics if you do not hear from us in 2 weeks.    Who should I call with questions?  [facility]: [phone]  [facility]: [phone]  For urgent needs outside of business hours call the [facility] at [phone] and ask for the dermatology resident on call

## 2025-06-10 NOTE — LETTER
6/10/2025       RE: Hi Smith  [address] [facility] [address]     Dear Colleague,    Thank you for referring your patient, Hi Smith, to the [facility] DERMATOLOGY CLINIC [place] at [facility]. Please see a copy of my visit note below.    [facility] Dermatology Note  Encounter Date: Gregory 10, 2025  Office Visit     Dermatology Problem List:  Last FBSE on Dec 19, 2024.  1. 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  2. Non-healing ulcers, bilateral LEs and left forearm, resolved  - Bacterial culture 3/2016 with normal skin giuliana.  - Bx obtained 3/31/2016 for H&E, sterile culture, flow cytometry, and donor chimerism studies, and if possible TCR gene rearrangement studies, suggested NK-Tcell lymphoma  3. NMSC   - BCC, left anterior shoulder, s/p bx 11/15/2019 with excision shortly after  - BCC, s/p MMS in 2000.  4. Telogen effluvium   - New treatment started on visit on Dec 19, 2024: oral Minoxidil 2,5 mg today. Follow-up in 6 months for possible dose increase to 5 mg  - Discussed earlier tx: Minoxidil 5% foam  5. AK  - s/p cryo 12/2022  6. Hyperpigmented patch, L dorsal hand  - New treatment started on visit on Dec 19, 2024: OTC Vitamin E 1 g/day together with Routen 1500 mg/day   - Earlier tx: hydroquinone cream 4%, Rutin/Vitamin C supplements for bruising  7. Inflamed SK  - Cryotherapy on visit on Dec 19, 2024 - right and left scalp.  - Cryotherapy on visit on 14 Dec, 2023  8. EIC - right frontal neck  - Punch biopsy on visit on Dec 19, 2024.  9. Inflamed skin tag   - s/p cryo 03/24/2025  10. NUB, forehead  - ddx BCC vs trauma  - s/p biopsy  6/10/25    ____________________________________________    Assessment & Plan:    # NUB, erythematous papule with singular telangiectasia, forehead  Differential includes early BCC vs trauma. Given repeated recurrence in same location, recommended biopsy.   - Shave biopsy performed today (see procedure note(s) below).    #Hx BCC  #Lentigines  #Actinic skin damage   - Reviewed the compounding benefits of incremental changes to sun protective behaviors including increased frequency of sunscreen and sun protective clothing like broad brimmed hats and longsleeved UPF containing clothing      Procedures Performed:   - Shave biopsy procedure note, location(s): forehead. After discussion of benefits and risks including but not limited to bleeding, infection, scar, incomplete removal, recurrence, and non-diagnostic biopsy, written consent and photographs were obtained. The area was cleaned with isopropyl alcohol. 0.5mL of 1% lidocaine with epinephrine was injected to obtain adequate anesthesia of lesion(s). Shave biopsy at site(s) performed. Hemostasis was achieved with aluminium chloride. Petrolatum ointment and a sterile dressing were applied. The patient tolerated the procedure and no complications were noted. The patient was provided with verbal and written post care instructions.   - Procedure(s) performed by faculty.     Follow-up: December 2025 for FBSE, earlier pending biopsy results    Staff and Medical Student:   Patient seen and discussed with Dr. Van Sutton, MS4    ____________________________________________    CC: Derm Problem (Spot of concern on forehead. )      HPI:  Mr. Hi Smith is a(n) 70 year old male with a history of extranodal NK/T cell lymphoma s/p BMT and irradiation in remission, NMSE, and TE who presents today as a return patient for a spot check. He reports a new spot on his forehead that has been coming and going for the past few months. It is red, non-tender, and not crusting or  bleeding. It has resolved then reappeared in the same spot three times now. It has currently been present for the past 3 weeks without changing. Patient is otherwise feeling well, without additional skin concerns.    Labs:  None reviewed.    Physical Exam:  Vitals: There were no vitals taken for this visit.  SKIN: Focused examination of the face was performed.  - erythematous papule with single faint telangiectasia, forehead    - No other lesions of concern on areas examined.     Medications:  Current Outpatient Medications   Medication Sig Dispense Refill     amLODIPine (NORVASC) 2.5 MG tablet Take 1 tablet by mouth daily at 2 pm       ASPIRIN PO Take 81 mg by mouth daily       atorvastatin (LIPITOR) 80 MG tablet Take 1 tablet (80 mg) by mouth daily 90 tablet 1     K Phos [place]-Sod Phos Di & Mono (DAVID-PHOS 250 NEUTRAL) 155-852-130 MG TABS Take 1 tablet by mouth twice daily 180 tablet 0     metoprolol succinate ER (TOPROL XL) 25 MG 24 hr tablet Take 0.5 tablets (12.5 mg) by mouth daily       Multiple Vitamin (MULTIVITAMIN ADULT PO) Take by mouth.       nitroGLYcerin (NITROSTAT) 0.4 MG sublingual tablet Place 1 tablet (0.4 mg) under the tongue every 5 minutes as needed for chest pain 12 tablet 0     omeprazole (PRILOSEC) 20 MG DR capsule Take 20 mg by mouth daily       No current facility-administered medications for this visit.        Past Medical History:   Patient Active Problem List   Diagnosis     Extranodal NK/T-cell lymphoma, nasal type (H)     Nausea with vomiting     Anemia in neoplastic disease     Bacteremia     NHL (non-Hodgkin's lymphoma) (H)     Lymphoma (H)     Fever     Lymphoma, non-Hodgkin's (H)     Neoplasm of uncertain behavior of skin     T-cell or NK-cell neoplasm (H)     Other specified prophylactic or treatment measure     Skin cancer screening     Status post cord blood transplantation     Physical deconditioning     History of peripheral stem cell transplant (H)     Dysuria      Hypogammaglobulinemia     Status post bone marrow transplant (H)     Complications of bone marrow transplant (H)     Cellulitis     Hypophosphatemia     CAP (community acquired pneumonia)     History of nonmelanoma skin cancer     Hypertension     Hyperlipidemia     Coronary artery disease involving native heart with other form of angina pectoris, unspecified vessel or lesion type     History of deep vein thrombosis     Basal cell carcinoma of truncal skin     AK (actinic keratosis)     Telogen effluvium     Seborrheic keratoses, inflamed     Actinic keratosis     Seborrheic keratosis     Multifocal motor neuropathy (H)     Other specified neoplasms of uncertain behavior of lymphoid, hematopoietic and related tissue (H)     Androgenetic alopecia     Past Medical History:   Diagnosis Date     Alcohol abuse     in remission x 20 yrs     CAD (coronary artery disease) of artery bypass graft     CABG 2005, hx of stents     Depression      DVT (deep venous thrombosis) (H) 11/2014    upper left arm after PICC line     Extranodal NK/T-cell lymphoma, nasal type (H) 07/23/2014     GERD (gastroesophageal reflux disease)      Hearing loss      HTN (hypertension)      Lymphoma (H)      Natural killer (NK) cell lymphoblastic lymphoma (H) 08/15/2014     S/P autologous bone marrow transplantation (H) 03/04/2015     Skin cancer     15 y ago.? basal cell     Tinnitus      VRE (vancomycin resistant enterococcus) culture positive         CC [place]strada [place]  [address] [place] [address] on close of this encounter.      Lidocaine-epinephrine 1-1:429635 % injection   0.25mL once for one use, starting 6/10/2025 ending 6/10/2025,  2mL disp, R-0, injection  Injected by BRAYDEN Lyle        Again, thank you for allowing me to participate in the care of your patient.      Sincerely,    Marek Araujo MD

## 2025-06-10 NOTE — NURSING NOTE
Dermatology Rooming Note    Hi Smith's goals for this visit include:   Chief Complaint   Patient presents with    Derm Problem     Spot of concern on forehead.      BRAYDEN Lyle

## 2025-06-10 NOTE — PROGRESS NOTES
[facility] Dermatology Note  Encounter Date: Gregory 10, 2025  Office Visit     Dermatology Problem List:  Last FBSE on Dec 19, 2024.  1. 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  2. Non-healing ulcers, bilateral LEs and left forearm, resolved  - Bacterial culture 3/2016 with normal skin giuliana.  - Bx obtained 3/31/2016 for H&E, sterile culture, flow cytometry, and donor chimerism studies, and if possible TCR gene rearrangement studies, suggested NK-Tcell lymphoma  3. NMSC   - BCC, left anterior shoulder, s/p bx 11/15/2019 with excision shortly after  - BCC, s/p MMS in 2000.  4. Telogen effluvium   - New treatment started on visit on Dec 19, 2024: oral Minoxidil 2,5 mg today. Follow-up in 6 months for possible dose increase to 5 mg  - Discussed earlier tx: Minoxidil 5% foam  5. AK  - s/p cryo 12/2022  6. Hyperpigmented patch, L dorsal hand  - New treatment started on visit on Dec 19, 2024: OTC Vitamin E 1 g/day together with Routen 1500 mg/day   - Earlier tx: hydroquinone cream 4%, Rutin/Vitamin C supplements for bruising  7. Inflamed SK  - Cryotherapy on visit on Dec 19, 2024 - right and left scalp.  - Cryotherapy on visit on 14 Dec, 2023  8. EIC - right frontal neck  - Punch biopsy on visit on Dec 19, 2024.  9. Inflamed skin tag   - s/p cryo 03/24/2025  10. NUB, forehead  - ddx BCC vs trauma  - s/p biopsy 6/10/25    ____________________________________________    Assessment & Plan:    # NUB, erythematous papule with singular telangiectasia, forehead  Differential includes early BCC vs trauma. Given repeated recurrence in same location, recommended biopsy.   - Shave biopsy performed today (see procedure note(s) below).    #Hx BCC  #Lentigines  #Actinic skin damage   -  Reviewed the compounding benefits of incremental changes to sun protective behaviors including increased frequency of sunscreen and sun protective clothing like broad brimmed hats and longsleeved UPF containing clothing      Procedures Performed:   - Shave biopsy procedure note, location(s): forehead. After discussion of benefits and risks including but not limited to bleeding, infection, scar, incomplete removal, recurrence, and non-diagnostic biopsy, written consent and photographs were obtained. The area was cleaned with isopropyl alcohol. 0.5mL of 1% lidocaine with epinephrine was injected to obtain adequate anesthesia of lesion(s). Shave biopsy at site(s) performed. Hemostasis was achieved with aluminium chloride. Petrolatum ointment and a sterile dressing were applied. The patient tolerated the procedure and no complications were noted. The patient was provided with verbal and written post care instructions.   - Procedure(s) performed by faculty.     Follow-up: December 2025 for FBSE, earlier pending biopsy results    Staff and Medical Student:   Patient seen and discussed with Dr. Van Sutton, MS4    ____________________________________________    CC: Derm Problem (Spot of concern on forehead. )      HPI:  Mr. Hi Smith is a(n) 70 year old male with a history of extranodal NK/T cell lymphoma s/p BMT and irradiation in remission, NMSE, and TE who presents today as a return patient for a spot check. He reports a new spot on his forehead that has been coming and going for the past few months. It is red, non-tender, and not crusting or bleeding. It has resolved then reappeared in the same spot three times now. It has currently been present for the past 3 weeks without changing. Patient is otherwise feeling well, without additional skin concerns.    Labs:  None reviewed.    Physical Exam:  Vitals: There were no vitals taken for this visit.  SKIN: Focused examination of the face was performed.  -  erythematous papule with single faint telangiectasia, forehead    - No other lesions of concern on areas examined.     Medications:  Current Outpatient Medications   Medication Sig Dispense Refill    amLODIPine (NORVASC) 2.5 MG tablet Take 1 tablet by mouth daily at 2 pm      ASPIRIN PO Take 81 mg by mouth daily      atorvastatin (LIPITOR) 80 MG tablet Take 1 tablet (80 mg) by mouth daily 90 tablet 1    K Phos [place]-Sod Phos Di & Mono (DAVID-PHOS 250 NEUTRAL) 155-852-130 MG TABS Take 1 tablet by mouth twice daily 180 tablet 0    metoprolol succinate ER (TOPROL XL) 25 MG 24 hr tablet Take 0.5 tablets (12.5 mg) by mouth daily      Multiple Vitamin (MULTIVITAMIN ADULT PO) Take by mouth.      nitroGLYcerin (NITROSTAT) 0.4 MG sublingual tablet Place 1 tablet (0.4 mg) under the tongue every 5 minutes as needed for chest pain 12 tablet 0    omeprazole (PRILOSEC) 20 MG DR capsule Take 20 mg by mouth daily       No current facility-administered medications for this visit.        Past Medical History:   Patient Active Problem List   Diagnosis    Extranodal NK/T-cell lymphoma, nasal type (H)    Nausea with vomiting    Anemia in neoplastic disease    Bacteremia    NHL (non-Hodgkin's lymphoma) (H)    Lymphoma (H)    Fever    Lymphoma, non-Hodgkin's (H)    Neoplasm of uncertain behavior of skin    T-cell or NK-cell neoplasm (H)    Other specified prophylactic or treatment measure    Skin cancer screening    Status post cord blood transplantation    Physical deconditioning    History of peripheral stem cell transplant (H)    Dysuria    Hypogammaglobulinemia    Status post bone marrow transplant (H)    Complications of bone marrow transplant (H)    Cellulitis    Hypophosphatemia    CAP (community acquired pneumonia)    History of nonmelanoma skin cancer    Hypertension    Hyperlipidemia    Coronary artery disease involving native heart with other form of angina pectoris, unspecified vessel or lesion type    History of deep vein  thrombosis    Basal cell carcinoma of truncal skin    AK (actinic keratosis)    Telogen effluvium    Seborrheic keratoses, inflamed    Actinic keratosis    Seborrheic keratosis    Multifocal motor neuropathy (H)    Other specified neoplasms of uncertain behavior of lymphoid, hematopoietic and related tissue (H)    Androgenetic alopecia     Past Medical History:   Diagnosis Date    Alcohol abuse     in remission x 20 yrs    CAD (coronary artery disease) of artery bypass graft     CABG 2005, hx of stents    Depression     DVT (deep venous thrombosis) (H) 11/2014    upper left arm after PICC line    Extranodal NK/T-cell lymphoma, nasal type (H) 07/23/2014    GERD (gastroesophageal reflux disease)     Hearing loss     HTN (hypertension)     Lymphoma (H)     Natural killer (NK) cell lymphoblastic lymphoma (H) 08/15/2014    S/P autologous bone marrow transplantation (H) 03/04/2015    Skin cancer     15 y ago.? basal cell    Tinnitus     VRE (vancomycin resistant enterococcus) culture positive         CC Tiffany Estrada MD  [address] [place] [address] on close of this encounter.

## 2025-06-11 ENCOUNTER — RESULTS FOLLOW-UP (OUTPATIENT)
Dept: DERMATOLOGY | Facility: CLINIC | Age: 70
End: 2025-06-11

## 2025-06-12 ENCOUNTER — TELEPHONE (OUTPATIENT)
Dept: DERMATOLOGY | Facility: CLINIC | Age: 70
End: 2025-06-12
Payer: MEDICARE

## 2025-06-12 DIAGNOSIS — C44.91 BASAL CELL CARCINOMA: Primary | ICD-10-CM

## 2025-06-17 NOTE — TELEPHONE ENCOUNTER
FUTURE VISIT INFORMATION      FUTURE VISIT INFORMATION:  Date: 7/16/25  Time: 9:00  Location: [facility]  REFERRAL INFORMATION:  Referring provider:  SEJAL Araujo  Referring providers clinic:  Derm  Reason for visit/diagnosis  Forehead; BCC; Mohs      RECORDS REQUESTED FROM:       Clinic name Comments Records Status Imaging Status   Derm 6/10/25  Path # [id number] [facility]

## 2025-07-05 ENCOUNTER — HEALTH MAINTENANCE LETTER (OUTPATIENT)
Age: 70
End: 2025-07-05

## 2025-07-09 ENCOUNTER — VIRTUAL VISIT (OUTPATIENT)
Dept: DERMATOLOGY | Facility: CLINIC | Age: 70
End: 2025-07-09
Payer: MEDICARE

## 2025-07-09 DIAGNOSIS — C44.319 BASAL CELL CARCINOMA (BCC) OF SKIN OF OTHER PART OF FACE: Primary | ICD-10-CM

## 2025-07-09 RX ORDER — ISOSORBIDE MONONITRATE 30 MG/1
30 TABLET, EXTENDED RELEASE ORAL DAILY
COMMUNITY
Start: 2025-04-01

## 2025-07-09 ASSESSMENT — PAIN SCALES - GENERAL: PAINLEVEL_OUTOF10: NO PAIN (0)

## 2025-07-09 NOTE — Clinical Note
Pt would like to reschedule Mohs procedure to later date (mid to end of Aug) due to upcoming cruise

## 2025-07-09 NOTE — Clinical Note
7/9/2025       RE: Hi Smith  [address] [address] [address]     Dear Colleague,    Thank you for referring your patient, Hi Smith, to the [facility] DERMATOLOGIC SURGERY CLINIC [place] at [facility]. Please see a copy of my visit note below.    [facility] Dermatology Note  Encounter Date: Jul 9, 2025  Store-and-Forward and Telephone. Location of teledermatologist: [facility] DERMATOLOGIC SURGERY CLINIC [place].  Start time: 2:13 PM. End time: 2:16 PM.    Dermatologic Surgery Telemedicine Consult Note    Dermatology Problem List:  Last FBSE on Dec 19, 2024.  1. 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  2. Non-healing ulcers, bilateral LEs and left forearm, resolved  - Bacterial culture 3/2016 with normal skin giuliana.  - Bx obtained 3/31/2016 for H&E, sterile culture, flow cytometry, and donor chimerism studies, and if possible TCR gene rearrangement studies, suggested NK-Tcell lymphoma  3. NMSC   - BCC, left anterior shoulder, s/p bx 11/15/2019 with excision shortly after  - BCC, s/p MMS in 2000.   -Forehead, BCC, s/p biopsy 6/10/25  4. Telogen effluvium   - New treatment started on visit on Dec 19, 2024: oral Minoxidil 2,5 mg today. Follow-up in 6 months for possible dose increase to 5 mg  - Discussed earlier tx: Minoxidil 5% foam  5. AK  - s/p cryo 12/2022  6. Hyperpigmented patch, L dorsal hand  - New treatment started on visit on Dec 19, 2024: OTC Vitamin E 1 g/day together with Routen 1500 mg/day   - Earlier tx: hydroquinone cream 4%, Rutin/Vitamin C supplements for bruising  7. Inflamed SK  - Cryotherapy on visit on Dec 19, 2024 - right and left scalp.  - Cryotherapy on visit on  14 Dec, 2023  8. EIC - right frontal neck  - Punch biopsy on visit on Dec 19, 2024.  9. Inflamed skin tag   - s/p cryo 03/24/2025      CC: No chief complaint on file.      Subjective: Hi Smith is a 70 year old male who presents today for Mohs micrographic surgery consultation for a recent diagnosis of skin cancer.  - Skin cancer(s): BCC  - Location(s): forehead  - Patient notes he has had MMS before  -Patient has a trip planned and would like to know if he should wait til after to get the surgery done  - no other concerns today         Objective:   Skin: Focused examination of the forehead within the teledermatology photograph(s) on 07/09/25 was performed.   - R glabella with 6 mm crusted papule adjacent to linear white scar     Path report:   06/10/25 case: [id number]  A(1). Skin, forehead, shave:  - Basal cell carcinoma, superficial and nodular types     Assessment and Plan:     1. Plan for Mohs micrographic surgery for skin cancer(s) above:  *Review lab result(s): Dermpath report   - We discussed the nature of the diagnosis/condition above. We discussed the treatment options, including the risks benefits and expectations of these options. We recommend micrographic surgery as the most effective and most tissue sparing option for treatment, and the patient agrees to proceed with this.  The patient is aware of the risks, benefits and expectations of this procedure. The patient will be scheduled for this procedure, if not already done so.  - We anticipate the following closure type: Linear closure, such as complex linear closure (CLC)    The patient was discussed with and evaluated by attending physician, Rashawn Araujo MD.    Siri Dean MD   Mohs Surgery and Dermatologic Oncology Fellow      Scribe Disclosure:   I, Josesito Petersen, am serving as a scribe; to document services personally performed by Rashawn Araujo MD -based on data collection and the provider's statements to me.         Again, thank you for  allowing me to participate in the care of your patient.      Sincerely,    Rashawn Araujo MD

## 2025-07-09 NOTE — PROGRESS NOTES
[facility] Dermatology Note  Encounter Date: Jul 9, 2025  Store-and-Forward and Telephone. Location of teledermatologist: [facility] DERMATOLOGIC SURGERY CLINIC [place].  Start time: 2:13 PM. End time: 2:16 PM.    Dermatologic Surgery Telemedicine Consult Note    Dermatology Problem List:  Last FBSE on Dec 19, 2024.  1. 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  2. Non-healing ulcers, bilateral LEs and left forearm, resolved  - Bacterial culture 3/2016 with normal skin giuliana.  - Bx obtained 3/31/2016 for H&E, sterile culture, flow cytometry, and donor chimerism studies, and if possible TCR gene rearrangement studies, suggested NK-Tcell lymphoma  3. NMSC   - BCC, left anterior shoulder, s/p bx 11/15/2019 with excision shortly after  - BCC, s/p MMS in 2000.   -Forehead, BCC, s/p biopsy 6/10/25  4. Telogen effluvium   - New treatment started on visit on Dec 19, 2024: oral Minoxidil 2,5 mg today. Follow-up in 6 months for possible dose increase to 5 mg  - Discussed earlier tx: Minoxidil 5% foam  5. AK  - s/p cryo 12/2022  6. Hyperpigmented patch, L dorsal hand  - New treatment started on visit on Dec 19, 2024: OTC Vitamin E 1 g/day together with Routen 1500 mg/day   - Earlier tx: hydroquinone cream 4%, Rutin/Vitamin C supplements for bruising  7. Inflamed SK  - Cryotherapy on visit on Dec 19, 2024 - right and left scalp.  - Cryotherapy on visit on 14 Dec, 2023  8. EIC - right frontal neck  - Punch biopsy on visit on Dec 19, 2024.  9. Inflamed skin tag   - s/p cryo 03/24/2025      CC: No chief complaint on file.      Subjective: Hi Smith is a 70 year old male who presents today for Mohs micrographic surgery consultation for a recent diagnosis of skin  cancer.  - Skin cancer(s): BCC  - Location(s): forehead  - Patient notes he has had MMS before  -Patient has a trip planned and would like to know if he should wait til after to get the surgery done  - no other concerns today         Objective:   Skin: Focused examination of the forehead within the teledermatology photograph(s) on 07/09/25 was performed.   - R glabella with 6 mm crusted papule adjacent to linear white scar     Path report:   06/10/25 case: [id number]  A(1). Skin, forehead, shave:  - Basal cell carcinoma, superficial and nodular types     Assessment and Plan:     1. Plan for Mohs micrographic surgery for skin cancer(s) above:  *Review lab result(s): Dermpath report   - We discussed the nature of the diagnosis/condition above. We discussed the treatment options, including the risks benefits and expectations of these options. We recommend micrographic surgery as the most effective and most tissue sparing option for treatment, and the patient agrees to proceed with this.  The patient is aware of the risks, benefits and expectations of this procedure. The patient will be scheduled for this procedure, if not already done so.  - We anticipate the following closure type: Linear closure, such as complex linear closure (CLC)    The patient was discussed with and evaluated by attending physician, Rashawn Araujo MD.    Siri Dean MD   Mohs Surgery and Dermatologic Oncology Fellow      Scribe Disclosure:   Josesito REYES, am serving as a scribe; to document services personally performed by Rashawn Araujo MD -based on data collection and the provider's statements to me.

## 2025-07-16 ENCOUNTER — PRE VISIT (OUTPATIENT)
Dept: DERMATOLOGY | Facility: CLINIC | Age: 70
End: 2025-07-16

## 2025-08-20 ENCOUNTER — TELEPHONE (OUTPATIENT)
Dept: DERMATOLOGY | Facility: CLINIC | Age: 70
End: 2025-08-20

## 2025-08-20 ENCOUNTER — OFFICE VISIT (OUTPATIENT)
Dept: DERMATOLOGY | Facility: CLINIC | Age: 70
End: 2025-08-20
Attending: STUDENT IN AN ORGANIZED HEALTH CARE EDUCATION/TRAINING PROGRAM
Payer: MEDICARE

## 2025-08-20 VITALS — DIASTOLIC BLOOD PRESSURE: 77 MMHG | HEART RATE: 47 BPM | SYSTOLIC BLOOD PRESSURE: 134 MMHG

## 2025-08-20 DIAGNOSIS — C44.310 BASAL CELL CARCINOMA (BCC) OF SKIN OF FACE, UNSPECIFIED PART OF FACE: ICD-10-CM

## 2025-08-20 PROCEDURE — 17311 MOHS 1 STAGE H/N/HF/G: CPT | Performed by: DERMATOLOGY

## 2025-08-20 PROCEDURE — 1126F AMNT PAIN NOTED NONE PRSNT: CPT | Performed by: DERMATOLOGY

## 2025-08-20 PROCEDURE — 13132 CMPLX RPR F/C/C/M/N/AX/G/H/F: CPT | Performed by: DERMATOLOGY

## 2025-08-20 PROCEDURE — 3078F DIAST BP <80 MM HG: CPT | Performed by: DERMATOLOGY

## 2025-08-20 PROCEDURE — 3075F SYST BP GE 130 - 139MM HG: CPT | Performed by: DERMATOLOGY

## 2025-08-20 ASSESSMENT — PAIN SCALES - GENERAL: PAINLEVEL_OUTOF10: NO PAIN (0)

## 2025-08-31 DIAGNOSIS — E83.39 HYPOPHOSPHATEMIA: ICD-10-CM

## 2025-09-01 RX ORDER — SOD PHOS DI, MONO/K PHOS MONO 250 MG
1 TABLET ORAL 2 TIMES DAILY
Qty: 180 TABLET | Refills: 3 | Status: SHIPPED | OUTPATIENT
Start: 2025-09-01